# Patient Record
Sex: FEMALE | HISPANIC OR LATINO | Employment: OTHER
[De-identification: names, ages, dates, MRNs, and addresses within clinical notes are randomized per-mention and may not be internally consistent; named-entity substitution may affect disease eponyms.]

---

## 2017-01-16 ENCOUNTER — RECORDS - HEALTHEAST (OUTPATIENT)
Dept: ADMINISTRATIVE | Facility: OTHER | Age: 77
End: 2017-01-16

## 2017-01-16 ENCOUNTER — COMMUNICATION - HEALTHEAST (OUTPATIENT)
Dept: FAMILY MEDICINE | Facility: CLINIC | Age: 77
End: 2017-01-16

## 2017-01-16 DIAGNOSIS — F41.1 ANXIETY STATE: ICD-10-CM

## 2017-01-17 ENCOUNTER — COMMUNICATION - HEALTHEAST (OUTPATIENT)
Dept: NEUROLOGY | Facility: CLINIC | Age: 77
End: 2017-01-17

## 2017-01-19 ENCOUNTER — RECORDS - HEALTHEAST (OUTPATIENT)
Dept: ADMINISTRATIVE | Facility: OTHER | Age: 77
End: 2017-01-19

## 2017-01-27 ENCOUNTER — COMMUNICATION - HEALTHEAST (OUTPATIENT)
Dept: FAMILY MEDICINE | Facility: CLINIC | Age: 77
End: 2017-01-27

## 2017-01-27 ENCOUNTER — RECORDS - HEALTHEAST (OUTPATIENT)
Dept: ADMINISTRATIVE | Facility: OTHER | Age: 77
End: 2017-01-27

## 2017-02-10 ENCOUNTER — COMMUNICATION - HEALTHEAST (OUTPATIENT)
Dept: FAMILY MEDICINE | Facility: CLINIC | Age: 77
End: 2017-02-10

## 2017-02-10 ENCOUNTER — RECORDS - HEALTHEAST (OUTPATIENT)
Dept: ADMINISTRATIVE | Facility: OTHER | Age: 77
End: 2017-02-10

## 2017-02-15 ENCOUNTER — RECORDS - HEALTHEAST (OUTPATIENT)
Dept: ADMINISTRATIVE | Facility: OTHER | Age: 77
End: 2017-02-15

## 2017-03-22 ENCOUNTER — RECORDS - HEALTHEAST (OUTPATIENT)
Dept: ADMINISTRATIVE | Facility: OTHER | Age: 77
End: 2017-03-22

## 2017-03-23 ENCOUNTER — OFFICE VISIT - HEALTHEAST (OUTPATIENT)
Dept: FAMILY MEDICINE | Facility: CLINIC | Age: 77
End: 2017-03-23

## 2017-03-23 ENCOUNTER — COMMUNICATION - HEALTHEAST (OUTPATIENT)
Dept: TELEHEALTH | Facility: CLINIC | Age: 77
End: 2017-03-23

## 2017-03-23 ENCOUNTER — RECORDS - HEALTHEAST (OUTPATIENT)
Dept: ADMINISTRATIVE | Facility: OTHER | Age: 77
End: 2017-03-23

## 2017-03-23 DIAGNOSIS — I10 ESSENTIAL HYPERTENSION: ICD-10-CM

## 2017-03-23 DIAGNOSIS — E11.9 DIABETES (H): ICD-10-CM

## 2017-03-23 LAB
HBA1C MFR BLD: 7.8 % (ref 3.5–6.1)
LDLC SERPL CALC-MCNC: 120 MG/DL

## 2017-03-24 ENCOUNTER — COMMUNICATION - HEALTHEAST (OUTPATIENT)
Dept: FAMILY MEDICINE | Facility: CLINIC | Age: 77
End: 2017-03-24

## 2017-03-24 ENCOUNTER — AMBULATORY - HEALTHEAST (OUTPATIENT)
Dept: FAMILY MEDICINE | Facility: CLINIC | Age: 77
End: 2017-03-24

## 2017-04-17 ENCOUNTER — RECORDS - HEALTHEAST (OUTPATIENT)
Dept: ADMINISTRATIVE | Facility: OTHER | Age: 77
End: 2017-04-17

## 2017-04-20 ENCOUNTER — RECORDS - HEALTHEAST (OUTPATIENT)
Dept: ADMINISTRATIVE | Facility: OTHER | Age: 77
End: 2017-04-20

## 2017-04-22 ENCOUNTER — COMMUNICATION - HEALTHEAST (OUTPATIENT)
Dept: FAMILY MEDICINE | Facility: CLINIC | Age: 77
End: 2017-04-22

## 2017-04-22 DIAGNOSIS — F43.21 ADJUSTMENT DISORDER WITH DEPRESSED MOOD: ICD-10-CM

## 2017-04-22 DIAGNOSIS — I10 HTN (HYPERTENSION): ICD-10-CM

## 2017-04-22 DIAGNOSIS — K21.9 GERD WITHOUT ESOPHAGITIS: ICD-10-CM

## 2017-05-05 ENCOUNTER — HOSPITAL ENCOUNTER (OUTPATIENT)
Dept: NEUROLOGY | Facility: CLINIC | Age: 77
Setting detail: THERAPIES SERIES
Discharge: STILL ADMITTED OR ADMINISTRATIVE | End: 2017-05-05
Attending: NURSE PRACTITIONER

## 2017-05-05 DIAGNOSIS — J30.2 SEASONAL ALLERGIC RHINITIS: ICD-10-CM

## 2017-05-05 DIAGNOSIS — F41.1 ANXIETY STATE: ICD-10-CM

## 2017-06-12 ENCOUNTER — AMBULATORY - HEALTHEAST (OUTPATIENT)
Dept: ADMINISTRATIVE | Facility: CLINIC | Age: 77
End: 2017-06-12

## 2017-06-12 ENCOUNTER — OFFICE VISIT - HEALTHEAST (OUTPATIENT)
Dept: GERIATRICS | Facility: CLINIC | Age: 77
End: 2017-06-12

## 2017-06-12 DIAGNOSIS — E66.9 OBESITY: ICD-10-CM

## 2017-06-12 DIAGNOSIS — I10 ESSENTIAL HYPERTENSION: ICD-10-CM

## 2017-06-12 DIAGNOSIS — R41.3 MEMORY LOSS: ICD-10-CM

## 2017-06-12 DIAGNOSIS — I07.9 DISEASE OF TRICUSPID VALVE: ICD-10-CM

## 2017-06-12 RX ORDER — CETIRIZINE HYDROCHLORIDE 10 MG/1
10 TABLET ORAL DAILY PRN
Status: SHIPPED | COMMUNITY
Start: 2017-06-12 | End: 2021-10-25

## 2017-06-23 ENCOUNTER — RECORDS - HEALTHEAST (OUTPATIENT)
Dept: ADMINISTRATIVE | Facility: OTHER | Age: 77
End: 2017-06-23

## 2017-07-17 ENCOUNTER — OFFICE VISIT - HEALTHEAST (OUTPATIENT)
Dept: GERIATRICS | Facility: CLINIC | Age: 77
End: 2017-07-17

## 2017-07-17 DIAGNOSIS — G47.33 OBSTRUCTIVE SLEEP APNEA (ADULT) (PEDIATRIC): ICD-10-CM

## 2017-07-17 DIAGNOSIS — R60.0 BILATERAL LOWER EXTREMITY EDEMA: ICD-10-CM

## 2017-07-17 DIAGNOSIS — I10 ESSENTIAL HYPERTENSION WITH GOAL BLOOD PRESSURE LESS THAN 130/80: ICD-10-CM

## 2017-07-17 DIAGNOSIS — E11.65 TYPE 2 DIABETES MELLITUS WITH HYPERGLYCEMIA, WITHOUT LONG-TERM CURRENT USE OF INSULIN (H): ICD-10-CM

## 2017-07-17 ASSESSMENT — MIFFLIN-ST. JEOR: SCORE: 1508.27

## 2017-08-09 ENCOUNTER — OFFICE VISIT - HEALTHEAST (OUTPATIENT)
Dept: GERIATRICS | Facility: CLINIC | Age: 77
End: 2017-08-09

## 2017-08-09 DIAGNOSIS — I10 ESSENTIAL HYPERTENSION WITH GOAL BLOOD PRESSURE LESS THAN 130/80: ICD-10-CM

## 2017-08-09 DIAGNOSIS — G47.33 OBSTRUCTIVE SLEEP APNEA (ADULT) (PEDIATRIC): ICD-10-CM

## 2017-08-09 DIAGNOSIS — E11.65 TYPE 2 DIABETES MELLITUS WITH HYPERGLYCEMIA, WITHOUT LONG-TERM CURRENT USE OF INSULIN (H): ICD-10-CM

## 2017-08-09 DIAGNOSIS — R60.0 BILATERAL LOWER EXTREMITY EDEMA: ICD-10-CM

## 2017-08-10 ENCOUNTER — RECORDS - HEALTHEAST (OUTPATIENT)
Dept: LAB | Facility: CLINIC | Age: 77
End: 2017-08-10

## 2017-08-10 LAB — HBA1C MFR BLD: 8.5 % (ref 4.2–6.1)

## 2017-09-14 ENCOUNTER — OFFICE VISIT - HEALTHEAST (OUTPATIENT)
Dept: GERIATRICS | Facility: CLINIC | Age: 77
End: 2017-09-14

## 2017-09-14 DIAGNOSIS — I10 ESSENTIAL HYPERTENSION WITH GOAL BLOOD PRESSURE LESS THAN 130/80: ICD-10-CM

## 2017-09-14 DIAGNOSIS — E87.6 POTASSIUM (K) DEFICIENCY: ICD-10-CM

## 2017-09-14 DIAGNOSIS — F41.1 ANXIETY STATE: ICD-10-CM

## 2017-09-14 DIAGNOSIS — N18.30 RENAL FAILURE, CHRONIC, STAGE 3 (MODERATE) (H): ICD-10-CM

## 2017-09-14 DIAGNOSIS — E11.65 TYPE 2 DIABETES MELLITUS WITH HYPERGLYCEMIA, WITHOUT LONG-TERM CURRENT USE OF INSULIN (H): ICD-10-CM

## 2017-09-14 RX ORDER — METOPROLOL SUCCINATE 25 MG/1
25 TABLET, EXTENDED RELEASE ORAL DAILY
Status: SHIPPED | COMMUNITY
Start: 2017-09-14 | End: 2021-10-25

## 2017-09-14 RX ORDER — MINERAL OIL/HYDROPHIL PETROLAT
1 OINTMENT (GRAM) TOPICAL 3 TIMES DAILY PRN
Status: SHIPPED | COMMUNITY
Start: 2017-09-14 | End: 2021-10-25

## 2017-10-11 ENCOUNTER — OFFICE VISIT - HEALTHEAST (OUTPATIENT)
Dept: GERIATRICS | Facility: CLINIC | Age: 77
End: 2017-10-11

## 2017-10-11 DIAGNOSIS — F41.1 ANXIETY STATE: ICD-10-CM

## 2017-10-11 DIAGNOSIS — E87.6 POTASSIUM (K) DEFICIENCY: ICD-10-CM

## 2017-10-11 DIAGNOSIS — N18.30 RENAL FAILURE, CHRONIC, STAGE 3 (MODERATE) (H): ICD-10-CM

## 2017-10-11 DIAGNOSIS — E11.65 TYPE 2 DIABETES MELLITUS WITH HYPERGLYCEMIA, WITHOUT LONG-TERM CURRENT USE OF INSULIN (H): ICD-10-CM

## 2017-10-11 DIAGNOSIS — I10 ESSENTIAL HYPERTENSION: ICD-10-CM

## 2017-11-24 ENCOUNTER — RECORDS - HEALTHEAST (OUTPATIENT)
Dept: LAB | Facility: CLINIC | Age: 77
End: 2017-11-24

## 2017-11-27 LAB
CHOLEST SERPL-MCNC: 183 MG/DL
FASTING STATUS PATIENT QL REPORTED: NORMAL
HDLC SERPL-MCNC: 60 MG/DL
LDLC SERPL CALC-MCNC: 103 MG/DL
TRIGL SERPL-MCNC: 102 MG/DL

## 2018-01-08 ENCOUNTER — HOSPITAL ENCOUNTER (OUTPATIENT)
Dept: NEUROLOGY | Facility: CLINIC | Age: 78
Setting detail: THERAPIES SERIES
Discharge: STILL ADMITTED OR ADMINISTRATIVE | End: 2018-01-08
Attending: NURSE PRACTITIONER

## 2018-01-08 DIAGNOSIS — F02.80 ALZHEIMER'S DEMENTIA WITHOUT BEHAVIORAL DISTURBANCE, UNSPECIFIED TIMING OF DEMENTIA ONSET: ICD-10-CM

## 2018-01-08 DIAGNOSIS — G30.9 ALZHEIMER'S DEMENTIA WITHOUT BEHAVIORAL DISTURBANCE, UNSPECIFIED TIMING OF DEMENTIA ONSET: ICD-10-CM

## 2018-01-08 DIAGNOSIS — R41.3 MEMORY LOSS: ICD-10-CM

## 2018-01-08 DIAGNOSIS — R79.89 LOW VITAMIN B12 LEVEL: ICD-10-CM

## 2018-01-08 DIAGNOSIS — F41.1 ANXIETY STATE: ICD-10-CM

## 2018-01-08 LAB — VIT B12 SERPL-MCNC: 487 PG/ML (ref 213–816)

## 2018-01-24 ENCOUNTER — RECORDS - HEALTHEAST (OUTPATIENT)
Dept: LAB | Facility: CLINIC | Age: 78
End: 2018-01-24

## 2018-01-26 LAB — 25(OH)D3 SERPL-MCNC: 42.3 NG/ML (ref 30–80)

## 2018-03-07 ENCOUNTER — OFFICE VISIT - HEALTHEAST (OUTPATIENT)
Dept: GERIATRICS | Facility: CLINIC | Age: 78
End: 2018-03-07

## 2018-03-07 DIAGNOSIS — E11.65 TYPE 2 DIABETES MELLITUS WITH HYPERGLYCEMIA, WITHOUT LONG-TERM CURRENT USE OF INSULIN (H): ICD-10-CM

## 2018-03-07 DIAGNOSIS — I10 ESSENTIAL HYPERTENSION WITH GOAL BLOOD PRESSURE LESS THAN 130/80: ICD-10-CM

## 2018-03-07 DIAGNOSIS — R41.3 MEMORY LOSS: ICD-10-CM

## 2018-03-07 DIAGNOSIS — N18.30 RENAL FAILURE, CHRONIC, STAGE 3 (MODERATE) (H): ICD-10-CM

## 2018-03-16 ENCOUNTER — HOSPITAL ENCOUNTER (OUTPATIENT)
Dept: NEUROLOGY | Facility: CLINIC | Age: 78
Setting detail: THERAPIES SERIES
Discharge: STILL ADMITTED OR ADMINISTRATIVE | End: 2018-03-16
Attending: NURSE PRACTITIONER

## 2018-03-16 DIAGNOSIS — R41.3 MEMORY LOSS: ICD-10-CM

## 2018-03-16 DIAGNOSIS — G31.84 MILD NEUROCOGNITIVE DISORDER: ICD-10-CM

## 2018-03-17 ENCOUNTER — RECORDS - HEALTHEAST (OUTPATIENT)
Dept: LAB | Facility: CLINIC | Age: 78
End: 2018-03-17

## 2018-03-17 LAB
CREAT SERPL-MCNC: 1.18 MG/DL (ref 0.6–1.1)
GFR SERPL CREATININE-BSD FRML MDRD: 45 ML/MIN/1.73M2

## 2018-03-28 ENCOUNTER — HOSPITAL ENCOUNTER (OUTPATIENT)
Dept: NEUROLOGY | Facility: CLINIC | Age: 78
Setting detail: THERAPIES SERIES
Discharge: STILL ADMITTED OR ADMINISTRATIVE | End: 2018-03-28
Attending: NURSE PRACTITIONER

## 2018-03-28 DIAGNOSIS — G31.84 MILD NEUROCOGNITIVE DISORDER: ICD-10-CM

## 2018-04-12 ENCOUNTER — OFFICE VISIT - HEALTHEAST (OUTPATIENT)
Dept: GERIATRICS | Facility: CLINIC | Age: 78
End: 2018-04-12

## 2018-04-12 DIAGNOSIS — F41.1 ANXIETY STATE: ICD-10-CM

## 2018-04-12 DIAGNOSIS — E11.65 TYPE 2 DIABETES MELLITUS WITH HYPERGLYCEMIA, WITHOUT LONG-TERM CURRENT USE OF INSULIN (H): ICD-10-CM

## 2018-04-12 DIAGNOSIS — R60.0 BILATERAL LOWER EXTREMITY EDEMA: ICD-10-CM

## 2018-04-12 DIAGNOSIS — I10 ESSENTIAL HYPERTENSION: ICD-10-CM

## 2018-04-12 DIAGNOSIS — N18.30 RENAL FAILURE, CHRONIC, STAGE 3 (MODERATE) (H): ICD-10-CM

## 2018-04-12 DIAGNOSIS — R73.09 OTHER ABNORMAL GLUCOSE: ICD-10-CM

## 2018-04-12 DIAGNOSIS — K21.00 GASTROESOPHAGEAL REFLUX DISEASE WITH ESOPHAGITIS: ICD-10-CM

## 2018-04-12 DIAGNOSIS — E87.6 POTASSIUM (K) DEFICIENCY: ICD-10-CM

## 2018-04-13 ENCOUNTER — RECORDS - HEALTHEAST (OUTPATIENT)
Dept: LAB | Facility: CLINIC | Age: 78
End: 2018-04-13

## 2018-04-16 ENCOUNTER — COMMUNICATION - HEALTHEAST (OUTPATIENT)
Dept: NEUROLOGY | Facility: CLINIC | Age: 78
End: 2018-04-16

## 2018-04-17 LAB — HBA1C MFR BLD: 6.5 % (ref 4.2–6.1)

## 2018-05-18 ENCOUNTER — HOSPITAL ENCOUNTER (OUTPATIENT)
Dept: NEUROLOGY | Facility: CLINIC | Age: 78
Setting detail: THERAPIES SERIES
Discharge: STILL ADMITTED OR ADMINISTRATIVE | End: 2018-05-18
Attending: NURSE PRACTITIONER

## 2018-05-18 DIAGNOSIS — F02.80 MAJOR NEUROCOGNITIVE DISORDER DUE TO MULTIPLE ETIOLOGIES WITHOUT BEHAVIORAL DISTURBANCE (H): ICD-10-CM

## 2018-06-18 ENCOUNTER — OFFICE VISIT - HEALTHEAST (OUTPATIENT)
Dept: GERIATRICS | Facility: CLINIC | Age: 78
End: 2018-06-18

## 2018-06-18 DIAGNOSIS — I10 ESSENTIAL HYPERTENSION: ICD-10-CM

## 2018-06-18 DIAGNOSIS — E11.65 TYPE 2 DIABETES MELLITUS WITH HYPERGLYCEMIA, WITHOUT LONG-TERM CURRENT USE OF INSULIN (H): ICD-10-CM

## 2018-06-18 DIAGNOSIS — F02.80 ALZHEIMER'S DEMENTIA WITHOUT BEHAVIORAL DISTURBANCE, UNSPECIFIED TIMING OF DEMENTIA ONSET: ICD-10-CM

## 2018-06-18 DIAGNOSIS — K21.00 GASTROESOPHAGEAL REFLUX DISEASE WITH ESOPHAGITIS: ICD-10-CM

## 2018-06-18 DIAGNOSIS — N18.30 RENAL FAILURE, CHRONIC, STAGE 3 (MODERATE) (H): ICD-10-CM

## 2018-06-18 DIAGNOSIS — G30.9 ALZHEIMER'S DEMENTIA WITHOUT BEHAVIORAL DISTURBANCE, UNSPECIFIED TIMING OF DEMENTIA ONSET: ICD-10-CM

## 2018-06-18 DIAGNOSIS — E87.6 POTASSIUM (K) DEFICIENCY: ICD-10-CM

## 2018-06-18 DIAGNOSIS — F41.1 ANXIETY STATE: ICD-10-CM

## 2018-06-22 ENCOUNTER — RECORDS - HEALTHEAST (OUTPATIENT)
Dept: LAB | Facility: CLINIC | Age: 78
End: 2018-06-22

## 2018-06-25 LAB
ANION GAP SERPL CALCULATED.3IONS-SCNC: 10 MMOL/L (ref 5–18)
BUN SERPL-MCNC: 31 MG/DL (ref 8–28)
CALCIUM SERPL-MCNC: 9.6 MG/DL (ref 8.5–10.5)
CHLORIDE BLD-SCNC: 103 MMOL/L (ref 98–107)
CO2 SERPL-SCNC: 25 MMOL/L (ref 22–31)
CREAT SERPL-MCNC: 1.27 MG/DL (ref 0.6–1.1)
GFR SERPL CREATININE-BSD FRML MDRD: 41 ML/MIN/1.73M2
GLUCOSE BLD-MCNC: 185 MG/DL (ref 70–125)
HBA1C MFR BLD: 7 % (ref 4.2–6.1)
MAGNESIUM SERPL-MCNC: 2.3 MG/DL (ref 1.8–2.6)
POTASSIUM BLD-SCNC: 4.4 MMOL/L (ref 3.5–5)
SODIUM SERPL-SCNC: 138 MMOL/L (ref 136–145)
TSH SERPL DL<=0.005 MIU/L-ACNC: 2.71 UIU/ML (ref 0.3–5)

## 2018-07-03 ENCOUNTER — OFFICE VISIT - HEALTHEAST (OUTPATIENT)
Dept: GERIATRICS | Facility: CLINIC | Age: 78
End: 2018-07-03

## 2018-07-03 DIAGNOSIS — F02.80 ALZHEIMER'S DEMENTIA WITHOUT BEHAVIORAL DISTURBANCE, UNSPECIFIED TIMING OF DEMENTIA ONSET: ICD-10-CM

## 2018-07-03 DIAGNOSIS — E87.6 POTASSIUM (K) DEFICIENCY: ICD-10-CM

## 2018-07-03 DIAGNOSIS — R60.0 BILATERAL LOWER EXTREMITY EDEMA: ICD-10-CM

## 2018-07-03 DIAGNOSIS — E11.65 TYPE 2 DIABETES MELLITUS WITH HYPERGLYCEMIA, WITHOUT LONG-TERM CURRENT USE OF INSULIN (H): ICD-10-CM

## 2018-07-03 DIAGNOSIS — F41.1 ANXIETY STATE: ICD-10-CM

## 2018-07-03 DIAGNOSIS — I10 ESSENTIAL HYPERTENSION: ICD-10-CM

## 2018-07-03 DIAGNOSIS — K21.00 GASTROESOPHAGEAL REFLUX DISEASE WITH ESOPHAGITIS: ICD-10-CM

## 2018-07-03 DIAGNOSIS — N18.30 RENAL FAILURE, CHRONIC, STAGE 3 (MODERATE) (H): ICD-10-CM

## 2018-07-03 DIAGNOSIS — G30.9 ALZHEIMER'S DEMENTIA WITHOUT BEHAVIORAL DISTURBANCE, UNSPECIFIED TIMING OF DEMENTIA ONSET: ICD-10-CM

## 2018-07-09 ENCOUNTER — OFFICE VISIT - HEALTHEAST (OUTPATIENT)
Dept: GERIATRICS | Facility: CLINIC | Age: 78
End: 2018-07-09

## 2018-07-09 DIAGNOSIS — G30.9 ALZHEIMER'S DEMENTIA WITHOUT BEHAVIORAL DISTURBANCE, UNSPECIFIED TIMING OF DEMENTIA ONSET: ICD-10-CM

## 2018-07-09 DIAGNOSIS — R60.0 BILATERAL LOWER EXTREMITY EDEMA: ICD-10-CM

## 2018-07-09 DIAGNOSIS — E11.65 TYPE 2 DIABETES MELLITUS WITH HYPERGLYCEMIA, WITHOUT LONG-TERM CURRENT USE OF INSULIN (H): ICD-10-CM

## 2018-07-09 DIAGNOSIS — F02.80 ALZHEIMER'S DEMENTIA WITHOUT BEHAVIORAL DISTURBANCE, UNSPECIFIED TIMING OF DEMENTIA ONSET: ICD-10-CM

## 2018-07-09 DIAGNOSIS — N18.30 RENAL FAILURE, CHRONIC, STAGE 3 (MODERATE) (H): ICD-10-CM

## 2018-07-10 ENCOUNTER — HOSPITAL ENCOUNTER (OUTPATIENT)
Dept: NEUROLOGY | Facility: CLINIC | Age: 78
Setting detail: THERAPIES SERIES
Discharge: STILL ADMITTED OR ADMINISTRATIVE | End: 2018-07-10
Attending: NURSE PRACTITIONER

## 2018-07-10 DIAGNOSIS — F02.80 MAJOR NEUROCOGNITIVE DISORDER DUE TO MULTIPLE ETIOLOGIES WITHOUT BEHAVIORAL DISTURBANCE (H): ICD-10-CM

## 2018-07-11 ENCOUNTER — OFFICE VISIT - HEALTHEAST (OUTPATIENT)
Dept: GERIATRICS | Facility: CLINIC | Age: 78
End: 2018-07-11

## 2018-07-11 DIAGNOSIS — I10 ESSENTIAL HYPERTENSION: ICD-10-CM

## 2018-07-11 DIAGNOSIS — E11.65 TYPE 2 DIABETES MELLITUS WITH HYPERGLYCEMIA, WITHOUT LONG-TERM CURRENT USE OF INSULIN (H): ICD-10-CM

## 2018-07-11 DIAGNOSIS — N18.30 RENAL FAILURE, CHRONIC, STAGE 3 (MODERATE) (H): ICD-10-CM

## 2018-07-11 DIAGNOSIS — K21.00 GASTROESOPHAGEAL REFLUX DISEASE WITH ESOPHAGITIS: ICD-10-CM

## 2018-07-11 DIAGNOSIS — G30.9 ALZHEIMER'S DEMENTIA WITHOUT BEHAVIORAL DISTURBANCE, UNSPECIFIED TIMING OF DEMENTIA ONSET: ICD-10-CM

## 2018-07-11 DIAGNOSIS — E87.6 POTASSIUM (K) DEFICIENCY: ICD-10-CM

## 2018-07-11 DIAGNOSIS — F02.80 ALZHEIMER'S DEMENTIA WITHOUT BEHAVIORAL DISTURBANCE, UNSPECIFIED TIMING OF DEMENTIA ONSET: ICD-10-CM

## 2018-07-12 ENCOUNTER — RECORDS - HEALTHEAST (OUTPATIENT)
Dept: LAB | Facility: CLINIC | Age: 78
End: 2018-07-12

## 2018-07-12 LAB
ANION GAP SERPL CALCULATED.3IONS-SCNC: 8 MMOL/L (ref 5–18)
BUN SERPL-MCNC: 28 MG/DL (ref 8–28)
CALCIUM SERPL-MCNC: 9.4 MG/DL (ref 8.5–10.5)
CHLORIDE BLD-SCNC: 100 MMOL/L (ref 98–107)
CO2 SERPL-SCNC: 30 MMOL/L (ref 22–31)
CREAT SERPL-MCNC: 1.32 MG/DL (ref 0.6–1.1)
GFR SERPL CREATININE-BSD FRML MDRD: 39 ML/MIN/1.73M2
GLUCOSE BLD-MCNC: 229 MG/DL (ref 70–125)
POTASSIUM BLD-SCNC: 4 MMOL/L (ref 3.5–5)
SODIUM SERPL-SCNC: 138 MMOL/L (ref 136–145)

## 2018-08-15 ENCOUNTER — OFFICE VISIT - HEALTHEAST (OUTPATIENT)
Dept: GERIATRICS | Facility: CLINIC | Age: 78
End: 2018-08-15

## 2018-08-15 DIAGNOSIS — F02.80 ALZHEIMER'S DEMENTIA WITHOUT BEHAVIORAL DISTURBANCE, UNSPECIFIED TIMING OF DEMENTIA ONSET: ICD-10-CM

## 2018-08-15 DIAGNOSIS — E87.6 POTASSIUM (K) DEFICIENCY: ICD-10-CM

## 2018-08-15 DIAGNOSIS — I10 ESSENTIAL HYPERTENSION: ICD-10-CM

## 2018-08-15 DIAGNOSIS — E11.65 TYPE 2 DIABETES MELLITUS WITH HYPERGLYCEMIA, WITHOUT LONG-TERM CURRENT USE OF INSULIN (H): ICD-10-CM

## 2018-08-15 DIAGNOSIS — R60.0 BILATERAL LOWER EXTREMITY EDEMA: ICD-10-CM

## 2018-08-15 DIAGNOSIS — G30.9 ALZHEIMER'S DEMENTIA WITHOUT BEHAVIORAL DISTURBANCE, UNSPECIFIED TIMING OF DEMENTIA ONSET: ICD-10-CM

## 2018-08-15 DIAGNOSIS — F41.1 ANXIETY STATE: ICD-10-CM

## 2018-08-15 DIAGNOSIS — N18.30 RENAL FAILURE, CHRONIC, STAGE 3 (MODERATE) (H): ICD-10-CM

## 2018-08-16 RX ORDER — HYDRALAZINE HYDROCHLORIDE 25 MG/1
50 TABLET, FILM COATED ORAL DAILY
Status: SHIPPED | COMMUNITY
Start: 2018-08-16 | End: 2021-10-25

## 2018-08-23 ENCOUNTER — RECORDS - HEALTHEAST (OUTPATIENT)
Dept: LAB | Facility: CLINIC | Age: 78
End: 2018-08-23

## 2018-08-23 LAB
ANION GAP SERPL CALCULATED.3IONS-SCNC: 9 MMOL/L (ref 5–18)
BUN SERPL-MCNC: 25 MG/DL (ref 8–28)
CALCIUM SERPL-MCNC: 9.5 MG/DL (ref 8.5–10.5)
CHLORIDE BLD-SCNC: 101 MMOL/L (ref 98–107)
CO2 SERPL-SCNC: 30 MMOL/L (ref 22–31)
CREAT SERPL-MCNC: 1.11 MG/DL (ref 0.6–1.1)
GFR SERPL CREATININE-BSD FRML MDRD: 48 ML/MIN/1.73M2
GLUCOSE BLD-MCNC: 121 MG/DL (ref 70–125)
POTASSIUM BLD-SCNC: 4.2 MMOL/L (ref 3.5–5)
SODIUM SERPL-SCNC: 140 MMOL/L (ref 136–145)

## 2018-08-24 ENCOUNTER — OFFICE VISIT - HEALTHEAST (OUTPATIENT)
Dept: GERIATRICS | Facility: CLINIC | Age: 78
End: 2018-08-24

## 2018-08-24 DIAGNOSIS — E87.6 POTASSIUM (K) DEFICIENCY: ICD-10-CM

## 2018-08-24 DIAGNOSIS — G30.9 ALZHEIMER'S DEMENTIA WITHOUT BEHAVIORAL DISTURBANCE, UNSPECIFIED TIMING OF DEMENTIA ONSET: ICD-10-CM

## 2018-08-24 DIAGNOSIS — I10 ESSENTIAL HYPERTENSION: ICD-10-CM

## 2018-08-24 DIAGNOSIS — F02.80 ALZHEIMER'S DEMENTIA WITHOUT BEHAVIORAL DISTURBANCE, UNSPECIFIED TIMING OF DEMENTIA ONSET: ICD-10-CM

## 2018-08-24 DIAGNOSIS — N18.30 RENAL FAILURE, CHRONIC, STAGE 3 (MODERATE) (H): ICD-10-CM

## 2018-08-24 DIAGNOSIS — F41.1 ANXIETY STATE: ICD-10-CM

## 2018-08-24 DIAGNOSIS — R60.0 BILATERAL LOWER EXTREMITY EDEMA: ICD-10-CM

## 2018-08-24 DIAGNOSIS — E11.65 TYPE 2 DIABETES MELLITUS WITH HYPERGLYCEMIA, WITHOUT LONG-TERM CURRENT USE OF INSULIN (H): ICD-10-CM

## 2018-09-06 ENCOUNTER — OFFICE VISIT - HEALTHEAST (OUTPATIENT)
Dept: GERIATRICS | Facility: CLINIC | Age: 78
End: 2018-09-06

## 2018-09-06 DIAGNOSIS — K21.00 GASTROESOPHAGEAL REFLUX DISEASE WITH ESOPHAGITIS: ICD-10-CM

## 2018-09-06 DIAGNOSIS — R60.0 BILATERAL LOWER EXTREMITY EDEMA: ICD-10-CM

## 2018-09-06 DIAGNOSIS — I10 ESSENTIAL HYPERTENSION: ICD-10-CM

## 2018-09-06 DIAGNOSIS — F41.1 ANXIETY STATE: ICD-10-CM

## 2018-09-06 DIAGNOSIS — E11.65 TYPE 2 DIABETES MELLITUS WITH HYPERGLYCEMIA, WITHOUT LONG-TERM CURRENT USE OF INSULIN (H): ICD-10-CM

## 2018-09-06 DIAGNOSIS — F02.80 ALZHEIMER'S DEMENTIA WITHOUT BEHAVIORAL DISTURBANCE, UNSPECIFIED TIMING OF DEMENTIA ONSET: ICD-10-CM

## 2018-09-06 DIAGNOSIS — E87.6 POTASSIUM (K) DEFICIENCY: ICD-10-CM

## 2018-09-06 DIAGNOSIS — G30.9 ALZHEIMER'S DEMENTIA WITHOUT BEHAVIORAL DISTURBANCE, UNSPECIFIED TIMING OF DEMENTIA ONSET: ICD-10-CM

## 2018-09-06 DIAGNOSIS — N18.30 RENAL FAILURE, CHRONIC, STAGE 3 (MODERATE) (H): ICD-10-CM

## 2018-09-14 ENCOUNTER — OFFICE VISIT - HEALTHEAST (OUTPATIENT)
Dept: GERIATRICS | Facility: CLINIC | Age: 78
End: 2018-09-14

## 2018-09-14 DIAGNOSIS — G30.9 ALZHEIMER'S DEMENTIA WITHOUT BEHAVIORAL DISTURBANCE, UNSPECIFIED TIMING OF DEMENTIA ONSET: ICD-10-CM

## 2018-09-14 DIAGNOSIS — R60.0 BILATERAL LOWER EXTREMITY EDEMA: ICD-10-CM

## 2018-09-14 DIAGNOSIS — F02.80 ALZHEIMER'S DEMENTIA WITHOUT BEHAVIORAL DISTURBANCE, UNSPECIFIED TIMING OF DEMENTIA ONSET: ICD-10-CM

## 2018-09-14 DIAGNOSIS — E11.65 TYPE 2 DIABETES MELLITUS WITH HYPERGLYCEMIA, WITHOUT LONG-TERM CURRENT USE OF INSULIN (H): ICD-10-CM

## 2018-09-14 DIAGNOSIS — E55.9 VITAMIN D DEFICIENCY: ICD-10-CM

## 2018-09-14 DIAGNOSIS — E87.6 POTASSIUM (K) DEFICIENCY: ICD-10-CM

## 2018-09-14 DIAGNOSIS — N18.30 RENAL FAILURE, CHRONIC, STAGE 3 (MODERATE) (H): ICD-10-CM

## 2018-09-14 DIAGNOSIS — F41.1 ANXIETY STATE: ICD-10-CM

## 2018-09-14 DIAGNOSIS — I10 ESSENTIAL HYPERTENSION: ICD-10-CM

## 2018-10-02 ENCOUNTER — RECORDS - HEALTHEAST (OUTPATIENT)
Dept: LAB | Facility: CLINIC | Age: 78
End: 2018-10-02

## 2018-10-03 LAB — HBA1C MFR BLD: 7.6 % (ref 4.2–6.1)

## 2018-10-04 ENCOUNTER — OFFICE VISIT - HEALTHEAST (OUTPATIENT)
Dept: GERIATRICS | Facility: CLINIC | Age: 78
End: 2018-10-04

## 2018-10-04 DIAGNOSIS — E87.6 POTASSIUM (K) DEFICIENCY: ICD-10-CM

## 2018-10-04 DIAGNOSIS — F41.1 ANXIETY STATE: ICD-10-CM

## 2018-10-04 DIAGNOSIS — G30.9 ALZHEIMER'S DEMENTIA WITHOUT BEHAVIORAL DISTURBANCE, UNSPECIFIED TIMING OF DEMENTIA ONSET: ICD-10-CM

## 2018-10-04 DIAGNOSIS — F02.80 ALZHEIMER'S DEMENTIA WITHOUT BEHAVIORAL DISTURBANCE, UNSPECIFIED TIMING OF DEMENTIA ONSET: ICD-10-CM

## 2018-10-04 DIAGNOSIS — N18.30 RENAL FAILURE, CHRONIC, STAGE 3 (MODERATE) (H): ICD-10-CM

## 2018-10-04 DIAGNOSIS — E55.9 VITAMIN D DEFICIENCY: ICD-10-CM

## 2018-10-04 DIAGNOSIS — I10 ESSENTIAL HYPERTENSION: ICD-10-CM

## 2018-10-04 DIAGNOSIS — R60.0 BILATERAL LOWER EXTREMITY EDEMA: ICD-10-CM

## 2018-10-04 DIAGNOSIS — E11.65 TYPE 2 DIABETES MELLITUS WITH HYPERGLYCEMIA, WITHOUT LONG-TERM CURRENT USE OF INSULIN (H): ICD-10-CM

## 2018-10-12 ENCOUNTER — OFFICE VISIT - HEALTHEAST (OUTPATIENT)
Dept: GERIATRICS | Facility: CLINIC | Age: 78
End: 2018-10-12

## 2018-10-12 DIAGNOSIS — E11.65 TYPE 2 DIABETES MELLITUS WITH HYPERGLYCEMIA, WITHOUT LONG-TERM CURRENT USE OF INSULIN (H): ICD-10-CM

## 2018-10-12 DIAGNOSIS — E55.9 VITAMIN D DEFICIENCY: ICD-10-CM

## 2018-10-12 DIAGNOSIS — N18.30 RENAL FAILURE, CHRONIC, STAGE 3 (MODERATE) (H): ICD-10-CM

## 2018-10-12 DIAGNOSIS — E87.6 POTASSIUM (K) DEFICIENCY: ICD-10-CM

## 2018-10-12 DIAGNOSIS — G30.9 ALZHEIMER'S DEMENTIA WITHOUT BEHAVIORAL DISTURBANCE, UNSPECIFIED TIMING OF DEMENTIA ONSET: ICD-10-CM

## 2018-10-12 DIAGNOSIS — F02.80 ALZHEIMER'S DEMENTIA WITHOUT BEHAVIORAL DISTURBANCE, UNSPECIFIED TIMING OF DEMENTIA ONSET: ICD-10-CM

## 2018-10-12 DIAGNOSIS — R60.0 BILATERAL LOWER EXTREMITY EDEMA: ICD-10-CM

## 2018-10-12 DIAGNOSIS — F41.1 ANXIETY STATE: ICD-10-CM

## 2018-10-12 DIAGNOSIS — I10 ESSENTIAL HYPERTENSION: ICD-10-CM

## 2018-10-25 ENCOUNTER — RECORDS - HEALTHEAST (OUTPATIENT)
Dept: LAB | Facility: CLINIC | Age: 78
End: 2018-10-25

## 2018-10-25 ENCOUNTER — OFFICE VISIT - HEALTHEAST (OUTPATIENT)
Dept: GERIATRICS | Facility: CLINIC | Age: 78
End: 2018-10-25

## 2018-10-25 DIAGNOSIS — N18.30 RENAL FAILURE, CHRONIC, STAGE 3 (MODERATE) (H): ICD-10-CM

## 2018-10-25 DIAGNOSIS — F02.80 ALZHEIMER'S DEMENTIA WITHOUT BEHAVIORAL DISTURBANCE, UNSPECIFIED TIMING OF DEMENTIA ONSET: ICD-10-CM

## 2018-10-25 DIAGNOSIS — E55.9 VITAMIN D DEFICIENCY: ICD-10-CM

## 2018-10-25 DIAGNOSIS — F41.1 ANXIETY STATE: ICD-10-CM

## 2018-10-25 DIAGNOSIS — E87.6 POTASSIUM (K) DEFICIENCY: ICD-10-CM

## 2018-10-25 DIAGNOSIS — G30.9 ALZHEIMER'S DEMENTIA WITHOUT BEHAVIORAL DISTURBANCE, UNSPECIFIED TIMING OF DEMENTIA ONSET: ICD-10-CM

## 2018-10-25 DIAGNOSIS — I10 ESSENTIAL HYPERTENSION: ICD-10-CM

## 2018-10-25 DIAGNOSIS — E11.65 TYPE 2 DIABETES MELLITUS WITH HYPERGLYCEMIA, WITHOUT LONG-TERM CURRENT USE OF INSULIN (H): ICD-10-CM

## 2018-10-26 ENCOUNTER — COMMUNICATION - HEALTHEAST (OUTPATIENT)
Dept: GERIATRICS | Facility: CLINIC | Age: 78
End: 2018-10-26

## 2018-10-26 LAB
ANION GAP SERPL CALCULATED.3IONS-SCNC: 9 MMOL/L (ref 5–18)
BUN SERPL-MCNC: 30 MG/DL (ref 8–28)
CALCIUM SERPL-MCNC: 9.8 MG/DL (ref 8.5–10.5)
CHLORIDE BLD-SCNC: 97 MMOL/L (ref 98–107)
CO2 SERPL-SCNC: 33 MMOL/L (ref 22–31)
CREAT SERPL-MCNC: 1.21 MG/DL (ref 0.6–1.1)
GFR SERPL CREATININE-BSD FRML MDRD: 43 ML/MIN/1.73M2
GLUCOSE BLD-MCNC: 118 MG/DL (ref 70–125)
POTASSIUM BLD-SCNC: 3.7 MMOL/L (ref 3.5–5)
SODIUM SERPL-SCNC: 139 MMOL/L (ref 136–145)

## 2018-11-08 ENCOUNTER — OFFICE VISIT - HEALTHEAST (OUTPATIENT)
Dept: GERIATRICS | Facility: CLINIC | Age: 78
End: 2018-11-08

## 2018-11-08 DIAGNOSIS — F41.1 ANXIETY STATE: ICD-10-CM

## 2018-11-08 DIAGNOSIS — E11.65 TYPE 2 DIABETES MELLITUS WITH HYPERGLYCEMIA, WITHOUT LONG-TERM CURRENT USE OF INSULIN (H): ICD-10-CM

## 2018-11-08 DIAGNOSIS — I10 ESSENTIAL HYPERTENSION: ICD-10-CM

## 2018-11-08 DIAGNOSIS — R60.0 BILATERAL LOWER EXTREMITY EDEMA: ICD-10-CM

## 2018-11-08 DIAGNOSIS — G30.9 ALZHEIMER'S DEMENTIA WITHOUT BEHAVIORAL DISTURBANCE, UNSPECIFIED TIMING OF DEMENTIA ONSET: ICD-10-CM

## 2018-11-08 DIAGNOSIS — E55.9 VITAMIN D DEFICIENCY: ICD-10-CM

## 2018-11-08 DIAGNOSIS — N18.30 RENAL FAILURE, CHRONIC, STAGE 3 (MODERATE) (H): ICD-10-CM

## 2018-11-08 DIAGNOSIS — F02.80 ALZHEIMER'S DEMENTIA WITHOUT BEHAVIORAL DISTURBANCE, UNSPECIFIED TIMING OF DEMENTIA ONSET: ICD-10-CM

## 2018-11-08 DIAGNOSIS — E87.6 POTASSIUM (K) DEFICIENCY: ICD-10-CM

## 2018-11-23 ENCOUNTER — OFFICE VISIT - HEALTHEAST (OUTPATIENT)
Dept: GERIATRICS | Facility: CLINIC | Age: 78
End: 2018-11-23

## 2018-11-23 ENCOUNTER — RECORDS - HEALTHEAST (OUTPATIENT)
Dept: LAB | Facility: CLINIC | Age: 78
End: 2018-11-23

## 2018-11-23 DIAGNOSIS — G30.9 ALZHEIMER'S DEMENTIA WITHOUT BEHAVIORAL DISTURBANCE, UNSPECIFIED TIMING OF DEMENTIA ONSET: ICD-10-CM

## 2018-11-23 DIAGNOSIS — G47.33 OBSTRUCTIVE SLEEP APNEA (ADULT) (PEDIATRIC): ICD-10-CM

## 2018-11-23 DIAGNOSIS — R60.0 BILATERAL LOWER EXTREMITY EDEMA: ICD-10-CM

## 2018-11-23 DIAGNOSIS — E11.65 TYPE 2 DIABETES MELLITUS WITH HYPERGLYCEMIA, WITHOUT LONG-TERM CURRENT USE OF INSULIN (H): ICD-10-CM

## 2018-11-23 DIAGNOSIS — I10 ESSENTIAL HYPERTENSION: ICD-10-CM

## 2018-11-23 DIAGNOSIS — E55.9 VITAMIN D DEFICIENCY: ICD-10-CM

## 2018-11-23 DIAGNOSIS — N18.30 RENAL FAILURE, CHRONIC, STAGE 3 (MODERATE) (H): ICD-10-CM

## 2018-11-23 DIAGNOSIS — E87.6 POTASSIUM (K) DEFICIENCY: ICD-10-CM

## 2018-11-23 DIAGNOSIS — F02.80 ALZHEIMER'S DEMENTIA WITHOUT BEHAVIORAL DISTURBANCE, UNSPECIFIED TIMING OF DEMENTIA ONSET: ICD-10-CM

## 2018-11-23 LAB
CHOLEST SERPL-MCNC: 196 MG/DL
FASTING STATUS PATIENT QL REPORTED: NORMAL
HDLC SERPL-MCNC: 75 MG/DL
LDLC SERPL CALC-MCNC: 103 MG/DL
TRIGL SERPL-MCNC: 91 MG/DL

## 2018-11-28 ENCOUNTER — OFFICE VISIT - HEALTHEAST (OUTPATIENT)
Dept: GERIATRICS | Facility: CLINIC | Age: 78
End: 2018-11-28

## 2018-11-28 DIAGNOSIS — E11.65 TYPE 2 DIABETES MELLITUS WITH HYPERGLYCEMIA, WITHOUT LONG-TERM CURRENT USE OF INSULIN (H): ICD-10-CM

## 2018-11-28 DIAGNOSIS — F41.1 ANXIETY STATE: ICD-10-CM

## 2018-11-28 DIAGNOSIS — G47.33 OBSTRUCTIVE SLEEP APNEA (ADULT) (PEDIATRIC): ICD-10-CM

## 2018-11-28 DIAGNOSIS — G30.9 ALZHEIMER'S DEMENTIA WITHOUT BEHAVIORAL DISTURBANCE, UNSPECIFIED TIMING OF DEMENTIA ONSET: ICD-10-CM

## 2018-11-28 DIAGNOSIS — F02.80 ALZHEIMER'S DEMENTIA WITHOUT BEHAVIORAL DISTURBANCE, UNSPECIFIED TIMING OF DEMENTIA ONSET: ICD-10-CM

## 2018-12-20 ENCOUNTER — RECORDS - HEALTHEAST (OUTPATIENT)
Dept: LAB | Facility: CLINIC | Age: 78
End: 2018-12-20

## 2018-12-21 LAB — HBA1C MFR BLD: 7.3 % (ref 4.2–6.1)

## 2018-12-26 ENCOUNTER — AMBULATORY - HEALTHEAST (OUTPATIENT)
Dept: ADMINISTRATIVE | Facility: CLINIC | Age: 78
End: 2018-12-26

## 2019-01-02 ENCOUNTER — COMMUNICATION - HEALTHEAST (OUTPATIENT)
Dept: CARE COORDINATION | Facility: HOSPITAL | Age: 79
End: 2019-01-02

## 2019-01-09 ENCOUNTER — RECORDS - HEALTHEAST (OUTPATIENT)
Dept: LAB | Facility: CLINIC | Age: 79
End: 2019-01-09

## 2019-01-09 LAB
ANION GAP SERPL CALCULATED.3IONS-SCNC: 9 MMOL/L (ref 5–18)
BUN SERPL-MCNC: 24 MG/DL (ref 8–28)
CALCIUM SERPL-MCNC: 9 MG/DL (ref 8.5–10.5)
CHLORIDE BLD-SCNC: 98 MMOL/L (ref 98–107)
CO2 SERPL-SCNC: 32 MMOL/L (ref 22–31)
CREAT SERPL-MCNC: 1.22 MG/DL (ref 0.6–1.1)
GFR SERPL CREATININE-BSD FRML MDRD: 43 ML/MIN/1.73M2
GLUCOSE BLD-MCNC: 126 MG/DL (ref 70–125)
MAGNESIUM SERPL-MCNC: 1.7 MG/DL (ref 1.8–2.6)
POTASSIUM BLD-SCNC: 3.4 MMOL/L (ref 3.5–5)
SODIUM SERPL-SCNC: 139 MMOL/L (ref 136–145)

## 2019-01-10 ENCOUNTER — OFFICE VISIT - HEALTHEAST (OUTPATIENT)
Dept: GERIATRICS | Facility: CLINIC | Age: 79
End: 2019-01-10

## 2019-01-10 DIAGNOSIS — F02.80 ALZHEIMER'S DEMENTIA WITHOUT BEHAVIORAL DISTURBANCE, UNSPECIFIED TIMING OF DEMENTIA ONSET: ICD-10-CM

## 2019-01-10 DIAGNOSIS — E55.9 VITAMIN D DEFICIENCY: ICD-10-CM

## 2019-01-10 DIAGNOSIS — G30.9 ALZHEIMER'S DEMENTIA WITHOUT BEHAVIORAL DISTURBANCE, UNSPECIFIED TIMING OF DEMENTIA ONSET: ICD-10-CM

## 2019-01-10 DIAGNOSIS — E87.6 POTASSIUM (K) DEFICIENCY: ICD-10-CM

## 2019-01-10 DIAGNOSIS — R41.3 MEMORY LOSS: ICD-10-CM

## 2019-01-10 DIAGNOSIS — E11.65 TYPE 2 DIABETES MELLITUS WITH HYPERGLYCEMIA, WITHOUT LONG-TERM CURRENT USE OF INSULIN (H): ICD-10-CM

## 2019-01-10 DIAGNOSIS — R60.0 BILATERAL LOWER EXTREMITY EDEMA: ICD-10-CM

## 2019-01-10 DIAGNOSIS — I10 ESSENTIAL HYPERTENSION: ICD-10-CM

## 2019-01-10 DIAGNOSIS — F41.1 ANXIETY STATE: ICD-10-CM

## 2019-01-10 DIAGNOSIS — N18.30 RENAL FAILURE, CHRONIC, STAGE 3 (MODERATE) (H): ICD-10-CM

## 2019-01-11 ENCOUNTER — RECORDS - HEALTHEAST (OUTPATIENT)
Dept: LAB | Facility: CLINIC | Age: 79
End: 2019-01-11

## 2019-01-14 ENCOUNTER — RECORDS - HEALTHEAST (OUTPATIENT)
Dept: LAB | Facility: CLINIC | Age: 79
End: 2019-01-14

## 2019-01-14 ENCOUNTER — COMMUNICATION - HEALTHEAST (OUTPATIENT)
Dept: GERIATRICS | Facility: CLINIC | Age: 79
End: 2019-01-14

## 2019-01-14 LAB
ANION GAP SERPL CALCULATED.3IONS-SCNC: 13 MMOL/L (ref 5–18)
BUN SERPL-MCNC: 23 MG/DL (ref 8–28)
CALCIUM SERPL-MCNC: 10 MG/DL (ref 8.5–10.5)
CHLORIDE BLD-SCNC: 97 MMOL/L (ref 98–107)
CO2 SERPL-SCNC: 28 MMOL/L (ref 22–31)
CREAT SERPL-MCNC: 1.38 MG/DL (ref 0.6–1.1)
GFR SERPL CREATININE-BSD FRML MDRD: 37 ML/MIN/1.73M2
GLUCOSE BLD-MCNC: 229 MG/DL (ref 70–125)
HGB BLD-MCNC: 11.6 G/DL (ref 12–16)
POTASSIUM BLD-SCNC: 3.9 MMOL/L (ref 3.5–5)
SODIUM SERPL-SCNC: 138 MMOL/L (ref 136–145)

## 2019-01-15 LAB — HBA1C MFR BLD: 7.2 % (ref 4.2–6.1)

## 2019-01-18 ENCOUNTER — RECORDS - HEALTHEAST (OUTPATIENT)
Dept: LAB | Facility: CLINIC | Age: 79
End: 2019-01-18

## 2019-01-21 LAB
ANION GAP SERPL CALCULATED.3IONS-SCNC: 11 MMOL/L (ref 5–18)
BUN SERPL-MCNC: 23 MG/DL (ref 8–28)
CALCIUM SERPL-MCNC: 9.4 MG/DL (ref 8.5–10.5)
CHLORIDE BLD-SCNC: 101 MMOL/L (ref 98–107)
CO2 SERPL-SCNC: 28 MMOL/L (ref 22–31)
CREAT SERPL-MCNC: 1.03 MG/DL (ref 0.6–1.1)
GFR SERPL CREATININE-BSD FRML MDRD: 52 ML/MIN/1.73M2
GLUCOSE BLD-MCNC: 108 MG/DL (ref 70–125)
POTASSIUM BLD-SCNC: 4.4 MMOL/L (ref 3.5–5)
SODIUM SERPL-SCNC: 140 MMOL/L (ref 136–145)

## 2019-01-25 ENCOUNTER — COMMUNICATION - HEALTHEAST (OUTPATIENT)
Dept: GERIATRICS | Facility: CLINIC | Age: 79
End: 2019-01-25

## 2019-03-14 ENCOUNTER — OFFICE VISIT - HEALTHEAST (OUTPATIENT)
Dept: GERIATRICS | Facility: CLINIC | Age: 79
End: 2019-03-14

## 2019-03-14 DIAGNOSIS — N18.30 RENAL FAILURE, CHRONIC, STAGE 3 (MODERATE) (H): ICD-10-CM

## 2019-03-14 DIAGNOSIS — R60.0 BILATERAL LOWER EXTREMITY EDEMA: ICD-10-CM

## 2019-03-14 DIAGNOSIS — G30.9 ALZHEIMER'S DEMENTIA WITHOUT BEHAVIORAL DISTURBANCE, UNSPECIFIED TIMING OF DEMENTIA ONSET: ICD-10-CM

## 2019-03-14 DIAGNOSIS — E11.65 TYPE 2 DIABETES MELLITUS WITH HYPERGLYCEMIA, WITHOUT LONG-TERM CURRENT USE OF INSULIN (H): ICD-10-CM

## 2019-03-14 DIAGNOSIS — I10 ESSENTIAL HYPERTENSION: ICD-10-CM

## 2019-03-14 DIAGNOSIS — E87.6 POTASSIUM (K) DEFICIENCY: ICD-10-CM

## 2019-03-14 DIAGNOSIS — E55.9 VITAMIN D DEFICIENCY: ICD-10-CM

## 2019-03-14 DIAGNOSIS — F02.80 ALZHEIMER'S DEMENTIA WITHOUT BEHAVIORAL DISTURBANCE, UNSPECIFIED TIMING OF DEMENTIA ONSET: ICD-10-CM

## 2019-03-14 DIAGNOSIS — F41.1 ANXIETY STATE: ICD-10-CM

## 2019-03-14 RX ORDER — GABAPENTIN 100 MG/1
100 CAPSULE ORAL 3 TIMES DAILY
Status: SHIPPED | COMMUNITY
Start: 2019-03-14 | End: 2021-10-25

## 2019-03-15 ENCOUNTER — RECORDS - HEALTHEAST (OUTPATIENT)
Dept: LAB | Facility: CLINIC | Age: 79
End: 2019-03-15

## 2019-03-15 LAB
HBA1C MFR BLD: 6.9 % (ref 4.2–6.1)
MAGNESIUM SERPL-MCNC: 1.8 MG/DL (ref 1.8–2.6)

## 2019-05-20 ENCOUNTER — OFFICE VISIT - HEALTHEAST (OUTPATIENT)
Dept: GERIATRICS | Facility: CLINIC | Age: 79
End: 2019-05-20

## 2019-05-20 DIAGNOSIS — E11.65 TYPE 2 DIABETES MELLITUS WITH HYPERGLYCEMIA, WITHOUT LONG-TERM CURRENT USE OF INSULIN (H): ICD-10-CM

## 2019-05-20 DIAGNOSIS — N18.30 RENAL FAILURE, CHRONIC, STAGE 3 (MODERATE) (H): ICD-10-CM

## 2019-05-20 DIAGNOSIS — G47.33 OBSTRUCTIVE SLEEP APNEA (ADULT) (PEDIATRIC): ICD-10-CM

## 2019-05-20 DIAGNOSIS — F02.80 ALZHEIMER'S DEMENTIA WITHOUT BEHAVIORAL DISTURBANCE, UNSPECIFIED TIMING OF DEMENTIA ONSET: ICD-10-CM

## 2019-05-20 DIAGNOSIS — G30.9 ALZHEIMER'S DEMENTIA WITHOUT BEHAVIORAL DISTURBANCE, UNSPECIFIED TIMING OF DEMENTIA ONSET: ICD-10-CM

## 2019-06-12 ENCOUNTER — OFFICE VISIT - HEALTHEAST (OUTPATIENT)
Dept: GERIATRICS | Facility: CLINIC | Age: 79
End: 2019-06-12

## 2019-06-12 DIAGNOSIS — K21.00 GASTROESOPHAGEAL REFLUX DISEASE WITH ESOPHAGITIS: ICD-10-CM

## 2019-06-12 DIAGNOSIS — N18.30 RENAL FAILURE, CHRONIC, STAGE 3 (MODERATE) (H): ICD-10-CM

## 2019-06-12 DIAGNOSIS — F02.80 ALZHEIMER'S DEMENTIA WITHOUT BEHAVIORAL DISTURBANCE, UNSPECIFIED TIMING OF DEMENTIA ONSET: ICD-10-CM

## 2019-06-12 DIAGNOSIS — E11.65 TYPE 2 DIABETES MELLITUS WITH HYPERGLYCEMIA, WITHOUT LONG-TERM CURRENT USE OF INSULIN (H): ICD-10-CM

## 2019-06-12 DIAGNOSIS — R60.0 BILATERAL LOWER EXTREMITY EDEMA: ICD-10-CM

## 2019-06-12 DIAGNOSIS — I10 ESSENTIAL HYPERTENSION: ICD-10-CM

## 2019-06-12 DIAGNOSIS — G30.9 ALZHEIMER'S DEMENTIA WITHOUT BEHAVIORAL DISTURBANCE, UNSPECIFIED TIMING OF DEMENTIA ONSET: ICD-10-CM

## 2019-06-12 DIAGNOSIS — E87.6 POTASSIUM (K) DEFICIENCY: ICD-10-CM

## 2019-06-12 DIAGNOSIS — E55.9 VITAMIN D DEFICIENCY: ICD-10-CM

## 2019-06-18 ENCOUNTER — RECORDS - HEALTHEAST (OUTPATIENT)
Dept: LAB | Facility: CLINIC | Age: 79
End: 2019-06-18

## 2019-06-19 LAB
25(OH)D3 SERPL-MCNC: 33.5 NG/ML (ref 30–80)
ANION GAP SERPL CALCULATED.3IONS-SCNC: 9 MMOL/L (ref 5–18)
BUN SERPL-MCNC: 23 MG/DL (ref 8–28)
CALCIUM SERPL-MCNC: 9.7 MG/DL (ref 8.5–10.5)
CHLORIDE BLD-SCNC: 103 MMOL/L (ref 98–107)
CO2 SERPL-SCNC: 29 MMOL/L (ref 22–31)
CREAT SERPL-MCNC: 1.02 MG/DL (ref 0.6–1.1)
GFR SERPL CREATININE-BSD FRML MDRD: 53 ML/MIN/1.73M2
GLUCOSE BLD-MCNC: 109 MG/DL (ref 70–125)
HBA1C MFR BLD: 6.8 % (ref 4.2–6.1)
MAGNESIUM SERPL-MCNC: 1.9 MG/DL (ref 1.8–2.6)
POTASSIUM BLD-SCNC: 4.3 MMOL/L (ref 3.5–5)
SODIUM SERPL-SCNC: 141 MMOL/L (ref 136–145)
VIT B12 SERPL-MCNC: 309 PG/ML (ref 213–816)

## 2019-08-02 ENCOUNTER — OFFICE VISIT - HEALTHEAST (OUTPATIENT)
Dept: GERIATRICS | Facility: CLINIC | Age: 79
End: 2019-08-02

## 2019-08-02 DIAGNOSIS — R60.0 BILATERAL LOWER EXTREMITY EDEMA: ICD-10-CM

## 2019-08-02 DIAGNOSIS — F02.80 ALZHEIMER'S DEMENTIA WITHOUT BEHAVIORAL DISTURBANCE, UNSPECIFIED TIMING OF DEMENTIA ONSET: ICD-10-CM

## 2019-08-02 DIAGNOSIS — N18.30 RENAL FAILURE, CHRONIC, STAGE 3 (MODERATE) (H): ICD-10-CM

## 2019-08-02 DIAGNOSIS — E11.65 TYPE 2 DIABETES MELLITUS WITH HYPERGLYCEMIA, WITHOUT LONG-TERM CURRENT USE OF INSULIN (H): ICD-10-CM

## 2019-08-02 DIAGNOSIS — E87.6 POTASSIUM (K) DEFICIENCY: ICD-10-CM

## 2019-08-02 DIAGNOSIS — F41.1 ANXIETY STATE: ICD-10-CM

## 2019-08-02 DIAGNOSIS — G30.9 ALZHEIMER'S DEMENTIA WITHOUT BEHAVIORAL DISTURBANCE, UNSPECIFIED TIMING OF DEMENTIA ONSET: ICD-10-CM

## 2019-08-02 DIAGNOSIS — I10 ESSENTIAL HYPERTENSION: ICD-10-CM

## 2019-08-02 DIAGNOSIS — E55.9 VITAMIN D DEFICIENCY: ICD-10-CM

## 2019-09-07 ENCOUNTER — RECORDS - HEALTHEAST (OUTPATIENT)
Dept: LAB | Facility: CLINIC | Age: 79
End: 2019-09-07

## 2019-09-09 LAB — TSH SERPL DL<=0.005 MIU/L-ACNC: 2.39 UIU/ML (ref 0.3–5)

## 2019-09-10 LAB — HBA1C MFR BLD: 6.6 % (ref 4.2–6.1)

## 2019-09-16 ENCOUNTER — OFFICE VISIT - HEALTHEAST (OUTPATIENT)
Dept: GERIATRICS | Facility: CLINIC | Age: 79
End: 2019-09-16

## 2019-09-16 DIAGNOSIS — R60.0 BILATERAL LOWER EXTREMITY EDEMA: ICD-10-CM

## 2019-09-16 DIAGNOSIS — I10 ESSENTIAL HYPERTENSION: ICD-10-CM

## 2019-09-16 DIAGNOSIS — G30.9 ALZHEIMER'S DEMENTIA WITHOUT BEHAVIORAL DISTURBANCE, UNSPECIFIED TIMING OF DEMENTIA ONSET: ICD-10-CM

## 2019-09-16 DIAGNOSIS — F02.80 ALZHEIMER'S DEMENTIA WITHOUT BEHAVIORAL DISTURBANCE, UNSPECIFIED TIMING OF DEMENTIA ONSET: ICD-10-CM

## 2019-09-16 DIAGNOSIS — N18.30 RENAL FAILURE, CHRONIC, STAGE 3 (MODERATE) (H): ICD-10-CM

## 2019-09-16 DIAGNOSIS — E11.65 TYPE 2 DIABETES MELLITUS WITH HYPERGLYCEMIA, WITHOUT LONG-TERM CURRENT USE OF INSULIN (H): ICD-10-CM

## 2019-10-11 ENCOUNTER — OFFICE VISIT - HEALTHEAST (OUTPATIENT)
Dept: GERIATRICS | Facility: CLINIC | Age: 79
End: 2019-10-11

## 2019-10-11 ENCOUNTER — RECORDS - HEALTHEAST (OUTPATIENT)
Dept: LAB | Facility: CLINIC | Age: 79
End: 2019-10-11

## 2019-10-11 DIAGNOSIS — E55.9 VITAMIN D DEFICIENCY: ICD-10-CM

## 2019-10-11 DIAGNOSIS — F41.1 ANXIETY STATE: ICD-10-CM

## 2019-10-11 DIAGNOSIS — I10 ESSENTIAL HYPERTENSION: ICD-10-CM

## 2019-10-11 DIAGNOSIS — R60.0 BILATERAL LOWER EXTREMITY EDEMA: ICD-10-CM

## 2019-10-11 DIAGNOSIS — F02.80 ALZHEIMER'S DEMENTIA WITHOUT BEHAVIORAL DISTURBANCE, UNSPECIFIED TIMING OF DEMENTIA ONSET: ICD-10-CM

## 2019-10-11 DIAGNOSIS — N18.30 RENAL FAILURE, CHRONIC, STAGE 3 (MODERATE) (H): ICD-10-CM

## 2019-10-11 DIAGNOSIS — G30.9 ALZHEIMER'S DEMENTIA WITHOUT BEHAVIORAL DISTURBANCE, UNSPECIFIED TIMING OF DEMENTIA ONSET: ICD-10-CM

## 2019-10-11 DIAGNOSIS — E11.65 TYPE 2 DIABETES MELLITUS WITH HYPERGLYCEMIA, WITHOUT LONG-TERM CURRENT USE OF INSULIN (H): ICD-10-CM

## 2019-10-11 LAB
ALBUMIN UR-MCNC: NEGATIVE MG/DL
AMORPH CRY #/AREA URNS HPF: ABNORMAL /[HPF]
APPEARANCE UR: CLEAR
BACTERIA #/AREA URNS HPF: ABNORMAL HPF
BILIRUB UR QL STRIP: NEGATIVE
COLOR UR AUTO: YELLOW
GLUCOSE UR STRIP-MCNC: NEGATIVE MG/DL
HGB UR QL STRIP: NEGATIVE
KETONES UR STRIP-MCNC: NEGATIVE MG/DL
LEUKOCYTE ESTERASE UR QL STRIP: ABNORMAL
NITRATE UR QL: NEGATIVE
PH UR STRIP: 7 [PH] (ref 4.5–8)
RBC #/AREA URNS AUTO: ABNORMAL HPF
SP GR UR STRIP: 1.02 (ref 1–1.03)
SQUAMOUS #/AREA URNS AUTO: ABNORMAL LPF
UROBILINOGEN UR STRIP-ACNC: ABNORMAL
WBC #/AREA URNS AUTO: ABNORMAL HPF

## 2019-10-12 LAB — BACTERIA SPEC CULT: NO GROWTH

## 2019-12-30 ENCOUNTER — AMBULATORY - HEALTHEAST (OUTPATIENT)
Dept: ADMINISTRATIVE | Facility: CLINIC | Age: 79
End: 2019-12-30

## 2019-12-30 RX ORDER — VENLAFAXINE HYDROCHLORIDE 37.5 MG/1
37.5 CAPSULE, EXTENDED RELEASE ORAL DAILY
Status: SHIPPED | COMMUNITY
Start: 2019-12-30 | End: 2021-10-25

## 2019-12-30 RX ORDER — ACETAMINOPHEN 325 MG/1
650 TABLET ORAL 4 TIMES DAILY
Status: SHIPPED | COMMUNITY
Start: 2019-12-30 | End: 2021-10-25

## 2020-01-14 ENCOUNTER — COMMUNICATION - HEALTHEAST (OUTPATIENT)
Dept: CARE COORDINATION | Facility: HOSPITAL | Age: 80
End: 2020-01-14

## 2020-01-14 ENCOUNTER — RECORDS - HEALTHEAST (OUTPATIENT)
Dept: ADMINISTRATIVE | Facility: OTHER | Age: 80
End: 2020-01-14

## 2020-02-19 ENCOUNTER — OFFICE VISIT - HEALTHEAST (OUTPATIENT)
Dept: GERIATRICS | Facility: CLINIC | Age: 80
End: 2020-02-19

## 2020-02-19 DIAGNOSIS — E55.9 VITAMIN D DEFICIENCY: ICD-10-CM

## 2020-02-19 DIAGNOSIS — F02.80 ALZHEIMER'S DEMENTIA WITHOUT BEHAVIORAL DISTURBANCE, UNSPECIFIED TIMING OF DEMENTIA ONSET: ICD-10-CM

## 2020-02-19 DIAGNOSIS — G30.9 ALZHEIMER'S DEMENTIA WITHOUT BEHAVIORAL DISTURBANCE, UNSPECIFIED TIMING OF DEMENTIA ONSET: ICD-10-CM

## 2020-02-19 DIAGNOSIS — E11.65 TYPE 2 DIABETES MELLITUS WITH HYPERGLYCEMIA, WITHOUT LONG-TERM CURRENT USE OF INSULIN (H): ICD-10-CM

## 2020-02-19 DIAGNOSIS — N18.30 RENAL FAILURE, CHRONIC, STAGE 3 (MODERATE) (H): ICD-10-CM

## 2020-02-25 ENCOUNTER — RECORDS - HEALTHEAST (OUTPATIENT)
Dept: LAB | Facility: CLINIC | Age: 80
End: 2020-02-25

## 2020-02-25 LAB
ANION GAP SERPL CALCULATED.3IONS-SCNC: 13 MMOL/L (ref 5–18)
BUN SERPL-MCNC: 25 MG/DL (ref 8–28)
CALCIUM SERPL-MCNC: 9.7 MG/DL (ref 8.5–10.5)
CHLORIDE BLD-SCNC: 102 MMOL/L (ref 98–107)
CO2 SERPL-SCNC: 23 MMOL/L (ref 22–31)
CREAT SERPL-MCNC: 1.1 MG/DL (ref 0.6–1.1)
GFR SERPL CREATININE-BSD FRML MDRD: 48 ML/MIN/1.73M2
GLUCOSE BLD-MCNC: 232 MG/DL (ref 70–125)
HBA1C MFR BLD: 6.7 %
MAGNESIUM SERPL-MCNC: 1.7 MG/DL (ref 1.8–2.6)
POTASSIUM BLD-SCNC: 4.2 MMOL/L (ref 3.5–5)
SODIUM SERPL-SCNC: 138 MMOL/L (ref 136–145)
TSH SERPL DL<=0.005 MIU/L-ACNC: 1.51 UIU/ML (ref 0.3–5)

## 2020-03-06 ENCOUNTER — OFFICE VISIT - HEALTHEAST (OUTPATIENT)
Dept: GERIATRICS | Facility: CLINIC | Age: 80
End: 2020-03-06

## 2020-03-06 DIAGNOSIS — G30.9 ALZHEIMER'S DEMENTIA WITHOUT BEHAVIORAL DISTURBANCE, UNSPECIFIED TIMING OF DEMENTIA ONSET: ICD-10-CM

## 2020-03-06 DIAGNOSIS — E11.65 TYPE 2 DIABETES MELLITUS WITH HYPERGLYCEMIA, WITHOUT LONG-TERM CURRENT USE OF INSULIN (H): ICD-10-CM

## 2020-03-06 DIAGNOSIS — E55.9 VITAMIN D DEFICIENCY: ICD-10-CM

## 2020-03-06 DIAGNOSIS — F02.80 ALZHEIMER'S DEMENTIA WITHOUT BEHAVIORAL DISTURBANCE, UNSPECIFIED TIMING OF DEMENTIA ONSET: ICD-10-CM

## 2020-03-06 DIAGNOSIS — N18.30 RENAL FAILURE, CHRONIC, STAGE 3 (MODERATE) (H): ICD-10-CM

## 2020-03-06 DIAGNOSIS — I10 ESSENTIAL HYPERTENSION: ICD-10-CM

## 2020-03-07 RX ORDER — LISINOPRIL 2.5 MG/1
5 TABLET ORAL DAILY
Status: SHIPPED | COMMUNITY
Start: 2020-03-07 | End: 2021-10-25

## 2020-03-07 ASSESSMENT — MIFFLIN-ST. JEOR: SCORE: 1449.31

## 2020-03-20 ENCOUNTER — RECORDS - HEALTHEAST (OUTPATIENT)
Dept: LAB | Facility: CLINIC | Age: 80
End: 2020-03-20

## 2020-03-23 LAB
ANION GAP SERPL CALCULATED.3IONS-SCNC: 10 MMOL/L (ref 5–18)
BASOPHILS # BLD AUTO: 0 THOU/UL (ref 0–0.2)
BASOPHILS NFR BLD AUTO: 1 % (ref 0–2)
BUN SERPL-MCNC: 19 MG/DL (ref 8–28)
CALCIUM SERPL-MCNC: 9.4 MG/DL (ref 8.5–10.5)
CHLORIDE BLD-SCNC: 105 MMOL/L (ref 98–107)
CO2 SERPL-SCNC: 25 MMOL/L (ref 22–31)
CREAT SERPL-MCNC: 0.99 MG/DL (ref 0.6–1.1)
EOSINOPHIL # BLD AUTO: 0.3 THOU/UL (ref 0–0.4)
EOSINOPHIL NFR BLD AUTO: 3 % (ref 0–6)
ERYTHROCYTE [DISTWIDTH] IN BLOOD BY AUTOMATED COUNT: 15.7 % (ref 11–14.5)
GFR SERPL CREATININE-BSD FRML MDRD: 54 ML/MIN/1.73M2
GLUCOSE BLD-MCNC: 85 MG/DL (ref 70–125)
HCT VFR BLD AUTO: 35 % (ref 35–47)
HGB BLD-MCNC: 11 G/DL (ref 12–16)
LYMPHOCYTES # BLD AUTO: 2.5 THOU/UL (ref 0.8–4.4)
LYMPHOCYTES NFR BLD AUTO: 29 % (ref 20–40)
MAGNESIUM SERPL-MCNC: 1.7 MG/DL (ref 1.8–2.6)
MCH RBC QN AUTO: 30.9 PG (ref 27–34)
MCHC RBC AUTO-ENTMCNC: 31.4 G/DL (ref 32–36)
MCV RBC AUTO: 98 FL (ref 80–100)
MONOCYTES # BLD AUTO: 1.1 THOU/UL (ref 0–0.9)
MONOCYTES NFR BLD AUTO: 13 % (ref 2–10)
NEUTROPHILS # BLD AUTO: 4.6 THOU/UL (ref 2–7.7)
NEUTROPHILS NFR BLD AUTO: 54 % (ref 50–70)
PLATELET # BLD AUTO: 397 THOU/UL (ref 140–440)
PMV BLD AUTO: 9.8 FL (ref 8.5–12.5)
POTASSIUM BLD-SCNC: 4 MMOL/L (ref 3.5–5)
RBC # BLD AUTO: 3.56 MILL/UL (ref 3.8–5.4)
SODIUM SERPL-SCNC: 140 MMOL/L (ref 136–145)
TSH SERPL DL<=0.005 MIU/L-ACNC: 1.22 UIU/ML (ref 0.3–5)
VIT B12 SERPL-MCNC: 368 PG/ML (ref 213–816)
WBC: 8.6 THOU/UL (ref 4–11)

## 2020-03-24 LAB — HBA1C MFR BLD: 6.7 %

## 2020-03-25 LAB — 25(OH)D3 SERPL-MCNC: 34.4 NG/ML (ref 30–80)

## 2020-04-17 ENCOUNTER — OFFICE VISIT - HEALTHEAST (OUTPATIENT)
Dept: GERIATRICS | Facility: CLINIC | Age: 80
End: 2020-04-17

## 2020-04-17 DIAGNOSIS — N18.30 RENAL FAILURE, CHRONIC, STAGE 3 (MODERATE) (H): ICD-10-CM

## 2020-04-17 DIAGNOSIS — R60.0 BILATERAL LOWER EXTREMITY EDEMA: ICD-10-CM

## 2020-04-17 DIAGNOSIS — K59.01 SLOW TRANSIT CONSTIPATION: ICD-10-CM

## 2020-04-17 DIAGNOSIS — E11.65 TYPE 2 DIABETES MELLITUS WITH HYPERGLYCEMIA, WITHOUT LONG-TERM CURRENT USE OF INSULIN (H): ICD-10-CM

## 2020-04-17 DIAGNOSIS — G30.9 ALZHEIMER'S DEMENTIA WITHOUT BEHAVIORAL DISTURBANCE, UNSPECIFIED TIMING OF DEMENTIA ONSET: ICD-10-CM

## 2020-04-17 DIAGNOSIS — E55.9 VITAMIN D DEFICIENCY: ICD-10-CM

## 2020-04-17 DIAGNOSIS — F02.80 ALZHEIMER'S DEMENTIA WITHOUT BEHAVIORAL DISTURBANCE, UNSPECIFIED TIMING OF DEMENTIA ONSET: ICD-10-CM

## 2020-04-17 DIAGNOSIS — I10 ESSENTIAL HYPERTENSION: ICD-10-CM

## 2020-06-24 ENCOUNTER — OFFICE VISIT - HEALTHEAST (OUTPATIENT)
Dept: GERIATRICS | Facility: CLINIC | Age: 80
End: 2020-06-24

## 2020-06-24 DIAGNOSIS — E11.65 TYPE 2 DIABETES MELLITUS WITH HYPERGLYCEMIA, WITHOUT LONG-TERM CURRENT USE OF INSULIN (H): ICD-10-CM

## 2020-06-24 DIAGNOSIS — F41.1 ANXIETY STATE: ICD-10-CM

## 2020-06-24 DIAGNOSIS — G30.9 ALZHEIMER'S DEMENTIA WITH BEHAVIORAL DISTURBANCE, UNSPECIFIED TIMING OF DEMENTIA ONSET: ICD-10-CM

## 2020-06-24 DIAGNOSIS — F02.81 ALZHEIMER'S DEMENTIA WITH BEHAVIORAL DISTURBANCE, UNSPECIFIED TIMING OF DEMENTIA ONSET: ICD-10-CM

## 2020-06-24 DIAGNOSIS — I10 ESSENTIAL HYPERTENSION: ICD-10-CM

## 2020-07-16 ENCOUNTER — OFFICE VISIT - HEALTHEAST (OUTPATIENT)
Dept: GERIATRICS | Facility: CLINIC | Age: 80
End: 2020-07-16

## 2020-07-16 DIAGNOSIS — K59.01 SLOW TRANSIT CONSTIPATION: ICD-10-CM

## 2020-07-16 DIAGNOSIS — E55.9 VITAMIN D DEFICIENCY: ICD-10-CM

## 2020-07-16 DIAGNOSIS — F02.81 ALZHEIMER'S DEMENTIA WITH BEHAVIORAL DISTURBANCE, UNSPECIFIED TIMING OF DEMENTIA ONSET: ICD-10-CM

## 2020-07-16 DIAGNOSIS — G30.9 ALZHEIMER'S DEMENTIA WITH BEHAVIORAL DISTURBANCE, UNSPECIFIED TIMING OF DEMENTIA ONSET: ICD-10-CM

## 2020-07-16 DIAGNOSIS — E11.65 TYPE 2 DIABETES MELLITUS WITH HYPERGLYCEMIA, WITHOUT LONG-TERM CURRENT USE OF INSULIN (H): ICD-10-CM

## 2020-07-16 DIAGNOSIS — R60.0 BILATERAL LOWER EXTREMITY EDEMA: ICD-10-CM

## 2020-07-17 ASSESSMENT — MIFFLIN-ST. JEOR: SCORE: 1367.66

## 2020-07-23 ENCOUNTER — RECORDS - HEALTHEAST (OUTPATIENT)
Dept: LAB | Facility: CLINIC | Age: 80
End: 2020-07-23

## 2020-07-23 LAB
SARS-COV-2 PCR COMMENT: NORMAL
SARS-COV-2 RNA SPEC QL NAA+PROBE: NEGATIVE
SARS-COV-2 VIRUS SPECIMEN SOURCE: NORMAL

## 2020-07-29 ENCOUNTER — RECORDS - HEALTHEAST (OUTPATIENT)
Dept: LAB | Facility: CLINIC | Age: 80
End: 2020-07-29

## 2020-07-30 LAB — HBA1C MFR BLD: 6.6 %

## 2020-09-16 ENCOUNTER — RECORDS - HEALTHEAST (OUTPATIENT)
Dept: LAB | Facility: CLINIC | Age: 80
End: 2020-09-16

## 2020-09-17 ENCOUNTER — OFFICE VISIT - HEALTHEAST (OUTPATIENT)
Dept: GERIATRICS | Facility: CLINIC | Age: 80
End: 2020-09-17

## 2020-09-17 DIAGNOSIS — F02.81 ALZHEIMER'S DEMENTIA WITH BEHAVIORAL DISTURBANCE, UNSPECIFIED TIMING OF DEMENTIA ONSET: ICD-10-CM

## 2020-09-17 DIAGNOSIS — I10 ESSENTIAL HYPERTENSION: ICD-10-CM

## 2020-09-17 DIAGNOSIS — E11.65 TYPE 2 DIABETES MELLITUS WITH HYPERGLYCEMIA, WITHOUT LONG-TERM CURRENT USE OF INSULIN (H): ICD-10-CM

## 2020-09-17 DIAGNOSIS — K59.01 SLOW TRANSIT CONSTIPATION: ICD-10-CM

## 2020-09-17 DIAGNOSIS — E55.9 VITAMIN D DEFICIENCY: ICD-10-CM

## 2020-09-17 DIAGNOSIS — N18.30 RENAL FAILURE, CHRONIC, STAGE 3 (MODERATE) (H): ICD-10-CM

## 2020-09-17 DIAGNOSIS — G30.9 ALZHEIMER'S DEMENTIA WITH BEHAVIORAL DISTURBANCE, UNSPECIFIED TIMING OF DEMENTIA ONSET: ICD-10-CM

## 2020-09-17 LAB
ANION GAP SERPL CALCULATED.3IONS-SCNC: 9 MMOL/L (ref 5–18)
BUN SERPL-MCNC: 28 MG/DL (ref 8–28)
CALCIUM SERPL-MCNC: 10 MG/DL (ref 8.5–10.5)
CHLORIDE BLD-SCNC: 102 MMOL/L (ref 98–107)
CO2 SERPL-SCNC: 29 MMOL/L (ref 22–31)
CREAT SERPL-MCNC: 1.05 MG/DL (ref 0.6–1.1)
GFR SERPL CREATININE-BSD FRML MDRD: 51 ML/MIN/1.73M2
GLUCOSE BLD-MCNC: 121 MG/DL (ref 70–125)
POTASSIUM BLD-SCNC: 4.5 MMOL/L (ref 3.5–5)
SODIUM SERPL-SCNC: 140 MMOL/L (ref 136–145)

## 2020-11-09 ENCOUNTER — OFFICE VISIT - HEALTHEAST (OUTPATIENT)
Dept: GERIATRICS | Facility: CLINIC | Age: 80
End: 2020-11-09

## 2020-11-09 DIAGNOSIS — G30.9 ALZHEIMER'S DEMENTIA WITH BEHAVIORAL DISTURBANCE, UNSPECIFIED TIMING OF DEMENTIA ONSET: ICD-10-CM

## 2020-11-09 DIAGNOSIS — F02.81 ALZHEIMER'S DEMENTIA WITH BEHAVIORAL DISTURBANCE, UNSPECIFIED TIMING OF DEMENTIA ONSET: ICD-10-CM

## 2020-11-09 DIAGNOSIS — E66.01 MORBID OBESITY (H): ICD-10-CM

## 2020-11-09 DIAGNOSIS — E55.9 VITAMIN D DEFICIENCY: ICD-10-CM

## 2020-11-09 DIAGNOSIS — I10 ESSENTIAL HYPERTENSION: ICD-10-CM

## 2020-11-09 DIAGNOSIS — E11.65 TYPE 2 DIABETES MELLITUS WITH HYPERGLYCEMIA, WITHOUT LONG-TERM CURRENT USE OF INSULIN (H): ICD-10-CM

## 2020-11-09 DIAGNOSIS — K59.01 SLOW TRANSIT CONSTIPATION: ICD-10-CM

## 2020-11-09 ASSESSMENT — MIFFLIN-ST. JEOR: SCORE: 1413.02

## 2020-12-22 ENCOUNTER — RECORDS - HEALTHEAST (OUTPATIENT)
Dept: LAB | Facility: CLINIC | Age: 80
End: 2020-12-22

## 2020-12-29 ENCOUNTER — RECORDS - HEALTHEAST (OUTPATIENT)
Dept: LAB | Facility: CLINIC | Age: 80
End: 2020-12-29

## 2021-01-20 ENCOUNTER — RECORDS - HEALTHEAST (OUTPATIENT)
Dept: LAB | Facility: CLINIC | Age: 81
End: 2021-01-20

## 2021-01-20 ENCOUNTER — OFFICE VISIT - HEALTHEAST (OUTPATIENT)
Dept: GERIATRICS | Facility: CLINIC | Age: 81
End: 2021-01-20

## 2021-01-20 DIAGNOSIS — E11.65 TYPE 2 DIABETES MELLITUS WITH HYPERGLYCEMIA, WITHOUT LONG-TERM CURRENT USE OF INSULIN (H): ICD-10-CM

## 2021-01-20 DIAGNOSIS — E55.9 VITAMIN D DEFICIENCY: ICD-10-CM

## 2021-01-20 DIAGNOSIS — N18.30 RENAL FAILURE, CHRONIC, STAGE 3 (MODERATE) (H): ICD-10-CM

## 2021-01-20 DIAGNOSIS — F02.80 ALZHEIMER'S DEMENTIA WITHOUT BEHAVIORAL DISTURBANCE, UNSPECIFIED TIMING OF DEMENTIA ONSET: ICD-10-CM

## 2021-01-20 DIAGNOSIS — I10 ESSENTIAL HYPERTENSION: ICD-10-CM

## 2021-01-20 DIAGNOSIS — K59.01 SLOW TRANSIT CONSTIPATION: ICD-10-CM

## 2021-01-20 DIAGNOSIS — G30.9 ALZHEIMER'S DEMENTIA WITHOUT BEHAVIORAL DISTURBANCE, UNSPECIFIED TIMING OF DEMENTIA ONSET: ICD-10-CM

## 2021-01-20 DIAGNOSIS — E66.01 MORBID OBESITY (H): ICD-10-CM

## 2021-01-20 ASSESSMENT — MIFFLIN-ST. JEOR: SCORE: 1413.02

## 2021-01-21 LAB — HBA1C MFR BLD: 6.7 %

## 2021-01-29 ENCOUNTER — AMBULATORY - HEALTHEAST (OUTPATIENT)
Dept: GERIATRICS | Facility: CLINIC | Age: 81
End: 2021-01-29

## 2021-01-29 ENCOUNTER — COMMUNICATION - HEALTHEAST (OUTPATIENT)
Dept: CARE COORDINATION | Facility: HOSPITAL | Age: 81
End: 2021-01-29

## 2021-02-01 ENCOUNTER — RECORDS - HEALTHEAST (OUTPATIENT)
Dept: LAB | Facility: CLINIC | Age: 81
End: 2021-02-01

## 2021-02-15 ENCOUNTER — RECORDS - HEALTHEAST (OUTPATIENT)
Dept: LAB | Facility: CLINIC | Age: 81
End: 2021-02-15

## 2021-02-24 ENCOUNTER — OFFICE VISIT - HEALTHEAST (OUTPATIENT)
Dept: GERIATRICS | Facility: CLINIC | Age: 81
End: 2021-02-24

## 2021-02-24 DIAGNOSIS — G30.9 ALZHEIMER'S DEMENTIA WITHOUT BEHAVIORAL DISTURBANCE, UNSPECIFIED TIMING OF DEMENTIA ONSET: ICD-10-CM

## 2021-02-24 DIAGNOSIS — E11.65 TYPE 2 DIABETES MELLITUS WITH HYPERGLYCEMIA, WITHOUT LONG-TERM CURRENT USE OF INSULIN (H): ICD-10-CM

## 2021-02-24 DIAGNOSIS — K59.01 SLOW TRANSIT CONSTIPATION: ICD-10-CM

## 2021-02-24 DIAGNOSIS — E66.01 MORBID OBESITY (H): ICD-10-CM

## 2021-02-24 DIAGNOSIS — E55.9 VITAMIN D DEFICIENCY: ICD-10-CM

## 2021-02-24 DIAGNOSIS — F02.80 ALZHEIMER'S DEMENTIA WITHOUT BEHAVIORAL DISTURBANCE, UNSPECIFIED TIMING OF DEMENTIA ONSET: ICD-10-CM

## 2021-02-24 DIAGNOSIS — K21.00 GASTROESOPHAGEAL REFLUX DISEASE WITH ESOPHAGITIS, UNSPECIFIED WHETHER HEMORRHAGE: ICD-10-CM

## 2021-03-01 ENCOUNTER — RECORDS - HEALTHEAST (OUTPATIENT)
Dept: LAB | Facility: CLINIC | Age: 81
End: 2021-03-01

## 2021-03-09 ENCOUNTER — RECORDS - HEALTHEAST (OUTPATIENT)
Dept: LAB | Facility: CLINIC | Age: 81
End: 2021-03-09

## 2021-04-08 ENCOUNTER — RECORDS - HEALTHEAST (OUTPATIENT)
Dept: LAB | Facility: CLINIC | Age: 81
End: 2021-04-08

## 2021-04-12 ENCOUNTER — OFFICE VISIT - HEALTHEAST (OUTPATIENT)
Dept: GERIATRICS | Facility: CLINIC | Age: 81
End: 2021-04-12

## 2021-04-12 DIAGNOSIS — E11.65 TYPE 2 DIABETES MELLITUS WITH HYPERGLYCEMIA, WITHOUT LONG-TERM CURRENT USE OF INSULIN (H): ICD-10-CM

## 2021-04-12 DIAGNOSIS — N18.30 RENAL FAILURE, CHRONIC, STAGE 3 (MODERATE) (H): ICD-10-CM

## 2021-04-12 DIAGNOSIS — G30.9 ALZHEIMER'S DEMENTIA WITHOUT BEHAVIORAL DISTURBANCE, UNSPECIFIED TIMING OF DEMENTIA ONSET: ICD-10-CM

## 2021-04-12 DIAGNOSIS — E66.01 MORBID OBESITY (H): ICD-10-CM

## 2021-04-12 DIAGNOSIS — F02.80 ALZHEIMER'S DEMENTIA WITHOUT BEHAVIORAL DISTURBANCE, UNSPECIFIED TIMING OF DEMENTIA ONSET: ICD-10-CM

## 2021-04-12 DIAGNOSIS — I10 ESSENTIAL HYPERTENSION: ICD-10-CM

## 2021-04-14 ENCOUNTER — RECORDS - HEALTHEAST (OUTPATIENT)
Dept: LAB | Facility: CLINIC | Age: 81
End: 2021-04-14

## 2021-04-21 ENCOUNTER — RECORDS - HEALTHEAST (OUTPATIENT)
Dept: LAB | Facility: CLINIC | Age: 81
End: 2021-04-21

## 2021-05-17 ENCOUNTER — RECORDS - HEALTHEAST (OUTPATIENT)
Dept: LAB | Facility: CLINIC | Age: 81
End: 2021-05-17

## 2021-05-24 ENCOUNTER — RECORDS - HEALTHEAST (OUTPATIENT)
Dept: LAB | Facility: CLINIC | Age: 81
End: 2021-05-24

## 2021-05-26 VITALS
OXYGEN SATURATION: 97 % | DIASTOLIC BLOOD PRESSURE: 76 MMHG | SYSTOLIC BLOOD PRESSURE: 143 MMHG | TEMPERATURE: 97 F | RESPIRATION RATE: 18 BRPM | HEART RATE: 83 BPM

## 2021-05-27 VITALS
SYSTOLIC BLOOD PRESSURE: 148 MMHG | OXYGEN SATURATION: 94 % | DIASTOLIC BLOOD PRESSURE: 70 MMHG | HEART RATE: 70 BPM | RESPIRATION RATE: 16 BRPM | TEMPERATURE: 97 F

## 2021-05-28 ENCOUNTER — RECORDS - HEALTHEAST (OUTPATIENT)
Dept: ADMINISTRATIVE | Facility: CLINIC | Age: 81
End: 2021-05-28

## 2021-05-28 NOTE — PROGRESS NOTES
[facility]      Code Status:  FULL CODE  Visit Type: Review Of Multiple Medical Conditions     Facility:  [facility] [[id number]]           History of Present Illness: Kandi Ontiveros is a 78 y.o. female who is a resident of [place].  She has periods of anxiety associated with memory loss and  her short-term memory is getting progressively worse.   BIMS 5/15  Recently taken off Aricept and Namenda is now lower dose too.  She has a history of diabetes her last A1c was 7.2.  Her metformin has been increased to 1 g twice a day.  Recheck A1c on 3/15/2019 was 6.9,  She has underlying history of anxiety and depression.  She was on Effexor and Wellbutrin.  Her daughter's request Wellbutrin have been discontinued, she remains on Celexa 20 mg daily ,  mood and behaviors have been stable.  She also has a history of hypertension with elevated blood pressures.  She was on a low-dose of hydralazine she remained metoprolol as well as verapamil and clonidine.  Patient also has morbid obesity.  She is on a portion control diet so far weights have been heavy.  Recheck weights to show an improvement at 208 pounds    Past Medical History:   Diagnosis Date     Anxiety     Created by Conversion      Benign Adenomatous Polyp Of The Large Intestine     Created by Conversion      Chronic Diarrhea Of Unknown Origin     Created by Conversion      Dementia of the Alzheimer's type 7/13/2014     Diabetes Mellitus     Created by Conversion      Esophageal reflux     Created by Conversion      Gastritis      Herpes Zoster (Shingles)     Created by Conversion      Hiatal hernia      Hypercholesterolemia     Created by Conversion      Hypertension     Created by Conversion      Melanosis Coli     Created by Conversion [facility] Annotation: May  2 2012  1:19PM - Sheila Benavides: colonoscopy  4/30/12      Memory Lapses Or Loss     Created by Conversion      Obstructive Sleep Apnea     Created by  Conversion      Osteopenia     Created by Conversion      Raynaud's Disease     Created by Conversion      Tricuspid Regurgitation     Created by Conversion      No past surgical history on file.  Family History   Problem Relation Age of Onset     Hypertension Other      Diabetes Other      Stroke Other      Social History     Socioeconomic History     Marital status:      Spouse name: Not on file     Number of children: Not on file     Years of education: Not on file     Highest education level: Not on file   Occupational History     Not on file   Social Needs     Financial resource strain: Not on file     Food insecurity:     Worry: Not on file     Inability: Not on file     Transportation needs:     Medical: Not on file     Non-medical: Not on file   Tobacco Use     Smoking status: Never Smoker     Smokeless tobacco: Never Used   Substance and Sexual Activity     Alcohol use: Not on file     Drug use: Not on file     Sexual activity: Not on file   Lifestyle     Physical activity:     Days per week: Not on file     Minutes per session: Not on file     Stress: Not on file   Relationships     Social connections:     Talks on phone: Not on file     Gets together: Not on file     Attends Oriental orthodox service: Not on file     Active member of club or organization: Not on file     Attends meetings of clubs or organizations: Not on file     Relationship status: Not on file     Intimate partner violence:     Fear of current or ex partner: Not on file     Emotionally abused: Not on file     Physically abused: Not on file     Forced sexual activity: Not on file   Other Topics Concern     Not on file   Social History Narrative     Not on file   lived in [facility]  Current Outpatient Medications   Medication Sig Dispense Refill     acetaminophen 500 mg coapsule Take 1 capsule by mouth. Every 4-6 hours PRN       bisacodyl (DULCOLAX, BISACODYL,) 10 mg suppository Insert 1 suppository (10 mg total) into the rectum daily as needed  (for constipation). 12 suppository 0     cetirizine (ZYRTEC) 10 MG tablet Take 10 mg by mouth daily.       cholecalciferol, vitamin D3, 1,000 unit tablet Take 1,000 Units by mouth daily.       citalopram (CELEXA) 20 MG tablet TAKE ONE TABLET BY MOUTH EVERY DAY 90 tablet 0     cloNIDine (CATAPRES-TTS) 0.2 mg/24 hr Place 1 patch on the skin once a week.       donepezil (ARICEPT) 5 MG tablet Take 5 mg by mouth at bedtime.              fluticasone (FLONASE) 50 mcg/actuation nasal spray 1 spray into each nostril daily. 16 g 12     furosemide (LASIX) 20 MG tablet Take 20 mg by mouth daily.              gabapentin (NEURONTIN) 100 MG capsule Take 100 mg by mouth 3 (three) times a day.       hydrALAZINE (APRESOLINE) 25 MG tablet Take 50 mg by mouth Daily at 8:00 am.. Hold for SBP <130              magnesium oxide 250 mg Tab Take 250 mg by mouth daily.       memantine (NAMENDA) 5 MG tablet Take 10 mg by mouth daily.              metFORMIN (GLUCOPHAGE) 500 MG tablet Take 1,000 mg by mouth 2 (two) times a day with meals .             metoprolol succinate (TOPROL-XL) 25 MG Take 12.5 mg by mouth daily.              omeprazole (PRILOSEC) 20 MG capsule Take 20 mg by mouth daily before breakfast.       polyethylene glycol (MIRALAX) 17 gram/dose powder Take 17 g by mouth daily. 255 g 0     polyvinyl alcohol (LIQUIFILM TEARS) 1.4 % ophthalmic solution Administer 1 drop to both eyes 3 (three) times a day as needed for dry eyes.       potassium chloride SA (K-DUR,KLOR-CON) 10 MEQ tablet Take 10 mEq by mouth daily.        verapamil (VERELAN PM) 100 mg 24 hr capsule TAKE ONE CAPSULE BY MOUTH EVERY DAY 90 capsule 1     white petrolatum (AQUAPHOR ORIGINAL) 41 % Oint Apply 1 application topically 3 (three) times a day as needed.              No current facility-administered medications for this visit.      Allergies   Allergen Reactions     Codeine Sulfate          Review of Systems:    Constitutional: Negative.  Negative for fever,  chills,has  activity change, appetite change and fatigue.   HENT: Negative for congestion and facial swelling.    Eyes: Negative for photophobia, redness and visual disturbance.   Respiratory: Negative for cough and chest tightness.    Cardiovascular: Negative for chest pain, palpitations and leg swelling.   Gastrointestinal: Negative for nausea, diarrhea, constipation, blood in stool and abdominal distention.   Genitourinary: Negative.    Musculoskeletal: Negative.   she has bilateral knee osteoarthritis with knee pain  Skin: Negative.    Neurological: Negative for dizziness, tremors, syncope, weakness, light-headedness and headaches.   Hematological: Does not bruise/bleed easily.   Psychiatric/Behavioral: Negative.  She had a flat effect with a poor recall       Physical Exam:    Weight 208 pounds blood pressure 138/68 temp 98 pulse 83  Obese  GENERAL: no acute distress. Cooperative in conversation.   HEENT: pupils are equal, round and reactive. Oral mucosa is moist and intact.  RESP:Chest symmetric. Regular respiratory rate. No stridor.  CVS: S1S2  ABD: Nondistended, soft.  EXTREMITIES: No lower extremity edema.   NEURO: non focal. Alert and oriented xSELF   PSYCH: within normal limits. No depression or anxiety.  SKIN: warm dry intact     Labs:    Lab Results   Component Value Date    HGBA1C 6.9 (H) 03/15/2019     Results for orders placed or performed in visit on 01/21/19   Basic Metabolic Panel   Result Value Ref Range    Sodium 140 136 - 145 mmol/L    Potassium 4.4 3.5 - 5.0 mmol/L    Chloride 101 98 - 107 mmol/L    CO2 28 22 - 31 mmol/L    Anion Gap, Calculation 11 5 - 18 mmol/L    Glucose 108 70 - 125 mg/dL    Calcium 9.4 8.5 - 10.5 mg/dL    BUN 23 8 - 28 mg/dL    Creatinine 1.03 0.60 - 1.10 mg/dL    GFR MDRD Af Amer >60 >60 mL/min/1.73m2    GFR MDRD Non Af Amer 52 (L) >60 mL/min/1.73m2     Lab Results   Component Value Date    [id number] 487 01/08/2018     Vitamin D, Total (25-Hydroxy)   Date Value Ref  Range Status   01/25/2018 42.3 30.0 - 80.0 ng/mL Final         Assessment/Plan:    Dementia with worsening recheck B IMS is 5/15  Taken off Aricept currently on a low-dose of Namenda  Diabetes type 2 currently on a higher dose of metformin  Improvement in A1c to 6.9 on recheck.  Blood sugar checks have been discontinued  Obesity on weight management program with improvement noted in weight  Anxiety and depression currently on Celexa at a low dose with Effexor Wellbutrin discontinued mood has been stable.  Vitamin D deficiency she is adequately replete  Hypertension stable on her multiple medication regimen.  Debilitation she is requiring more cares and can barely ambulate now without assistance.  Cognitively she is declining to.  Daughter remains involved in her care continue with her current care plan  Electronically signed by: BERLIN Butts  This progress note was completed using Dragon software and there may be grammatical errors.

## 2021-05-29 NOTE — PROGRESS NOTES
[facility]      Facility:    [facility] [[id number]]  205-1 Code Status: FULL CODE      Chief Complaint/Reason for Visit:   Chief Complaint   Patient presents with     Review Of Multiple Medical Conditions       HPI:   Kandi is a 78 y.o. female who who is residing at assisted living facility has moved to long-term care with underlying history of dementia, that has been progressive in nature.  She has periods of anxiety associated with memory loss and per daughter her short-term memory is getting progressively worse.  She has a past medical history of right knee osteoarthritis and apparently received steroid injections in the past.  She also is a diabetic that has been diet controlled and has obstructive sleep apnea.  As per the family she has been moved to long-term care so that she is closer to her  who is in the nursing home with her.     LTC: Today and previously she seems oriented though daughter reports having more confusion, previously started on namenda, no change per her.   She denies any pain. Today BP seems more controlled, maintain metoprolol, diltiazem and clonidine patch. Her edema has decreased with minimal pedal edema.  Ambulates per self and usually pushing  around in his wc.  Per dietitian, diet changed and portioned decreased. BIMS 5/15 and PHQ9 2//20 on 5/3/19.    Patient denies pain, headache, chest pain, numbness or tingling, shortest of breath, eating or swallowing concerns, nausea or vomiting, diarrhea or bowel abnormalities, or no new integumentary concerns today. Previously consulted recreation for increased socialization.        Past Medical History:  Past Medical History:   Diagnosis Date     Anxiety     Created by Conversion      Benign Adenomatous Polyp Of The Large Intestine     Created by Conversion      Chronic Diarrhea Of Unknown Origin     Created by Conversion      Dementia of the Alzheimer's type 7/13/2014     Diabetes  Mellitus     Created by Conversion      Esophageal reflux     Created by Conversion      Gastritis      Herpes Zoster (Shingles)     Created by Conversion      Hiatal hernia      Hypercholesterolemia     Created by Conversion      Hypertension     Created by Conversion      Melanosis Coli     Created by Conversion [facility] Annotation: May  2 2012  1:19PM - Sheila Benavides: colonoscopy  4/30/12      Memory Lapses Or Loss     Created by Conversion      Obstructive Sleep Apnea     Created by Conversion      Osteopenia     Created by Conversion      Raynaud's Disease     Created by Conversion      Tricuspid Regurgitation     Created by Conversion            Surgical History:  No past surgical history on file.    Family History:   Family History   Problem Relation Age of Onset     Hypertension Other      Diabetes Other      Stroke Other        Social History:    Social History     Socioeconomic History     Marital status:      Spouse name: Not on file     Number of children: Not on file     Years of education: Not on file     Highest education level: Not on file   Occupational History     Not on file   Social Needs     Financial resource strain: Not on file     Food insecurity:     Worry: Not on file     Inability: Not on file     Transportation needs:     Medical: Not on file     Non-medical: Not on file   Tobacco Use     Smoking status: Never Smoker     Smokeless tobacco: Never Used   Substance and Sexual Activity     Alcohol use: Not on file     Drug use: Not on file     Sexual activity: Not on file   Lifestyle     Physical activity:     Days per week: Not on file     Minutes per session: Not on file     Stress: Not on file   Relationships     Social connections:     Talks on phone: Not on file     Gets together: Not on file     Attends Muslim service: Not on file     Active member of club or organization: Not on file     Attends meetings of clubs or organizations: Not on file     Relationship status:  Not on file     Intimate partner violence:     Fear of current or ex partner: Not on file     Emotionally abused: Not on file     Physically abused: Not on file     Forced sexual activity: Not on file   Other Topics Concern     Not on file   Social History Narrative     Not on file     Review of Systems   Constitutional: Negative for activity change, appetite change, diaphoresis and fatigue.        No issues   HENT: Negative for congestion and facial swelling.    Eyes: Negative for photophobia, redness and visual disturbance.   Respiratory: Negative for choking, shortness of breath and wheezing.    Cardiovascular: Positive for leg swelling. Negative for chest pain.        Pedal   Gastrointestinal: Negative for abdominal distention, abdominal pain, blood in stool, constipation and diarrhea.   Endocrine: Negative.    Genitourinary: Negative for difficulty urinating and dysuria.   Musculoskeletal: Negative for back pain and joint swelling.        Bilateral knee pain per OA, taking tylenol   Skin: Negative for color change.        Dry, moisturizer used   Allergic/Immunologic: Negative.    Neurological: Negative for seizures, weakness and headaches.   Hematological: Negative.    Psychiatric/Behavioral: Positive for confusion. Negative for behavioral problems, hallucinations and sleep disturbance. The patient is nervous/anxious.        Vitals:    06/13/19 0725   BP: 146/77   Pulse: 73   Resp: 18   Temp: 97  F (36.1  C)   SpO2: 95%   Weight: 207 lb (93.9 kg)       Physical Exam   Constitutional: She appears well-developed and well-nourished. No distress.   No issues   HENT:   Head: Normocephalic and atraumatic.   Mouth/Throat: Oropharynx is clear and moist. No oropharyngeal exudate.   Eyes: Pupils are equal, round, and reactive to light. Conjunctivae and EOM are normal. Right eye exhibits no discharge. Left eye exhibits no discharge. No scleral icterus.   Neck: Normal range of motion. Neck supple. No JVD present. No  tracheal deviation present.   Intact   Cardiovascular: Normal rate, regular rhythm and normal heart sounds. Exam reveals no gallop and no friction rub.   No murmur heard.  S1S2, no murmur   Pulmonary/Chest: Effort normal and breath sounds normal. No stridor. No respiratory distress. She has no wheezes. She has no rales.   CTA, RA   Abdominal: Soft. Bowel sounds are normal. She exhibits no distension. There is no tenderness. There is no rebound.   No diarrhea or constipation   Genitourinary:   Genitourinary Comments: Deferred   Musculoskeletal: Normal range of motion. She exhibits edema.   1+ LE, mostly pedal, will try discontinuing lasix   Neurological: She is alert. No cranial nerve deficit.   A/O x1-2   Skin: Skin is warm and dry. She is not diaphoretic. No erythema.   Intact, very dry, encouraged to use lotion   Psychiatric: She has a normal mood and affect.   Less agitation, continue Effexor GDR, dc aricept and continue namenda   Nursing note and vitals reviewed.      Medication List:  Current Outpatient Medications   Medication Sig     acetaminophen 500 mg coapsule Take 1 capsule by mouth. Every 4-6 hours PRN     bisacodyl (DULCOLAX, BISACODYL,) 10 mg suppository Insert 1 suppository (10 mg total) into the rectum daily as needed (for constipation).     cetirizine (ZYRTEC) 10 MG tablet Take 10 mg by mouth daily.     cholecalciferol, vitamin D3, 1,000 unit tablet Take 1,000 Units by mouth daily.     citalopram (CELEXA) 20 MG tablet TAKE ONE TABLET BY MOUTH EVERY DAY     cloNIDine (CATAPRES-TTS) 0.2 mg/24 hr Place 1 patch on the skin once a week.     fluticasone (FLONASE) 50 mcg/actuation nasal spray 1 spray into each nostril daily.     gabapentin (NEURONTIN) 100 MG capsule Take 100 mg by mouth 3 (three) times a day.     hydrALAZINE (APRESOLINE) 25 MG tablet Take 50 mg by mouth Daily at 8:00 am.. Hold for SBP <150           memantine (NAMENDA) 5 MG tablet Take 10 mg by mouth daily.            metFORMIN  (GLUCOPHAGE) 500 MG tablet Take 1,000 mg by mouth 2 (two) times a day with meals .           metoprolol succinate (TOPROL-XL) 25 MG Take 12.5 mg by mouth daily.            omeprazole (PRILOSEC) 20 MG capsule Take 20 mg by mouth daily before breakfast.     polyethylene glycol (MIRALAX) 17 gram/dose powder Take 17 g by mouth daily.     polyvinyl alcohol (LIQUIFILM TEARS) 1.4 % ophthalmic solution Administer 1 drop to both eyes 3 (three) times a day as needed for dry eyes.     verapamil (VERELAN PM) 100 mg 24 hr capsule TAKE ONE CAPSULE BY MOUTH EVERY DAY     white petrolatum (AQUAPHOR ORIGINAL) 41 % Oint Apply 1 application topically 3 (three) times a day as needed.              Labs:  Results for orders placed or performed in visit on 01/21/19   Basic Metabolic Panel   Result Value Ref Range    Sodium 140 136 - 145 mmol/L    Potassium 4.4 3.5 - 5.0 mmol/L    Chloride 101 98 - 107 mmol/L    CO2 28 22 - 31 mmol/L    Anion Gap, Calculation 11 5 - 18 mmol/L    Glucose 108 70 - 125 mg/dL    Calcium 9.4 8.5 - 10.5 mg/dL    BUN 23 8 - 28 mg/dL    Creatinine 1.03 0.60 - 1.10 mg/dL    GFR MDRD Af Amer >60 >60 mL/min/1.73m2    GFR MDRD Non Af Amer 52 (L) >60 mL/min/1.73m2     Lab Results   Component Value Date    WBC 12.2 (H) 05/28/2016    HGB 11.6 (L) 01/14/2019    HCT 32.9 (L) 05/28/2016    MCV 91 05/28/2016     05/28/2016     Vitamin D, Total (25-Hydroxy)   Date Value Ref Range Status   01/25/2018 42.3 30.0 - 80.0 ng/mL Final     Lab Results   Component Value Date    HGBA1C 6.9 (H) 03/15/2019     Lab Results   Component Value Date    [id number] 487 01/08/2018     Lab Results   Component Value Date    TSH 2.71 06/25/2018       Assessment/Plan:  1.  Hypokalemia per lasix: will dc supplement per dc lasix  2.  TARI: Last Cr 1.03 with GFR 53 pn 1/21/9. Recheck   3.  DM: current Hgb A1c 6.9 on 3/15/19, continue metformin 1000mg two times a day, recheck A1c  4.  Anxiety/depression: continue citalopram 20mg daily, previously  discontinued Effexor/wellbutrin, was related to daughter's health concerns, previously decreased effexor in 3/2018.  Minimal PHQ9 score currently  5.  HTN: decrease hydralazine to 50mg at AM, continue verapamil 100mg daily, and will maintain metoprolol 25mg daily. With clonidine patch 0.2mg/wk. -150s, controlled  6. Vit D def: continue D3 1000U, last 42.3 on 1/25/18.  Recheck  7. GERD: PPI, no change.  8. Seasonal allergies: continue fluticasone and cetirizine.  9. Edema: currently minimal pedal, will dc lasix and monitor  10. Constipation: continue miralax daily, no change.  11. Dementia: recently had cognitive assessment, discontinued donepezil, previously increased namenda to 10mg daily. Per daughter no change in memory  12. Socialization: consulted recreation. Reports attending more events.  13. Hypomagnesia: last 1.8 on 3/15/19, dc supplementation per discontinuing lasix       Electronically signed by: Ashwin Tang NP

## 2021-05-30 ENCOUNTER — RECORDS - HEALTHEAST (OUTPATIENT)
Dept: ADMINISTRATIVE | Facility: CLINIC | Age: 81
End: 2021-05-30

## 2021-05-30 VITALS — WEIGHT: 223.7 LBS | BODY MASS INDEX: 36.11 KG/M2

## 2021-05-30 VITALS — WEIGHT: 226 LBS | BODY MASS INDEX: 36.48 KG/M2

## 2021-05-31 ENCOUNTER — RECORDS - HEALTHEAST (OUTPATIENT)
Dept: ADMINISTRATIVE | Facility: CLINIC | Age: 81
End: 2021-05-31

## 2021-05-31 VITALS — HEIGHT: 64 IN | BODY MASS INDEX: 39.27 KG/M2 | WEIGHT: 230 LBS

## 2021-05-31 VITALS — BODY MASS INDEX: 35.19 KG/M2 | WEIGHT: 205 LBS

## 2021-05-31 VITALS — WEIGHT: 213.2 LBS | BODY MASS INDEX: 36.6 KG/M2

## 2021-05-31 VITALS — WEIGHT: 234.4 LBS | BODY MASS INDEX: 40.23 KG/M2

## 2021-05-31 VITALS — WEIGHT: 211.8 LBS | BODY MASS INDEX: 36.36 KG/M2

## 2021-05-31 NOTE — PROGRESS NOTES
[facility]      Facility:    [facility] [[id number]] -1 Code Status: FULL CODE      Chief Complaint/Reason for Visit:   Chief Complaint   Patient presents with     Review Of Multiple Medical Conditions       HPI:   Kandi is a 79 y.o. female who who is residing at assisted living facility has moved to long-term care with underlying history of dementia, that has been progressive in nature.  She has periods of anxiety associated with memory loss and per daughter her short-term memory is getting progressively worse.  She has a past medical history of right knee osteoarthritis and apparently received steroid injections in the past.  She also is a diabetic that has been diet controlled and has obstructive sleep apnea.  As per the family she has been moved to long-term care so that she is closer to her  who is in the nursing home with her.     LTC: Today and previously she seems oriented though daughter reports having more confusion, previously started on namenda, no change per her.   She denies any pain. Today BP seems more controlled, maintain metoprolol, diltiazem and clonidine patch. Her edema has decreased with minimal pedal edema, lasix discontinued prior.  Ambulates per self and usually pushing  around in his wc.  Per dietitian, diet changed and portioned decreased. BIMS 5/15 and PHQ9 2//20 on 5/13/19.  Patient denies pain, headache, chest pain, numbness or tingling, shortest of breath, eating or swallowing concerns, nausea or vomiting, diarrhea or bowel abnormalities, or no new integumentary concerns today. Previously consulted recreation for increased socialization.        Past Medical History:  Past Medical History:   Diagnosis Date     Anxiety     Created by Conversion      Benign Adenomatous Polyp Of The Large Intestine     Created by Conversion      Chronic Diarrhea Of Unknown Origin     Created by Conversion      Dementia of the Alzheimer's  type 7/13/2014     Diabetes Mellitus     Created by Conversion      Esophageal reflux     Created by Conversion      Gastritis      Herpes Zoster (Shingles)     Created by Conversion      Hiatal hernia      Hypercholesterolemia     Created by Conversion      Hypertension     Created by Conversion      Melanosis Coli     Created by Conversion [facility] Annotation: May  2 2012  1:19PM - Sheila Benavides: colonoscopy  4/30/12      Memory Lapses Or Loss     Created by Conversion      Obstructive Sleep Apnea     Created by Conversion      Osteopenia     Created by Conversion      Raynaud's Disease     Created by Conversion      Tricuspid Regurgitation     Created by Conversion            Surgical History:  No past surgical history on file.    Family History:   Family History   Problem Relation Age of Onset     Hypertension Other      Diabetes Other      Stroke Other        Social History:    Social History     Socioeconomic History     Marital status:      Spouse name: Not on file     Number of children: Not on file     Years of education: Not on file     Highest education level: Not on file   Occupational History     Not on file   Social Needs     Financial resource strain: Not on file     Food insecurity:     Worry: Not on file     Inability: Not on file     Transportation needs:     Medical: Not on file     Non-medical: Not on file   Tobacco Use     Smoking status: Never Smoker     Smokeless tobacco: Never Used   Substance and Sexual Activity     Alcohol use: Not on file     Drug use: Not on file     Sexual activity: Not on file   Lifestyle     Physical activity:     Days per week: Not on file     Minutes per session: Not on file     Stress: Not on file   Relationships     Social connections:     Talks on phone: Not on file     Gets together: Not on file     Attends Sabianist service: Not on file     Active member of club or organization: Not on file     Attends meetings of clubs or organizations: Not on file      Relationship status: Not on file     Intimate partner violence:     Fear of current or ex partner: Not on file     Emotionally abused: Not on file     Physically abused: Not on file     Forced sexual activity: Not on file   Other Topics Concern     Not on file   Social History Narrative     Not on file     Review of Systems   Constitutional: Negative for activity change, appetite change, diaphoresis and fatigue.        No issues   HENT: Negative for congestion and facial swelling.    Eyes: Negative for photophobia, redness and visual disturbance.   Respiratory: Negative for choking, shortness of breath and wheezing.    Cardiovascular: Positive for leg swelling. Negative for chest pain.        Pedal   Gastrointestinal: Negative for abdominal distention, abdominal pain, blood in stool, constipation and diarrhea.   Endocrine: Negative.    Genitourinary: Negative for difficulty urinating and dysuria.   Musculoskeletal: Negative for back pain and joint swelling.        Bilateral knee pain per OA, taking tylenol   Skin: Negative for color change.        Dry, moisturizer used   Allergic/Immunologic: Negative.    Neurological: Negative for seizures, weakness and headaches.   Hematological: Negative.    Psychiatric/Behavioral: Positive for confusion. Negative for behavioral problems, hallucinations and sleep disturbance. The patient is nervous/anxious.         Declining mentally       Vitals:    08/03/19 1018   BP: 172/71   Pulse: 71   Resp: 18   Temp: 98  F (36.7  C)   SpO2: 95%   Weight: 209 lb (94.8 kg)       Physical Exam   Constitutional: She appears well-developed and well-nourished. No distress.   No issues   HENT:   Head: Normocephalic and atraumatic.   Mouth/Throat: Oropharynx is clear and moist. No oropharyngeal exudate.   Eyes: Pupils are equal, round, and reactive to light. Conjunctivae and EOM are normal. Right eye exhibits no discharge. Left eye exhibits no discharge. No scleral icterus.   Neck: Normal  range of motion. Neck supple. No JVD present. No tracheal deviation present.   Intact   Cardiovascular: Normal rate, regular rhythm and normal heart sounds. Exam reveals no gallop and no friction rub.   No murmur heard.  S1S2, no murmur   Pulmonary/Chest: Effort normal and breath sounds normal. No stridor. No respiratory distress. She has no wheezes. She has no rales.   CTA, RA   Abdominal: Soft. Bowel sounds are normal. She exhibits no distension. There is no tenderness. There is no rebound.   No diarrhea or constipation   Genitourinary:   Genitourinary Comments: Deferred   Musculoskeletal: Normal range of motion. She exhibits edema.   1+ LE, mostly pedal, lasix discontinued   Neurological: She is alert. No cranial nerve deficit.   A/O x1-2   Skin: Skin is warm and dry. She is not diaphoretic. No erythema.   Intact, very dry, encouraged to use lotion   Psychiatric: She has a normal mood and affect.   Less agitation, Effexor weaned, dc aricept and continue namenda   Nursing note and vitals reviewed.      Medication List:  Current Outpatient Medications   Medication Sig     acetaminophen 500 mg coapsule Take 1 capsule by mouth. Every 4-6 hours PRN     bisacodyl (DULCOLAX, BISACODYL,) 10 mg suppository Insert 1 suppository (10 mg total) into the rectum daily as needed (for constipation).     cetirizine (ZYRTEC) 10 MG tablet Take 10 mg by mouth daily.     cholecalciferol, vitamin D3, 1,000 unit tablet Take 1,000 Units by mouth daily.     citalopram (CELEXA) 20 MG tablet TAKE ONE TABLET BY MOUTH EVERY DAY     cloNIDine (CATAPRES-TTS) 0.2 mg/24 hr Place 1 patch on the skin once a week.     fluticasone (FLONASE) 50 mcg/actuation nasal spray 1 spray into each nostril daily.     gabapentin (NEURONTIN) 100 MG capsule Take 100 mg by mouth 3 (three) times a day.     hydrALAZINE (APRESOLINE) 25 MG tablet Take 50 mg by mouth Daily at 8:00 am.. Hold for SBP <150           memantine (NAMENDA) 5 MG tablet Take 10 mg by mouth daily.             metFORMIN (GLUCOPHAGE) 500 MG tablet Take 1,000 mg by mouth 2 (two) times a day with meals .           metoprolol succinate (TOPROL-XL) 25 MG Take 12.5 mg by mouth daily.            omeprazole (PRILOSEC) 20 MG capsule Take 20 mg by mouth daily before breakfast.     polyethylene glycol (MIRALAX) 17 gram/dose powder Take 17 g by mouth daily.     polyvinyl alcohol (LIQUIFILM TEARS) 1.4 % ophthalmic solution Administer 1 drop to both eyes 3 (three) times a day as needed for dry eyes.     verapamil (VERELAN PM) 100 mg 24 hr capsule TAKE ONE CAPSULE BY MOUTH EVERY DAY     white petrolatum (AQUAPHOR ORIGINAL) 41 % Oint Apply 1 application topically 3 (three) times a day as needed.              Labs:  Results for orders placed or performed in visit on 06/19/19   Basic Metabolic Panel   Result Value Ref Range    Sodium 141 136 - 145 mmol/L    Potassium 4.3 3.5 - 5.0 mmol/L    Chloride 103 98 - 107 mmol/L    CO2 29 22 - 31 mmol/L    Anion Gap, Calculation 9 5 - 18 mmol/L    Glucose 109 70 - 125 mg/dL    Calcium 9.7 8.5 - 10.5 mg/dL    BUN 23 8 - 28 mg/dL    Creatinine 1.02 0.60 - 1.10 mg/dL    GFR MDRD Af Amer >60 >60 mL/min/1.73m2    GFR MDRD Non Af Amer 53 (L) >60 mL/min/1.73m2     Lab Results   Component Value Date    WBC 12.2 (H) 05/28/2016    HGB 11.6 (L) 01/14/2019    HCT 32.9 (L) 05/28/2016    MCV 91 05/28/2016     05/28/2016     Vitamin D, Total (25-Hydroxy)   Date Value Ref Range Status   06/19/2019 33.5 30.0 - 80.0 ng/mL Final     Lab Results   Component Value Date    HGBA1C 6.8 (H) 06/19/2019     Lab Results   Component Value Date    [id number] 309 06/19/2019     Lab Results   Component Value Date    TSH 2.71 06/25/2018       Assessment/Plan:  1.  Hypokalemia per lasix: will dc supplement per dc lasix  2.  TARI: Last Cr 1.02 with GFR 53 on 6/19/19, improved  3.  DM: current Hgb A1c 6.8 on 6/19/19, continue metformin 1000mg two times a day  4.  Anxiety/depression: continue citalopram 20mg and  Effrexor 37.5 daily,  previously discontinued wellbutrin, was related to daughter's health concerns, previously decreased effexor in 3/2018.  Minimal PHQ9 2 on 5/13/19  5.  HTN: decrease hydralazine to 50mg at AM, continue verapamil 100mg daily, and will maintain metoprolol 12.5mg daily. With clonidine patch 0.2mg/wk. -150s, controlled  6. Vit D def: continue D3 1000U, last 33.5.   7. GERD: PPI, no change.  8. Seasonal allergies: continue fluticasone and cetirizine.  9. Edema: currently minimal pedal, dc'd lasix and monitor  10. Constipation: continue miralax daily, no change.  11. Dementia: recently had cognitive assessment, discontinued donepezil, previously increased namenda to 10mg daily. Per daughter no change in memory  12. Socialization: consulted recreation. Reports attending more events.  13. Hypomagnesia: last 1.9 on 6/19/19, dc supplementation per discontinuing lasix       Electronically signed by: Ashwin Tang NP

## 2021-06-01 ENCOUNTER — RECORDS - HEALTHEAST (OUTPATIENT)
Dept: ADMINISTRATIVE | Facility: CLINIC | Age: 81
End: 2021-06-01

## 2021-06-01 VITALS — BODY MASS INDEX: 37.76 KG/M2 | WEIGHT: 220 LBS

## 2021-06-01 VITALS — BODY MASS INDEX: 37.59 KG/M2 | WEIGHT: 219 LBS

## 2021-06-01 VITALS — BODY MASS INDEX: 35.36 KG/M2 | WEIGHT: 206 LBS

## 2021-06-01 VITALS — WEIGHT: 206 LBS | BODY MASS INDEX: 35.36 KG/M2

## 2021-06-01 VITALS — BODY MASS INDEX: 37.08 KG/M2 | WEIGHT: 216 LBS

## 2021-06-01 VITALS — WEIGHT: 218 LBS | BODY MASS INDEX: 37.42 KG/M2

## 2021-06-01 VITALS — WEIGHT: 214 LBS | BODY MASS INDEX: 36.73 KG/M2

## 2021-06-01 VITALS — BODY MASS INDEX: 37.42 KG/M2 | WEIGHT: 218 LBS

## 2021-06-01 VITALS — WEIGHT: 219 LBS | BODY MASS INDEX: 37.59 KG/M2

## 2021-06-01 NOTE — PROGRESS NOTES
[facility]      Code Status:  FULL CODE  Visit Type: Review Of Multiple Medical Conditions     Facility:  [facility] [[id number]]           History of Present Illness: Kandi Ontiveros is a 79 y.o. female who is a resident of [facility].  She has periods of anxiety associated with memory loss and  her short-term memory is getting progressively worse.   BIMS 5/15  Recently taken off Aricept .  Remains a poor historian due to that  She recently had a fall and suffered contusion of her face forehead and left knee.  She was sent to the emergency room on 9/ 2 /19 conservative treatment given.  She refused any intervention refused not to walk and refused imaging.  She was discharged back to the nursing home.  Noted again to be ambulating without any assist device gait remains highly unsteady  She continues to have significant pedal edema and Lasix has been discontinued.  Patient also has diabetes currently her A1c is 6.6 on last check .  She is only on oral agents with metformin daily  Blood pressures are stable she is on a low-dose of medications currently    Past Medical History:   Diagnosis Date     Anxiety     Created by Conversion      Benign Adenomatous Polyp Of The Large Intestine     Created by Conversion      Chronic Diarrhea Of Unknown Origin     Created by Conversion      Dementia of the Alzheimer's type 7/13/2014     Diabetes Mellitus     Created by Conversion      Esophageal reflux     Created by Conversion      Gastritis      Herpes Zoster (Shingles)     Created by Conversion      Hiatal hernia      Hypercholesterolemia     Created by Conversion      Hypertension     Created by Conversion      Melanosis Coli     Created by Conversion [facility] Annotation: May  2 2012  1:19PM - Sheila Benavides: colonoscopy  4/30/12      Memory Lapses Or Loss     Created by Conversion      Obstructive Sleep Apnea     Created by Conversion      Osteopenia     Created by Conversion       Raynaud's Disease     Created by Conversion      Tricuspid Regurgitation     Created by Conversion      No past surgical history on file.  Family History   Problem Relation Age of Onset     Hypertension Other      Diabetes Other      Stroke Other      Social History     Socioeconomic History     Marital status:      Spouse name: Not on file     Number of children: Not on file     Years of education: Not on file     Highest education level: Not on file   Occupational History     Not on file   Social Needs     Financial resource strain: Not on file     Food insecurity:     Worry: Not on file     Inability: Not on file     Transportation needs:     Medical: Not on file     Non-medical: Not on file   Tobacco Use     Smoking status: Never Smoker     Smokeless tobacco: Never Used   Substance and Sexual Activity     Alcohol use: Not on file     Drug use: Not on file     Sexual activity: Not on file   Lifestyle     Physical activity:     Days per week: Not on file     Minutes per session: Not on file     Stress: Not on file   Relationships     Social connections:     Talks on phone: Not on file     Gets together: Not on file     Attends Orthodox service: Not on file     Active member of club or organization: Not on file     Attends meetings of clubs or organizations: Not on file     Relationship status: Not on file     Intimate partner violence:     Fear of current or ex partner: Not on file     Emotionally abused: Not on file     Physically abused: Not on file     Forced sexual activity: Not on file   Other Topics Concern     Not on file   Social History Narrative     Not on file   lived in [facility]  Current Outpatient Medications   Medication Sig Dispense Refill     acetaminophen 500 mg coapsule Take 1 capsule by mouth. Every 4-6 hours PRN       bisacodyl (DULCOLAX, BISACODYL,) 10 mg suppository Insert 1 suppository (10 mg total) into the rectum daily as needed (for constipation). 12 suppository 0     cetirizine  (ZYRTEC) 10 MG tablet Take 10 mg by mouth daily.       cholecalciferol, vitamin D3, 1,000 unit tablet Take 1,000 Units by mouth daily.       citalopram (CELEXA) 20 MG tablet TAKE ONE TABLET BY MOUTH EVERY DAY 90 tablet 0     cloNIDine (CATAPRES-TTS) 0.2 mg/24 hr Place 1 patch on the skin once a week.       fluticasone (FLONASE) 50 mcg/actuation nasal spray 1 spray into each nostril daily. 16 g 12     gabapentin (NEURONTIN) 100 MG capsule Take 100 mg by mouth 3 (three) times a day.       hydrALAZINE (APRESOLINE) 25 MG tablet Take 50 mg by mouth Daily at 8:00 am.. Hold for SBP <150             memantine (NAMENDA) 5 MG tablet Take 10 mg by mouth daily.              metFORMIN (GLUCOPHAGE) 500 MG tablet Take 1,000 mg by mouth 2 (two) times a day with meals .             metoprolol succinate (TOPROL-XL) 25 MG Take 12.5 mg by mouth daily.              omeprazole (PRILOSEC) 20 MG capsule Take 20 mg by mouth daily before breakfast.       polyethylene glycol (MIRALAX) 17 gram/dose powder Take 17 g by mouth daily. 255 g 0     polyvinyl alcohol (LIQUIFILM TEARS) 1.4 % ophthalmic solution Administer 1 drop to both eyes 3 (three) times a day as needed for dry eyes.       verapamil (VERELAN PM) 100 mg 24 hr capsule TAKE ONE CAPSULE BY MOUTH EVERY DAY 90 capsule 1     white petrolatum (AQUAPHOR ORIGINAL) 41 % Oint Apply 1 application topically 3 (three) times a day as needed.              No current facility-administered medications for this visit.      Allergies   Allergen Reactions     Codeine Sulfate          Review of Systems:    Constitutional: Negative.  Negative for fever, chills,has  activity change, appetite change and fatigue.   HENT: Negative for congestion and facial swelling.    Eyes: Negative for photophobia, redness and visual disturbance.   Respiratory: Negative for cough and chest tightness.    Cardiovascular: Negative for chest pain, palpitations and leg swelling.   Gastrointestinal: Negative for nausea, diarrhea,  constipation, blood in stool and abdominal distention.   Genitourinary: Negative.    Musculoskeletal: Negative.   she has bilateral knee osteoarthritis with knee pain  Skin: Negative.    Neurological: Negative for dizziness, tremors, syncope, weakness, light-headedness and headaches.   Hematological: Does not bruise/bleed easily.   Psychiatric/Behavioral: Negative.  She had a flat effect with a poor recall   Has no recall of recent events in her fall and just smiles      Physical Exam:    Weight 206 pounds blood pressure 138/68 temp 98 pulse 83  Obese  GENERAL: no acute distress. Cooperative in conversation.  Has limited recall and mostly smiles to questions  HEENT: pupils are equal, round and reactive. Oral mucosa is moist and intact.  RESP:Chest symmetric. Regular respiratory rate. No stridor.  CVS: S1S2  ABD: Nondistended, soft.  EXTREMITIES: She has pedal and lower extremity edema.   NEURO: non focal. Alert and oriented xSELF   PSYCH: within normal limits. No depression or anxiety.  SKIN: warm dry intact     Labs:    Lab Results   Component Value Date    HGBA1C 6.6 (H) 09/09/2019     Results for orders placed or performed in visit on 06/19/19   Basic Metabolic Panel   Result Value Ref Range    Sodium 141 136 - 145 mmol/L    Potassium 4.3 3.5 - 5.0 mmol/L    Chloride 103 98 - 107 mmol/L    CO2 29 22 - 31 mmol/L    Anion Gap, Calculation 9 5 - 18 mmol/L    Glucose 109 70 - 125 mg/dL    Calcium 9.7 8.5 - 10.5 mg/dL    BUN 23 8 - 28 mg/dL    Creatinine 1.02 0.60 - 1.10 mg/dL    GFR MDRD Af Amer >60 >60 mL/min/1.73m2    GFR MDRD Non Af Amer 53 (L) >60 mL/min/1.73m2     Lab Results   Component Value Date    [id number] 309 06/19/2019     Vitamin D, Total (25-Hydroxy)   Date Value Ref Range Status   06/19/2019 33.5 30.0 - 80.0 ng/mL Final         Assessment/Plan:    Recent history of a fall on 9/2/2019 patient sent to the emergency room.  Noted to have contusion and laceration but refused further imaging and  intervention and decided to return home she became very agitated in the emergency room  Dementia with worsening recheck B IMS is 5/15  Taken off Aricept currently on a low-dose of Namenda  Diabetes type 2 currently on a higher dose of metformin  Improvement in A1c to 6.6 on recheck.  On 9/9/2019  Blood sugar checks have been discontinued  Obesity on weight management program with improvement noted in weight  Anxiety and depression currently on Celexa at a low dose with Effexor Wellbutrin discontinued mood has been stable.  Vitamin D deficiency she is adequately replete  Hypertension stable on her multiple medication regimen.  Debilitation she is requiring more cares and can barely ambulate now without assistance.  Cognitively she is declining to.  Daughter remains involved in her care continue with her current care plan  Unfortunately she is experiencing both physical decline with gait instability.  Noticed to be ambulating several times and remains a fall risk.  Unfortunately she refuses any interventions either.  She refused any imaging and x-rays in the hospital and became very agitated continue to monitor mood and behaviors  Electronically signed by: BERLIN Butts  This progress note was completed using Dragon software and there may be grammatical errors.

## 2021-06-02 ENCOUNTER — RECORDS - HEALTHEAST (OUTPATIENT)
Dept: ADMINISTRATIVE | Facility: CLINIC | Age: 81
End: 2021-06-02

## 2021-06-02 VITALS — WEIGHT: 209 LBS | BODY MASS INDEX: 35.87 KG/M2

## 2021-06-02 VITALS — WEIGHT: 221 LBS | BODY MASS INDEX: 37.93 KG/M2

## 2021-06-02 VITALS — WEIGHT: 217 LBS | BODY MASS INDEX: 37.25 KG/M2

## 2021-06-02 VITALS — WEIGHT: 226 LBS | BODY MASS INDEX: 38.79 KG/M2

## 2021-06-02 VITALS — BODY MASS INDEX: 38.11 KG/M2 | WEIGHT: 222 LBS

## 2021-06-02 VITALS — WEIGHT: 225 LBS | BODY MASS INDEX: 38.62 KG/M2

## 2021-06-02 VITALS — BODY MASS INDEX: 36.56 KG/M2 | WEIGHT: 213 LBS

## 2021-06-02 VITALS — WEIGHT: 218 LBS | BODY MASS INDEX: 37.42 KG/M2

## 2021-06-02 NOTE — PROGRESS NOTES
[facility]      Facility:    [facility] [[id number]]  205-1 Code Status: FULL CODE      Chief Complaint/Reason for Visit:   Chief Complaint   Patient presents with     Review Of Multiple Medical Conditions       HPI:   Kandi is a 79 y.o. female who who is residing at assisted living facility has moved to long-term care as of 6/2017 with underlying history of dementia, that has been progressive in nature.  She has periods of anxiety associated with memory loss and per daughter her short-term memory is getting progressively worse.  She has a past medical history of right knee osteoarthritis and apparently received steroid injections in the past.  She also is a diabetic that has been diet controlled and has obstructive sleep apnea.  As per the family she has been moved to long-term care so that she is closer to her  who is in the nursing home with her.     LTC: Today and previously she seems oriented though daughter reports having more confusion, previously started on namenda, no change per her and is still continuing to decline.  She denies any pain. Today BP seems more controlled, maintain metoprolol, diltiazem and clonidine patch. Her edema has decreased with minimal pedal edema, lasix discontinued prior.  Ambulates per self and usually pushing  around in his wc, though had 2 recent falls.  Per dietitian, diet changed and portioned decreased. BIMS 6/15 and PHQ9 2/20 on 8/6/19.  Previously consulted recreation for increased socialization, though minimal improvement per language barrier.    Recent ER visits:  She was sent to [facility] on 9/2/2019 status post fall.  She suffered a laceration on the left side of her face, additional imaging was negative so was returned to long-term care the same day.    Again on 10/6/2019 she was sent to [facility] status post having witnessed fall on the sidewalk.  She did again suffer a laceration over her  left eyebrow, imaging negative so return to care center the same day.    Past Medical History:  Past Medical History:   Diagnosis Date     Anxiety     Created by Conversion      Benign Adenomatous Polyp Of The Large Intestine     Created by Conversion      Chronic Diarrhea Of Unknown Origin     Created by Conversion      Dementia of the Alzheimer's type 7/13/2014     Diabetes Mellitus     Created by Conversion      Esophageal reflux     Created by Conversion      Gastritis      Herpes Zoster (Shingles)     Created by Conversion      Hiatal hernia      Hypercholesterolemia     Created by Conversion      Hypertension     Created by Conversion      Melanosis Coli     Created by Conversion [facility] Annotation: May  2 2012  1:19PM - Sheila Benavides: colonoscopy  4/30/12      Memory Lapses Or Loss     Created by Conversion      Obstructive Sleep Apnea     Created by Conversion      Osteopenia     Created by Conversion      Raynaud's Disease     Created by Conversion      Tricuspid Regurgitation     Created by Conversion            Surgical History:  No past surgical history on file.    Family History:   Family History   Problem Relation Age of Onset     Hypertension Other      Diabetes Other      Stroke Other        Social History:    Social History     Socioeconomic History     Marital status:      Spouse name: Not on file     Number of children: Not on file     Years of education: Not on file     Highest education level: Not on file   Occupational History     Not on file   Social Needs     Financial resource strain: Not on file     Food insecurity:     Worry: Not on file     Inability: Not on file     Transportation needs:     Medical: Not on file     Non-medical: Not on file   Tobacco Use     Smoking status: Never Smoker     Smokeless tobacco: Never Used   Substance and Sexual Activity     Alcohol use: Not on file     Drug use: Not on file     Sexual activity: Not on file   Lifestyle     Physical activity:      Days per week: Not on file     Minutes per session: Not on file     Stress: Not on file   Relationships     Social connections:     Talks on phone: Not on file     Gets together: Not on file     Attends [employer] service: Not on file     Active member of club or organization: Not on file     Attends meetings of clubs or organizations: Not on file     Relationship status: Not on file     Intimate partner violence:     Fear of current or ex partner: Not on file     Emotionally abused: Not on file     Physically abused: Not on file     Forced sexual activity: Not on file   Other Topics Concern     Not on file   Social History Narrative     Not on file     Review of Systems   Constitutional: Negative for activity change, appetite change, diaphoresis and fatigue.        No issues   HENT: Negative for congestion and facial swelling.    Eyes: Negative for photophobia, redness and visual disturbance.   Respiratory: Negative for choking, shortness of breath and wheezing.    Cardiovascular: Positive for leg swelling. Negative for chest pain.        Pedal   Gastrointestinal: Negative for abdominal distention, abdominal pain, blood in stool, constipation and diarrhea.   Endocrine: Negative.    Genitourinary: Negative for difficulty urinating and dysuria.   Musculoskeletal: Negative for back pain and joint swelling.        Bilateral knee pain per OA, taking tylenol   Skin: Negative for color change.        intact   Allergic/Immunologic: Negative.    Neurological: Negative for seizures, weakness and headaches.   Hematological: Negative.    Psychiatric/Behavioral: Positive for confusion. Negative for behavioral problems, hallucinations and sleep disturbance. The patient is nervous/anxious.         Declining mentally       Vitals:    10/13/19 1641   BP: 138/73   Pulse: 71   Resp: 18   Temp: 98  F (36.7  C)   SpO2: 95%   Weight: 211 lb (95.7 kg)       Physical Exam   Constitutional: She appears well-developed and well-nourished.  No distress.   No issues   HENT:   Head: Normocephalic and atraumatic.   Mouth/Throat: Oropharynx is clear and moist. No oropharyngeal exudate.   Eyes: Pupils are equal, round, and reactive to light. Conjunctivae and EOM are normal. Right eye exhibits no discharge. Left eye exhibits no discharge. No scleral icterus.   Neck: Normal range of motion. Neck supple. No JVD present. No tracheal deviation present.   Intact   Cardiovascular: Normal rate, regular rhythm and normal heart sounds. Exam reveals no gallop and no friction rub.   No murmur heard.  Pulmonary/Chest: Effort normal and breath sounds normal. No stridor. No respiratory distress. She has no wheezes. She has no rales.   CTA, RA   Abdominal: Soft. Bowel sounds are normal. She exhibits no distension. There is no tenderness. There is no rebound.   No diarrhea or constipation   Genitourinary:    Genitourinary Comments: Deferred   Musculoskeletal: Normal range of motion.         General: Edema present.      Comments: 1+ LE, mostly pedal, lasix discontinued     Neurological: She is alert. No cranial nerve deficit.   A/O x1-2   Skin: Skin is warm and dry. She is not diaphoretic. No erythema.   Intact, very dry   Psychiatric: She has a normal mood and affect.   Less agitation, Effexor weaned, dc aricept and continue namenda   Nursing note and vitals reviewed.      Medication List:  Current Outpatient Medications   Medication Sig     acetaminophen 500 mg coapsule Take 1 capsule by mouth. Every 4-6 hours PRN     bisacodyl (DULCOLAX, BISACODYL,) 10 mg suppository Insert 1 suppository (10 mg total) into the rectum daily as needed (for constipation).     cetirizine (ZYRTEC) 10 MG tablet Take 10 mg by mouth daily.     cholecalciferol, vitamin D3, 1,000 unit tablet Take 1,000 Units by mouth daily.     citalopram (CELEXA) 20 MG tablet TAKE ONE TABLET BY MOUTH EVERY DAY     cloNIDine (CATAPRES-TTS) 0.2 mg/24 hr Place 1 patch on the skin once a week.     fluticasone  (FLONASE) 50 mcg/actuation nasal spray 1 spray into each nostril daily.     gabapentin (NEURONTIN) 100 MG capsule Take 100 mg by mouth 3 (three) times a day.     hydrALAZINE (APRESOLINE) 25 MG tablet Take 50 mg by mouth Daily at 8:00 am.. Hold for SBP <150           memantine (NAMENDA) 5 MG tablet Take 10 mg by mouth daily.            metFORMIN (GLUCOPHAGE) 500 MG tablet Take 1,000 mg by mouth 2 (two) times a day with meals .           metoprolol succinate (TOPROL-XL) 25 MG Take 12.5 mg by mouth daily.            omeprazole (PRILOSEC) 20 MG capsule Take 20 mg by mouth daily before breakfast.     polyethylene glycol (MIRALAX) 17 gram/dose powder Take 17 g by mouth daily.     polyvinyl alcohol (LIQUIFILM TEARS) 1.4 % ophthalmic solution Administer 1 drop to both eyes 3 (three) times a day as needed for dry eyes.     verapamil (VERELAN PM) 100 mg 24 hr capsule TAKE ONE CAPSULE BY MOUTH EVERY DAY     white petrolatum (AQUAPHOR ORIGINAL) 41 % Oint Apply 1 application topically 3 (three) times a day as needed.              Labs:  Results for orders placed or performed in visit on 06/19/19   Basic Metabolic Panel   Result Value Ref Range    Sodium 141 136 - 145 mmol/L    Potassium 4.3 3.5 - 5.0 mmol/L    Chloride 103 98 - 107 mmol/L    CO2 29 22 - 31 mmol/L    Anion Gap, Calculation 9 5 - 18 mmol/L    Glucose 109 70 - 125 mg/dL    Calcium 9.7 8.5 - 10.5 mg/dL    BUN 23 8 - 28 mg/dL    Creatinine 1.02 0.60 - 1.10 mg/dL    GFR MDRD Af Amer >60 >60 mL/min/1.73m2    GFR MDRD Non Af Amer 53 (L) >60 mL/min/1.73m2     Lab Results   Component Value Date    WBC 12.2 (H) 05/28/2016    HGB 11.6 (L) 01/14/2019    HCT 32.9 (L) 05/28/2016    MCV 91 05/28/2016     05/28/2016     Vitamin D, Total (25-Hydroxy)   Date Value Ref Range Status   06/19/2019 33.5 30.0 - 80.0 ng/mL Final     Lab Results   Component Value Date    HGBA1C 6.6 (H) 09/09/2019     Lab Results   Component Value Date    [id number] 309 06/19/2019     Lab Results    Component Value Date    TSH 2.39 09/09/2019       Assessment/Plan:  1.  Hypokalemia per lasix: will dc supplement per dc lasix  2.  TARI: Last Cr 1.02 with GFR 53 on 6/19/19, improved  3.  DM: current Hgb A1c 6.6 on 9/9/19 so decreased metformin 750mg two times a day  4.  Anxiety/depression: continue citalopram 20mg and Effexor 37.5 daily,  previously discontinued wellbutrin, previously decreased effexor in 3/2018.  Minimal PHQ9 2 on 5/13/19  5.  HTN: continue hydralazine 50mg at AM, continue verapamil 100mg daily, and will maintain metoprolol 12.5mg daily. With clonidine patch 0.2mg/wk. -150s, controlled  6. Vit D def: continue D3 1000U, last 33.5.   7. GERD: PPI, no change.  8. Seasonal allergies: continue fluticasone and cetirizine.  9. Edema: currently minimal pedal, dc'd lasix and monitor. Wt 211lbs  10. Constipation: continue miralax daily, no change.  11. Dementia: recently had cognitive assessment, discontinued donepezil, previously increased namenda to 10mg daily. Per daughter no change in memory  12. Socialization: consulted recreation. No real change per daughter  13. Hypomagnesia: last 1.9 on 6/19/19, dc supplementation per discontinuing lasix       Electronically signed by: Ashwin Tang NP

## 2021-06-03 VITALS
TEMPERATURE: 98 F | WEIGHT: 211 LBS | HEART RATE: 71 BPM | BODY MASS INDEX: 36.22 KG/M2 | SYSTOLIC BLOOD PRESSURE: 138 MMHG | OXYGEN SATURATION: 95 % | DIASTOLIC BLOOD PRESSURE: 73 MMHG | RESPIRATION RATE: 18 BRPM

## 2021-06-03 VITALS — BODY MASS INDEX: 35.87 KG/M2 | WEIGHT: 209 LBS

## 2021-06-03 VITALS — WEIGHT: 207 LBS | BODY MASS INDEX: 35.53 KG/M2

## 2021-06-04 VITALS
TEMPERATURE: 98 F | OXYGEN SATURATION: 95 % | WEIGHT: 217 LBS | DIASTOLIC BLOOD PRESSURE: 70 MMHG | SYSTOLIC BLOOD PRESSURE: 118 MMHG | RESPIRATION RATE: 16 BRPM | HEART RATE: 65 BPM | BODY MASS INDEX: 37.25 KG/M2

## 2021-06-04 VITALS
DIASTOLIC BLOOD PRESSURE: 77 MMHG | HEIGHT: 64 IN | WEIGHT: 199 LBS | OXYGEN SATURATION: 94 % | SYSTOLIC BLOOD PRESSURE: 149 MMHG | RESPIRATION RATE: 18 BRPM | TEMPERATURE: 97 F | HEART RATE: 67 BPM | BODY MASS INDEX: 33.97 KG/M2

## 2021-06-04 VITALS
HEIGHT: 64 IN | TEMPERATURE: 97 F | HEART RATE: 75 BPM | DIASTOLIC BLOOD PRESSURE: 75 MMHG | SYSTOLIC BLOOD PRESSURE: 138 MMHG | RESPIRATION RATE: 17 BRPM | WEIGHT: 217 LBS | BODY MASS INDEX: 37.05 KG/M2 | OXYGEN SATURATION: 95 %

## 2021-06-05 VITALS
OXYGEN SATURATION: 95 % | RESPIRATION RATE: 16 BRPM | BODY MASS INDEX: 35.68 KG/M2 | HEART RATE: 68 BPM | TEMPERATURE: 98 F | DIASTOLIC BLOOD PRESSURE: 78 MMHG | HEIGHT: 64 IN | SYSTOLIC BLOOD PRESSURE: 134 MMHG | WEIGHT: 209 LBS

## 2021-06-05 VITALS
TEMPERATURE: 98 F | SYSTOLIC BLOOD PRESSURE: 160 MMHG | RESPIRATION RATE: 20 BRPM | HEIGHT: 64 IN | BODY MASS INDEX: 35.68 KG/M2 | DIASTOLIC BLOOD PRESSURE: 76 MMHG | OXYGEN SATURATION: 95 % | HEART RATE: 80 BPM | WEIGHT: 209 LBS

## 2021-06-05 NOTE — TELEPHONE ENCOUNTER
This patient's medication list and chart were reviewed as part of the service provided by [facility].    Assessment/Recommendations:  1. (Diabetes):  Due to estGFR >45-60, recommend monitoring renal function every 3-6mo with Metformin use.  Last BMP in June.  Please recheck.  Please also recheck a1c; appears Metformin dose was reduced in October.  A1c goal <8-8.5% reasonable in this elderly patient with multiple comorbidities, dementia, limited life expectancy.  Will likely be able to tolerate further reduction in Metformin dose.  Reduction in meds where able likely helpful for this elderly patient with dementia & a significant fall risk.  2. (Allergies):  On both flonase and cetirizine scheduled.  Consider changing cetirizine to prn.  Does have some anticholinergic effects, and symptoms may be able to be controlled with nasal spray alone.  Again, reduction in meds where able likely very beneficial, may reduce fall risk.  3. (Depression/anxiety):  On both Citalopram 20mg daily and Venlafaxine ER 37.5mg daily, which is duplicative and increases risk of serotonin syndrome.  Please consider reduction in Venlafaxine ER to 37.5mg every other day for 2 weeks, then stop.  If prefer to continue with Venlafaxine ER, then please begin taper and d'c of Citalopram.  4. (HTN):  BP goal <150/90mmHg reasonable for this elderly patient (may even consider loosening this to <160/90mmHg given pt with h/o frequent falls, dementia).  Clonidine with several CNS adverse effects associated with it, and may benefit from reduction to 0.1mg/24hr patch and monitor.  If BPs allow at this this lower dose, may consider d'c altogether.  Noted pt previously taken off of lisinopril due to impaired renal function, however, ACEi indicated with diabetes and CKD with HTN, and may improve renal function; requires monitoring of course, and if added, should follow-up BMP in one week.  Pt is on CCB in addition to low dose  Metoprolol ER; has Raynaud's which may be reason for CCB as well.  May consider d'c Metoprolol ER and begin low dose Lisinopril (however, not at same time as potential reduction/change in Clonidine).  Could consider switch from Metoprolol ER to Lisinopril prior to beginning potential reduction of Clonidine, and follow-up BMP one week from potential addition of Lisinopril.  5. (Pain):  Please d'c prn Ibuprofen.  NSAIDS should be avoided in elderly; Beer's List due to increased risk of gi bleed, MI/CVA/CV risk, and increased risk of ARF.  NSAID may also contribute to HTN, edema, and may inc risk of ARF if used in combo with ACEi or ARB.  Please also note that pt's most recent est CrCl =52ml/min.  Gabapentin dose/frequency for CrCl >30-59ml/min is 200-700mg two times a day to avoid accumulation of med and increased risk of adverse effects, such as peripheral edema, confusion, sedation, falls, etc.  Please change current dose from 100mg three times a day to 300mg at bedtime.  6. (Dementia):  Please note that pt is not taking target dose of Namenda.  Currently on 10mg daily; target dose 10mg twice daily.  Possible that it may provide some benefit for anxiety, and could consider increase to 10mg qam and 5mg qpm for 1 week, then 10mg twice daily.  Please educate daughter that med will not improve patient's memory, but may help to slow down progression of disease. Appropriate to reassess continued use of Namenda if noticeable progression of dementia and/or pt experiencing side effects such as dizziness, headache, etc.  7. (GERD):  If prn Ibuprofen dc'd, and pt without active GERD symptoms, may consider taper and d'c of Omeprazole.  Could consider reduction to 20mg every other day for 2 weeks, then d'c if no increase in symptoms.  PPIs increase risk of Cdiff, pneumonia, low B12, low Mg, inc fracture risk.        Est CrCl ([place]) = 52ml/min (based on Scr 1.02 and adjusted BW 71.1kg)    Sara Cunha  Pharm.D.,[facility] [place]  Board Certified Geriatric Pharmacist  Medication Therapy Management Pharmacist  [phone]

## 2021-06-06 NOTE — PROGRESS NOTES
[facility]      Code Status:  FULL CODE  Visit Type: Review Of Multiple Medical Conditions     Facility:  [facility] [[id number]]           History of Present Illness: Kandi Ontiveros is a 79 y.o. female who is a resident of [facility].  She has underlying history of dementia with progressive memory impairment.  In light of the fact that she has been noted several several times to be wandering.  It is felt safe for that she should move to long-term care memory care unit and family is comfortable with that decision.  Her  will be moving with her to  She has a history of hypertension and remains on multiple medications blood pressures have been stable.  Blood pressures are checked often and she has been given medications appropriately.  Weights are reviewed and at 217 pounds are stable she has not had any recent weight loss.  Family is noticing she is eating well.  Patient is a diabetic A1c's have been stable she remains on metformin.  Her recheck A1c was 6.6    Past Medical History:   Diagnosis Date     Anxiety     Created by Conversion      Benign Adenomatous Polyp Of The Large Intestine     Created by Conversion      Chronic Diarrhea Of Unknown Origin     Created by Conversion      Dementia of the Alzheimer's type 7/13/2014     Diabetes Mellitus     Created by Conversion      Esophageal reflux     Created by Conversion      Gastritis      Herpes Zoster (Shingles)     Created by Conversion      Hiatal hernia      Hypercholesterolemia     Created by Conversion      Hypertension     Created by Conversion      Melanosis Coli     Created by Conversion [facility] Annotation: May  2 2012  1:19PM - Sheila Benavides: colonoscopy  4/30/12      Memory Lapses Or Loss     Created by Conversion      Obstructive Sleep Apnea     Created by Conversion      Osteopenia     Created by Conversion      Raynaud's Disease     Created by Conversion      Tricuspid Regurgitation     Created  by Conversion      No past surgical history on file.  Family History   Problem Relation Age of Onset     Hypertension Other      Diabetes Other      Stroke Other      Social History     Socioeconomic History     Marital status:      Spouse name: Not on file     Number of children: Not on file     Years of education: Not on file     Highest education level: Not on file   Occupational History     Not on file   Social Needs     Financial resource strain: Not on file     Food insecurity:     Worry: Not on file     Inability: Not on file     Transportation needs:     Medical: Not on file     Non-medical: Not on file   Tobacco Use     Smoking status: Never Smoker     Smokeless tobacco: Never Used   Substance and Sexual Activity     Alcohol use: Not on file     Drug use: Not on file     Sexual activity: Not on file   Lifestyle     Physical activity:     Days per week: Not on file     Minutes per session: Not on file     Stress: Not on file   Relationships     Social connections:     Talks on phone: Not on file     Gets together: Not on file     Attends Hinduism service: Not on file     Active member of club or organization: Not on file     Attends meetings of clubs or organizations: Not on file     Relationship status: Not on file     Intimate partner violence:     Fear of current or ex partner: Not on file     Emotionally abused: Not on file     Physically abused: Not on file     Forced sexual activity: Not on file   Other Topics Concern     Not on file   Social History Narrative     Not on file   lived in [facility]  Current Outpatient Medications   Medication Sig Dispense Refill     acetaminophen (TYLENOL) 325 MG tablet Take 650 mg by mouth 4 (four) times a day.       acetaminophen 500 mg coapsule Take 1 capsule by mouth. Every 4-6 hours PRN       bisacodyl (DULCOLAX, BISACODYL,) 10 mg suppository Insert 1 suppository (10 mg total) into the rectum daily as needed (for constipation). 12 suppository 0     cetirizine  (ZYRTEC) 10 MG tablet Take 10 mg by mouth daily.       cholecalciferol, vitamin D3, 1,000 unit tablet Take 1,000 Units by mouth daily.       citalopram (CELEXA) 20 MG tablet TAKE ONE TABLET BY MOUTH EVERY DAY 90 tablet 0     cloNIDine (CATAPRES-TTS) 0.2 mg/24 hr Place 1 patch on the skin once a week.       fluticasone (FLONASE) 50 mcg/actuation nasal spray 1 spray into each nostril daily. 16 g 12     gabapentin (NEURONTIN) 100 MG capsule Take 100 mg by mouth 3 (three) times a day.       hydrALAZINE (APRESOLINE) 25 MG tablet Take 50 mg by mouth Daily at 8:00 am.. Hold for SBP <150             ibuprofen (ADVIL,MOTRIN) 600 MG tablet Take 600 mg by mouth every 6 (six) hours as needed for pain.       memantine (NAMENDA) 5 MG tablet Take 10 mg by mouth at bedtime.        metFORMIN (GLUCOPHAGE) 500 MG tablet Take 750 mg by mouth 2 (two) times a day with meals.        metoprolol succinate (TOPROL-XL) 25 MG Take 12.5 mg by mouth daily.              omeprazole (PRILOSEC) 20 MG capsule Take 20 mg by mouth daily before breakfast.       polyethylene glycol (MIRALAX) 17 gram/dose powder Take 17 g by mouth daily. 255 g 0     polyvinyl alcohol (LIQUIFILM TEARS) 1.4 % ophthalmic solution Administer 1 drop to both eyes 3 (three) times a day as needed for dry eyes.       venlafaxine (EFFEXOR-XR) 37.5 MG 24 hr capsule Take 37.5 mg by mouth daily.       verapamil (VERELAN PM) 100 mg 24 hr capsule TAKE ONE CAPSULE BY MOUTH EVERY DAY 90 capsule 1     white petrolatum (AQUAPHOR ORIGINAL) 41 % Oint Apply 1 application topically 3 (three) times a day as needed.              No current facility-administered medications for this visit.      Allergies   Allergen Reactions     Codeine Sulfate          Review of Systems:    Constitutional: Negative.  Negative for fever, chills,has  activity change, appetite change and fatigue.   HENT: Negative for congestion and facial swelling.    Eyes: Negative for photophobia, redness and visual disturbance.    Respiratory: Negative for cough and chest tightness.    Cardiovascular: Negative for chest pain, palpitations and leg swelling.   Gastrointestinal: Negative for nausea, diarrhea, constipation, blood in stool and abdominal distention.   Genitourinary: Negative.    Musculoskeletal: Negative.   she has bilateral knee osteoarthritis with knee pain  Skin: Negative.    Neurological: Negative for dizziness, tremors, syncope, weakness, light-headedness and headaches.   Hematological: Does not bruise/bleed easily.   Psychiatric/Behavioral: Negative.  She had a flat effect with a poor recall   Has no recall of recent events       Physical Exam:    Weight 217 pounds blood pressure 138/68 temp 98 pulse 83  Obese  GENERAL: no acute distress. Cooperative in conversation.  Has limited recall and mostly smiles to questions  Patient is obese  HEENT: pupils are equal, round and reactive. Oral mucosa is moist and intact.  RESP:Chest symmetric. Regular respiratory rate. No stridor.  CVS: S1S2  ABD: Nondistended, soft.  EXTREMITIES: She has pedal and lower extremity edema.   NEURO: non focal. Alert and oriented xSELF   PSYCH: within normal limits. No depression or anxiety.  SKIN: warm dry intact   Musculoskeletal no focal abnormalities noted to be ambulating without any assist device    Labs:    Lab Results   Component Value Date    HGBA1C 6.6 (H) 09/09/2019     Results for orders placed or performed in visit on 06/19/19   Basic Metabolic Panel   Result Value Ref Range    Sodium 141 136 - 145 mmol/L    Potassium 4.3 3.5 - 5.0 mmol/L    Chloride 103 98 - 107 mmol/L    CO2 29 22 - 31 mmol/L    Anion Gap, Calculation 9 5 - 18 mmol/L    Glucose 109 70 - 125 mg/dL    Calcium 9.7 8.5 - 10.5 mg/dL    BUN 23 8 - 28 mg/dL    Creatinine 1.02 0.60 - 1.10 mg/dL    GFR MDRD Af Amer >60 >60 mL/min/1.73m2    GFR MDRD Non Af Amer 53 (L) >60 mL/min/1.73m2     Lab Results   Component Value Date    [id number] 309 06/19/2019     Vitamin D, Total  (25-Hydroxy)   Date Value Ref Range Status   06/19/2019 33.5 30.0 - 80.0 ng/mL Final         Assessment/Plan:    -Dementia with significant cognitive decline  - History of diabetes adequately controlled with a last A1c of 6.6  - Morbid obesity  -History of depression with anxiety  - Hypertension  - Generalized weakness with decline noted    Patient has been declining cognitively unfortunately due to language barrier no recent  testing available for review.  Daughter present at bedside however agrees with staff decision to move to another floor which is a long-term care memory care unit.  Plan is to recheck another A1c on her her target goal is less than 8 for her A1c.  Monitor trends before adjusting metformin further.  Monitor mood and behaviors she remains on Effexor as well as Celexa.  Unfortunately patient continues to have significant resistance to care  She will refuse cares and all interventions unless her daughter is present even her other daughter cannot help her much so we will continue with her psych meds.  SHe frequently even will refuse showers and cleaning efforts  Recheck routine labs including TSH BMP and CBC in the morning.  Discontinue her Namenda.  She now has advanced dementia with no proven benefit from Namenda  Change her Claritin and Flonase to as needed  Adjust medications further based on labs    Electronically signed by: BERLIN Butts  This progress note was completed using Dragon software and there may be grammatical errors.

## 2021-06-06 NOTE — PROGRESS NOTES
[facility]      Facility:    [facility] [[id number]] -1 Code Status: FULL CODE      Chief Complaint/Reason for Visit:    Chief Complaint   Patient presents with     Review Of Multiple Medical Conditions     Dementia, DM       HPI:   Kandi is a 79 y.o. female who who is residing at assisted living facility has moved to long-term care as of 6/2017 with underlying history of dementia, that has been progressive in nature.  She has periods of anxiety associated with memory loss and per daughter her short-term memory is getting progressively worse.  She has a past medical history of right knee osteoarthritis and apparently received steroid injections in the past.  She also is a diabetic that has been diet controlled and has obstructive sleep apnea.  As per the family she has been moved to long-term care so that she is closer to her  who is in the nursing home with her.     LTC: Today and previously she seems oriented though daughter reports having more confusion, previously started on namenda, no change per her and is still continuing to decline.  She denies any pain. Today BP seems controlled, maintain metoprolol and diltiazem.  Per pharmacy recommendations will decrease clonidine patch with the intent to dc soon. Additionally restarted lisinopril. Her edema BLE is stable so lasix discontinued prior.  Her DM is well controlled requiring a titration of her metformin.  She ambulates per self and usually pushes  around in his wc, though had some falls in the past months (Aug/sept 2019). Also per dietitian, diet was changed and portioned decreased per weight gain. BIMS 6/15 and PHQ9 2/20 on 8/6/19.  Per cognitive decline, was moved to the  in 1/2020.    Recent ER visits:  She was sent to [facility] on 9/2/2019 status post fall.  She suffered a laceration on the left side of her face, additional imaging was negative so was returned to long-term  care the same day.    Again on 10/6/2019 she was sent to [facility] status post having witnessed fall on the sidewalk.  She did again suffer a laceration over her left eyebrow, imaging negative so return to care center the same day.    Past Medical History:  Past Medical History:   Diagnosis Date     Anxiety     Created by Conversion      Benign Adenomatous Polyp Of The Large Intestine     Created by Conversion      Chronic Diarrhea Of Unknown Origin     Created by Conversion      Dementia of the Alzheimer's type 7/13/2014     Diabetes Mellitus     Created by Conversion      Esophageal reflux     Created by Conversion      Gastritis      Herpes Zoster (Shingles)     Created by Conversion      Hiatal hernia      Hypercholesterolemia     Created by Conversion      Hypertension     Created by Conversion      Melanosis Coli     Created by Conversion [facility] Annotation: May  2 2012  1:19PM - Sheila Benavides: colonoscopy  4/30/12      Memory Lapses Or Loss     Created by Conversion      Obstructive Sleep Apnea     Created by Conversion      Osteopenia     Created by Conversion      Raynaud's Disease     Created by Conversion      Tricuspid Regurgitation     Created by Conversion            Surgical History:  No past surgical history on file.    Family History:   Family History   Problem Relation Age of Onset     Hypertension Other      Diabetes Other      Stroke Other        Social History:    Social History     Socioeconomic History     Marital status:      Spouse name: Not on file     Number of children: Not on file     Years of education: Not on file     Highest education level: Not on file   Occupational History     Not on file   Social Needs     Financial resource strain: Not on file     Food insecurity:     Worry: Not on file     Inability: Not on file     Transportation needs:     Medical: Not on file     Non-medical: Not on file   Tobacco Use     Smoking status: Never Smoker     Smokeless  tobacco: Never Used   Substance and Sexual Activity     Alcohol use: Not on file     Drug use: Not on file     Sexual activity: Not on file   Lifestyle     Physical activity:     Days per week: Not on file     Minutes per session: Not on file     Stress: Not on file   Relationships     Social connections:     Talks on phone: Not on file     Gets together: Not on file     Attends Sikhism service: Not on file     Active member of club or organization: Not on file     Attends meetings of clubs or organizations: Not on file     Relationship status: Not on file     Intimate partner violence:     Fear of current or ex partner: Not on file     Emotionally abused: Not on file     Physically abused: Not on file     Forced sexual activity: Not on file   Other Topics Concern     Not on file   Social History Narrative     Not on file     Review of Systems   Constitutional: Negative for activity change, appetite change, diaphoresis and fatigue.        No issues   HENT: Negative for congestion and facial swelling.    Eyes: Negative for photophobia, redness and visual disturbance.   Respiratory: Negative for choking, shortness of breath and wheezing.    Cardiovascular: Positive for leg swelling. Negative for chest pain.        Pedal   Gastrointestinal: Negative for abdominal distention, abdominal pain, blood in stool, constipation and diarrhea.   Endocrine: Negative.    Genitourinary: Negative for difficulty urinating and dysuria.   Musculoskeletal: Negative for back pain and joint swelling.        Bilateral knee pain per OA, taking tylenol   Skin: Negative for color change.        intact   Allergic/Immunologic: Negative.    Neurological: Negative for seizures, weakness and headaches.   Hematological: Negative.    Psychiatric/Behavioral: Positive for confusion. Negative for behavioral problems, hallucinations and sleep disturbance. The patient is nervous/anxious.         Declining mentally       Vitals:    03/07/20 1220   BP:  "138/75   Pulse: 75   Resp: 17   Temp: 97  F (36.1  C)   SpO2: 95%   Weight: 217 lb (98.4 kg)   Height: 5' 4\" (1.626 m)       Physical Exam   Constitutional: She appears well-developed and well-nourished. No distress.   Was moved to    HENT:   Head: Normocephalic and atraumatic.   Mouth/Throat: Oropharynx is clear and moist. No oropharyngeal exudate.   Eyes: Pupils are equal, round, and reactive to light. Conjunctivae and EOM are normal. Right eye exhibits no discharge. Left eye exhibits no discharge. No scleral icterus.   Neck: Normal range of motion. Neck supple. No JVD present. No tracheal deviation present.   Intact   Cardiovascular: Normal rate, regular rhythm and normal heart sounds. Exam reveals no gallop and no friction rub.   No murmur heard.  Pulmonary/Chest: Effort normal and breath sounds normal. No stridor. No respiratory distress. She has no wheezes. She has no rales.   CTA, RA   Abdominal: Soft. Bowel sounds are normal. She exhibits no distension. There is no abdominal tenderness. There is no rebound.   No diarrhea or constipation   Genitourinary:    Genitourinary Comments: Deferred     Musculoskeletal: Normal range of motion.         General: Edema present.      Comments: 1+ LE, mostly pedal, lasix discontinued   Neurological: She is alert. No cranial nerve deficit.   A/O x1, namenda was discontinued   Skin: Skin is warm and dry. She is not diaphoretic. No erythema.   Intact, very dry   Psychiatric: She has a normal mood and affect.   Less agitation, Effexor weaned, dc aricept and continue namenda   Nursing note and vitals reviewed.      Medication List:  Current Outpatient Medications   Medication Sig     cloNIDine (CATAPRES-TTS) 0.1 mg/24 hr Place 1 patch on the skin once a week.     lisinopriL (PRINIVIL,ZESTRIL) 2.5 MG tablet Take 2.5 mg by mouth daily.     acetaminophen (TYLENOL) 325 MG tablet Take 650 mg by mouth 4 (four) times a day.     acetaminophen 500 mg coapsule Take 1 capsule by mouth. " Every 4-6 hours PRN     bisacodyl (DULCOLAX, BISACODYL,) 10 mg suppository Insert 1 suppository (10 mg total) into the rectum daily as needed (for constipation).     cetirizine (ZYRTEC) 10 MG tablet Take 10 mg by mouth daily as needed.      cholecalciferol, vitamin D3, 1,000 unit tablet Take 1,000 Units by mouth daily.     citalopram (CELEXA) 20 MG tablet TAKE ONE TABLET BY MOUTH EVERY DAY (Patient taking differently: 10 mg. Start GDR on 3/9/20)     fluticasone (FLONASE) 50 mcg/actuation nasal spray 1 spray into each nostril daily. (Patient taking differently: 1 spray into each nostril daily as needed. )     gabapentin (NEURONTIN) 100 MG capsule Take 100 mg by mouth 3 (three) times a day.     hydrALAZINE (APRESOLINE) 25 MG tablet Take 50 mg by mouth Daily at 8:00 am.. Hold for SBP <150           metFORMIN (GLUCOPHAGE) 500 MG tablet Take 500 mg by mouth 2 (two) times a day with meals.      metoprolol succinate (TOPROL-XL) 25 MG Take 12.5 mg by mouth daily.            omeprazole (PRILOSEC) 20 MG capsule Take 20 mg by mouth daily before breakfast.     polyethylene glycol (MIRALAX) 17 gram/dose powder Take 17 g by mouth daily.     polyvinyl alcohol (LIQUIFILM TEARS) 1.4 % ophthalmic solution Administer 1 drop to both eyes 3 (three) times a day as needed for dry eyes.     venlafaxine (EFFEXOR-XR) 37.5 MG 24 hr capsule Take 37.5 mg by mouth daily.     verapamil (VERELAN PM) 100 mg 24 hr capsule TAKE ONE CAPSULE BY MOUTH EVERY DAY     white petrolatum (AQUAPHOR ORIGINAL) 41 % Oint Apply 1 application topically 3 (three) times a day as needed.              Labs:  Results for orders placed or performed in visit on 02/25/20   Basic Metabolic Panel   Result Value Ref Range    Sodium 138 136 - 145 mmol/L    Potassium 4.2 3.5 - 5.0 mmol/L    Chloride 102 98 - 107 mmol/L    CO2 23 22 - 31 mmol/L    Anion Gap, Calculation 13 5 - 18 mmol/L    Glucose 232 (H) 70 - 125 mg/dL    Calcium 9.7 8.5 - 10.5 mg/dL    BUN 25 8 - 28 mg/dL     Creatinine 1.10 0.60 - 1.10 mg/dL    GFR MDRD Af Amer 58 (L) >60 mL/min/1.73m2    GFR MDRD Non Af Amer 48 (L) >60 mL/min/1.73m2     Lab Results   Component Value Date    WBC 12.2 (H) 05/28/2016    HGB 11.6 (L) 01/14/2019    HCT 32.9 (L) 05/28/2016    MCV 91 05/28/2016     05/28/2016     Vitamin D, Total (25-Hydroxy)   Date Value Ref Range Status   06/19/2019 33.5 30.0 - 80.0 ng/mL Final     Lab Results   Component Value Date    HGBA1C 6.7 (H) 02/25/2020     Lab Results   Component Value Date    [id number] 309 06/19/2019     Lab Results   Component Value Date    TSH 1.51 02/25/2020       Assessment/Plan:      Hx of TARI: Last Cr 1.02 with GFR 53 on 6/19/19, recheck BMP    DM: current Hgb A1c 6.7 on 2/25/20 so decreased metformin 500mg two times a day    Anxiety/depression: per pharmacy recs will decrease citalopram to 10mg and continue Effexor 37.5 daily (intent to wean celexa off),  previously discontinued wellbutrin, previously decreased effexor in 3/2018.  Minimal PHQ9 2 on 5/13/19    HTN: continue hydralazine 50mg at AM, continue verapamil 100mg daily, and will maintain metoprolol 12.5mg daily. Per pharmacy recs will decrease clonidine patch to 0.1mg/wk and restart lisinopril per renal hx/DM     Vit D def: continue D3 1000U, last 33.5.  Recheck    Hypomagnesia: last 1.9 on 6/19/19, dc supplementation per discontinuing lasix     Hx of hypokalemia per lasix: will dc supplement per dc lasix    GERD: PPI, no change.    Seasonal allergies: recently changed fluticasone and cetirizine to PRN    Edema: currently minimal pedal, dc'd lasix prior. Wt 216lbs    Constipation: continue miralax daily, no change    Dementia: had cognitive assessment, discontinued donepezil, previously increased namenda to 10mg daily. Per daughter no change in memory so namenda discontinues as well    Socialization: consulted recreation. No real change per daughter    Disposition: per cognitive decline was moved to  with        Electronically signed by: Ashwin Tang NP

## 2021-06-07 NOTE — PROGRESS NOTES
"[facility]   Video Visit    Code Status: FULL CODE    Kandi Ontiveros is a 79 y.o. female who is being evaluated via a billable video visit.      The patient has been notified of following:     \"This video visit will be conducted via a call between you and your physician/provider. We have found that certain health care needs can be provided without the need for an in-person physical exam.  This service lets us provide the care you need with a video conversation.  If a prescription is necessary we can send it to the facility team.  If lab work is needed we can place an order through the facility team to have that test done at a later time.    If during the course of the call the physician/provider feels a video visit is not appropriate, you will not be charged for this service.\"     Physician/provider has received verbal consent for a Video Visit from the patient? Yes    Patient would like the video invitation sent by: Send to :     Video Start Time: 1205    Chief Complaint/Reason for Visit:  Chief Complaint   Patient presents with     P Care Coordination - Home Visit-Annual Assessment       HPI:   Kandi is a 79 y.o. female who is residing at assisted living facility has moved to long-term care as of 6/2017 with underlying history of dementia, that has been progressive in nature.  She has periods of anxiety associated with memory loss and per daughter her short-term memory is getting progressively worse.  She has a past medical history of right knee osteoarthritis and apparently received steroid injections in the past.  She also is a diabetic that has been diet controlled and has obstructive sleep apnea.  As per the family she has been moved to long-term care so that she is closer to her  who is in the nursing home with her.      LTC: Today and previously she seems oriented though daughter reports having more confusion, previously started on namenda, no change per her so " was discontinued.  She denies any pain. Today BP seems controlled, maintain metoprolol and diltiazem.  Per pharmacy recommendations clonidine discontinued. Additionally restarted lisinopril. Her edema BLE is stable so lasix discontinued prior.  Her DM is well controlled requiring a titration of her metformin.  She ambulates per self and usually pushes  around in his wc, though had some falls in the past months (Aug/sept 2019). Also per dietitian, diet was changed and portions decreased per weight gain. BIMS 6/15 and PHQ9 2/20 on 8/6/19.  Per cognitive decline, was moved to the  in 1/2020. Today offers no other issues.    I have reviewed and updated the patient's Past Medical History, Social History, Family History and Medication List.    ALLERGIES  Codeine sulfate    Review of Systems    Vitals:    04/17/20 1259   BP: 118/70   Pulse: 65   Resp: 16   Temp: 98  F (36.7  C)   SpO2: 95%   Weight: 217 lb (98.4 kg)         Labs:  Results for orders placed or performed in visit on 03/23/20   Basic Metabolic Panel   Result Value Ref Range    Sodium 140 136 - 145 mmol/L    Potassium 4.0 3.5 - 5.0 mmol/L    Chloride 105 98 - 107 mmol/L    CO2 25 22 - 31 mmol/L    Anion Gap, Calculation 10 5 - 18 mmol/L    Glucose 85 70 - 125 mg/dL    Calcium 9.4 8.5 - 10.5 mg/dL    BUN 19 8 - 28 mg/dL    Creatinine 0.99 0.60 - 1.10 mg/dL    GFR MDRD Af Amer >60 >60 mL/min/1.73m2    GFR MDRD Non Af Amer 54 (L) >60 mL/min/1.73m2     Lab Results   Component Value Date    WBC 8.6 03/23/2020    HGB 11.0 (L) 03/23/2020    HCT 35.0 03/23/2020    MCV 98 03/23/2020     03/23/2020     Lab Results   Component Value Date    HGBA1C 6.7 (H) 03/23/2020       Additional provider notes:     Assessment/Plan:    Hx of TARI: Last Cr 0.99 with GFR 54 on 3/23/20     DM: current Hgb A1c 6.7 on 2/25/20 so decreased metformin 500mg two times a day     Anxiety/depression: per pharmacy recs will decreased citalopram to 10mg and continue Effexor 37.5  daily (intent to wean celexa off), previously discontinued wellbutrin, previously decreased effexor in 3/2018.  Minimal PHQ9 2 on 5/13/19     HTN: continue hydralazine 50mg at AM, continue verapamil 100mg daily, and will maintain metoprolol 12.5mg daily. Per pharmacy recs dc clonidine patch to 0.1mg/wk and increase lisinopril to 5mg daily. Monitor BP in evening x1wk     Vit D def: continue D3 1000U, last 34.4     Hypomagnesia: last 1.7 on 3/23/20     Hx of hypokalemia per lasix: will dc supplement per dc lasix     GERD: PPI, no change.     Seasonal allergies: recently changed fluticasone and cetirizine to PRN     Edema: currently minimal pedal, dc'd lasix prior. Wt 216lbs     Constipation: continue miralax daily, no change     Dementia: had cognitive assessment, discontinued donepezil, previously increased namenda to 10mg daily. Per daughter no change in memory so namenda discontinued as well     Socialization: consulted recreation. No real change per daughter     Disposition: per cognitive decline was moved to  with       Case Management:  I have reviewed the facility/SNF care plan/MDS which was done 4/8/20, including the falls risk, nutrition and pain screening. I also reviewed the current immunizations, and preventive care.. Future cancer screening is not clinically indicated secondary to age/goals of care.   Patient's desire to return to the community is not assessible due to cognitive impairment.    Advance Directive Discussion:    I reviewed the current advanced directives as reflected in EPIC and the facility chart. I did not due to cognitive impairment review the advance directives with the resident.     Team Discussion:  I communicated with the appropriate disciplines involved with the Plan of Care:   Nursing      Patient Goal:  Patient's goal is pain control and comfort.    Information reviewed:  Medications, vital signs, orders, and nursing notes.      Video-Visit Details    Type of service:   Video Visit    Video End Time (time video stopped): 1220    Originating Location (pt. Location):[facility] [[id number]]    Distant Location (provider location):  [facility]     Mode of Communication:  FaceTime Video Conference      Ashwin Tang NP

## 2021-06-09 NOTE — PROGRESS NOTES
[facility]      Code Status:  FULL CODE  Visit Type: Review Of Multiple Medical Conditions     Facility:  [facility] [[id number]]           History of Present Illness: Kandi Ontiveros is a 79 y.o. female who is a resident of [facility].  She has underlying history of dementia with progressive memory impairment.   She remains a very poor historian secondary to that in light of her cognitive decline associated with the fact that she has been noted to be wandering several times she has been moved to the locked memory care unit.  She was noted to be ambulating without her assist device she is not aware and alert to her surroundings any longer    She has a history of hypertension and remains on multiple medications blood pressures have been stable.  Her last few blood pressures actually have been stable to on the low side and she is being monitored closely.    As her dementia has progressed she has been losing weight currently down to 211 pounds.  Staff reports that she occasionally will forget to eat    She also is a diabetic and remains on oral metformin no blood sugar checks are being done last A1c was 6.7    Past Medical History:   Diagnosis Date     Anxiety     Created by Conversion      Benign Adenomatous Polyp Of The Large Intestine     Created by Conversion      Chronic Diarrhea Of Unknown Origin     Created by Conversion      Dementia of the Alzheimer's type 7/13/2014     Diabetes Mellitus     Created by Conversion      Esophageal reflux     Created by Conversion      Gastritis      Herpes Zoster (Shingles)     Created by Conversion      Hiatal hernia      Hypercholesterolemia     Created by Conversion      Hypertension     Created by Conversion      Melanosis Coli     Created by Conversion [facility] Annotation: May  2 2012  1:19PM - Sheila Benavides: colonoscopy  4/30/12      Memory Lapses Or Loss     Created by Conversion      Obstructive Sleep Apnea     Created by  Conversion      Osteopenia     Created by Conversion      Raynaud's Disease     Created by Conversion      Tricuspid Regurgitation     Created by Conversion      No past surgical history on file.  Family History   Problem Relation Age of Onset     Hypertension Other      Diabetes Other      Stroke Other      Social History     Socioeconomic History     Marital status:      Spouse name: Not on file     Number of children: Not on file     Years of education: Not on file     Highest education level: Not on file   Occupational History     Not on file   Social Needs     Financial resource strain: Not on file     Food insecurity     Worry: Not on file     Inability: Not on file     Transportation needs     Medical: Not on file     Non-medical: Not on file   Tobacco Use     Smoking status: Never Smoker     Smokeless tobacco: Never Used   Substance and Sexual Activity     Alcohol use: Not on file     Drug use: Not on file     Sexual activity: Not on file   Lifestyle     Physical activity     Days per week: Not on file     Minutes per session: Not on file     Stress: Not on file   Relationships     Social connections     Talks on phone: Not on file     Gets together: Not on file     Attends [employer] service: Not on file     Active member of club or organization: Not on file     Attends meetings of clubs or organizations: Not on file     Relationship status: Not on file     Intimate partner violence     Fear of current or ex partner: Not on file     Emotionally abused: Not on file     Physically abused: Not on file     Forced sexual activity: Not on file   Other Topics Concern     Not on file   Social History Narrative     Not on file   lived in [facility]  Current Outpatient Medications   Medication Sig Dispense Refill     acetaminophen (TYLENOL) 325 MG tablet Take 650 mg by mouth 4 (four) times a day.       acetaminophen 500 mg coapsule Take 1 capsule by mouth. Every 4-6 hours PRN       bisacodyl (DULCOLAX, BISACODYL,) 10  mg suppository Insert 1 suppository (10 mg total) into the rectum daily as needed (for constipation). 12 suppository 0     cetirizine (ZYRTEC) 10 MG tablet Take 10 mg by mouth daily as needed.        cholecalciferol, vitamin D3, 1,000 unit tablet Take 1,000 Units by mouth daily.       citalopram (CELEXA) 20 MG tablet TAKE ONE TABLET BY MOUTH EVERY DAY (Patient taking differently: 10 mg. Start GDR on 3/9/20) 90 tablet 0     fluticasone (FLONASE) 50 mcg/actuation nasal spray 1 spray into each nostril daily. (Patient taking differently: 1 spray into each nostril daily as needed. ) 16 g 12     gabapentin (NEURONTIN) 100 MG capsule Take 100 mg by mouth 3 (three) times a day.       hydrALAZINE (APRESOLINE) 25 MG tablet Take 50 mg by mouth Daily at 8:00 am.. Hold for SBP <150             lisinopriL (PRINIVIL,ZESTRIL) 2.5 MG tablet Take 5 mg by mouth daily.        metFORMIN (GLUCOPHAGE) 500 MG tablet Take 500 mg by mouth 2 (two) times a day with meals.        metoprolol succinate (TOPROL-XL) 25 MG Take 12.5 mg by mouth daily.              omeprazole (PRILOSEC) 20 MG capsule Take 20 mg by mouth daily before breakfast.       polyethylene glycol (MIRALAX) 17 gram/dose powder Take 17 g by mouth daily. 255 g 0     polyvinyl alcohol (LIQUIFILM TEARS) 1.4 % ophthalmic solution Administer 1 drop to both eyes 3 (three) times a day as needed for dry eyes.       venlafaxine (EFFEXOR-XR) 37.5 MG 24 hr capsule Take 37.5 mg by mouth daily.       verapamil (VERELAN PM) 100 mg 24 hr capsule TAKE ONE CAPSULE BY MOUTH EVERY DAY 90 capsule 1     white petrolatum (AQUAPHOR ORIGINAL) 41 % Oint Apply 1 application topically 3 (three) times a day as needed.              No current facility-administered medications for this visit.      Allergies   Allergen Reactions     Codeine Sulfate          Review of Systems:    Constitutional: Negative.  Negative for fever, chills,has  activity change, appetite change and fatigue.   Has been gradually losing  weight  HENT: Negative for congestion and facial swelling.    Eyes: Negative for photophobia, redness and visual disturbance.   Respiratory: Negative for cough and chest tightness.    Cardiovascular: Negative for chest pain, palpitations and leg swelling.   Gastrointestinal: Negative for nausea, diarrhea, constipation, blood in stool and abdominal distention.   Genitourinary: Negative.    Musculoskeletal: Negative.   she has bilateral knee osteoarthritis with knee pain  Skin: Negative.    Neurological: Negative for dizziness, tremors, syncope, weakness, light-headedness and headaches.   Hematological: Does not bruise/bleed easily.   Psychiatric/Behavioral: Negative.  She had a flat effect with a poor recall   Has no recall of recent events       Physical Exam:    Weight 211 pounds blood pressure 123 / 71 temp 98 pulse 83  Obese  GENERAL: no acute distress. Cooperative in conversation.  Has limited recall and mostly smiles to questions  Patient is obese  HEENT: pupils are equal, round and reactive. Oral mucosa is moist and intact.  RESP:Chest symmetric. Regular respiratory rate. No stridor.  CVS: S1S2  ABD: Nondistended, soft.  EXTREMITIES: She has pedal and lower extremity edema.   NEURO: non focal. Alert and oriented xSELF   PSYCH: within normal limits. No depression or anxiety.  SKIN: warm dry intact   Musculoskeletal no focal abnormalities noted to be ambulating without any assist device    Labs:    Lab Results   Component Value Date    HGBA1C 6.7 (H) 03/23/2020     Results for orders placed or performed in visit on 03/23/20   Basic Metabolic Panel   Result Value Ref Range    Sodium 140 136 - 145 mmol/L    Potassium 4.0 3.5 - 5.0 mmol/L    Chloride 105 98 - 107 mmol/L    CO2 25 22 - 31 mmol/L    Anion Gap, Calculation 10 5 - 18 mmol/L    Glucose 85 70 - 125 mg/dL    Calcium 9.4 8.5 - 10.5 mg/dL    BUN 19 8 - 28 mg/dL    Creatinine 0.99 0.60 - 1.10 mg/dL    GFR MDRD Af Amer >60 >60 mL/min/1.73m2    GFR MDRD Non Af  Amer 54 (L) >60 mL/min/1.73m2     Lab Results   Component Value Date    [id number] 368 03/23/2020     Vitamin D, Total (25-Hydroxy)   Date Value Ref Range Status   03/23/2020 34.4 30.0 - 80.0 ng/mL Final         Assessment/Plan:    -Dementia with significant cognitive decline  - History of diabetes adequately controlled with a last A1c of 6.6  - Morbid obesity  -History of depression with anxiety  - Hypertension  - Generalized weakness with decline noted    Patient is currently in the long-term care memory care unit because of progressive decline in cognition with dementia.  Mood and behaviors are stable though she remains pleasantly confused.  Blood pressures are stable.  She is on a frequent checks with medication and as her weights are going down her blood pressures are stabilizing.  Diabetic control is adequate on oral metformin last A1c was 6.7.  Suspect that this will drop further as she continues to eat less.  Weights are down to 211 pounds suspected to be secondary to declining oral intake.  She is pleasantly confused and frequently noted to be ambulating without any assist device she is a wander and remains a risk of elopement so she has been moved to a locked memory care unit where she is doing much better  Continue with her current care plan  Staff does report some behaviors including refusal of cares which are generally redirectable or her daughter has been coming over to help but in light of the COVID pandemic that has been an issue but overall mood has been stable    Electronically signed by: BERLIN Butts  This progress note was completed using Dragon software and there may be grammatical errors.

## 2021-06-09 NOTE — PROGRESS NOTES
[facility]      Facility:    [facility] [[id number]] -1 Code Status: FULL CODE      Chief Complaint/Reason for Visit:    Chief Complaint   Patient presents with     Review Of Multiple Medical Conditions       HPI:   Kandi is a 80 y.o. female who who is residing at assisted living facility has moved to long-term care as of 6/2017 with underlying history of dementia, that has been progressive in nature.  She has periods of anxiety associated with memory loss and per daughter her short-term memory is getting progressively worse.  She has a past medical history of right knee osteoarthritis and apparently received steroid injections in the past.  She also is a diabetic that has been diet controlled and has obstructive sleep apnea.  As per the family she has been moved to long-term care so that she is closer to her  who is in the nursing home with her.     LTC: Today and previously she seems oriented though daughter reports having more confusion, previously started on namenda, no change per her and is still continuing to decline, so namenda was discontinued.  She denies any pain. Today BP seems controlled, maintained on metoprolol, lisinopril, hydralazine and diltiazem.   Her edema BLE is stable so lasix was discontinued prior.  Her DM is well controlled with metformin.  She ambulates per self and usually pushes  around in his wc, though had some falls in the past months (Aug/sept 2019). Also per dietitian, diet was changed and portioned decreased per weight gain.   Per cognitive decline, was moved to the  in 1/2020 with . Today no changes needed.    Recent ER visits:  She was sent to [facility] on 9/2/2019 status post fall.  She suffered a laceration on the left side of her face, additional imaging was negative so was returned to long-term care the same day.    Again on 10/6/2019 she was sent to [facility] status post having  witnessed fall on the sidewalk.  She did again suffer a laceration over her left eyebrow, imaging negative so return to care center the same day.    Past Medical History:  Past Medical History:   Diagnosis Date     Anxiety     Created by Conversion      Benign Adenomatous Polyp Of The Large Intestine     Created by Conversion      Chronic Diarrhea Of Unknown Origin     Created by Conversion      Dementia of the Alzheimer's type 7/13/2014     Diabetes Mellitus     Created by Conversion      Esophageal reflux     Created by Conversion      Gastritis      Herpes Zoster (Shingles)     Created by Conversion      Hiatal hernia      Hypercholesterolemia     Created by Conversion      Hypertension     Created by Conversion      Melanosis Coli     Created by Conversion [facility] Annotation: May  2 2012  1:19PM - Sheila Benavides: colonoscopy  4/30/12      Memory Lapses Or Loss     Created by Conversion      Obstructive Sleep Apnea     Created by Conversion      Osteopenia     Created by Conversion      Raynaud's Disease     Created by Conversion      Tricuspid Regurgitation     Created by Conversion            Surgical History:  No past surgical history on file.    Family History:   Family History   Problem Relation Age of Onset     Hypertension Other      Diabetes Other      Stroke Other        Social History:    Social History     Socioeconomic History     Marital status:      Spouse name: Not on file     Number of children: Not on file     Years of education: Not on file     Highest education level: Not on file   Occupational History     Not on file   Social Needs     Financial resource strain: Not on file     Food insecurity     Worry: Not on file     Inability: Not on file     Transportation needs     Medical: Not on file     Non-medical: Not on file   Tobacco Use     Smoking status: Never Smoker     Smokeless tobacco: Never Used   Substance and Sexual Activity     Alcohol use: Not on file     Drug use: Not on  file     Sexual activity: Not on file   Lifestyle     Physical activity     Days per week: Not on file     Minutes per session: Not on file     Stress: Not on file   Relationships     Social connections     Talks on phone: Not on file     Gets together: Not on file     Attends Hinduism service: Not on file     Active member of club or organization: Not on file     Attends meetings of clubs or organizations: Not on file     Relationship status: Not on file     Intimate partner violence     Fear of current or ex partner: Not on file     Emotionally abused: Not on file     Physically abused: Not on file     Forced sexual activity: Not on file   Other Topics Concern     Not on file   Social History Narrative     Not on file     Review of Systems   Constitutional: Negative for activity change, appetite change, diaphoresis and fatigue.        No issues   HENT: Negative for congestion and facial swelling.    Eyes: Negative for photophobia, redness and visual disturbance.   Respiratory: Negative for choking, shortness of breath and wheezing.    Cardiovascular: Positive for leg swelling. Negative for chest pain.        Pedal   Gastrointestinal: Negative for abdominal distention, abdominal pain, blood in stool, constipation and diarrhea.   Endocrine: Negative.    Genitourinary: Negative for difficulty urinating and dysuria.   Musculoskeletal: Negative for back pain and joint swelling.        Bilateral knee pain per OA, taking tylenol   Skin: Negative for color change.        intact   Allergic/Immunologic: Negative.    Neurological: Negative for seizures, weakness and headaches.   Hematological: Negative.    Psychiatric/Behavioral: Positive for confusion. Negative for behavioral problems, hallucinations and sleep disturbance. The patient is nervous/anxious.         Declining mentally       Vitals:    07/17/20 1814   BP: 149/77   Pulse: 67   Resp: 18   Temp: 97  F (36.1  C)   SpO2: 94%   Weight: 199 lb (90.3 kg)   Height: 5'  "4\" (1.626 m)       Physical Exam   Constitutional: She appears well-developed and well-nourished. No distress.   No acute issues   HENT:   Head: Normocephalic and atraumatic.   Mouth/Throat: Oropharynx is clear and moist. No oropharyngeal exudate.   Eyes: Pupils are equal, round, and reactive to light. Conjunctivae and EOM are normal. Right eye exhibits no discharge. Left eye exhibits no discharge. No scleral icterus.   Neck: Normal range of motion. Neck supple. No JVD present. No tracheal deviation present.   Intact   Cardiovascular: Normal rate.   Pulmonary/Chest: Effort normal. No stridor. No respiratory distress.   RA, no AS   Abdominal: Soft. She exhibits no distension.   No diarrhea or constipation reported   Genitourinary:    Genitourinary Comments: incontinent     Musculoskeletal: Normal range of motion.         General: Edema present.      Comments: 1+ LE, mostly pedal, lasix discontinued prior   Neurological: She is alert. No cranial nerve deficit.   A/O x1, namenda was discontinued   Skin: Skin is warm and dry. She is not diaphoretic. No erythema.   Intact, very dry   Psychiatric: She has a normal mood and affect.   Less agitation, Effexor weaned, dc aricept and continue namenda   Nursing note and vitals reviewed.      Medication List:  Current Outpatient Medications   Medication Sig     acetaminophen (TYLENOL) 325 MG tablet Take 650 mg by mouth 4 (four) times a day.     bisacodyl (DULCOLAX, BISACODYL,) 10 mg suppository Insert 1 suppository (10 mg total) into the rectum daily as needed (for constipation).     cetirizine (ZYRTEC) 10 MG tablet Take 10 mg by mouth daily as needed.      cholecalciferol, vitamin D3, 1,000 unit tablet Take 1,000 Units by mouth daily.     fluticasone (FLONASE) 50 mcg/actuation nasal spray 1 spray into each nostril daily. (Patient taking differently: 1 spray into each nostril daily as needed. )     gabapentin (NEURONTIN) 100 MG capsule Take 100 mg by mouth 3 (three) times a day. "     hydrALAZINE (APRESOLINE) 25 MG tablet Take 50 mg by mouth Daily at 8:00 am.. Hold for SBP <150           lisinopriL (PRINIVIL,ZESTRIL) 2.5 MG tablet Take 5 mg by mouth daily.      metFORMIN (GLUCOPHAGE) 500 MG tablet Take 500 mg by mouth 2 (two) times a day with meals.      metoprolol succinate (TOPROL-XL) 25 MG Take 12.5 mg by mouth daily.            polyethylene glycol (MIRALAX) 17 gram/dose powder Take 17 g by mouth daily.     polyvinyl alcohol (LIQUIFILM TEARS) 1.4 % ophthalmic solution Administer 1 drop to both eyes 3 (three) times a day as needed for dry eyes.     venlafaxine (EFFEXOR-XR) 37.5 MG 24 hr capsule Take 37.5 mg by mouth daily.     verapamil (VERELAN PM) 100 mg 24 hr capsule TAKE ONE CAPSULE BY MOUTH EVERY DAY     white petrolatum (AQUAPHOR ORIGINAL) 41 % Oint Apply 1 application topically 3 (three) times a day as needed.              Labs:  Results for orders placed or performed in visit on 03/23/20   Basic Metabolic Panel   Result Value Ref Range    Sodium 140 136 - 145 mmol/L    Potassium 4.0 3.5 - 5.0 mmol/L    Chloride 105 98 - 107 mmol/L    CO2 25 22 - 31 mmol/L    Anion Gap, Calculation 10 5 - 18 mmol/L    Glucose 85 70 - 125 mg/dL    Calcium 9.4 8.5 - 10.5 mg/dL    BUN 19 8 - 28 mg/dL    Creatinine 0.99 0.60 - 1.10 mg/dL    GFR MDRD Af Amer >60 >60 mL/min/1.73m2    GFR MDRD Non Af Amer 54 (L) >60 mL/min/1.73m2     Lab Results   Component Value Date    WBC 8.6 03/23/2020    HGB 11.0 (L) 03/23/2020    HCT 35.0 03/23/2020    MCV 98 03/23/2020     03/23/2020     Vitamin D, Total (25-Hydroxy)   Date Value Ref Range Status   03/23/2020 34.4 30.0 - 80.0 ng/mL Final     Lab Results   Component Value Date    HGBA1C 6.7 (H) 03/23/2020     Lab Results   Component Value Date    [id number] 368 03/23/2020     Lab Results   Component Value Date    TSH 1.22 03/23/2020       Assessment/Plan:      Hx of TARI: Last Cr 0.99 with GFR 54 on 3/23/20    DM: current Hgb A1c 6.7 on 2/25/20, metformin 500mg  two times a day, recheck    Anxiety/depression: have discontinued citalopram and continue Effexor 37.5 daily,  previously discontinued wellbutrin, previously decreased effexor in 3/2018.  Minimal PHQ9 2 on 5/13/19    HTN: continue hydralazine 50mg at AM, continue verapamil 100mg daily, and will maintain metoprolol 12.5mg daily. Per pharmacy recs have discontinued clonidine patch and started lisinopril 5mg daily per renal hx/DM. SBP <140     Vit D def: continue D3 1000U, last 34.4 on 3/23/20    Hypomagnesia: last 1.7 on 3/23/20    Hx of hypokalemia per lasix: dc'd supplement per dc lasix    GERD: PPI, no change.    Seasonal allergies: recently changed fluticasone and cetirizine to PRN    Edema: currently minimal pedal, wt down to 199lb    Constipation: continue miralax daily, no change    Dementia: had cognitive assessment, discontinued donepezil and namenda per no change with medication    Socialization: consulted recreation. No real change per daughter    Disposition: per cognitive decline was moved to  with . Ambulating per self      Electronically signed by: Ashwin Tang NP

## 2021-06-09 NOTE — PROGRESS NOTES
ASSESSMENT:  1. Diabetes  - Basic Metabolic Panel  - Glycosylated Hemoglobin A1c  - Microalbumin, Random Urine  - LDL Cholesterol, Direct    2. Essential hypertension    She is not fasting.  We will do labs.  Encouraged to improve food choices, less of carbs.  Increase physical activity.  If levels are controlled, recheck in 6 months otherwise earlier if worse or if we start the medication.  Yearly eye exam.  Avoid walking barefoot.  They were agreeable with the plans.    PLAN:  Patient Instructions   Check your blood sugar before eating in the morning or wait at least 2 hours after eating.  Limit your consumption of sweets.  Try to get regular exercise.     You are due for a diabetic eye exam.  You need this done once a year because you have diabetes.  Make sure you tell them that you are diabetic.     Wear shoes or slippers all the time because diabetes affects the nerves in the hands and feet.  You may develop numbness and not notice if you step on something.  You should check your feet every day to make sure there are not cuts or scrapes.      Exam is normal.  Labs today.  See me in 3 months.       Orders Placed This Encounter   Procedures     Basic Metabolic Panel     Glycosylated Hemoglobin A1c     Microalbumin, Random Urine     LDL Cholesterol, Direct     CHIEF COMPLAINT:  Chief Complaint   Patient presents with     Follow-up     Diabetic check- pt is NOT FASTING today.        HISTORY OF PRESENT ILLNESS:  Kandi is a 76 y.o. female presenting to the clinic today with her two daughters for a diabetic check. Her last A1c was 6.7 in April. She is in assisted living and has her blood sugar checked twice a day. Her morning blood sugars are in the 200's but her daughter suspects she is eating before her glucose is checked. She eats all of her meals. No dizzy or fainting spells. She has not had an eye exam in about 2 years. No numbness or tingling in the hands or feet.     Osteoarthritis: Her knee has been  bothering her. Pain interferes with walking and has caused her to be less active. She used to walk a lot. She had a cortisone injection yesterday through her orthopedist. It was noted that she is not a candidate for knee replacement yet.     REVIEW OF SYSTEMS:   No chest pain, tachycardia, SOB at rest, or sudden vision loss. She continues to be seen at the [facility] clinic. All other systems are negative.    PFSH:  Her  is in a nursing home.     TOBACCO USE:  History   Smoking Status     Never Smoker   Smokeless Tobacco     Never Used       VITALS:  Vitals:    03/23/17 1127   BP: 128/62   Patient Site: Right Arm   Patient Position: Sitting   Cuff Size: Adult Large   Pulse: 72   SpO2: 97%   Weight: (!) 223 lb 11.2 oz (101.5 kg)     Wt Readings from Last 3 Encounters:   03/23/17 (!) 223 lb 11.2 oz (101.5 kg)   12/14/16 216 lb (98 kg)   05/28/16 205 lb (93 kg)     Body mass index is 36.11 kg/(m^2).    PHYSICAL EXAM:  Vital signs noted above. AAO ×3.    HEENT no nasal discharge, moist oral mucosa.    Neck: Supple neck, nonpalpable cervical lymph nodes.    Lungs: Clear to auscultation bilateral.    Heart: S1-S2 regular rate and rhythm, no murmurs were noted.    Abdomen: Flabby, soft with bowel sounds and nontender.    Extremities: No edema, pulses were full and equal.  Diabetic Foot Exam: No foot ulcers. Sensation to monofilament test normal.     QUALITY MEASURES:  The following high BMI interventions were performed this visit: encouragement to exercise    ADDITIONAL HISTORY SUMMARIZED (2): Blood sugars from assisted living reviewed in chart. Additional history from daughter.   DECISION TO OBTAIN EXTRA INFORMATION (1): None.   RADIOLOGY TESTS (1): None.  LABS (1): Labs ordered.   MEDICINE TESTS (1): None.  INDEPENDENT REVIEW (2 each): None.     The visit lasted a total of 17 minutes face to face with the patient. Over 50% of the time was spent counseling and educating the patient about diabetes.    Brittani GUNDERSON,  am scribing for and in the presence of, Dr. Calderon.    I, Dr. Calderon, personally performed the services described in this documentation, as scribed by Brittani Da Silva in my presence, and it is both accurate and complete.    MEDICATIONS:  Current Outpatient Prescriptions   Medication Sig Dispense Refill     bisacodyl (DULCOLAX, BISACODYL,) 10 mg suppository Insert 1 suppository (10 mg total) into the rectum daily as needed (for constipation). 12 suppository 0     blood glucose test (CONTOUR NEXT STRIPS) strips TEST THREE TIMES DAILY 100 strip 0     buPROPion (WELLBUTRIN XL) 150 MG 24 hr tablet Take 1 tablet (150 mg total) by mouth daily. 31 tablet 5     CALCIUM CARBONATE/VITAMIN D3 (CALCIUM 600 WITH VITAMIN D3 ORAL) Take 2 tablets by mouth daily.       cetirizine (ZYRTEC) 10 MG tablet Take 1 tablet (10 mg total) by mouth daily. 90 tablet 2     cholecalciferol, vitamin D3, 1,000 unit tablet Take 1,000 Units by mouth daily.       citalopram (CELEXA) 20 MG tablet TAKE ONE TABLET BY MOUTH EVERY DAY 90 tablet 0     galantamine (RAZADYNE ER) 16 MG 24 hr capsule TAKE ONE CAPSULE BY MOUTH EVERY DAY WITH BREAKFAST 90 capsule 3     generic lancets Use 1 each As Directed as needed. Dispense brand per patient's insurance at pharmacy discretion. 100 each 0     lansoprazole (PREVACID) 30 MG capsule TAKE 1 CAPSULE BY MOUTH EVERY DAY 90 capsule 0     lisinopril-hydrochlorothiazide (PRINZIDE,ZESTORETIC) 20-25 mg per tablet TAKE ONE TABLET BY MOUTH EVERY DAY 90 tablet 2     OMEGA-3S/DHA/EPA/FISH OIL (OMEGA 3 ORAL) Take 2 capsules by mouth daily.       polyethylene glycol (MIRALAX) 17 gram/dose powder Take 17 g by mouth daily. 255 g 0     verapamil (VERELAN PM) 100 mg 24 hr capsule TAKE ONE CAPSULE BY MOUTH EVERY DAY 90 capsule 1     vitamin E 1000 UNIT capsule Take 1,000 Units by mouth 2 (two) times a day.       No current facility-administered medications for this visit.        Total data points: 3

## 2021-06-10 NOTE — PROGRESS NOTES
Persons accompanying you (the patient) today: Daughter    How have you been doing since we saw you last? Please note any concerns.  F/u apt. No new concerns. Pt had a urinary tract infection around 2 weeks ago and it affected her memory.     Please list any surgeries or procedures you have had since we saw you last:  none    Have you had any falls since your last visit? No    Do you have any pain today? No    With whom do you currently reside? (alone, spouse, family, assisted living, nursing home)  Assisted Living   If assisted living or nursing home, please note name and location of facility: [place]

## 2021-06-10 NOTE — PROGRESS NOTES
Assessment:     1. Dementia of the Alzheimer's type  2. Tricuspid Regurgitation  3. Osteopenia  4. Anxiety  5. Diabetes Melitis  6. Obstructive sleep apnea    Plan:     1. continue Wellbutrin 150 mg, start Venlafaxine  2. Referral to Neurology for sleep evaluation  3. Return to clinic in 3 months    The patient presents to the memory loss clinic for a follow-up visit, she was last seen by me on 12/14/16, where I sent the patient for a sleep consult and started her on Wellbutrin. The daughter reports no change in the patient. The patient is now living in an assistant living apartment with her disabled daughter, and she is taking care of her daughter. Her other daughter is concerned because the patient is unable to care for her sister. The patient refused to go to the sleep consult. The patient is still not motivated to do anything, she sits at home. The patient's  was not able to move into the same facility as his wife, I did suggest the daughter talk with our social work to see if they can help her find a facility that her father, mother and sister all can live and make it easier on herself and family. The patient reports that her memory is fine because she can remember the names of her children. The daughter reports that the patient's memory is getting worse. I did suggest that she decide what is best for the patient and do it. She should ask for help from the males in her family. With the patient's nationality, they tend to respect males a little more.    Subjective:        Kandi Ontiveros is a 76 year old female who initially presented to the memory loss clinic on 4/18/13 for a cognitive evaluation. Daughter reported that the patient had been having some difficulty with cognitive performance over the past year. They noted that she was having difficulty with short-term recall manifested as misplacing items within the home, repeating herself during the course of conversation, and repeating questions.  She was also making lists of daily chores. Patient believed that she had been experiencing problems for at least 2 years. She also reported having trouble concentrating and describes instances where working memory may be a bit impaired. According to patient and family there were no changes functionally. The patient was noted to be a bit more irritable and more easily agitated and a bit impulsive. Daughter reported that the patient was having difficulty with self control in consuming sweets. Speech and behavior were appropriate. Neuropsychological testing on 6/27/13 was most consistent with a diagnostic impression of mild cognitive impairment, multi-domain, with deficits noted in immediate and delayed verbal memory and delayed visual memory, but also in areas of cognitive processing speed, augmentation on naming, verbal fluency, cognitive flexibility.  There was a possibility given her risk factors there was a multifactorial etiology contributing to her presentation, including vascular ischemic changes and the possibility of Alzheimer's disease.  The patient was endorsing some minimal symptoms of depression, and falls within the range considered normal for most individuals. On 8/29/13, Dr. Lackey's clinical evaluation was Dementia of the Alzheimer's type, he discontinued the Aricept due to persistent problems with dreams. He started Razadyne ER 8 mg. 11/6/13 the Razadne was increased to 16 mg. On 11/12/14 the patient was experiencing vivid dreams again, the patient was started on melatonin 3 mg. No other problems noted, patient has been seen every 6 months and reported that she has been stable cognitively and behaviorally.   12/14/16,   the patient is currently on citalopram 20 mg, galantamine ER 16 mg, and vitamin E 1000 BID. Since last appointment she was doing good, until her  was hospitalized for pancreatitis, the patient has been living with her disabled daughter. The daughter reports that the patient is  sleeping a lot more and has no energy. Her  was diagnosed with severe pancreatitis and was hospitalized for quite some time in acute care, and then did spend sometime up stairs in our long-term acute hospital, he then was moved to transitional care, with the hopes of him returning home to be with his wife.  Apparently the  has not recovered as planned, he has recently moved in to long-term care. Wife and daughter are scheduled to meet with the [facility] employee to assess if the patient is able to move into assisted living complex, it is planned that the wife will move in and the  will join her in her apartment, and the disabled daughter will be moved into another apartment next door.  Since 's hospitalization the daughter reports that the patient forgets a lot more and has very low energy The patient does have a history of obstructive sleep apnea and refuses to wear her CPAP machine. The daughter also reports the patient has been gaining weight. Patient has no interest in moving into long-term care with her  even though she is at a dementia level of impairment. I referred her to neurology for sleep consultation.  I also started the patient on Wellbutrin probably help her a little bit with energy and concentration,      Patient Active Problem List    Diagnosis Date Noted     Dementia of the Alzheimer's type 07/13/2014     Tricuspid Regurgitation      Pain During Urination (Dysuria)      Osteopenia      Hypertension      Breast Pain      Joint Pain, Localized In The Shoulder      Chronic Diarrhea Of Unknown Origin      Anxiety      Fatigue      Abnormal Glucose      Contusion With Intact Skin Surface      Herpes Zoster (Shingles)      Diabetes Mellitus      Hypercholesterolemia      Esophageal Reflux      Melanosis Coli      Memory Lapses Or Loss      Benign Adenomatous Polyp Of The Large Intestine      Raynaud's Disease      Obstructive Sleep Apnea      Past Medical History:    Diagnosis Date     Anxiety     Created by Conversion      Benign Adenomatous Polyp Of The Large Intestine     Created by Conversion      Chronic Diarrhea Of Unknown Origin     Created by Conversion      Dementia of the Alzheimer's type 7/13/2014     Diabetes Mellitus     Created by Conversion      Esophageal reflux     Created by Conversion      Gastritis      Herpes Zoster (Shingles)     Created by Conversion      Hiatal hernia      Hypercholesterolemia     Created by Conversion      Hypertension     Created by Conversion      Melanosis Coli     Created by Conversion [facility] Annotation: May  2 2012  1:19PM - Sheila Benavides: colonoscopy  4/30/12      Memory Lapses Or Loss     Created by Conversion      Obstructive Sleep Apnea     Created by Conversion      Osteopenia     Created by Conversion      Raynaud's Disease     Created by Conversion      Tricuspid Regurgitation     Created by Conversion      No past surgical history on file.  Family History   Problem Relation Age of Onset     Hypertension Other      Diabetes Other      Stroke Other      Current Outpatient Prescriptions   Medication Sig Dispense Refill     bisacodyl (DULCOLAX, BISACODYL,) 10 mg suppository Insert 1 suppository (10 mg total) into the rectum daily as needed (for constipation). 12 suppository 0     blood glucose test (CONTOUR NEXT STRIPS) strips TEST THREE TIMES DAILY 100 strip 0     buPROPion (WELLBUTRIN XL) 150 MG 24 hr tablet Take 1 tablet (150 mg total) by mouth daily. 90 tablet 1     CALCIUM CARBONATE/VITAMIN D3 (CALCIUM 600 WITH VITAMIN D3 ORAL) Take 2 tablets by mouth daily.       cetirizine (ZYRTEC) 10 MG tablet Take 1 tablet (10 mg total) by mouth daily. 90 tablet 2     cholecalciferol, vitamin D3, 1,000 unit tablet Take 1,000 Units by mouth daily.       citalopram (CELEXA) 20 MG tablet TAKE ONE TABLET BY MOUTH EVERY DAY 90 tablet 0     galantamine (RAZADYNE ER) 16 MG 24 hr capsule TAKE ONE CAPSULE BY MOUTH EVERY DAY WITH  BREAKFAST 90 capsule 3     generic lancets Use 1 each As Directed as needed. Dispense brand per patient's insurance at pharmacy discretion. 100 each 0     lansoprazole (PREVACID) 30 MG capsule TAKE 1 CAPSULE BY MOUTH EVERY DAY 90 capsule 1     lisinopril-hydrochlorothiazide (PRINZIDE,ZESTORETIC) 20-25 mg per tablet TAKE ONE TABLET BY MOUTH EVERY DAY 90 tablet 2     OMEGA-3S/DHA/EPA/FISH OIL (OMEGA 3 ORAL) Take 2 capsules by mouth daily.       polyethylene glycol (MIRALAX) 17 gram/dose powder Take 17 g by mouth daily. 255 g 0     verapamil (VERELAN PM) 100 mg 24 hr capsule TAKE ONE CAPSULE BY MOUTH EVERY DAY 90 capsule 1     vitamin E 1000 UNIT capsule Take 1,000 Units by mouth 2 (two) times a day.       atorvastatin (LIPITOR) 10 MG tablet Take 1 tablet (10 mg total) by mouth bedtime. 30 tablet 2     fluticasone (FLONASE) 50 mcg/actuation nasal spray 1 spray into each nostril daily. 16 g 12     metFORMIN (GLUCOPHAGE) 500 MG tablet Take 1 tablet (500 mg total) by mouth 2 (two) times a day with meals. 60 tablet 2     venlafaxine (EFFEXOR XR) 37.5 MG 24 hr capsule Take 2 capsules (75 mg total) by mouth daily. 60 capsule 1     No current facility-administered medications for this encounter.        Allergies   Allergen Reactions     Codeine Sulfate      Social History     Social History     Marital status:      Spouse name: N/A     Number of children: N/A     Years of education: N/A     Occupational History     Not on file.     Social History Main Topics     Smoking status: Never Smoker     Smokeless tobacco: Never Used     Alcohol use Not on file     Drug use: Not on file     Sexual activity: Not on file     Other Topics Concern     Not on file     Social History Narrative     The following portions of the patient's history were reviewed and updated as appropriate: allergies, current medications, past family history, past medical history, past social history, past surgical history and problem list.    Review of  Systems  A comprehensive review of systems was negative except for: what is noted above    Objective:     Vitals:    05/05/17 0912   BP: 158/66   Pulse: 80   Weight: (!) 226 lb (102.5 kg)     Functional Assessment 4/18/16 04/18/2016 11/12/2014 07/31/2013   ACL: NT due to time 4.4 4.4   CPT: 4.9 4.9 4.6   MOCA: 20/28 22/30 23/30   The above assessment scores indicate a Mild level of impairment. The CPT consisted of the following sub-tests: Medbox, Shop, Wash, [place] and Travel.  Recommendations: Cognitive functioning recommendations: 4.6-4.8: The assessment scores indicate a recommendation for an assisted living environment with daily check-in supervision (i.e. at home with assistance or assisted living facility). Assistance with managing medications and finances is recommended as @ name@ may have increased difficulty with complex problem solving. Learning new skills and information may be very challenging for Kandi but she may be able to do so with a lot of repetition. Problem solving is often challenging especially when it is not anticipated or expected. Kandi may lack insight into her deficits indicating an increased need for assistance with complex tasks requiring accuracy for safety. Supervision is recommended for Kandi when using hot tools and major appliances during meal preparation. Driving is not recommended for Kandi due to difficulty with divided attention and complex problem solving. Further, Kandi may benefit from increased structure throughout the day creating healthy habits and routines and eliminating her need to problem solve from one task to another.    I asked her to call with any questions or concerns and will see her again in clinic in about 3 months. Total time spent with the patient today was 40 minutes with greater than 50% of the time spent in counseling and care coordination. We discussed the risks and benefits of the medication including risk of worsening depression with  medication adjustments and even the possibility of emergence of suicidal ideations.      Physical Examination  General: Elderly,  female, no acute physical distress     Mental Status Examination  She is casually dressed, appropriately groomed and seated for evaluation. There is no evidence of acute psychological distress. Her behavior is socially appropriate and she initially is interactive and part of the conversation, but as the conversation became more complex, she became quiet and did not partake. Affect with mild apathy, but euthymic mostly and congruent mood. She is alert and without obvious signs of distractibility/delirium. She is oriented to person, place and situation. There is some reduction in spontaneity of speech, but volume and rate is normal. She speaks fluent Banglades[place] without any halting or difficulty saying words. Thought processes are with impaired expression and comprehension. She generate ideas that are quite simple and are lacking in depth and she has trouble comprehending more complex conversation. When she does speak, her thoughts are organized and delivered in a sequential fashion. No evidence of auditory or visual hallucinations. No delusional ideation. Gen. fund of knowledge, insight and memory all impaired.

## 2021-06-11 NOTE — PROGRESS NOTES
[facility]      Facility:    [facility] [[id number]]  Code Status: FULL CODE      Chief Complaint/Reason for Visit: RM  [address]  Chief Complaint   Patient presents with     Review Of Multiple Medical Conditions       HPI:   Kandi is a 77 y.o. female who who is residing at assisted living facility has moved to long-term care with underlying history of dementia, that has been progressive in nature.  She has periods of anxiety associated with memory loss and per daughter her short-term memory is getting progressively worse.  She has a past medical history of right knee osteoarthritis and apparently received steroid injections in the past.  She also is a diabetic that has been diet controlled and has obstructive sleep apnea.  As per the family she has been moved to long-term care so that she is closer to her  who is in the nursing home with her.     TCU: Today she seems oriented though not sure per language barrier.  She denies any pain.  We explained to her that we need to give her medication for her diabetes and increase 1 of her medications that she is currently taking for her blood pressure. We also suggested that we order GANGA hose per her edema in her legs.  She seemed to agree.     Patient denies pain, headache, chest pain, numbness or tingling, shortest of breath, eating or swallowing concerns, nausea or vomiting, diarrhea or bowel abnormalities, or no new integumentary concerns today.      Past Medical History:  Past Medical History:   Diagnosis Date     Anxiety     Created by Conversion      Benign Adenomatous Polyp Of The Large Intestine     Created by Conversion      Chronic Diarrhea Of Unknown Origin     Created by Conversion      Dementia of the Alzheimer's type 7/13/2014     Diabetes Mellitus     Created by Conversion      Esophageal reflux     Created by Conversion      Gastritis      Herpes Zoster (Shingles)     Created by Conversion      Hiatal  hernia      Hypercholesterolemia     Created by Conversion      Hypertension     Created by Conversion      Melanosis Coli     Created by Conversion [facility] Annotation: May  2 2012  1:19PM - Sheila Benavides: colonoscopy  4/30/12      Memory Lapses Or Loss     Created by Conversion      Obstructive Sleep Apnea     Created by Conversion      Osteopenia     Created by Conversion      Raynaud's Disease     Created by Conversion      Tricuspid Regurgitation     Created by Conversion            Surgical History:  No past surgical history on file.    Family History:   Family History   Problem Relation Age of Onset     Hypertension Other      Diabetes Other      Stroke Other        Social History:    Social History     Social History     Marital status:      Spouse name: N/A     Number of children: N/A     Years of education: N/A     Social History Main Topics     Smoking status: Never Smoker     Smokeless tobacco: Never Used     Alcohol use Not on file     Drug use: Not on file     Sexual activity: Not on file     Other Topics Concern     Not on file     Social History Narrative     Review of Systems   Constitutional: Positive for appetite change. Negative for activity change, diaphoresis and fatigue.   HENT: Negative.  Negative for congestion and facial swelling.    Eyes: Negative.  Negative for visual disturbance.   Respiratory: Negative.  Negative for shortness of breath and wheezing.    Cardiovascular: Negative.  Negative for chest pain.   Gastrointestinal: Negative.  Negative for abdominal distention and abdominal pain.   Endocrine: Negative.    Genitourinary: Negative.    Musculoskeletal: Positive for joint swelling. Negative for back pain.        Bilateral knee pain per OA   Skin: Negative.  Negative for color change.   Allergic/Immunologic: Negative.    Neurological: Negative.  Negative for weakness and headaches.   Hematological: Negative.    Psychiatric/Behavioral: Negative.  Negative for confusion  "and sleep disturbance.       Vitals:    07/17/17 1324   BP: 138/74   Pulse: 79   Resp: 20   Temp: 97.2  F (36.2  C)   Weight: (!) 230 lb (104.3 kg)   Height: 5' 4\" (1.626 m)       Physical Exam   Constitutional: She is oriented to person, place, and time. She appears well-developed.   HENT:   Head: Normocephalic and atraumatic.   Eyes: Pupils are equal, round, and reactive to light.   Neck: Normal range of motion. Neck supple.   Cardiovascular: Normal rate and regular rhythm.    S1S2, no murmur   Pulmonary/Chest: Breath sounds normal. She has no wheezes.   Abdominal: Soft. Bowel sounds are normal. She exhibits no distension.   Musculoskeletal: Normal range of motion. She exhibits edema.   2+ LE edema bilaterally   Neurological: She is alert and oriented to person, place, and time.   BIMS 9/15   Skin: Skin is warm and dry. No erythema.   Psychiatric: She has a normal mood and affect.       Medication List:  Current Outpatient Prescriptions   Medication Sig     furosemide (LASIX) 20 MG tablet Take 20 mg by mouth daily.     metFORMIN (GLUCOPHAGE) 500 MG tablet Take 250 mg by mouth 2 (two) times a day with meals.     acetaminophen 500 mg coapsule Take 1 capsule by mouth. Every 4-6 hours PRN     bisacodyl (DULCOLAX, BISACODYL,) 10 mg suppository Insert 1 suppository (10 mg total) into the rectum daily as needed (for constipation).     buPROPion (WELLBUTRIN XL) 150 MG 24 hr tablet Take 1 tablet (150 mg total) by mouth daily.     CALCIUM CARBONATE/VITAMIN D3 (CALCIUM 600 WITH VITAMIN D3 ORAL) Take 2 tablets by mouth daily.     cetirizine (ZYRTEC) 10 MG tablet Take 10 mg by mouth daily.     cholecalciferol, vitamin D3, 1,000 unit tablet Take 1,000 Units by mouth daily.     citalopram (CELEXA) 20 MG tablet TAKE ONE TABLET BY MOUTH EVERY DAY     dextromethorphan (DELSYM) 30 mg/5 mL liquid Take 60 mg by mouth every 12 (twelve) hours as needed for cough.     fluticasone (FLONASE) 50 mcg/actuation nasal spray 1 spray into each " nostril daily.     galantamine (RAZADYNE ER) 16 MG 24 hr capsule TAKE ONE CAPSULE BY MOUTH EVERY DAY WITH BREAKFAST     lansoprazole (PREVACID) 30 MG capsule TAKE 1 CAPSULE BY MOUTH EVERY DAY     lisinopril-hydrochlorothiazide (PRINZIDE,ZESTORETIC) 20-25 mg per tablet TAKE ONE TABLET BY MOUTH EVERY DAY     OMEGA-3S/DHA/EPA/FISH OIL (OMEGA 3 ORAL) Take 2 capsules by mouth daily.     polyethylene glycol (MIRALAX) 17 gram/dose powder Take 17 g by mouth daily.     venlafaxine (EFFEXOR XR) 37.5 MG 24 hr capsule Take 2 capsules (75 mg total) by mouth daily.     verapamil (VERELAN PM) 100 mg 24 hr capsule TAKE ONE CAPSULE BY MOUTH EVERY DAY     vitamin E 1000 UNIT capsule Take 1,000 Units by mouth 2 (two) times a day.       Labs:  No results found for this or any previous visit (from the past 168 hour(s)).      Assessment:    ICD-10-CM    1. Type 2 diabetes mellitus with hyperglycemia, without long-term current use of insulin E11.65    2. Essential hypertension with goal blood pressure less than 130/80 I10    3. Bilateral lower extremity edema R60.0    4. Obstructive Sleep Apnea G47.33        Plan:  1. Start metformin 250mg BID  2. Continue zestoretic (max dose)  3. Start lasix 20mg daily  4. Encouraged CPAP    The care plan has been reviewed and all orders signed. Changes to care plan, if any, as noted. Otherwise, continue care plan of care.      Ashwin GUNDERSON, am scribing for and in the presence of ZANDRA Mclean.    Electronically signed by: Jayesh Mendiola CNP

## 2021-06-11 NOTE — PROGRESS NOTES
[facility]      Code Status:  FULL CODE  Visit Type: H & P     Facility:  [facility] [[id number]]           History of Present Illness: Kandi Ontiveros is a 76 y.o. female who was residing in assisted living facility and has moved to long-term care with underlying history of dementia which has been progressive.  She has periods of anxiety associated with memory loss and as per her daughter her short-term memory is getting progressively worse.  She also has underlying history of knee osteoarthritis and was getting steroid injections but is able to ambulate without any assist device as yet patient is a diabetic and also has obstructive sleep apnea  Along with anxiety which is primarily associated with her memory loss.  As per family she has been moved to long-term care so that she can be closer to her  who is in the nursing home also due to her progressive memory issues she needed more supervision with medications and other issues then she has had before    Past Medical History:   Diagnosis Date     Anxiety     Created by Conversion      Benign Adenomatous Polyp Of The Large Intestine     Created by Conversion      Chronic Diarrhea Of Unknown Origin     Created by Conversion      Dementia of the Alzheimer's type 7/13/2014     Diabetes Mellitus     Created by Conversion      Esophageal reflux     Created by Conversion      Gastritis      Herpes Zoster (Shingles)     Created by Conversion      Hiatal hernia      Hypercholesterolemia     Created by Conversion      Hypertension     Created by Conversion      Melanosis Coli     Created by Conversion [facility] Annotation: May  2 2012  1:19PM - Sheila Benavides: colonoscopy  4/30/12      Memory Lapses Or Loss     Created by Conversion      Obstructive Sleep Apnea     Created by Conversion      Osteopenia     Created by Conversion      Raynaud's Disease     Created by Conversion      Tricuspid Regurgitation      Created by Conversion      No past surgical history on file.  Family History   Problem Relation Age of Onset     Hypertension Other      Diabetes Other      Stroke Other      Social History     Social History     Marital status:      Spouse name: N/A     Number of children: N/A     Years of education: N/A     Occupational History     Not on file.     Social History Main Topics     Smoking status: Never Smoker     Smokeless tobacco: Never Used     Alcohol use Not on file     Drug use: Not on file     Sexual activity: Not on file     Other Topics Concern     Not on file     Social History Narrative   lived in [facility]  Current Outpatient Prescriptions   Medication Sig Dispense Refill     atorvastatin (LIPITOR) 10 MG tablet Take 1 tablet (10 mg total) by mouth bedtime. 30 tablet 2     bisacodyl (DULCOLAX, BISACODYL,) 10 mg suppository Insert 1 suppository (10 mg total) into the rectum daily as needed (for constipation). 12 suppository 0     blood glucose test (CONTOUR NEXT STRIPS) strips TEST THREE TIMES DAILY 100 strip 0     buPROPion (WELLBUTRIN XL) 150 MG 24 hr tablet Take 1 tablet (150 mg total) by mouth daily. 90 tablet 1     CALCIUM CARBONATE/VITAMIN D3 (CALCIUM 600 WITH VITAMIN D3 ORAL) Take 2 tablets by mouth daily.       cholecalciferol, vitamin D3, 1,000 unit tablet Take 1,000 Units by mouth daily.       citalopram (CELEXA) 20 MG tablet TAKE ONE TABLET BY MOUTH EVERY DAY 90 tablet 0     fluticasone (FLONASE) 50 mcg/actuation nasal spray 1 spray into each nostril daily. 16 g 12     galantamine (RAZADYNE ER) 16 MG 24 hr capsule TAKE ONE CAPSULE BY MOUTH EVERY DAY WITH BREAKFAST 90 capsule 3     generic lancets Use 1 each As Directed as needed. Dispense brand per patient's insurance at pharmacy discretion. 100 each 0     lansoprazole (PREVACID) 30 MG capsule TAKE 1 CAPSULE BY MOUTH EVERY DAY 90 capsule 1     lisinopril-hydrochlorothiazide (PRINZIDE,ZESTORETIC) 20-25 mg per tablet TAKE ONE TABLET BY MOUTH EVERY  DAY 90 tablet 2     metFORMIN (GLUCOPHAGE) 500 MG tablet Take 1 tablet (500 mg total) by mouth 2 (two) times a day with meals. 60 tablet 2     OMEGA-3S/DHA/EPA/FISH OIL (OMEGA 3 ORAL) Take 2 capsules by mouth daily.       polyethylene glycol (MIRALAX) 17 gram/dose powder Take 17 g by mouth daily. 255 g 0     venlafaxine (EFFEXOR XR) 37.5 MG 24 hr capsule Take 2 capsules (75 mg total) by mouth daily. 60 capsule 1     verapamil (VERELAN PM) 100 mg 24 hr capsule TAKE ONE CAPSULE BY MOUTH EVERY DAY 90 capsule 1     vitamin E 1000 UNIT capsule Take 1,000 Units by mouth 2 (two) times a day.       No current facility-administered medications for this visit.      Allergies   Allergen Reactions     Codeine Sulfate          Review of Systems:    Constitutional: Negative.  Negative for fever, chills,has  activity change, appetite change and fatigue.   HENT: Negative for congestion and facial swelling.    Eyes: Negative for photophobia, redness and visual disturbance.   Respiratory: Negative for cough and chest tightness.    Cardiovascular: Negative for chest pain, palpitations and leg swelling.   Gastrointestinal: Negative for nausea, diarrhea, constipation, blood in stool and abdominal distention.   Genitourinary: Negative.    Musculoskeletal: Negative.   she has bilateral knee osteoarthritis with knee pain  Skin: Negative.    Neurological: Negative for dizziness, tremors, syncope, weakness, light-headedness and headaches.   Hematological: Does not bruise/bleed easily.   Psychiatric/Behavioral: Negative.  She had a flat effect with a poor recall     There were no vitals filed for this visit.    Physical Exam:    GENERAL: no acute distress. Cooperative in conversation.   HEENT: pupils are equal, round and reactive. Oral mucosa is moist and intact.  RESP:Chest symmetric. Regular respiratory rate. No stridor.  CVS: S1S2  ABD: Nondistended, soft.  EXTREMITIES: No lower extremity edema.   NEURO: non focal. Alert and oriented x3.    PSYCH: within normal limits. No depression or anxiety.  SKIN: warm dry intact     Labs:    Lab Results   Component Value Date    HGBA1C 7.8 (H) 03/23/2017     Results for orders placed or performed in visit on 03/23/17   Basic Metabolic Panel   Result Value Ref Range    Sodium 140 136 - 145 mmol/L    Potassium 4.8 3.5 - 5.0 mmol/L    Chloride 102 98 - 107 mmol/L    CO2 25 22 - 31 mmol/L    Anion Gap, Calculation 13 5 - 18 mmol/L    Glucose 150 (H) 70 - 125 mg/dL    Calcium 9.2 8.5 - 10.5 mg/dL    BUN 36 (H) 8 - 28 mg/dL    Creatinine 1.17 (H) 0.60 - 1.10 mg/dL    GFR MDRD Af Amer 55 (L) >60 mL/min/1.73m2    GFR MDRD Non Af Amer 45 (L) >60 mL/min/1.73m2     Lab Results   Component Value Date    [id number] 486 04/18/2013         Assessment/Plan:    Impaired cognition/dementia-she remains on galantamine.  Her last CPT was 4.9 NaCl 4.4 and [place] 22/30 her cognitive impairment but daughter feels she has progressed since then  Diabetes-she is diet controlled  CKD  Hyperlipidemia-she is on fish oil  Anxiety disorder with depression for which patient is on multiple medications including Celexa along with Wellbutrin along with Effexor  Osteoarthritis of the knee  Hypertension with patient currently on lisinopril HCTZ medication  Obstructive sleep apnea  GERD-on Prevacid  She has chronic diarrhea  Obstructive sleep apnea-Refused sleep study  TR  Debilitation  Family has elected to move her to long-term care from an assisted living facility due to progressive cognitive decline  She seems to be adjusting well to long-term care though she remains somewhat resistant to certain cares daughter remains close at hand and is extremely helpful in their care management continue with her care plan monitor mood and behaviors  Total time spent was 45 minutes, more than half of it was in face-to-face counseling regarding disease state, treatment, side effects, documentation, review of clinical data and coordination of care  Electronically  signed by: BERLIN Butts  This progress note was completed using Dragon software and there may be grammatical errors.

## 2021-06-11 NOTE — PROGRESS NOTES
[facility]      Facility:    [facility] [[id number]] -1 Code Status: FULL CODE      Chief Complaint/Reason for Visit:    Chief Complaint   Patient presents with     Review Of Multiple Medical Conditions     dementia       HPI:   Kandi is a 80 y.o. female who who is residing at assisted living facility has moved to long-term care as of 6/2017 with underlying history of dementia, that has been progressive in nature.  She has periods of anxiety associated with memory loss and per daughter her short-term memory is getting progressively worse.  She has a past medical history of right knee osteoarthritis and apparently received steroid injections in the past.  She also is a diabetic that has been diet controlled and has obstructive sleep apnea.  As per the family she has been moved to long-term care so that she is closer to her  who is in the nursing home with her.     LTC: Today and previously she seems oriented though daughter reports having more confusion, previously started on namenda, no change per her and is still continuing to decline, so namenda was discontinued.  She denies any pain. Historically BP controlled, maintained on metoprolol, lisinopril, hydralazine and diltiazem.   Her edema BLE was stable so lasix discontinued prior.  Her DM is well controlled with metformin.   Per cognitive decline, was moved to the  in 1/2020 with . She ambulates per self and usually pushes  around in his wc, though had some falls in the past months (Aug/sept 2019). Previous diet was changed and portions decreased per weight gain, though now per cognition declining not eating as much per report. No recent weight available.      Recent ER visits:  She was sent to [facility] on 9/2/2019 status post fall.  She suffered a laceration on the left side of her face, additional imaging was negative so was returned to long-term care the same  day.    Again on 10/6/2019 she was sent to [facility] status post having witnessed fall on the sidewalk.  She did again suffer a laceration over her left eyebrow, imaging negative so return to care center the same day.    Past Medical History:  Past Medical History:   Diagnosis Date     Anxiety     Created by Conversion      Benign Adenomatous Polyp Of The Large Intestine     Created by Conversion      Chronic Diarrhea Of Unknown Origin     Created by Conversion      Dementia of the Alzheimer's type 7/13/2014     Diabetes Mellitus     Created by Conversion      Esophageal reflux     Created by Conversion      Gastritis      Herpes Zoster (Shingles)     Created by Conversion      Hiatal hernia      Hypercholesterolemia     Created by Conversion      Hypertension     Created by Conversion      Melanosis Coli     Created by Conversion [facility] Annotation: May  2 2012  1:19PM - Sheila Benavides: colonoscopy  4/30/12      Memory Lapses Or Loss     Created by Conversion      Obstructive Sleep Apnea     Created by Conversion      Osteopenia     Created by Conversion      Raynaud's Disease     Created by Conversion      Tricuspid Regurgitation     Created by Conversion            Surgical History:  No past surgical history on file.    Family History:   Family History   Problem Relation Age of Onset     Hypertension Other      Diabetes Other      Stroke Other        Social History:    Social History     Socioeconomic History     Marital status:      Spouse name: Not on file     Number of children: Not on file     Years of education: Not on file     Highest education level: Not on file   Occupational History     Not on file   Social Needs     Financial resource strain: Not on file     Food insecurity     Worry: Not on file     Inability: Not on file     Transportation needs     Medical: Not on file     Non-medical: Not on file   Tobacco Use     Smoking status: Never Smoker     Smokeless tobacco: Never Used    Substance and Sexual Activity     Alcohol use: Not on file     Drug use: Not on file     Sexual activity: Not on file   Lifestyle     Physical activity     Days per week: Not on file     Minutes per session: Not on file     Stress: Not on file   Relationships     Social connections     Talks on phone: Not on file     Gets together: Not on file     Attends [employer] service: Not on file     Active member of club or organization: Not on file     Attends meetings of clubs or organizations: Not on file     Relationship status: Not on file     Intimate partner violence     Fear of current or ex partner: Not on file     Emotionally abused: Not on file     Physically abused: Not on file     Forced sexual activity: Not on file   Other Topics Concern     Not on file   Social History Narrative     Not on file     Review of Systems   Constitutional: Negative for activity change, appetite change, diaphoresis and fatigue.        No issues   HENT: Negative for congestion and facial swelling.    Eyes: Negative for photophobia, redness and visual disturbance.   Respiratory: Negative for choking, shortness of breath and wheezing.    Cardiovascular: Positive for leg swelling. Negative for chest pain.        Pedal   Gastrointestinal: Negative for abdominal distention, abdominal pain, blood in stool, constipation and diarrhea.   Endocrine: Negative.    Genitourinary: Negative for difficulty urinating and dysuria.   Musculoskeletal: Negative for back pain and joint swelling.        Bilateral knee pain per OA, taking tylenol   Skin: Negative for color change.        intact   Allergic/Immunologic: Negative.    Neurological: Negative for seizures, weakness and headaches.   Hematological: Negative.    Psychiatric/Behavioral: Positive for confusion. Negative for behavioral problems, hallucinations and sleep disturbance. The patient is nervous/anxious.        Vitals:    09/19/20 1043   BP: 143/76   Pulse: 83   Resp: 18   Temp: 97  F (36.1   C)   SpO2: 97%       Physical Exam   Constitutional: She appears well-developed and well-nourished. No distress.   No acute issues   HENT:   Head: Normocephalic and atraumatic.   Mouth/Throat: Oropharynx is clear and moist. No oropharyngeal exudate.   Eyes: Pupils are equal, round, and reactive to light. Conjunctivae and EOM are normal. Right eye exhibits no discharge. Left eye exhibits no discharge. No scleral icterus.   Neck: Normal range of motion. Neck supple. No JVD present. No tracheal deviation present.   Intact   Cardiovascular: Normal rate.   Pulmonary/Chest: Effort normal. No stridor. No respiratory distress.   RA, no AS per covid 19 recommendation   Abdominal: Soft. She exhibits no distension.   No diarrhea or constipation reported   Genitourinary:    Genitourinary Comments: incontinent     Musculoskeletal: Normal range of motion.         General: Edema present.      Comments: 1+ LE, mostly pedal, denies pain   Neurological: She is alert. No cranial nerve deficit.   A/O x1   Skin: Skin is warm and dry. She is not diaphoretic. No erythema.   Intact, very dry   Psychiatric: She has a normal mood and affect.   No behaviors reported   Nursing note and vitals reviewed.      Medication List:  Current Outpatient Medications   Medication Sig     acetaminophen (TYLENOL) 325 MG tablet Take 650 mg by mouth 4 (four) times a day.     bisacodyl (DULCOLAX, BISACODYL,) 10 mg suppository Insert 1 suppository (10 mg total) into the rectum daily as needed (for constipation).     cetirizine (ZYRTEC) 10 MG tablet Take 10 mg by mouth daily as needed.      cholecalciferol, vitamin D3, 1,000 unit tablet Take 1,000 Units by mouth daily.     fluticasone (FLONASE) 50 mcg/actuation nasal spray 1 spray into each nostril daily. (Patient taking differently: 1 spray into each nostril daily as needed. )     gabapentin (NEURONTIN) 100 MG capsule Take 100 mg by mouth 3 (three) times a day.     hydrALAZINE (APRESOLINE) 25 MG tablet Take 50  mg by mouth Daily at 8:00 am.. Hold for SBP <150           lisinopriL (PRINIVIL,ZESTRIL) 2.5 MG tablet Take 5 mg by mouth daily.      metFORMIN (GLUCOPHAGE) 500 MG tablet Take 500 mg by mouth 2 (two) times a day with meals.      metoprolol succinate (TOPROL-XL) 25 MG Take 12.5 mg by mouth daily.            polyethylene glycol (MIRALAX) 17 gram/dose powder Take 17 g by mouth daily.     polyvinyl alcohol (LIQUIFILM TEARS) 1.4 % ophthalmic solution Administer 1 drop to both eyes 3 (three) times a day as needed for dry eyes.     venlafaxine (EFFEXOR-XR) 37.5 MG 24 hr capsule Take 37.5 mg by mouth daily.     verapamil (VERELAN PM) 100 mg 24 hr capsule TAKE ONE CAPSULE BY MOUTH EVERY DAY     white petrolatum (AQUAPHOR ORIGINAL) 41 % Oint Apply 1 application topically 3 (three) times a day as needed.              Labs:  Results for orders placed or performed in visit on 09/17/20   Basic Metabolic Panel   Result Value Ref Range    Sodium 140 136 - 145 mmol/L    Potassium 4.5 3.5 - 5.0 mmol/L    Chloride 102 98 - 107 mmol/L    CO2 29 22 - 31 mmol/L    Anion Gap, Calculation 9 5 - 18 mmol/L    Glucose 121 70 - 125 mg/dL    Calcium 10.0 8.5 - 10.5 mg/dL    BUN 28 8 - 28 mg/dL    Creatinine 1.05 0.60 - 1.10 mg/dL    GFR MDRD Af Amer >60 >60 mL/min/1.73m2    GFR MDRD Non Af Amer 51 (L) >60 mL/min/1.73m2     Lab Results   Component Value Date    WBC 8.6 03/23/2020    HGB 11.0 (L) 03/23/2020    HCT 35.0 03/23/2020    MCV 98 03/23/2020     03/23/2020     Vitamin D, Total (25-Hydroxy)   Date Value Ref Range Status   03/23/2020 34.4 30.0 - 80.0 ng/mL Final     Lab Results   Component Value Date    HGBA1C 6.6 (H) 07/30/2020     Lab Results   Component Value Date    [id number] 368 03/23/2020     Lab Results   Component Value Date    TSH 1.22 03/23/2020       Assessment/Plan:      Hx of TARI: Last Cr 0.99 with GFR 54 on 3/23/20    DM: current Hgb A1c 6.6 on 7/30/20, continue metformin 500mg two times a day    Anxiety/depression:  have discontinued citalopram and continue Effexor 37.5 daily,  previously discontinued wellbutrin, previously decreased effexor in 3/2018.  Minimal PHQ9 0 on 7/8/20    HTN: continue hydralazine 50mg at AM, continue verapamil 100mg daily, and will maintain metoprolol 12.5mg daily. Prior per pharmacy recs have discontinued clonidine patch and continue lisinopril 5mg daily per renal hx/DM. SBP <140     Vit D def: continue D3 1000U, last 34.4 on 3/23/20    Hypomagnesia: last 1.7 on 3/23/20    Hx of hypokalemia per lasix: dc'd supplement per dc lasix    GERD: PPI, no change.    Seasonal allergies: recently changed fluticasone and cetirizine to PRN    Edema: currently minimal pedal, no current weight available    Constipation: continue miralax daily, no change    Dementia: had cognitive assessment, discontinued donepezil and namenda per no change with medication    Socialization: consulted recreation. No real change per daughter    Disposition: Resides in [facility]. Ambulating per self      Electronically signed by: Ashwin Tang NP

## 2021-06-12 NOTE — PROGRESS NOTES
[facility]      Facility:    [facility] [[id number]]  Code Status: DNR      Chief Complaint/Reason for Visit:    Chief Complaint   Patient presents with     Review Of Multiple Medical Conditions     DM       HPI:   Kandi is a 80 y.o. female who who is residing at assisted living facility has moved to long-term care as of 6/2017 with underlying history of dementia, that has been progressive in nature.  She has periods of anxiety associated with memory loss and per daughter her short-term memory is getting progressively worse.  She has a past medical history of right knee osteoarthritis and apparently received steroid injections in the past.  She also is a diabetic that has been diet controlled and has obstructive sleep apnea.  As per the family she has been moved to long-term care so that she is closer to her  who is in the nursing home with her.     LTC: Today and previously she seems oriented though daughter reports having more confusion, previously started on namenda, no change per her and is still continuing to decline, so namenda was discontinued.  She denies any pain. Historically BP controlled, maintained on metoprolol, lisinopril, hydralazine and diltiazem.   Her edema BLE was stable so lasix discontinued prior.  Her DM is well controlled with metformin.   Per cognitive decline, was moved to the  in 1/2020 with . She ambulates per self and usually pushes  around in his wc, though had some falls in the past months (Aug/sept 2019). Previous diet was changed and portions decreased per weight gain, though now per cognition declining not eating as much per report with subsequent weight loss, dietitian following. She has weekly visits with , but currently on hold per covid recommendations.      Recent ER visits:  She was sent to [facility] on 9/2/2019 status post fall.  She suffered a laceration on the left side of her  face, additional imaging was negative so was returned to long-term care the same day.    Again on 10/6/2019 she was sent to [facility] status post having witnessed fall on the sidewalk.  She did again suffer a laceration over her left eyebrow, imaging negative so return to care center the same day.    Past Medical History:  Past Medical History:   Diagnosis Date     Anxiety     Created by Conversion      Benign Adenomatous Polyp Of The Large Intestine     Created by Conversion      Chronic Diarrhea Of Unknown Origin     Created by Conversion      Dementia of the Alzheimer's type 7/13/2014     Diabetes Mellitus     Created by Conversion      Esophageal reflux     Created by Conversion      Gastritis      Herpes Zoster (Shingles)     Created by Conversion      Hiatal hernia      Hypercholesterolemia     Created by Conversion      Hypertension     Created by Conversion      Melanosis Coli     Created by Conversion [facility] Annotation: May  2 2012  1:19PM - Sheila Benavides: colonoscopy  4/30/12      Memory Lapses Or Loss     Created by Conversion      Obstructive Sleep Apnea     Created by Conversion      Osteopenia     Created by Conversion      Raynaud's Disease     Created by Conversion      Tricuspid Regurgitation     Created by Conversion            Surgical History:  No past surgical history on file.    Family History:   Family History   Problem Relation Age of Onset     Hypertension Other      Diabetes Other      Stroke Other        Social History:    Social History     Socioeconomic History     Marital status:      Spouse name: Not on file     Number of children: Not on file     Years of education: Not on file     Highest education level: Not on file   Occupational History     Not on file   Social Needs     Financial resource strain: Not on file     Food insecurity     Worry: Not on file     Inability: Not on file     Transportation needs     Medical: Not on file     Non-medical: Not on file    Tobacco Use     Smoking status: Never Smoker     Smokeless tobacco: Never Used   Substance and Sexual Activity     Alcohol use: Not on file     Drug use: Not on file     Sexual activity: Not on file   Lifestyle     Physical activity     Days per week: Not on file     Minutes per session: Not on file     Stress: Not on file   Relationships     Social connections     Talks on phone: Not on file     Gets together: Not on file     Attends [employer] service: Not on file     Active member of club or organization: Not on file     Attends meetings of clubs or organizations: Not on file     Relationship status: Not on file     Intimate partner violence     Fear of current or ex partner: Not on file     Emotionally abused: Not on file     Physically abused: Not on file     Forced sexual activity: Not on file   Other Topics Concern     Not on file   Social History Narrative     Not on file     Review of Systems   Constitutional: Negative for activity change, appetite change, diaphoresis and fatigue.        No issues   HENT: Negative for congestion and facial swelling.    Eyes: Negative for photophobia, redness and visual disturbance.   Respiratory: Negative for choking, shortness of breath and wheezing.    Cardiovascular: Positive for leg swelling. Negative for chest pain.        Pedal   Gastrointestinal: Negative for abdominal distention, abdominal pain, blood in stool, constipation and diarrhea.   Endocrine: Negative.    Genitourinary: Negative for difficulty urinating and dysuria.   Musculoskeletal: Negative for back pain and joint swelling.        Bilateral knee pain per OA, taking tylenol   Skin: Negative for color change.        intact   Allergic/Immunologic: Negative.    Neurological: Negative for seizures, weakness and headaches.   Hematological: Negative.    Psychiatric/Behavioral: Positive for confusion. Negative for behavioral problems, hallucinations and sleep disturbance. The patient is nervous/anxious.   "      Vitals:    11/09/20 1637   BP: 134/78   Pulse: 68   Resp: 16   Temp: 98  F (36.7  C)   SpO2: 95%   Weight: 209 lb (94.8 kg)   Height: 5' 4\" (1.626 m)       Physical Exam   Constitutional: She appears well-developed and well-nourished. No distress.   No acute issues   HENT:   Head: Normocephalic and atraumatic.   Mouth/Throat: Oropharynx is clear and moist. No oropharyngeal exudate.   Eyes: Pupils are equal, round, and reactive to light. Conjunctivae and EOM are normal. Right eye exhibits no discharge. Left eye exhibits no discharge. No scleral icterus.   Neck: Normal range of motion. Neck supple. No JVD present. No tracheal deviation present.   Intact   Cardiovascular: Normal rate.   Pulmonary/Chest: Effort normal. No stridor. No respiratory distress.   RA, no AS per covid 19 recommendation   Abdominal: Soft. She exhibits no distension.   No diarrhea or constipation reported   Genitourinary:    Genitourinary Comments: incontinent     Musculoskeletal: Normal range of motion.         General: Edema present.      Comments: 1+ LE, mostly pedal, denies pain   Neurological: She is alert. No cranial nerve deficit.   A/O x1   Skin: Skin is warm and dry. She is not diaphoretic. No erythema.   Intact, very dry   Psychiatric: She has a normal mood and affect.   No behaviors reported   Nursing note and vitals reviewed.      Medication List:  Current Outpatient Medications   Medication Sig     acetaminophen (TYLENOL) 325 MG tablet Take 650 mg by mouth 4 (four) times a day.     bisacodyl (DULCOLAX, BISACODYL,) 10 mg suppository Insert 1 suppository (10 mg total) into the rectum daily as needed (for constipation).     cetirizine (ZYRTEC) 10 MG tablet Take 10 mg by mouth daily as needed.      cholecalciferol, vitamin D3, 1,000 unit tablet Take 1,000 Units by mouth daily.     fluticasone (FLONASE) 50 mcg/actuation nasal spray 1 spray into each nostril daily. (Patient taking differently: 1 spray into each nostril daily as " needed. )     gabapentin (NEURONTIN) 100 MG capsule Take 100 mg by mouth 3 (three) times a day.     hydrALAZINE (APRESOLINE) 25 MG tablet Take 50 mg by mouth Daily at 8:00 am.. Hold for SBP <150           lisinopriL (PRINIVIL,ZESTRIL) 2.5 MG tablet Take 5 mg by mouth daily.      metFORMIN (GLUCOPHAGE) 500 MG tablet Take 500 mg by mouth 2 (two) times a day with meals.      metoprolol succinate (TOPROL-XL) 25 MG Take 12.5 mg by mouth daily.            polyethylene glycol (MIRALAX) 17 gram/dose powder Take 17 g by mouth daily.     polyvinyl alcohol (LIQUIFILM TEARS) 1.4 % ophthalmic solution Administer 1 drop to both eyes 3 (three) times a day as needed for dry eyes.     venlafaxine (EFFEXOR-XR) 37.5 MG 24 hr capsule Take 37.5 mg by mouth daily.     verapamil (VERELAN PM) 100 mg 24 hr capsule TAKE ONE CAPSULE BY MOUTH EVERY DAY     white petrolatum (AQUAPHOR ORIGINAL) 41 % Oint Apply 1 application topically 3 (three) times a day as needed.              Labs:  Results for orders placed or performed in visit on 09/17/20   Basic Metabolic Panel   Result Value Ref Range    Sodium 140 136 - 145 mmol/L    Potassium 4.5 3.5 - 5.0 mmol/L    Chloride 102 98 - 107 mmol/L    CO2 29 22 - 31 mmol/L    Anion Gap, Calculation 9 5 - 18 mmol/L    Glucose 121 70 - 125 mg/dL    Calcium 10.0 8.5 - 10.5 mg/dL    BUN 28 8 - 28 mg/dL    Creatinine 1.05 0.60 - 1.10 mg/dL    GFR MDRD Af Amer >60 >60 mL/min/1.73m2    GFR MDRD Non Af Amer 51 (L) >60 mL/min/1.73m2     Lab Results   Component Value Date    WBC 8.6 03/23/2020    HGB 11.0 (L) 03/23/2020    HCT 35.0 03/23/2020    MCV 98 03/23/2020     03/23/2020     Vitamin D, Total (25-Hydroxy)   Date Value Ref Range Status   03/23/2020 34.4 30.0 - 80.0 ng/mL Final     Lab Results   Component Value Date    HGBA1C 6.6 (H) 07/30/2020     Lab Results   Component Value Date    [id number] 368 03/23/2020     Lab Results   Component Value Date    TSH 1.22 03/23/2020       Assessment/Plan:      Denny of  TARI: Last Cr 1.05 with GFR 51 on 9/17/20    DM: current Hgb A1c 6.6 on 7/30/20, continue metformin 250mg two times a day, if Alc still low will titrate metformin. Recheck    Anxiety/depression: have discontinued citalopram and continue Effexor 37.5 daily,  previously discontinued wellbutrin, previously decreased effexor in 3/2018.  Minimal PHQ9 0 on 7/8/20    HTN: continue hydralazine 50mg at AM, continue verapamil 100mg daily, and will maintain metoprolol 12.5mg daily. Prior per pharmacy recs have discontinued clonidine patch and continue lisinopril 5mg daily per renal hx/DM. SBP <140     Vit D def: continue D3 1000U, last 34.4 on 3/23/20    Hypomagnesia: last 1.7 on 3/23/20    Hx of hypokalemia per lasix: dc'd supplement per dc lasix    GERD: PPI, no change.    Seasonal allergies: recently changed fluticasone and cetirizine to PRN    Edema: currently minimal pedal, 209lb    Morbid obesity: last BMI 35.8    Constipation: continue miralax daily, no change    Dementia: had cognitive assessment, discontinued donepezil and namenda per no change with medication    Socialization: consulted recreation. No real change per daughter    Disposition: Resides in [facility]. Ambulating per self      Electronically signed by: Ashwin Tang NP

## 2021-06-12 NOTE — PROGRESS NOTES
[facility]      Code Status:  FULL CODE  Visit Type: Review Of Multiple Medical Conditions     Facility:  [facility] [[id number]]           History of Present Illness: Kandi Ontiveros is a 77 y.o. female who was residing in assisted living facility and has moved to long-term care with underlying history of dementia which has been progressive.  She has periods of anxiety associated with memory loss and  her short-term memory is getting progressively worse.    She also has underlying history of knee osteoarthritis and was getting steroid injections but is able to ambulate without any assist device as yet patient is a diabetic and also has obstructive sleep apnea  Along with anxiety which is primarily associated with her memory loss.  As per family she has been moved to long-term care so that she can be closer to her  who is in the nursing home also due to her progressive memory issues she needed more supervision with medications and other issues then she has had before  Weights have been stable at 234 pounds.  She is independent with most of her ADLs and in fact helps her daughter and  with cares also  Mood and behaviors have been stable appetite has been good.  Taken off lisinopril due to impaired renal function and started on Lasix.  Blood pressures have been stable    Past Medical History:   Diagnosis Date     Anxiety     Created by Conversion      Benign Adenomatous Polyp Of The Large Intestine     Created by Conversion      Chronic Diarrhea Of Unknown Origin     Created by Conversion      Dementia of the Alzheimer's type 7/13/2014     Diabetes Mellitus     Created by Conversion      Esophageal reflux     Created by Conversion      Gastritis      Herpes Zoster (Shingles)     Created by Conversion      Hiatal hernia      Hypercholesterolemia     Created by Conversion      Hypertension     Created by Conversion      Melanosis Coli     Created by  [facility] Annotation: May  2 2012  1:19PM - Sheila Benavides: colonoscopy  4/30/12      Memory Lapses Or Loss     Created by Conversion      Obstructive Sleep Apnea     Created by Conversion      Osteopenia     Created by Conversion      Raynaud's Disease     Created by Conversion      Tricuspid Regurgitation     Created by Conversion      No past surgical history on file.  Family History   Problem Relation Age of Onset     Hypertension Other      Diabetes Other      Stroke Other      Social History     Social History     Marital status:      Spouse name: N/A     Number of children: N/A     Years of education: N/A     Occupational History     Not on file.     Social History Main Topics     Smoking status: Never Smoker     Smokeless tobacco: Never Used     Alcohol use Not on file     Drug use: Not on file     Sexual activity: Not on file     Other Topics Concern     Not on file     Social History Narrative   lived in [facility]  Current Outpatient Prescriptions   Medication Sig Dispense Refill     acetaminophen 500 mg coapsule Take 1 capsule by mouth. Every 4-6 hours PRN       bisacodyl (DULCOLAX, BISACODYL,) 10 mg suppository Insert 1 suppository (10 mg total) into the rectum daily as needed (for constipation). 12 suppository 0     buPROPion (WELLBUTRIN XL) 150 MG 24 hr tablet Take 1 tablet (150 mg total) by mouth daily. 90 tablet 1     CALCIUM CARBONATE/VITAMIN D3 (CALCIUM 600 WITH VITAMIN D3 ORAL) Take 2 tablets by mouth daily.       cetirizine (ZYRTEC) 10 MG tablet Take 10 mg by mouth daily.       cholecalciferol, vitamin D3, 1,000 unit tablet Take 1,000 Units by mouth daily.       citalopram (CELEXA) 20 MG tablet TAKE ONE TABLET BY MOUTH EVERY DAY 90 tablet 0     dextromethorphan (DELSYM) 30 mg/5 mL liquid Take 60 mg by mouth every 12 (twelve) hours as needed for cough.       fluticasone (FLONASE) 50 mcg/actuation nasal spray 1 spray into each nostril daily. 16 g 12     furosemide (LASIX) 20 MG  tablet Take 20 mg by mouth daily.       galantamine (RAZADYNE ER) 16 MG 24 hr capsule TAKE ONE CAPSULE BY MOUTH EVERY DAY WITH BREAKFAST 90 capsule 3     lansoprazole (PREVACID) 30 MG capsule TAKE 1 CAPSULE BY MOUTH EVERY DAY 90 capsule 1     lisinopril-hydrochlorothiazide (PRINZIDE,ZESTORETIC) 20-25 mg per tablet TAKE ONE TABLET BY MOUTH EVERY DAY 90 tablet 2     metFORMIN (GLUCOPHAGE) 500 MG tablet Take 250 mg by mouth 2 (two) times a day with meals.       OMEGA-3S/DHA/EPA/FISH OIL (OMEGA 3 ORAL) Take 2 capsules by mouth daily.       polyethylene glycol (MIRALAX) 17 gram/dose powder Take 17 g by mouth daily. 255 g 0     venlafaxine (EFFEXOR XR) 37.5 MG 24 hr capsule Take 2 capsules (75 mg total) by mouth daily. 60 capsule 1     verapamil (VERELAN PM) 100 mg 24 hr capsule TAKE ONE CAPSULE BY MOUTH EVERY DAY 90 capsule 1     vitamin E 1000 UNIT capsule Take 1,000 Units by mouth 2 (two) times a day.       No current facility-administered medications for this visit.      Allergies   Allergen Reactions     Codeine Sulfate          Review of Systems:    Constitutional: Negative.  Negative for fever, chills,has  activity change, appetite change and fatigue.   HENT: Negative for congestion and facial swelling.    Eyes: Negative for photophobia, redness and visual disturbance.   Respiratory: Negative for cough and chest tightness.    Cardiovascular: Negative for chest pain, palpitations and leg swelling.   Gastrointestinal: Negative for nausea, diarrhea, constipation, blood in stool and abdominal distention.   Genitourinary: Negative.    Musculoskeletal: Negative.   she has bilateral knee osteoarthritis with knee pain  Skin: Negative.    Neurological: Negative for dizziness, tremors, syncope, weakness, light-headedness and headaches.   Hematological: Does not bruise/bleed easily.   Psychiatric/Behavioral: Negative.  She had a flat effect with a poor recall     Vitals:    08/09/17 1457   BP: 136/72   Pulse: 68   Resp: 19    Temp: 97.6  F (36.4  C)       Physical Exam:    GENERAL: no acute distress. Cooperative in conversation.   HEENT: pupils are equal, round and reactive. Oral mucosa is moist and intact.  RESP:Chest symmetric. Regular respiratory rate. No stridor.  CVS: S1S2  ABD: Nondistended, soft.  EXTREMITIES: No lower extremity edema.   NEURO: non focal. Alert and oriented x3.   PSYCH: within normal limits. No depression or anxiety.  SKIN: warm dry intact     Labs:    Lab Results   Component Value Date    HGBA1C 7.8 (H) 03/23/2017     Results for orders placed or performed in visit on 08/03/17   Basic Metabolic Panel   Result Value Ref Range    Sodium 135 (L) 136 - 145 mmol/L    Potassium 3.5 3.5 - 5.0 mmol/L    Chloride 95 (L) 98 - 107 mmol/L    CO2 28 22 - 31 mmol/L    Anion Gap, Calculation 12 5 - 18 mmol/L    Glucose 198 (H) 70 - 125 mg/dL    Calcium 9.5 8.5 - 10.5 mg/dL    BUN 25 8 - 28 mg/dL    Creatinine 1.48 (H) 0.60 - 1.10 mg/dL    GFR MDRD Af Amer 42 (L) >60 mL/min/1.73m2    GFR MDRD Non Af Amer 34 (L) >60 mL/min/1.73m2     Lab Results   Component Value Date    [id number] 486 04/18/2013         Assessment/Plan:    Impaired cognition/dementia-she remains on galantamine.  Her last CPT was 4.9 ACl 4.4 and [place] 22/30 .BIMS 9/15  Stable mood and behaviors with no change reported  Diabetes-she is diet controlled; due to elevated hemoglobin A1c has been started on a low-dose of metformin  CKD  Hyperlipidemia-she is on fish oil  Anxiety disorder with depression for which patient is on multiple medications including Celexa along with Wellbutrin along with Effexor  Osteoarthritis of the knee  Hypertension with patient currently off lisinopril HCTZ medication due to impaired renal function recheck BMP is pending  Obstructive sleep apnea  GERD-on Prevacid  She has chronic diarrhea  Obstructive sleep apnea-Refused sleep study  Debilitation  Family has elected to move her to long-term care from an assisted living facility due to  progressive cognitive decline  She seems to be adjusting well to long-term care though she remains somewhat resistant to certain cares daughter remains close at hand and is extremely helpful in their care management continue with her care plan monitor mood and behaviors  Follow-up on the results of her BMP.  No change in weight reported  Electronically signed by: BERLIN Butts  This progress note was completed using Dragon software and there may be grammatical errors.

## 2021-06-13 NOTE — PROGRESS NOTES
[facility]      Facility:    [facility] [[id number]] -1 Code Status: FULL CODE      Chief Complaint/Reason for Visit:   Chief Complaint   Patient presents with     Review Of Multiple Medical Conditions       HPI:   Kandi is a 77 y.o. female who who is residing at assisted living facility has moved to long-term care with underlying history of dementia, that has been progressive in nature.  She has periods of anxiety associated with memory loss and per daughter her short-term memory is getting progressively worse.  She has a past medical history of right knee osteoarthritis and apparently received steroid injections in the past.  She also is a diabetic that has been diet controlled and has obstructive sleep apnea.  As per the family she has been moved to long-term care so that she is closer to her  who is in the nursing home with her.     TCU: Today and previously she seems oriented though not sure per language barrier.  She denies any pain.  Previously we explained to her that we need to give her medication for her diabetes and increase 1 of her medications that she is currently taking for her blood pressure.  This has resulted in decreased renal function.  Today I noticed I ordered to DC her metformin was never carried out.  Fortunately her kidney function has rebounded taking off HCTZ and ACEi as her creatinine is 1.13 with a GFR of 47.  Previously I will started metoprolol 12.5 mg daily and monitor blood pressure.  Today he hav noticed that her edema is improved and her hypertension is borderline.  So order monitoring of blood pressures up until 10/16/17 and evaluate.  We also suggested ordering GANGA hose per her edema in her legs, though apparently she is never use of this and has since been DC'd.  Previously she also told me she has really dry skin so I ordered Aquaphor for her 3 times daily as needed, as this has been therapeutic.  Also she needed  potassium supplementation with 10 mEq daily, with a recheck potassium at 3.7.  We will continue to administer per Lasix dosage. BIMS 9/15 on 9/14/17.    Patient denies pain, headache, chest pain, numbness or tingling, shortest of breath, eating or swallowing concerns, nausea or vomiting, diarrhea or bowel abnormalities, or no new integumentary concerns today. Monitor BP.      Past Medical History:  Past Medical History:   Diagnosis Date     Anxiety     Created by Conversion      Benign Adenomatous Polyp Of The Large Intestine     Created by Conversion      Chronic Diarrhea Of Unknown Origin     Created by Conversion      Dementia of the Alzheimer's type 7/13/2014     Diabetes Mellitus     Created by Conversion      Esophageal reflux     Created by Conversion      Gastritis      Herpes Zoster (Shingles)     Created by Conversion      Hiatal hernia      Hypercholesterolemia     Created by Conversion      Hypertension     Created by Conversion      Melanosis Coli     Created by Conversion [facility] Annotation: May  2 2012  1:19PM - Sheila Benavides: colonoscopy  4/30/12      Memory Lapses Or Loss     Created by Conversion      Obstructive Sleep Apnea     Created by Conversion      Osteopenia     Created by Conversion      Raynaud's Disease     Created by Conversion      Tricuspid Regurgitation     Created by Conversion            Surgical History:  No past surgical history on file.    Family History:   Family History   Problem Relation Age of Onset     Hypertension Other      Diabetes Other      Stroke Other        Social History:    Social History     Social History     Marital status:      Spouse name: N/A     Number of children: N/A     Years of education: N/A     Social History Main Topics     Smoking status: Never Smoker     Smokeless tobacco: Never Used     Alcohol use Not on file     Drug use: Not on file     Sexual activity: Not on file     Other Topics Concern     Not on file     Social History  Narrative     Review of Systems   Constitutional: Positive for appetite change. Negative for activity change, diaphoresis and fatigue.   HENT: Negative.  Negative for congestion and facial swelling.    Eyes: Negative.  Negative for visual disturbance.   Respiratory: Negative.  Negative for shortness of breath and wheezing.    Cardiovascular: Negative.  Negative for chest pain.   Gastrointestinal: Negative.  Negative for abdominal distention, abdominal pain, blood in stool, constipation and diarrhea.   Endocrine: Negative.    Genitourinary: Negative.  Negative for dysuria.   Musculoskeletal: Negative for back pain and joint swelling.        Bilateral knee pain per OA, taking tylenol   Skin: Negative.  Negative for color change.        moisturizer used   Allergic/Immunologic: Negative.    Neurological: Negative.  Negative for weakness and headaches.   Hematological: Negative.    Psychiatric/Behavioral: Negative for confusion and sleep disturbance. The patient is nervous/anxious.        Vitals:    10/11/17 1515   BP: 146/84   Pulse: 80   Resp: 18   Temp: 98.2  F (36.8  C)   SpO2: 94%   Weight: 211 lb 12.8 oz (96.1 kg)       Physical Exam   Constitutional: She is oriented to person, place, and time. She appears well-developed.   Concerns about daughter   HENT:   Head: Normocephalic and atraumatic.   Mouth/Throat: Oropharynx is clear and moist.   Eyes: Pupils are equal, round, and reactive to light. Right eye exhibits no discharge. Left eye exhibits no discharge. No scleral icterus.   Neck: Normal range of motion. Neck supple.   Intact   Cardiovascular: Normal rate and regular rhythm.  Exam reveals no gallop and no friction rub.    No murmur heard.  S1S2, no murmur   Pulmonary/Chest: Breath sounds normal. She has no wheezes. She has no rales.   CTA   Abdominal: Soft. Bowel sounds are normal. She exhibits no distension.   Genitourinary:   Genitourinary Comments: Deferred   Musculoskeletal: Normal range of motion. She  exhibits edema.   1-2+ LE edema bilaterally   Neurological: She is alert and oriented to person, place, and time.   BIMS 9/15   Skin: Skin is warm and dry. No erythema.   Psychiatric: She has a normal mood and affect.   Has anxiety       Medication List:  Current Outpatient Prescriptions   Medication Sig     acetaminophen 500 mg coapsule Take 1 capsule by mouth. Every 4-6 hours PRN     bisacodyl (DULCOLAX, BISACODYL,) 10 mg suppository Insert 1 suppository (10 mg total) into the rectum daily as needed (for constipation).     buPROPion (WELLBUTRIN XL) 150 MG 24 hr tablet Take 1 tablet (150 mg total) by mouth daily.     CALCIUM CARBONATE/VITAMIN D3 (CALCIUM 600 WITH VITAMIN D3 ORAL) Take 2 tablets by mouth daily.     cetirizine (ZYRTEC) 10 MG tablet Take 10 mg by mouth daily.     cholecalciferol, vitamin D3, 1,000 unit tablet Take 1,000 Units by mouth daily.     citalopram (CELEXA) 20 MG tablet TAKE ONE TABLET BY MOUTH EVERY DAY     dextromethorphan (DELSYM) 30 mg/5 mL liquid Take 60 mg by mouth every 12 (twelve) hours as needed for cough.     fluticasone (FLONASE) 50 mcg/actuation nasal spray 1 spray into each nostril daily.     furosemide (LASIX) 20 MG tablet Take 20 mg by mouth daily.     galantamine (RAZADYNE ER) 16 MG 24 hr capsule TAKE ONE CAPSULE BY MOUTH EVERY DAY WITH BREAKFAST     lansoprazole (PREVACID) 30 MG capsule TAKE 1 CAPSULE BY MOUTH EVERY DAY     metoprolol succinate (TOPROL-XL) 25 MG Take 12.5 mg by mouth daily.     OMEGA-3S/DHA/EPA/FISH OIL (OMEGA 3 ORAL) Take 2 capsules by mouth daily.     polyethylene glycol (MIRALAX) 17 gram/dose powder Take 17 g by mouth daily.     potassium chloride SA (K-DUR,KLOR-CON) 10 MEQ tablet Take 10 mEq by mouth 2 (two) times a day.     venlafaxine (EFFEXOR XR) 37.5 MG 24 hr capsule Take 2 capsules (75 mg total) by mouth daily.     verapamil (VERELAN PM) 100 mg 24 hr capsule TAKE ONE CAPSULE BY MOUTH EVERY DAY     vitamin E 1000 UNIT capsule Take 1,000 Units by mouth  2 (two) times a day.     white petrolatum (AQUAPHOR ORIGINAL) 41 % Oint Apply topically 3 (three) times a day as needed.       Labs:  No results found for this or any previous visit (from the past 240 hour(s)).        Assessment:    ICD-10-CM    1. Potassium (K) deficiency E87.6    2. Renal failure, chronic, stage 3 (moderate) N18.3    3. Type 2 diabetes mellitus with hyperglycemia, without long-term current use of insulin E11.65    4. Anxiety F41.1    5. Hypertension I10        Plan:  1.  Last K3.7 on 10 mEq daily  2.  Last creatinine 1.13 with GFR of 47 on 10/3/17  3.  -180s, monitor  4.  Improved, related to daughter stasis  5.  Monitor BP every shift until 10/16, monitor    The care plan has been reviewed and all orders signed. Changes to care plan, if any, as noted. Otherwise, continue care plan of care.      Electronically signed by: Ashwin Tang NP

## 2021-06-13 NOTE — PROGRESS NOTES
[facility]      Facility:    [facility] [[id number]] -1 Code Status: FULL CODE      Chief Complaint/Reason for Visit:   Chief Complaint   Patient presents with     Review Of Multiple Medical Conditions       HPI:   Kandi is a 77 y.o. female who who is residing at assisted living facility has moved to long-term care with underlying history of dementia, that has been progressive in nature.  She has periods of anxiety associated with memory loss and per daughter her short-term memory is getting progressively worse.  She has a past medical history of right knee osteoarthritis and apparently received steroid injections in the past.  She also is a diabetic that has been diet controlled and has obstructive sleep apnea.  As per the family she has been moved to long-term care so that she is closer to her  who is in the nursing home with her.     TCU: Today she seems oriented though not sure per language barrier.  She denies any pain.  Previously we explained to her that we need to give her medication for her diabetes and increase 1 of her medications that she is currently taking for her blood pressure.  This has resulted in decreased renal function.  Today DC her metformin with previous DC of high chlorothiazide and lisinopril.  Today I will start metoprolol 12.5 mg daily and monitor blood pressure.  We also suggested that we order GANGA hose per her edema in her legs, though apparently she is never use of this has been DC'd previously.  She also tells me she has really dry skin so I will order Aquaphor for her 3 times daily as needed.  Also she needs potassium supplementation with 10 mEq daily with a recheck BMP in 1 week.    Patient denies pain, headache, chest pain, numbness or tingling, shortest of breath, eating or swallowing concerns, nausea or vomiting, diarrhea or bowel abnormalities, or no new integumentary concerns today.      Past Medical History:  Past  Medical History:   Diagnosis Date     Anxiety     Created by Conversion      Benign Adenomatous Polyp Of The Large Intestine     Created by Conversion      Chronic Diarrhea Of Unknown Origin     Created by Conversion      Dementia of the Alzheimer's type 7/13/2014     Diabetes Mellitus     Created by Conversion      Esophageal reflux     Created by Conversion      Gastritis      Herpes Zoster (Shingles)     Created by Conversion      Hiatal hernia      Hypercholesterolemia     Created by Conversion      Hypertension     Created by Conversion      Melanosis Coli     Created by Conversion [facility] Annotation: May  2 2012  1:19PM - Sheila Benavides: colonoscopy  4/30/12      Memory Lapses Or Loss     Created by Conversion      Obstructive Sleep Apnea     Created by Conversion      Osteopenia     Created by Conversion      Raynaud's Disease     Created by Conversion      Tricuspid Regurgitation     Created by Conversion            Surgical History:  No past surgical history on file.    Family History:   Family History   Problem Relation Age of Onset     Hypertension Other      Diabetes Other      Stroke Other        Social History:    Social History     Social History     Marital status:      Spouse name: N/A     Number of children: N/A     Years of education: N/A     Social History Main Topics     Smoking status: Never Smoker     Smokeless tobacco: Never Used     Alcohol use Not on file     Drug use: Not on file     Sexual activity: Not on file     Other Topics Concern     Not on file     Social History Narrative     Review of Systems   Constitutional: Positive for appetite change. Negative for activity change, diaphoresis and fatigue.   HENT: Negative.  Negative for congestion and facial swelling.    Eyes: Negative.  Negative for visual disturbance.   Respiratory: Negative.  Negative for shortness of breath and wheezing.    Cardiovascular: Negative.  Negative for chest pain.   Gastrointestinal: Negative.   Negative for abdominal distention and abdominal pain.   Endocrine: Negative.    Genitourinary: Negative.    Musculoskeletal: Positive for joint swelling. Negative for back pain.        Bilateral knee pain per OA   Skin: Negative.  Negative for color change.   Allergic/Immunologic: Negative.    Neurological: Negative.  Negative for weakness and headaches.   Hematological: Negative.    Psychiatric/Behavioral: Negative.  Negative for confusion and sleep disturbance.       Vitals:    09/14/17 1200   BP: 162/75   Pulse: 84   Resp: 16   Temp: 98.4  F (36.9  C)   SpO2: 94%   Weight: 213 lb 3.2 oz (96.7 kg)       Physical Exam   Constitutional: She is oriented to person, place, and time. She appears well-developed.   Concerns about daughter   HENT:   Head: Normocephalic and atraumatic.   Mouth/Throat: Oropharynx is clear and moist.   Eyes: Pupils are equal, round, and reactive to light. Right eye exhibits no discharge. Left eye exhibits no discharge. No scleral icterus.   Neck: Normal range of motion. Neck supple.   Intact   Cardiovascular: Normal rate and regular rhythm.  Exam reveals no gallop and no friction rub.    No murmur heard.  S1S2, no murmur   Pulmonary/Chest: Breath sounds normal. She has no wheezes. She has no rales.   CTA   Abdominal: Soft. Bowel sounds are normal. She exhibits no distension.   Genitourinary:   Genitourinary Comments: Deferred   Musculoskeletal: Normal range of motion. She exhibits edema.   1-2+ LE edema bilaterally   Neurological: She is alert and oriented to person, place, and time.   BIMS 9/15   Skin: Skin is warm and dry. No erythema.   Psychiatric: She has a normal mood and affect.   Has anxiety       Medication List:  Current Outpatient Prescriptions   Medication Sig     metoprolol succinate (TOPROL-XL) 25 MG Take 12.5 mg by mouth daily.     potassium chloride SA (K-DUR,KLOR-CON) 10 MEQ tablet Take 10 mEq by mouth 2 (two) times a day.     white petrolatum (AQUAPHOR ORIGINAL) 41 % Oint  Apply topically 3 (three) times a day as needed.     acetaminophen 500 mg coapsule Take 1 capsule by mouth. Every 4-6 hours PRN     bisacodyl (DULCOLAX, BISACODYL,) 10 mg suppository Insert 1 suppository (10 mg total) into the rectum daily as needed (for constipation).     buPROPion (WELLBUTRIN XL) 150 MG 24 hr tablet Take 1 tablet (150 mg total) by mouth daily.     CALCIUM CARBONATE/VITAMIN D3 (CALCIUM 600 WITH VITAMIN D3 ORAL) Take 2 tablets by mouth daily.     cetirizine (ZYRTEC) 10 MG tablet Take 10 mg by mouth daily.     cholecalciferol, vitamin D3, 1,000 unit tablet Take 1,000 Units by mouth daily.     citalopram (CELEXA) 20 MG tablet TAKE ONE TABLET BY MOUTH EVERY DAY     dextromethorphan (DELSYM) 30 mg/5 mL liquid Take 60 mg by mouth every 12 (twelve) hours as needed for cough.     fluticasone (FLONASE) 50 mcg/actuation nasal spray 1 spray into each nostril daily.     furosemide (LASIX) 20 MG tablet Take 20 mg by mouth daily.     galantamine (RAZADYNE ER) 16 MG 24 hr capsule TAKE ONE CAPSULE BY MOUTH EVERY DAY WITH BREAKFAST     lansoprazole (PREVACID) 30 MG capsule TAKE 1 CAPSULE BY MOUTH EVERY DAY     OMEGA-3S/DHA/EPA/FISH OIL (OMEGA 3 ORAL) Take 2 capsules by mouth daily.     polyethylene glycol (MIRALAX) 17 gram/dose powder Take 17 g by mouth daily.     venlafaxine (EFFEXOR XR) 37.5 MG 24 hr capsule Take 2 capsules (75 mg total) by mouth daily.     verapamil (VERELAN PM) 100 mg 24 hr capsule TAKE ONE CAPSULE BY MOUTH EVERY DAY     vitamin E 1000 UNIT capsule Take 1,000 Units by mouth 2 (two) times a day.       Labs:  Recent Results (from the past 168 hour(s))   Basic Metabolic Panel   Result Value Ref Range    Sodium 137 136 - 145 mmol/L    Potassium 3.3 (L) 3.5 - 5.0 mmol/L    Chloride 96 (L) 98 - 107 mmol/L    CO2 28 22 - 31 mmol/L    Anion Gap, Calculation 13 5 - 18 mmol/L    Glucose 228 (H) 70 - 125 mg/dL    Calcium 9.5 8.5 - 10.5 mg/dL    BUN 18 8 - 28 mg/dL    Creatinine 1.46 (H) 0.60 - 1.10 mg/dL     GFR MDRD Af Amer 42 (L) >60 mL/min/1.73m2    GFR MDRD Non Af Amer 35 (L) >60 mL/min/1.73m2         Assessment:    ICD-10-CM    1. Essential hypertension with goal blood pressure less than 130/80 I10    2. Type 2 diabetes mellitus with hyperglycemia, without long-term current use of insulin E11.65    3. Renal failure, chronic, stage 3 (moderate) N18.3    4. Potassium (K) deficiency E87.6    5. Anxiety F41.1        Plan:  1. Start metoprolol 12.5 mg daily  2. Continue to monitor, plan to start insulin  3.  DC Glucophage, last creatinine 1.46 with GFR of 35, will check next BMP in 1 week  4.  Start 10 mg with potassium daily  5.  Ongoing, related to daughter, monitor    The care plan has been reviewed and all orders signed. Changes to care plan, if any, as noted. Otherwise, continue care plan of care.      Ashwin GUNDERSON, am scribing for and in the presence of ZANDRA Mclean.    Electronically signed by: Ashwin Tang NP

## 2021-06-14 NOTE — PROGRESS NOTES
[facility]      Facility:    [facility] [[id number]]  Code Status: DNR      Chief Complaint/Reason for Visit:    Chief Complaint   Patient presents with     Review Of Multiple Medical Conditions     dementia       HPI:   Kandi is a 80 y.o. female who who is residing at assisted living facility has moved to long-term care as of 6/2017 with underlying history of dementia, that has been progressive in nature.  She has periods of anxiety associated with memory loss and per daughter her short-term memory is getting progressively worse.  She has a past medical history of right knee osteoarthritis and apparently received steroid injections in the past.  She also is a diabetic that has been diet controlled and has obstructive sleep apnea.  As per the family she has been moved to long-term care so that she is closer to her  who is in the nursing home with her.     LTC: Today and previously she seems oriented though daughter reports having more confusion, previously started on namenda, no change per her and is still continuing to decline, so namenda was discontinued.  She denies any pain. Historically BP controlled, maintained on metoprolol, lisinopril, hydralazine and diltiazem.   Her edema BLE was stable so lasix discontinued prior.  Her DM is well controlled with metformin.   Per cognitive decline, was moved to the  in 1/2020 with . She ambulates per self and usually pushes  around in his wc, though had some falls in the past months (Aug/sept 2019). Previous diet was changed and portions decreased per weight gain, though now per cognition declining not eating as much per report with subsequent weight loss, dietitian following. She has weekly visits with  again, previous on hold per covid outbreak in facility. No issues reported this visit.      Recent ER visits:  She was sent to [facility] on 9/2/2019 status post fall.  She  suffered a laceration on the left side of her face, additional imaging was negative so was returned to long-term care the same day.    Again on 10/6/2019 she was sent to [facility] status post having witnessed fall on the sidewalk.  She did again suffer a laceration over her left eyebrow, imaging negative so return to care center the same day.    Past Medical History:  Past Medical History:   Diagnosis Date     Anxiety     Created by Conversion      Benign Adenomatous Polyp Of The Large Intestine     Created by Conversion      Chronic Diarrhea Of Unknown Origin     Created by Conversion      Dementia of the Alzheimer's type 7/13/2014     Diabetes Mellitus     Created by Conversion      Esophageal reflux     Created by Conversion      Gastritis      Herpes Zoster (Shingles)     Created by Conversion      Hiatal hernia      Hypercholesterolemia     Created by Conversion      Hypertension     Created by Conversion      Melanosis Coli     Created by Conversion [facility] Annotation: May  2 2012  1:19PM - Sheila Benavides: colonoscopy  4/30/12      Memory Lapses Or Loss     Created by Conversion      Obstructive Sleep Apnea     Created by Conversion      Osteopenia     Created by Conversion      Raynaud's Disease     Created by Conversion      Tricuspid Regurgitation     Created by Conversion            Surgical History:  No past surgical history on file.    Family History:   Family History   Problem Relation Age of Onset     Hypertension Other      Diabetes Other      Stroke Other        Social History:    Social History     Socioeconomic History     Marital status:      Spouse name: Not on file     Number of children: Not on file     Years of education: Not on file     Highest education level: Not on file   Occupational History     Not on file   Social Needs     Financial resource strain: Not on file     Food insecurity     Worry: Not on file     Inability: Not on file     Transportation needs      Medical: Not on file     Non-medical: Not on file   Tobacco Use     Smoking status: Never Smoker     Smokeless tobacco: Never Used   Substance and Sexual Activity     Alcohol use: Not on file     Drug use: Not on file     Sexual activity: Not on file   Lifestyle     Physical activity     Days per week: Not on file     Minutes per session: Not on file     Stress: Not on file   Relationships     Social connections     Talks on phone: Not on file     Gets together: Not on file     Attends Yazdanism service: Not on file     Active member of club or organization: Not on file     Attends meetings of clubs or organizations: Not on file     Relationship status: Not on file     Intimate partner violence     Fear of current or ex partner: Not on file     Emotionally abused: Not on file     Physically abused: Not on file     Forced sexual activity: Not on file   Other Topics Concern     Not on file   Social History Narrative     Not on file     Review of Systems   Constitutional: Negative for activity change, appetite change, diaphoresis and fatigue.        No issues   HENT: Negative for congestion and facial swelling.    Eyes: Negative for photophobia, redness and visual disturbance.   Respiratory: Negative for choking, shortness of breath and wheezing.    Cardiovascular: Positive for leg swelling. Negative for chest pain.        Pedal   Gastrointestinal: Negative for abdominal distention, abdominal pain, blood in stool, constipation and diarrhea.   Endocrine: Negative.    Genitourinary: Negative for difficulty urinating and dysuria.   Musculoskeletal: Negative for back pain and joint swelling.        Bilateral knee pain per OA, taking tylenol   Skin: Negative for color change.        intact   Allergic/Immunologic: Negative.    Neurological: Negative for seizures, weakness and headaches.   Hematological: Negative.    Psychiatric/Behavioral: Positive for confusion. Negative for behavioral problems, hallucinations and sleep  "disturbance. The patient is nervous/anxious.        Vitals:    01/20/21 1437   BP: 160/76   Pulse: 80   Resp: 20   Temp: 98  F (36.7  C)   SpO2: 95%   Weight: 209 lb (94.8 kg)   Height: 5' 4\" (1.626 m)       Physical Exam   Constitutional: She appears well-developed and well-nourished. No distress.   No acute issues   HENT:   Head: Normocephalic and atraumatic.   Mouth/Throat: Oropharynx is clear and moist. No oropharyngeal exudate.   Eyes: Pupils are equal, round, and reactive to light. Conjunctivae and EOM are normal. Right eye exhibits no discharge. Left eye exhibits no discharge. No scleral icterus.   Neck: Normal range of motion. Neck supple. No JVD present. No tracheal deviation present.   Intact   Cardiovascular: Normal rate.   Pulmonary/Chest: Effort normal. No stridor. No respiratory distress.   RA, CTA   Abdominal: Soft. She exhibits no distension.   No diarrhea or constipation reported   Genitourinary:    Genitourinary Comments: incontinent     Musculoskeletal: Normal range of motion.         General: Edema present.      Comments: 1+ LE, mostly pedal, denies pain   Neurological: She is alert. No cranial nerve deficit.   A/O x1   Skin: Skin is warm and dry. She is not diaphoretic. No erythema.   Intact, very dry   Psychiatric: She has a normal mood and affect.   No behaviors reported   Nursing note and vitals reviewed.      Medication List:  Current Outpatient Medications   Medication Sig     acetaminophen (TYLENOL) 325 MG tablet Take 650 mg by mouth 4 (four) times a day.     bisacodyl (DULCOLAX, BISACODYL,) 10 mg suppository Insert 1 suppository (10 mg total) into the rectum daily as needed (for constipation).     cetirizine (ZYRTEC) 10 MG tablet Take 10 mg by mouth daily as needed.      cholecalciferol, vitamin D3, 1,000 unit tablet Take 1,000 Units by mouth daily.     fluticasone (FLONASE) 50 mcg/actuation nasal spray 1 spray into each nostril daily. (Patient taking differently: 1 spray into each " nostril daily as needed. )     gabapentin (NEURONTIN) 100 MG capsule Take 100 mg by mouth 3 (three) times a day.     hydrALAZINE (APRESOLINE) 25 MG tablet Take 50 mg by mouth Daily at 8:00 am.. Hold for SBP <150           lisinopriL (PRINIVIL,ZESTRIL) 2.5 MG tablet Take 5 mg by mouth daily.      metFORMIN (GLUCOPHAGE) 500 MG tablet Take 250 mg by mouth 2 (two) times a day with meals.      metoprolol succinate (TOPROL-XL) 25 MG Take 12.5 mg by mouth daily.            polyethylene glycol (MIRALAX) 17 gram/dose powder Take 17 g by mouth daily.     polyvinyl alcohol (LIQUIFILM TEARS) 1.4 % ophthalmic solution Administer 1 drop to both eyes 3 (three) times a day as needed for dry eyes.     venlafaxine (EFFEXOR-XR) 37.5 MG 24 hr capsule Take 37.5 mg by mouth daily.     verapamil (VERELAN PM) 100 mg 24 hr capsule TAKE ONE CAPSULE BY MOUTH EVERY DAY     white petrolatum (AQUAPHOR ORIGINAL) 41 % Oint Apply 1 application topically 3 (three) times a day as needed.              Labs:  Results for orders placed or performed in visit on 09/17/20   Basic Metabolic Panel   Result Value Ref Range    Sodium 140 136 - 145 mmol/L    Potassium 4.5 3.5 - 5.0 mmol/L    Chloride 102 98 - 107 mmol/L    CO2 29 22 - 31 mmol/L    Anion Gap, Calculation 9 5 - 18 mmol/L    Glucose 121 70 - 125 mg/dL    Calcium 10.0 8.5 - 10.5 mg/dL    BUN 28 8 - 28 mg/dL    Creatinine 1.05 0.60 - 1.10 mg/dL    GFR MDRD Af Amer >60 >60 mL/min/1.73m2    GFR MDRD Non Af Amer 51 (L) >60 mL/min/1.73m2     Lab Results   Component Value Date    WBC 8.6 03/23/2020    HGB 11.0 (L) 03/23/2020    HCT 35.0 03/23/2020    MCV 98 03/23/2020     03/23/2020     Vitamin D, Total (25-Hydroxy)   Date Value Ref Range Status   03/23/2020 34.4 30.0 - 80.0 ng/mL Final     Lab Results   Component Value Date    HGBA1C 6.6 (H) 07/30/2020     Lab Results   Component Value Date    [id number] 368 03/23/2020     Lab Results   Component Value Date    TSH 1.22 03/23/2020        Assessment/Plan:      Hx of TARI: Last Cr 1.05 with GFR 51 on 9/17/20, recheck in March 2021    DM: current Hgb A1c 6.6 on 7/30/20, continue metformin 250mg two times a day, if Alc still low will titrate metformin. Recheck    Anxiety/depression: have discontinued citalopram and continue Effexor 37.5 daily,  previously discontinued wellbutrin, previously decreased effexor in 3/2018.  Minimal PHQ9 0 on 7/8/20    HTN: continue hydralazine 50mg at AM, continue verapamil 100mg daily, and will maintain metoprolol 12.5mg daily. Prior per pharmacy recs have discontinued clonidine patch and continue lisinopril 5mg daily per renal hx/DM. SBP <140     Vit D def: continue D3 1000U, last 34.4 on 3/23/20    Hypomagnesia: last 1.7 on 3/23/20    Hx of hypokalemia per lasix: dc'd supplement per dc lasix    GERD: PPI, no change.    Seasonal allergies: recently changed fluticasone and cetirizine to PRN    Edema: currently minimal pedal, 209lb. (stable)    Morbid obesity: last BMI 35.8    Constipation: continue miralax daily, no change    Dementia: had cognitive assessment, discontinued donepezil and namenda per no change with medication    Socialization: consulted recreation. No real change per daughter    Disposition: Resides in LTC  and  in regular LTC. Ambulating per self      Electronically signed by: Ashwin Tang NP

## 2021-06-14 NOTE — TELEPHONE ENCOUNTER
This patient's medication list and chart were reviewed as part of the service provided by [facility].    Assessment/Recommendations:  1. (HTN):  Pt on lisinopril 5mg daily, hydralazine 50mg every morning, metoprolol ER 12.5mg daily, verapamil ER-24hr 100mg daily.  To reduce pill load, could consider d'c metoprolol ER and if necessary, may increase verapamil ER to 120mg daily and monitor. Alternative to increasing the verapamil would be an increase in the lisinopril and follow-up BMP in one week, however last K = 4.5 and risk for hyperkalemia with dose increase.  2. (Allergies):  Both prn Fluticasone NS and prn Cetirizine available.  Fluticasone requires scheduled use for a couple of weeks before reaching full benefit, so prn use not ideal.  Consider d'c prn Fluticasone NS and consider changing Cetirizine to 5mg daily prn vs 10mg daily prn (due to pt age, 5mg is recommended dose).  3. (Pain):  If no active symptoms, may consider reducing Gabapentin to 100mg twice daily and monitor.  Gabapentin may increase risk of peripheral edema, confusion, sedation, dizziness, falls, etc.  May also consider changing tylenol from 650mg four times a day to 1000mg three times daily to reduce number of med passes.      Sara Cunha, Pharm.D.,[facility]  Board Certified Geriatric Pharmacist  Medication Therapy Management Pharmacist  [phone]

## 2021-06-15 NOTE — PROGRESS NOTES
[facility]      Facility:    [facility] [[id number]]  Code Status: DNR      Chief Complaint/Reason for Visit:    Chief Complaint   Patient presents with     [facility] Care Coordination - Home Visit-Annual Assessment       HPI:   Kandi is a 80 y.o. female who resided at assisted living facility has moved to long-term care as of 6/2017 with underlying history of dementia, that has been progressive in nature.  She has periods of anxiety associated with memory loss and per daughter her short-term memory is getting progressively worse.  She has a past medical history of right knee osteoarthritis and apparently received steroid injections in the past.  She also is a diabetic that has been diet controlled and has obstructive sleep apnea.  As per the family she has been moved to long-term care so that she is closer to her  who is in the nursing home with her.     LTC: Today and previously she seems oriented though daughter reports having more confusion, previously started on namenda, no change per her and is still continuing to decline, so namenda was discontinued.  She denies any pain. Historically BP controlled, maintained on metoprolol, lisinopril, hydralazine and diltiazem.   Her edema BLE was stable so lasix discontinued prior.  Her DM is well controlled with metformin.   Per cognitive decline, was moved to the  in 1/2020 with . She ambulates per self and usually pushes  around in his wc, though had some falls in the past months (Aug/sept 2019). Previous diet was changed and portions decreased per weight gain, though now per cognition declining not eating as much per report with subsequent weight loss, dietitian following. She has weekly visits with  again, previous on hold per covid outbreak in facility. No medication changes needed today again.      Recent ER visits:  She was sent to [facility] on 9/2/2019 status post fall.   She suffered a laceration on the left side of her face, additional imaging was negative so was returned to long-term care the same day.    Again on 10/6/2019 she was sent to [facility] status post having witnessed fall on the sidewalk.  She did again suffer a laceration over her left eyebrow, imaging negative so return to care center the same day.    Past Medical History:  Past Medical History:   Diagnosis Date     Anxiety     Created by Conversion      Benign Adenomatous Polyp Of The Large Intestine     Created by Conversion      Chronic Diarrhea Of Unknown Origin     Created by Conversion      Dementia of the Alzheimer's type 7/13/2014     Diabetes Mellitus     Created by Conversion      Esophageal reflux     Created by Conversion      Gastritis      Herpes Zoster (Shingles)     Created by Conversion      Hiatal hernia      Hypercholesterolemia     Created by Conversion      Hypertension     Created by Conversion      Melanosis Coli     Created by Conversion [facility] Annotation: May  2 2012  1:19PM - Sheila Benavides: colonoscopy  4/30/12      Memory Lapses Or Loss     Created by Conversion      Obstructive Sleep Apnea     Created by Conversion      Osteopenia     Created by Conversion      Raynaud's Disease     Created by Conversion      Tricuspid Regurgitation     Created by Conversion            Surgical History:  No past surgical history on file.    Family History:   Family History   Problem Relation Age of Onset     Hypertension Other      Diabetes Other      Stroke Other        Social History:    Social History     Socioeconomic History     Marital status:      Spouse name: Not on file     Number of children: Not on file     Years of education: Not on file     Highest education level: Not on file   Occupational History     Not on file   Social Needs     Financial resource strain: Not on file     Food insecurity     Worry: Not on file     Inability: Not on file     Transportation needs      Medical: Not on file     Non-medical: Not on file   Tobacco Use     Smoking status: Never Smoker     Smokeless tobacco: Never Used   Substance and Sexual Activity     Alcohol use: Not on file     Drug use: Not on file     Sexual activity: Not on file   Lifestyle     Physical activity     Days per week: Not on file     Minutes per session: Not on file     Stress: Not on file   Relationships     Social connections     Talks on phone: Not on file     Gets together: Not on file     Attends Yazdanism service: Not on file     Active member of club or organization: Not on file     Attends meetings of clubs or organizations: Not on file     Relationship status: Not on file     Intimate partner violence     Fear of current or ex partner: Not on file     Emotionally abused: Not on file     Physically abused: Not on file     Forced sexual activity: Not on file   Other Topics Concern     Not on file   Social History Narrative     Not on file     Review of Systems   Constitutional: Negative for activity change, appetite change, diaphoresis and fatigue.        No issues   HENT: Negative for congestion and facial swelling.    Eyes: Negative for photophobia, redness and visual disturbance.   Respiratory: Negative for choking, shortness of breath and wheezing.    Cardiovascular: Positive for leg swelling. Negative for chest pain.        Pedal   Gastrointestinal: Negative for abdominal distention, abdominal pain, blood in stool, constipation and diarrhea.   Endocrine: Negative.    Genitourinary: Negative for difficulty urinating and dysuria.   Musculoskeletal: Negative for back pain and joint swelling.        Bilateral knee pain per OA, taking tylenol   Skin: Negative for color change.        intact   Allergic/Immunologic: Negative.    Neurological: Negative for seizures, weakness and headaches.   Hematological: Negative.    Psychiatric/Behavioral: Positive for confusion. Negative for behavioral problems, hallucinations and sleep  disturbance. The patient is nervous/anxious.        Vitals:    02/24/21 1854   BP: 148/70   Pulse: 70   Resp: 16   Temp: 97  F (36.1  C)   SpO2: 94%       Physical Exam   Constitutional: She appears well-developed and well-nourished. No distress.   No acute issues   HENT:   Head: Normocephalic and atraumatic.   Mouth/Throat: Oropharynx is clear and moist. No oropharyngeal exudate.   Eyes: Pupils are equal, round, and reactive to light. Conjunctivae and EOM are normal. Right eye exhibits no discharge. Left eye exhibits no discharge. No scleral icterus.   Neck: Normal range of motion. Neck supple. No JVD present. No tracheal deviation present.   Intact   Cardiovascular: Normal rate.   Pulmonary/Chest: Effort normal. No stridor. No respiratory distress.   RA, CTA   Abdominal: Soft. She exhibits no distension.   No diarrhea or constipation reported   Genitourinary:    Genitourinary Comments: incontinent     Musculoskeletal: Normal range of motion.         General: Edema present.      Comments: 1+ LE, mostly pedal, denies pain   Neurological: She is alert. No cranial nerve deficit.   A/O x1   Skin: Skin is warm and dry. She is not diaphoretic. No erythema.   Intact, very dry   Psychiatric: She has a normal mood and affect.   No behaviors reported   Nursing note and vitals reviewed.      Medication List:  Current Outpatient Medications   Medication Sig     acetaminophen (TYLENOL) 325 MG tablet Take 650 mg by mouth 4 (four) times a day.     bisacodyl (DULCOLAX, BISACODYL,) 10 mg suppository Insert 1 suppository (10 mg total) into the rectum daily as needed (for constipation).     cetirizine (ZYRTEC) 10 MG tablet Take 10 mg by mouth daily as needed.      cholecalciferol, vitamin D3, 1,000 unit tablet Take 1,000 Units by mouth daily.     fluticasone propionate (FLONASE) 50 mcg/actuation nasal spray Apply 1 spray into each nostril daily as needed for rhinitis.     gabapentin (NEURONTIN) 100 MG capsule Take 100 mg by mouth 3  (three) times a day.     hydrALAZINE (APRESOLINE) 25 MG tablet Take 50 mg by mouth Daily at 8:00 am.. Hold for SBP <150           lisinopriL (PRINIVIL,ZESTRIL) 2.5 MG tablet Take 5 mg by mouth daily.      metoprolol succinate (TOPROL-XL) 25 MG Take 12.5 mg by mouth daily.            polyethylene glycol (MIRALAX) 17 gram/dose powder Take 17 g by mouth daily.     venlafaxine (EFFEXOR-XR) 37.5 MG 24 hr capsule Take 37.5 mg by mouth daily.     verapamil (VERELAN PM) 100 mg 24 hr capsule TAKE ONE CAPSULE BY MOUTH EVERY DAY     white petrolatum (AQUAPHOR ORIGINAL) 41 % Oint Apply 1 application topically 3 (three) times a day as needed.              Labs:  Results for orders placed or performed in visit on 09/17/20   Basic Metabolic Panel   Result Value Ref Range    Sodium 140 136 - 145 mmol/L    Potassium 4.5 3.5 - 5.0 mmol/L    Chloride 102 98 - 107 mmol/L    CO2 29 22 - 31 mmol/L    Anion Gap, Calculation 9 5 - 18 mmol/L    Glucose 121 70 - 125 mg/dL    Calcium 10.0 8.5 - 10.5 mg/dL    BUN 28 8 - 28 mg/dL    Creatinine 1.05 0.60 - 1.10 mg/dL    GFR MDRD Af Amer >60 >60 mL/min/1.73m2    GFR MDRD Non Af Amer 51 (L) >60 mL/min/1.73m2     Lab Results   Component Value Date    WBC 8.6 03/23/2020    HGB 11.0 (L) 03/23/2020    HCT 35.0 03/23/2020    MCV 98 03/23/2020     03/23/2020     Vitamin D, Total (25-Hydroxy)   Date Value Ref Range Status   03/23/2020 34.4 30.0 - 80.0 ng/mL Final     Lab Results   Component Value Date    HGBA1C 6.7 (H) 01/21/2021     Lab Results   Component Value Date    [id number] 03/23/2020     Lab Results   Component Value Date    TSH 1.22 03/23/2020       Assessment/Plan:      Hx of TARI: Last Cr 1.05 with GFR 51 on 9/17/20, recheck in March 2021    DM: current Hgb A1c 6.7 on 1/21/21, continue metformin 250mg two times a day, plan to decrease further in future    Anxiety/depression: have discontinued citalopram and continue Effexor 37.5 daily,  previously discontinued wellbutrin, previously  decreased effexor in 3/2018.  Minimal PHQ9 0 on 7/8/20    HTN: continue hydralazine 50mg at AM, continue verapamil 100mg daily, and will maintain metoprolol 12.5mg daily. Prior per pharmacy recs have discontinued clonidine patch and continue lisinopril 5mg daily per renal hx/DM. SBP <140     Vit D def: continue D3 1000U, last 34.4 on 3/23/20    Hypomagnesia: last 1.7 on 3/23/20    Hx of hypokalemia per lasix: dc'd supplement per dc lasix    GERD: PPI, no change.    Seasonal allergies: recently changed fluticasone and cetirizine to PRN    Edema: currently minimal pedal, 209lb    Morbid obesity: last BMI 35.8    Constipation: continue miralax daily, no change    Dementia: had cognitive assessment, discontinued donepezil and namenda prior    Socialization: consulted recreation. No real change per daughter    Disposition: Resides in LTC [facility] and  in regular LTC. Ambulating per self      Case Management:  I have reviewed the facility/SNF care plan/MDS which was done 2/24/21, including the falls risk, nutrition and pain screening. I also reviewed the current immunizations, and preventive care.. Future cancer screening is not clinically indicated secondary to age/goals of care.   Patient's desire to return to the community is not assessible due to cognitive impairment.    Advance Directive Discussion:    I reviewed the current advanced directives as reflected in EPIC and the facility chart. I did not due to cognitive impairment review the advance directives with the resident.     Team Discussion:  I communicated with the appropriate disciplines involved with the Plan of Care:   Nursing      Patient Goal:  Patient's goal is unobtainable secondary to cognitive impairment.    Information reviewed:  Medications, vital signs, orders, and nursing notes.      Electronically signed by: Ashwin Tang NP

## 2021-06-15 NOTE — PROGRESS NOTES
Persons accompanying you (the patient) today: robyn Da Silva    How have you been doing since we saw you last? Please note any concerns.  Lives in the nursing home now, forgetful more, can a dose be changed in medication. Can this be done through the nursing home or does she have to come here.    Please list any recent hospitalizations/surgeries/procedures you've had since we saw you last:  no    Have you had any falls since your last visit? Yes but not hurt. Fell due to assisting     Do you have any pain today? No

## 2021-06-15 NOTE — PROGRESS NOTES
Assessment:     1. Dementia of the Alzheimer's type  2. Tricuspid Regurgitation  3. Osteopenia  4. Anxiety  5. Diabetes Melitis  6. Obstructive sleep apnea    Plan:     1. Neuropsych testing  2. B12 lab and injection  3.  Increase galantamine, and make sure facility is getting whole and splitting medication  4.  Return to clinic in 1 year    The patient presents to the memory loss clinic for a follow-up visit, she was last seen by me on 5/5/17, where the patient was started on Effexor.  The patient is now living with her  and her disabled daughter is in a group home.  Daughter reports that the patient's memory continues to decline.  I will have the patient complete neuropsych testing to see if there is been a big decline in the patient's cognition.  I have also increased galantamine, daughter and patient report that the facility is scoring the tablet and giving the medication throughout the day.  The tablet is a 24 hour tablets I will write in the patient's orders not to score tablet.  The patient also had a lower B12 last time I performed a lab.  I will do a B12 lab and give the patient IM B12 to help with fatigue.  Overall the patient reports that she is doing much better now that she lives with her , she has had a decrease in anxiety.  Daughter reports that there was a lot of stress or her disabled sister, there was a lot of hospitalization stays and problems, the stress could have impacted the patient's cognition, will be interesting to see now without this added stress if the patient's cognition is becomes more stable or improved.  The patient continues to do all ADLs independently.  I have suggested that the patient spend more time away from her  and taking care of herself, per daughter's report patient does refuse to do this.    Subjective:        Kandi Ontiveros is a 76 year old female who initially presented to the memory loss clinic on 4/18/13 for a cognitive evaluation.  Daughter reported that the patient had been having some difficulty with cognitive performance over the past year. They noted that she was having difficulty with short-term recall manifested as misplacing items within the home, repeating herself during the course of conversation, and repeating questions. She was also making lists of daily chores. Patient believed that she had been experiencing problems for at least 2 years. She also reported having trouble concentrating and describes instances where working memory may be a bit impaired. According to patient and family there were no changes functionally. The patient was noted to be a bit more irritable and more easily agitated and a bit impulsive. Daughter reported that the patient was having difficulty with self control in consuming sweets. Speech and behavior were appropriate. Neuropsychological testing on 6/27/13 was most consistent with a diagnostic impression of mild cognitive impairment, multi-domain, with deficits noted in immediate and delayed verbal memory and delayed visual memory, but also in areas of cognitive processing speed, augmentation on naming, verbal fluency, cognitive flexibility.  There was a possibility given her risk factors there was a multifactorial etiology contributing to her presentation, including vascular ischemic changes and the possibility of Alzheimer's disease.  The patient was endorsing some minimal symptoms of depression, and falls within the range considered normal for most individuals. On 8/29/13, Dr. Lackey's clinical evaluation was Dementia of the Alzheimer's type, he discontinued the Aricept due to persistent problems with dreams. He started Razadyne ER 8 mg. 11/6/13 the Razadne was increased to 16 mg. On 11/12/14 the patient was experiencing vivid dreams again, the patient was started on melatonin 3 mg. No other problems noted, patient has been seen every 6 months and reported that she has been stable cognitively and  behaviorally.   12/14/16,   the patient is currently on citalopram 20 mg, galantamine ER 16 mg, and vitamin E 1000 BID. Since last appointment she was doing good, until her  was hospitalized for pancreatitis, the patient has been living with her disabled daughter. The daughter reports that the patient is sleeping a lot more and has no energy. Her  was diagnosed with severe pancreatitis and was hospitalized for quite some time in acute care, and then did spend sometime up stairs in our long-term acute hospital, he then was moved to transitional care, with the hopes of him returning home to be with his wife.  Apparently the  has not recovered as planned, he has recently moved in to long-term care. Wife and daughter are scheduled to meet with the [facility] employee to assess if the patient is able to move into assisted living complex, it is planned that the wife will move in and the  will join her in her apartment, and the disabled daughter will be moved into another apartment next door.  Since 's hospitalization the daughter reports that the patient forgets a lot more and has very low energy The patient does have a history of obstructive sleep apnea and refuses to wear her CPAP machine. The daughter also reports the patient has been gaining weight. Patient has no interest in moving into long-term care with her  even though she is at a dementia level of impairment. I referred her to neurology for sleep consultation.  I also started the patient on Wellbutrin probably help her a little bit with energy and concentration,  5/5/17  The daughter reports no change in the patient. The patient is now living in an assistant living apartment with her disabled daughter, and she is taking care of her daughter. Her other daughter is concerned because the patient is unable to care for her sister. The patient refused to go to the sleep consult. The patient is still not motivated to do anything,  she sits at home. The patient reports that her memory is fine because she can remember the names of her children. The daughter reports that the patient's memory is getting worse.  Patient was started on Effexor ER    Patient Active Problem List    Diagnosis Date Noted     Renal failure, chronic, stage 3 (moderate) 09/14/2017     Potassium (K) deficiency 09/14/2017     Bilateral lower extremity edema 07/17/2017     Dementia of the Alzheimer's type 07/13/2014     Tricuspid Regurgitation      Pain During Urination (Dysuria)      Osteopenia      Hypertension      Breast Pain      Joint Pain, Localized In The Shoulder      Chronic Diarrhea Of Unknown Origin      Anxiety      Fatigue      Abnormal Glucose      Contusion With Intact Skin Surface      Herpes Zoster (Shingles)      Type 2 diabetes mellitus with hyperglycemia, without long-term current use of insulin      Hypercholesterolemia      Esophageal Reflux      Melanosis Coli      Memory Lapses Or Loss      Benign Adenomatous Polyp Of The Large Intestine      Raynaud's Disease      Obstructive Sleep Apnea      Past Medical History:   Diagnosis Date     Anxiety     Created by Conversion      Benign Adenomatous Polyp Of The Large Intestine     Created by Conversion      Chronic Diarrhea Of Unknown Origin     Created by Conversion      Dementia of the Alzheimer's type 7/13/2014     Diabetes Mellitus     Created by Conversion      Esophageal reflux     Created by Conversion      Gastritis      Herpes Zoster (Shingles)     Created by Conversion      Hiatal hernia      Hypercholesterolemia     Created by Conversion      Hypertension     Created by Conversion      Melanosis Coli     Created by Conversion [facility] Annotation: May  2 2012  1:19PM - Sheila Benavides: colonoscopy  4/30/12      Memory Lapses Or Loss     Created by Conversion      Obstructive Sleep Apnea     Created by Conversion      Osteopenia     Created by Conversion      Raynaud's Disease     Created by  Conversion      Tricuspid Regurgitation     Created by Conversion      No past surgical history on file.  Family History   Problem Relation Age of Onset     Hypertension Other      Diabetes Other      Stroke Other      Current Outpatient Prescriptions   Medication Sig Dispense Refill     acetaminophen 500 mg coapsule Take 1 capsule by mouth. Every 4-6 hours PRN       bisacodyl (DULCOLAX, BISACODYL,) 10 mg suppository Insert 1 suppository (10 mg total) into the rectum daily as needed (for constipation). 12 suppository 0     buPROPion (WELLBUTRIN XL) 150 MG 24 hr tablet Take 1 tablet (150 mg total) by mouth daily. 90 tablet 1     CALCIUM CARBONATE/VITAMIN D3 (CALCIUM 600 WITH VITAMIN D3 ORAL) Take 2 tablets by mouth daily.       cetirizine (ZYRTEC) 10 MG tablet Take 10 mg by mouth daily.       cholecalciferol, vitamin D3, 1,000 unit tablet Take 1,000 Units by mouth daily.       citalopram (CELEXA) 20 MG tablet TAKE ONE TABLET BY MOUTH EVERY DAY 90 tablet 0     dextromethorphan (DELSYM) 30 mg/5 mL liquid Take 60 mg by mouth every 12 (twelve) hours as needed for cough.       fluticasone (FLONASE) 50 mcg/actuation nasal spray 1 spray into each nostril daily. 16 g 12     furosemide (LASIX) 20 MG tablet Take 20 mg by mouth daily.       hydrALAZINE (APRESOLINE) 10 MG tablet Take 5 mg by mouth 4 (four) times a day.       lansoprazole (PREVACID) 30 MG capsule TAKE 1 CAPSULE BY MOUTH EVERY DAY 90 capsule 1     metFORMIN (GLUCOPHAGE) 500 MG tablet Take 500 mg by mouth 2 (two) times a day with meals.       metoprolol succinate (TOPROL-XL) 25 MG Take 12.5 mg by mouth daily.       OMEGA-3S/DHA/EPA/FISH OIL (OMEGA 3 ORAL) Take 2 capsules by mouth daily.       omeprazole (PRILOSEC) 20 MG capsule Take 20 mg by mouth daily before breakfast.       polyethylene glycol (MIRALAX) 17 gram/dose powder Take 17 g by mouth daily. 255 g 0     potassium chloride SA (K-DUR,KLOR-CON) 10 MEQ tablet Take 10 mEq by mouth daily.        venlafaxine  (EFFEXOR XR) 37.5 MG 24 hr capsule Take 2 capsules (75 mg total) by mouth daily. 60 capsule 1     verapamil (VERELAN PM) 100 mg 24 hr capsule TAKE ONE CAPSULE BY MOUTH EVERY DAY 90 capsule 1     vitamin E 1000 UNIT capsule Take 1,000 Units by mouth 2 (two) times a day.       white petrolatum (AQUAPHOR ORIGINAL) 41 % Oint Apply topically 3 (three) times a day as needed.       galantamine (RAZADYNE ER) 24 MG 24 hr capsule Take 1 capsule (24 mg total) by mouth daily with breakfast. 30 capsule 6     No current facility-administered medications for this encounter.        Allergies   Allergen Reactions     Codeine Sulfate      Social History     Social History     Marital status:      Spouse name: N/A     Number of children: N/A     Years of education: N/A     Occupational History     Not on file.     Social History Main Topics     Smoking status: Never Smoker     Smokeless tobacco: Never Used     Alcohol use Not on file     Drug use: Not on file     Sexual activity: Not on file     Other Topics Concern     Not on file     Social History Narrative     The following portions of the patient's history were reviewed and updated as appropriate: allergies, current medications, past family history, past medical history, past social history, past surgical history and problem list.    Review of Systems  A comprehensive review of systems was negative except for: what is noted above    Objective:     Vitals:    01/08/18 1101 01/08/18 1103 01/08/18 1104   BP: 149/72 144/70 158/75   Pulse: 72 75 77   Weight: 205 lb (93 kg)       Functional Assessment 4/18/16 04/18/2016 11/12/2014 07/31/2013   ACL: NT due to time 4.4 4.4   CPT: 4.9 4.9 4.6   MOCA: 20/28 22/30 23/30   The above assessment scores indicate a Mild level of impairment. The CPT consisted of the following sub-tests: Medbox, Shop, Wash, [place] and Travel.  Recommendations: Cognitive functioning recommendations: 4.6-4.8: The assessment scores indicate a recommendation  for an assisted living environment with daily check-in supervision (i.e. at home with assistance or assisted living facility). Assistance with managing medications and finances is recommended as @ name@ may have increased difficulty with complex problem solving. Learning new skills and information may be very challenging for Kandi but she may be able to do so with a lot of repetition. Problem solving is often challenging especially when it is not anticipated or expected. Kandi may lack insight into her deficits indicating an increased need for assistance with complex tasks requiring accuracy for safety. Supervision is recommended for Kandi when using hot tools and major appliances during meal preparation. Driving is not recommended for Kandi due to difficulty with divided attention and complex problem solving. Further, Kandi may benefit from increased structure throughout the day creating healthy habits and routines and eliminating her need to problem solve from one task to another.    I asked her to call with any questions or concerns and will see her again in clinic in about 1 year. Total time spent with the patient today was 30 minutes with greater than 50% of the time spent in counseling and care coordination. We discussed the risks and benefits of the medication including risk of worsening depression with medication adjustments and even the possibility of emergence of suicidal ideations.      Physical Examination  General: Elderly,  female, no acute physical distress     Mental Status Examination  She is casually dressed, appropriately groomed and seated for evaluation. There is no evidence of acute psychological distress. Her behavior is socially appropriate and she initially is interactive and part of the conversation, but as the conversation became more complex, she became quiet and did not partake. Affect with mild apathy, but euthymic mostly and congruent mood. She is alert and  without obvious signs of distractibility/delirium. She is oriented to person, place and situation. There is some reduction in spontaneity of speech, but volume and rate is normal. She speaks fluent Greek without any halting or difficulty saying words. Thought processes are with impaired expression and comprehension. She generate ideas that are quite simple and are lacking in depth and she has trouble comprehending more complex conversation. When she does speak, her thoughts are organized and delivered in a sequential fashion. No evidence of auditory or visual hallucinations. No delusional ideation. Gen. fund of knowledge, insight and memory all impaired.  .

## 2021-06-16 ENCOUNTER — OFFICE VISIT - HEALTHEAST (OUTPATIENT)
Dept: GERIATRICS | Facility: CLINIC | Age: 81
End: 2021-06-16

## 2021-06-16 DIAGNOSIS — E55.9 VITAMIN D DEFICIENCY: ICD-10-CM

## 2021-06-16 DIAGNOSIS — E11.65 TYPE 2 DIABETES MELLITUS WITH HYPERGLYCEMIA, WITHOUT LONG-TERM CURRENT USE OF INSULIN (H): ICD-10-CM

## 2021-06-16 DIAGNOSIS — I10 ESSENTIAL HYPERTENSION: ICD-10-CM

## 2021-06-16 DIAGNOSIS — F32.A DEPRESSION, UNSPECIFIED DEPRESSION TYPE: ICD-10-CM

## 2021-06-16 DIAGNOSIS — D64.9 ANEMIA, UNSPECIFIED TYPE: ICD-10-CM

## 2021-06-16 DIAGNOSIS — G30.9 ALZHEIMER'S DEMENTIA WITH BEHAVIORAL DISTURBANCE, UNSPECIFIED TIMING OF DEMENTIA ONSET: ICD-10-CM

## 2021-06-16 DIAGNOSIS — F02.81 ALZHEIMER'S DEMENTIA WITH BEHAVIORAL DISTURBANCE, UNSPECIFIED TIMING OF DEMENTIA ONSET: ICD-10-CM

## 2021-06-16 PROBLEM — N18.30: Status: ACTIVE | Noted: 2017-09-14

## 2021-06-16 PROBLEM — R60.0 BILATERAL LOWER EXTREMITY EDEMA: Status: ACTIVE | Noted: 2017-07-17

## 2021-06-16 PROBLEM — E66.01 MORBID OBESITY (H): Status: ACTIVE | Noted: 2020-11-09

## 2021-06-16 PROBLEM — K59.01 SLOW TRANSIT CONSTIPATION: Status: ACTIVE | Noted: 2020-04-17

## 2021-06-16 ASSESSMENT — MIFFLIN-ST. JEOR: SCORE: 1450.66

## 2021-06-16 NOTE — PROGRESS NOTES
[facility]      Code Status:  FULL CODE  Visit Type: Review Of Multiple Medical Conditions     Facility:  [facility] [[id number]]           History of Present Illness: Kandi Ontiveros is a 77 y.o. female who was residing in assisted living facility and has moved to long-term care with underlying history of dementia which has been progressive.  She has periods of anxiety associated with memory loss and  her short-term memory is getting progressively worse.   BIMS 6/15  She also has underlying history of knee osteoarthritis and was getting steroid injections but is able to ambulate without any assist device as yet patient is a diabetic and also has obstructive sleep apnea  Along with anxiety which is primarily associated with her memory loss.  As per family she has been moved to long-term care so that she can be closer to her  who is in the nursing home also due to her progressive memory issues she needed more supervision with medications and other issues then she has had before  Weights have been stable .  She is independent with most of her ADLs and in fact helps her   with cares also  Mood and behaviors have been stable appetite has been good.  Taken off lisinopril due to impaired renal function and started on Lasix.  Blood pressures have been stable  She is on metformin for dm    Past Medical History:   Diagnosis Date     Anxiety     Created by Conversion      Benign Adenomatous Polyp Of The Large Intestine     Created by Conversion      Chronic Diarrhea Of Unknown Origin     Created by Conversion      Dementia of the Alzheimer's type 7/13/2014     Diabetes Mellitus     Created by Conversion      Esophageal reflux     Created by Conversion      Gastritis      Herpes Zoster (Shingles)     Created by Conversion      Hiatal hernia      Hypercholesterolemia     Created by Conversion      Hypertension     Created by Conversion      Melanosis Coli      Created by Conversion [facility] Annotation: May  2 2012  1:19PM - Sheila Benavides: colonoscopy  4/30/12      Memory Lapses Or Loss     Created by Conversion      Obstructive Sleep Apnea     Created by Conversion      Osteopenia     Created by Conversion      Raynaud's Disease     Created by Conversion      Tricuspid Regurgitation     Created by Conversion      No past surgical history on file.  Family History   Problem Relation Age of Onset     Hypertension Other      Diabetes Other      Stroke Other      Social History     Social History     Marital status:      Spouse name: N/A     Number of children: N/A     Years of education: N/A     Occupational History     Not on file.     Social History Main Topics     Smoking status: Never Smoker     Smokeless tobacco: Never Used     Alcohol use Not on file     Drug use: Not on file     Sexual activity: Not on file     Other Topics Concern     Not on file     Social History Narrative   lived in retirement  Current Outpatient Prescriptions   Medication Sig Dispense Refill     acetaminophen 500 mg coapsule Take 1 capsule by mouth. Every 4-6 hours PRN       bisacodyl (DULCOLAX, BISACODYL,) 10 mg suppository Insert 1 suppository (10 mg total) into the rectum daily as needed (for constipation). 12 suppository 0     buPROPion (WELLBUTRIN XL) 150 MG 24 hr tablet Take 1 tablet (150 mg total) by mouth daily. 90 tablet 1     CALCIUM CARBONATE/VITAMIN D3 (CALCIUM 600 WITH VITAMIN D3 ORAL) Take 2 tablets by mouth daily.       cetirizine (ZYRTEC) 10 MG tablet Take 10 mg by mouth daily.       cholecalciferol, vitamin D3, 1,000 unit tablet Take 1,000 Units by mouth daily.       citalopram (CELEXA) 20 MG tablet TAKE ONE TABLET BY MOUTH EVERY DAY 90 tablet 0     dextromethorphan (DELSYM) 30 mg/5 mL liquid Take 60 mg by mouth every 12 (twelve) hours as needed for cough.       fluticasone (FLONASE) 50 mcg/actuation nasal spray 1 spray into each nostril daily. 16 g 12     furosemide (LASIX)  20 MG tablet Take 20 mg by mouth daily.       galantamine (RAZADYNE ER) 24 MG 24 hr capsule Take 1 capsule (24 mg total) by mouth daily with breakfast. 30 capsule 6     hydrALAZINE (APRESOLINE) 10 MG tablet Take 5 mg by mouth 4 (four) times a day.       lansoprazole (PREVACID) 30 MG capsule TAKE 1 CAPSULE BY MOUTH EVERY DAY 90 capsule 1     metFORMIN (GLUCOPHAGE) 500 MG tablet Take 500 mg by mouth 2 (two) times a day with meals.       metoprolol succinate (TOPROL-XL) 25 MG Take 12.5 mg by mouth daily.       OMEGA-3S/DHA/EPA/FISH OIL (OMEGA 3 ORAL) Take 2 capsules by mouth daily.       omeprazole (PRILOSEC) 20 MG capsule Take 20 mg by mouth daily before breakfast.       polyethylene glycol (MIRALAX) 17 gram/dose powder Take 17 g by mouth daily. 255 g 0     potassium chloride SA (K-DUR,KLOR-CON) 10 MEQ tablet Take 10 mEq by mouth daily.        venlafaxine (EFFEXOR XR) 37.5 MG 24 hr capsule Take 2 capsules (75 mg total) by mouth daily. 60 capsule 1     verapamil (VERELAN PM) 100 mg 24 hr capsule TAKE ONE CAPSULE BY MOUTH EVERY DAY 90 capsule 1     vitamin E 1000 UNIT capsule Take 1,000 Units by mouth 2 (two) times a day.       white petrolatum (AQUAPHOR ORIGINAL) 41 % Oint Apply topically 3 (three) times a day as needed.       No current facility-administered medications for this visit.      Allergies   Allergen Reactions     Codeine Sulfate          Review of Systems:    Constitutional: Negative.  Negative for fever, chills,has  activity change, appetite change and fatigue.   HENT: Negative for congestion and facial swelling.    Eyes: Negative for photophobia, redness and visual disturbance.   Respiratory: Negative for cough and chest tightness.    Cardiovascular: Negative for chest pain, palpitations and leg swelling.   Gastrointestinal: Negative for nausea, diarrhea, constipation, blood in stool and abdominal distention.   Genitourinary: Negative.    Musculoskeletal: Negative.   she has bilateral knee osteoarthritis  with knee pain  Skin: Negative.    Neurological: Negative for dizziness, tremors, syncope, weakness, light-headedness and headaches.   Hematological: Does not bruise/bleed easily.   Psychiatric/Behavioral: Negative.  She had a flat effect with a poor recall     Vitals:    03/07/18 1615   BP: 133/76   Pulse: 80   Temp: 98  F (36.7  C)       Physical Exam:    GENERAL: no acute distress. Cooperative in conversation.   HEENT: pupils are equal, round and reactive. Oral mucosa is moist and intact.  RESP:Chest symmetric. Regular respiratory rate. No stridor.  CVS: S1S2  ABD: Nondistended, soft.  EXTREMITIES: No lower extremity edema.   NEURO: non focal. Alert and oriented xSELF   PSYCH: within normal limits. No depression or anxiety.  SKIN: warm dry intact     Labs:    Lab Results   Component Value Date    HGBA1C 8.5 (H) 08/10/2017     Results for orders placed or performed in visit on 10/03/17   Basic Metabolic Panel   Result Value Ref Range    Sodium 139 136 - 145 mmol/L    Potassium 3.7 3.5 - 5.0 mmol/L    Chloride 101 98 - 107 mmol/L    CO2 29 22 - 31 mmol/L    Anion Gap, Calculation 9 5 - 18 mmol/L    Glucose 185 (H) 70 - 125 mg/dL    Calcium 9.9 8.5 - 10.5 mg/dL    BUN 21 8 - 28 mg/dL    Creatinine 1.13 (H) 0.60 - 1.10 mg/dL    GFR MDRD Af Amer 57 (L) >60 mL/min/1.73m2    GFR MDRD Non Af Amer 47 (L) >60 mL/min/1.73m2     Lab Results   Component Value Date    [id number] 487 01/08/2018     Vitamin D, Total (25-Hydroxy)   Date Value Ref Range Status   01/25/2018 42.3 30.0 - 80.0 ng/mL Final         Assessment/Plan:    Impaired cognition/dementia-she remains on galantamine.  Her last CPT was 4.9 ACl 4.4 and [place] 22/30 .BIMS 6/15  Stable mood and behaviors with no change reported  Diabetes-she is diet controlled; due to elevated hemoglobin A1c has been started on a low-dose of metformin  R/c A1c pending  CKD  Hyperlipidemia-she is on fish oil  Anxiety disorder with depression for which patient is on multiple medications  including Celexa along with Wellbutrin along with Effexor  Osteoarthritis of the knee  Hypertension with patient currently off lisinopril HCTZ medication due to impaired renal function recheck BMP is pending  Obstructive sleep apnea  GERD-on Prevacid  She has chronic diarrhea  Obstructive sleep apnea-Refused sleep study  Debilitation  Family has elected to move her to long-term care from an assisted living facility due to progressive cognitive decline  She seems to be adjusting well to long-term care though she remains somewhat resistant to certain cares daughter remains close at hand and is extremely helpful in their care management continue with her care plan monitor mood and behaviors  Some gain in wt noted  Electronically signed by: BERLIN Butts  This progress note was completed using Dragon software and there may be grammatical errors.

## 2021-06-16 NOTE — PROGRESS NOTES
NEUROPSYCHOLOGICAL EVALUATION  [facility]: [facility]    NAME: Kandi Ontiveros    YOB: 1940   AGE: 77  EDU: 16  DATE OF EVALUATION: 3/16/2018    REASON FOR REFERRAL:  Ms. Ontiveros is a 77 y.o., right-handed (early childhood left-handed),  [place] female presenting with concerns about cognitive functioning in the context of cerebrovascular risk factors, an approximate 6-year history of anxiety, and previous testing in 2013 notable for mild cognitive impairment. She was referred for updated neurocognitive evaluation by her provider in the Memory Loss clinic, VICTORIANO Nunes, to assist with differential diagnosis and care planning.    Unfortunately, there was a misunderstanding and no  was present for today's appointment.  Ms. Ontiveros is a native [place] speaker and is not fluent in English.  Today we were able to complete the interview with the assistance of her daughter Deborah.  However, testing was deferred to a future date (March 28) so that a professional  could be available for formal testing.    Update: Ms. Ontiveros returned on March 28th but although scheduled, no  arrived for the appointment and an internal  was not available for last minute coverage. We are working with staff to resolve the issue and to make sure that an  will be available for the next appointment. The following note will be completed based on the interview conducted on March 16th, with the full evaluation to follow when testing is completed.     Thank you for allowing us to participate in Ms. Ontiveros s care.  Please contact Dr. Salazar with any questions regarding the content of this report.      HISTORY OF PRESENTING PROBLEM:  Verbal consent for neuropsychological testing was received following the provision of information about the nature of the evaluation, and the opportunity to ask questions. The evaluation was  conducted and this report was prepared jointly by the undersigned extern under the supervision of the undersigned licensed psychologist. Dr. Brenda Salazar provided general supervision and is responsible for the final version of this report.    Ms. Ontiveros previously underwent neuropsychological evaluation with Dr. Norah Lux on June 18th, 2013.  Please see that report for full background. The following provides an update over the last several years.     At the present time, Ms. Ontiveros reports concerns about further declines in her memory over the last several years. She stated that she  did not know how to explain it,  but she did provide some examples. Specifically, she stated that she will meet a person one day and not know who they are tomorrow. She also reported that she never knows what day of the week it is, which is a fairly new problem over the past several months. She endorsed forgetting appointments, but she noted that others remind her. Ms. Ontiveros additionally reported misplacing objects. Ms. Ontiveros also reported concerns about word finding difficulties. She stated that she  sometimes  has trouble coming up with the word she needs.    Deborah reported that her mother s short term memory has been getting progressively worse over the past several years. She stated that her memory is  a lot worse  and hypothesized that  you will probably see a big difference in the tests  since her previous evaluation. She noted that her mother seems to understand conversations and television programs in the moment, but she forgets  1 minute later,  for example if there is a commercial break. Deborah stated that sometimes her mother appears to have a vague recollection of something if she is reminded (e.g., she will say  oh, that s right! ) while other times she does not (e.g., she will reply,  really? ).  Ms. Ontiveros endorsed forgetting people she has known for a while but her daughter  qualified that she does better if she has had more frequent contact with the person. Her daughter stated that she has not noticed her mother calling objects by the wrong name.     With regard to activities of daily living, Deborah stated that she became concerned about her mother s ability to live independently when Ms. Ontiveros s  (and Deborah s father), Jaswant, became ill in September of 2016. Deborah stated that her parents had been living independently with their disabled daughter and that they were able to make this arrangement work because they complemented each other s strengths and weaknesses; Jaswant was  mentally sharp  but physically disabled and Ms. Ontiveros was able-bodied but struggled cognitively. Ms. Ontiveros and her disabled daughter were moved into assisted living in January of 2017. Deborah reported that she felt her mother needed a greater level of support than what she was getting and also that it was stressful for her parents to be . Therefore, she moved Ms. Ontiveros (and their daughter) into the nursing home with Jaswant in June of 2017. Ms. Ontiveros s daughter has since moved to a group home, but Ms. Ontiveros and Jaswant remain in the nursing home. Ms. Ontiveros reported that the nursing home staff is doing everything for her  except for bathing her,  i.e., they clean, administer medications, and prepare meals. Deborah reported today that she took over management of her parents  finances when Jaswant fell ill in late 2016. Ms. Ontiveros does not currently drive. She never liked driving in the [place], and her daughter Deborah reported that her license was revoked by a physician.      MEDICAL HISTORY:  Ms. Ontiveros's medical history is significant for   Past Medical History:   Diagnosis Date     Anxiety     Created by Conversion      Benign Adenomatous Polyp Of The Large Intestine     Created by Conversion      Chronic Diarrhea  Of Unknown Origin     Created by Conversion      Dementia of the Alzheimer's type 7/13/2014     Diabetes Mellitus     Created by Conversion      Esophageal reflux     Created by Conversion      Gastritis      Herpes Zoster (Shingles)     Created by Conversion      Hiatal hernia      Hypercholesterolemia     Created by Conversion      Hypertension     Created by Conversion      Melanosis Coli     Created by Conversion [facility] Annotation: May  2 2012  1:19PM - Sheila Benavides: colonoscopy  4/30/12      Memory Lapses Or Loss     Created by Conversion      Obstructive Sleep Apnea     Created by Conversion      Osteopenia     Created by Conversion      Raynaud's Disease     Created by Conversion      Tricuspid Regurgitation     Created by Conversion      Ms. Rodriguez current problem list includes   Patient Active Problem List   Diagnosis     Tricuspid Regurgitation     Pain During Urination (Dysuria)     Osteopenia     Hypertension     Breast Pain     Joint Pain, Localized In The Shoulder     Chronic Diarrhea Of Unknown Origin     Anxiety     Fatigue     Abnormal Glucose     Contusion With Intact Skin Surface     Herpes Zoster (Shingles)     Type 2 diabetes mellitus with hyperglycemia, without long-term current use of insulin     Hypercholesterolemia     Esophageal Reflux     Melanosis Coli     Memory Lapses Or Loss     Benign Adenomatous Polyp Of The Large Intestine     Raynaud's Disease     Obstructive Sleep Apnea     Dementia of the Alzheimer's type     Bilateral lower extremity edema     Renal failure, chronic, stage 3 (moderate)     Potassium (K) deficiency      Diagnostic studies:  The most recent imaging of the head was an MRI on 5/1/13, which was significant only for  minimal generalized cerebral atrophy.     No significant sensory changes or disturbance in appetite, sleep or energy level was reported. Ms. Ontiveros has Obstructive Sleep Apnea but she does not tolerate CPAP.    No past surgical  history on file.    Current medications include (per medical record):   Current Outpatient Prescriptions:      acetaminophen 500 mg coapsule, Take 1 capsule by mouth. Every 4-6 hours PRN, Disp: , Rfl:      bisacodyl (DULCOLAX, BISACODYL,) 10 mg suppository, Insert 1 suppository (10 mg total) into the rectum daily as needed (for constipation)., Disp: 12 suppository, Rfl: 0     buPROPion (WELLBUTRIN XL) 150 MG 24 hr tablet, Take 1 tablet (150 mg total) by mouth daily., Disp: 90 tablet, Rfl: 1     CALCIUM CARBONATE/VITAMIN D3 (CALCIUM 600 WITH VITAMIN D3 ORAL), Take 2 tablets by mouth daily., Disp: , Rfl:      cetirizine (ZYRTEC) 10 MG tablet, Take 10 mg by mouth daily., Disp: , Rfl:      cholecalciferol, vitamin D3, 1,000 unit tablet, Take 1,000 Units by mouth daily., Disp: , Rfl:      citalopram (CELEXA) 20 MG tablet, TAKE ONE TABLET BY MOUTH EVERY DAY, Disp: 90 tablet, Rfl: 0     dextromethorphan (DELSYM) 30 mg/5 mL liquid, Take 60 mg by mouth every 12 (twelve) hours as needed for cough., Disp: , Rfl:      fluticasone (FLONASE) 50 mcg/actuation nasal spray, 1 spray into each nostril daily., Disp: 16 g, Rfl: 12     furosemide (LASIX) 20 MG tablet, Take 20 mg by mouth daily., Disp: , Rfl:      galantamine (RAZADYNE ER) 24 MG 24 hr capsule, Take 1 capsule (24 mg total) by mouth daily with breakfast., Disp: 30 capsule, Rfl: 6     hydrALAZINE (APRESOLINE) 10 MG tablet, Take 5 mg by mouth 4 (four) times a day., Disp: , Rfl:      lansoprazole (PREVACID) 30 MG capsule, TAKE 1 CAPSULE BY MOUTH EVERY DAY, Disp: 90 capsule, Rfl: 1     metFORMIN (GLUCOPHAGE) 500 MG tablet, Take 500 mg by mouth 2 (two) times a day with meals., Disp: , Rfl:      metoprolol succinate (TOPROL-XL) 25 MG, Take 12.5 mg by mouth daily., Disp: , Rfl:      OMEGA-3S/DHA/EPA/FISH OIL (OMEGA 3 ORAL), Take 2 capsules by mouth daily., Disp: , Rfl:      omeprazole (PRILOSEC) 20 MG capsule, Take 20 mg by mouth daily before breakfast., Disp: , Rfl:       "polyethylene glycol (MIRALAX) 17 gram/dose powder, Take 17 g by mouth daily., Disp: 255 g, Rfl: 0     potassium chloride SA (K-DUR,KLOR-CON) 10 MEQ tablet, Take 10 mEq by mouth daily. , Disp: , Rfl:      venlafaxine (EFFEXOR XR) 37.5 MG 24 hr capsule, Take 2 capsules (75 mg total) by mouth daily., Disp: 60 capsule, Rfl: 1     verapamil (VERELAN PM) 100 mg 24 hr capsule, TAKE ONE CAPSULE BY MOUTH EVERY DAY, Disp: 90 capsule, Rfl: 1     vitamin E 1000 UNIT capsule, Take 1,000 Units by mouth 2 (two) times a day., Disp: , Rfl:      white petrolatum (AQUAPHOR ORIGINAL) 41 % Oint, Apply topically 3 (three) times a day as needed., Disp: , Rfl: .    FAMILY HISTORY:   Family medical history is significant for:   Family History   Problem Relation Age of Onset     Hypertension Other      Diabetes Other      Stroke Other      PSYCHIATRIC HISTORY:  Per the previous report, Ms. Ontiveros has a history of anxiety as a result of various psychosocial stressors. There is no other history of mental illness.    Today Ms. Ontiveros described her current mood as \"not 100% well,\" noting that she has been  stuck with Jaswant  all day every day. She stated that at times she has motivation to do things while at other times she does not. Ms. Ontiveros experienced a strong emotional reaction when her  Jaswant fell ill and was hospitalized in late 2016. Deborah reported that her mother was particularly tearful at that time, which was out of the ordinary for her. She also appeared to give up many of her daily routines (such as cooking, cleaning, and laundry) at that time, opting instead to sit on the couch for the majority of the day. Deborah feels that her mother s mood has not fully returned to her previous baseline at this point and wonders if this is because her mother has nothing to do. Ms. Ontiveros used to be an active volunteer and a dedicated homemaker, but these activities are not possible for her anymore. Ms. " Weston reported that she likes to go shopping and read.     Today Ms. Ontiveros and her daughter reported that she has been experiencing visual hallucinations, with the most recent episode occurring several months ago. Specifically, Ms. Ontiveros reported seeing beautiful puppies, birds, and tiny flying children in her home. She stated that when this happened she thought she  must be going crazy  but also that she was not scared by the hallucinations because they were  pretty, not ugly.       Ms. Ontiveros has never smoked tobacco or used recreational drugs. She reported no history of alcohol use.     SOCIAL HISTORY:  Per the previous report, Ms. Ontiveros was born and raised in [place], [place]. Today she reported that although she was initially left-handed, her mother taught her to write with her right hand so that she would not be punished by the , who was notorious for slapping children who wrote with the left hand. Thus, she is now right-handed. She earned mostly A s and B s in grade school and went to college. 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 After working as a teacher for approximately 15 years, she left the profession to care for her disabled daughter, who was 12 years old at the time. She has not worked outside of the home since. She has been  to her  Jaswant for 55 years. She and Jaswant have 4 children (3 living, one who passed away as an infant) and 6 grandchildren together.  They moved from [place] to [place] in 2003. Ms. Ontiveros lived with her  and their disabled daughter until late 2016 when Jaswant went into the hospital and then into a nursing home. Ms. Ontiveros was moved to assisted living in January of 2017 then into the nursing home with Jaswant in June of 2017. Ms. Ontiveros and Jaswant currently live together in [facility].     PREVIOUS TESTING:  Ms. Ontiveros previously underwent neuropsychological evaluation with Dr. Norah Bruno on June 18th 2013.  Please see that report for full details. In brief, \"Ms. Kandi Ontiveros s history as well as presentation on neuropsychological assessment is most consistent with the diagnostic impression of mild cognitive impairment, multidomain, with deficits noted in immediate and delayed verbal memory and delayed visual memory, but also in areas of cognitive processing speed, confrontational naming, verbal fluency, cognitive flexibility.  Vascular ischemic changes as well as Alzheimer s disease were thought to be possible etiological factors.     MENTAL STATUS AND BEHAVIORAL OBSERVATIONS:   Ms. Ontiveros arrived several minutes late and accompanied by her daughter Deborah to today's appointment. She was appropriately dressed and groomed. Ms. Ontiveros spoke [place] during the interview and her daughter interpreted. Ms. Ontiveros was alert but failed several orientation probes. She estimated the date to be the middle of October, 2018, and was unable to name the day of the week. She was unable to name the city or the building, only stating that she was in  an office.  Gait was slow but otherwise unremarkable. Her mood was euthymic and her affect was appropriately reactive. She joked appropriately with her daughter.  Although Ms. Ontiveros appeared to have a good sense of humor, her daughter noted that she was difficult to discern whether her mother was being humorous or serious. Rapport was easily established and eye contact was unremarkable. She was pleasant and cooperative.  Rate, prosody, and content of speech were grossly normal, although again, Ms. Ontiveros spoke in [place].  There was no evidence of a dai thought disorder; no hallucinations or delusions were apparent. Judgment and insight appeared impaired.       EVALUATION SERVICES AND " TIME:   Pertinent information was obtained by reviewing the electronic medical record as well as through an individual interview the extern and I conducted with the patient.    For diagnostic and coding purposes, Ms. Ontiveros has a history of cerebrovascular risk factors, an approximate 6-year history of anxiety, and previous testing in 2013 notable for mild cognitive impairment, and was referred for an evaluation of Mild Neurocognitive Disorder.     79161: 1 hour of the neuropsychologists time was spent in neurobehavioral status exam  [id number]: 1 hour of the neuropsychologists time was spent in medical record review and report preparation    Екатерина Aden MA  Neuropsychology Extern    Brenda Salazar, PhD, LP, ABPP  Clinical Neuropsychologist, LP#[id number]  Board Certified in Clinical [place] [address]  Phone:  [phone]

## 2021-06-16 NOTE — PROGRESS NOTES
[facility]      Code Status:  FULL CODE  Visit Type: Review Of Multiple Medical Conditions     Facility:  [facility] [[id number]]           History of Present Illness: Kandi Ontiveros is a 80 y.o. female who is a resident of [facility].  She has underlying history of dementia with progressive memory impairment.   Last BIMS 3/15  Due to certain behaviors including resisting cares and elopement episodes.  Physically she remains debilitated but noted to be ambulating independently without any assist device.  Weights are stable at 215 pounds today.  She is a diabetic and last A1c done is 6.7 which is an adequate control in this elderly female.  Speaks very little and is a native Bulgarian speaker      Past Medical History:   Diagnosis Date     Anxiety     Created by Conversion      Benign Adenomatous Polyp Of The Large Intestine     Created by Conversion      Chronic Diarrhea Of Unknown Origin     Created by Conversion      Dementia of the Alzheimer's type 7/13/2014     Diabetes Mellitus     Created by Conversion      Esophageal reflux     Created by Conversion      Gastritis      Herpes Zoster (Shingles)     Created by Conversion      Hiatal hernia      Hypercholesterolemia     Created by Conversion      Hypertension     Created by Conversion      Melanosis Coli     Created by Conversion Health system Annotation: May  2 2012  1:19PM - Sheila Benavides: colonoscopy  4/30/12      Memory Lapses Or Loss     Created by Conversion      Obstructive Sleep Apnea     Created by Conversion      Osteopenia     Created by Conversion      Raynaud's Disease     Created by Conversion      Tricuspid Regurgitation     Created by Conversion      No past surgical history on file.  Family History   Problem Relation Age of Onset     Hypertension Other      Diabetes Other      Stroke Other      Social History     Socioeconomic History     Marital status:      Spouse name: Not on file     Number  of children: Not on file     Years of education: Not on file     Highest education level: Not on file   Occupational History     Not on file   Social Needs     Financial resource strain: Not on file     Food insecurity     Worry: Not on file     Inability: Not on file     Transportation needs     Medical: Not on file     Non-medical: Not on file   Tobacco Use     Smoking status: Never Smoker     Smokeless tobacco: Never Used   Substance and Sexual Activity     Alcohol use: Not on file     Drug use: Not on file     Sexual activity: Not on file   Lifestyle     Physical activity     Days per week: Not on file     Minutes per session: Not on file     Stress: Not on file   Relationships     Social connections     Talks on phone: Not on file     Gets together: Not on file     Attends Congregation service: Not on file     Active member of club or organization: Not on file     Attends meetings of clubs or organizations: Not on file     Relationship status: Not on file     Intimate partner violence     Fear of current or ex partner: Not on file     Emotionally abused: Not on file     Physically abused: Not on file     Forced sexual activity: Not on file   Other Topics Concern     Not on file   Social History Narrative     Not on file   lived in [facility]  Current Outpatient Medications   Medication Sig Dispense Refill     acetaminophen (TYLENOL) 325 MG tablet Take 650 mg by mouth 4 (four) times a day.       bisacodyl (DULCOLAX, BISACODYL,) 10 mg suppository Insert 1 suppository (10 mg total) into the rectum daily as needed (for constipation). 12 suppository 0     cetirizine (ZYRTEC) 10 MG tablet Take 10 mg by mouth daily as needed.        cholecalciferol, vitamin D3, 1,000 unit tablet Take 1,000 Units by mouth daily.       fluticasone propionate (FLONASE) 50 mcg/actuation nasal spray Apply 1 spray into each nostril daily as needed for rhinitis.       gabapentin (NEURONTIN) 100 MG capsule Take 100 mg by mouth 3 (three) times a day.        hydrALAZINE (APRESOLINE) 25 MG tablet Take 50 mg by mouth Daily at 8:00 am.. Hold for SBP <150             lisinopriL (PRINIVIL,ZESTRIL) 2.5 MG tablet Take 5 mg by mouth daily.        metoprolol succinate (TOPROL-XL) 25 MG Take 12.5 mg by mouth daily.              polyethylene glycol (MIRALAX) 17 gram/dose powder Take 17 g by mouth daily. 255 g 0     venlafaxine (EFFEXOR-XR) 37.5 MG 24 hr capsule Take 37.5 mg by mouth daily.       verapamil (VERELAN PM) 100 mg 24 hr capsule TAKE ONE CAPSULE BY MOUTH EVERY DAY 90 capsule 1     white petrolatum (AQUAPHOR ORIGINAL) 41 % Oint Apply 1 application topically 3 (three) times a day as needed.              No current facility-administered medications for this visit.      Allergies   Allergen Reactions     Codeine Sulfate          Review of Systems:    Constitutional: Negative.  Negative for fever, chills,has  activity change, appetite change and fatigue.   Has been gradually losing weight but now weights appear to be stable  HENT: Negative for congestion and facial swelling.    Eyes: Negative for photophobia, redness and visual disturbance.   Respiratory: Negative for cough and chest tightness.    Cardiovascular: Negative for chest pain, palpitations and leg swelling.   Gastrointestinal: Negative for nausea, diarrhea, constipation, blood in stool and abdominal distention.   Genitourinary: Negative.    Musculoskeletal: Negative.   she has bilateral knee osteoarthritis with knee pain  Skin: Negative.    Neurological: Negative for dizziness, tremors, syncope, weakness, light-headedness and headaches.   Hematological: Does not bruise/bleed easily.   Psychiatric/Behavioral: Negative.  She had a flat effect with a poor recall   Has no recall of recent events   Staff does report some behaviors including continued resistance to cares but overall mood and behaviors have been stable      Physical Exam:    Weight 215 pounds blood pressure 126 / 71 temp 98 pulse 83  Obese  GENERAL:  no acute distress. Cooperative in conversation.  Has limited recall and mostly smiles to questions  Patient is obese  HEENT: pupils are equal, round and reactive. Oral mucosa is moist and intact.  RESP:Chest symmetric. Regular respiratory rate. No stridor.  CVS: S1S2  ABD: Nondistended, soft.  EXTREMITIES: She has pedal and lower extremity edema.   NEURO: non focal. Alert and oriented xSELF   PSYCH: within normal limits. No depression or anxiety.  SKIN: warm dry intact   Musculoskeletal no focal abnormalities noted to be ambulating without any assist device    Labs:    Lab Results   Component Value Date    HGBA1C 6.7 (H) 01/21/2021     Results for orders placed or performed in visit on 09/17/20   Basic Metabolic Panel   Result Value Ref Range    Sodium 140 136 - 145 mmol/L    Potassium 4.5 3.5 - 5.0 mmol/L    Chloride 102 98 - 107 mmol/L    CO2 29 22 - 31 mmol/L    Anion Gap, Calculation 9 5 - 18 mmol/L    Glucose 121 70 - 125 mg/dL    Calcium 10.0 8.5 - 10.5 mg/dL    BUN 28 8 - 28 mg/dL    Creatinine 1.05 0.60 - 1.10 mg/dL    GFR MDRD Af Amer >60 >60 mL/min/1.73m2    GFR MDRD Non Af Amer 51 (L) >60 mL/min/1.73m2     Lab Results   Component Value Date    VTGNPOGO81 368 03/23/2020     Vitamin D, Total (25-Hydroxy)   Date Value Ref Range Status   03/23/2020 34.4 30.0 - 80.0 ng/mL Final         Assessment/Plan:    -Dementia with significant cognitive decline  - History of diabetes adequately controlled with a last A1c of 6.7  - Morbid obesity  -History of depression with anxiety  - Hypertension  - Generalized weakness with decline noted    Patient is currently in the long-term care memory care unit because of progressive decline in cognition with dementia.  She continues to have decline.  Her last BIMS is 3/15.  She has certain behaviors including refusal and resistance to cares and elopement episodes but overall has been stable.  Ambulating independently without any assist device.  Weights are stable at 215  pounds.  No worsening lymphedema noted.  Diabetic control adequate with a last A1c on 1/21/2021 at 6.7 which is adequate.  Blood pressures are stable.  Continue with her care plan      Electronically signed by: BERLIN Butts  This progress note was completed using Dragon software and there may be grammatical errors.

## 2021-06-17 ENCOUNTER — RECORDS - HEALTHEAST (OUTPATIENT)
Dept: LAB | Facility: CLINIC | Age: 81
End: 2021-06-17

## 2021-06-17 NOTE — PROGRESS NOTES
[facility]      Facility:    [facility] [[id number]] -1 Code Status: FULL CODE      Chief Complaint/Reason for Visit:   Chief Complaint   Patient presents with     Review Of Multiple Medical Conditions       HPI:   Kandi is a 77 y.o. female who who is residing at assisted living facility has moved to long-term care with underlying history of dementia, that has been progressive in nature.  She has periods of anxiety associated with memory loss and per daughter her short-term memory is getting progressively worse.  She has a past medical history of right knee osteoarthritis and apparently received steroid injections in the past.  She also is a diabetic that has been diet controlled and has obstructive sleep apnea.  As per the family she has been moved to long-term care so that she is closer to her  who is in the nursing home with her.     TCU: Today and previously she seems oriented though not sure per language barrier.  She denies any pain.  Previously we explained to her that we need to give her medication for her diabetes and increase 1 of her medications that she is currently taking for her blood pressure.  This has resulted in decreased renal function.  Previously noticed I ordered to DC her metformin was never carried out.  Fortunately her kidney function has rebounded taking off HCTZ and ACEi as her last Cr 1.18 with GFR 45.  Previously I will started metoprolol 12.5 mg daily and monitor blood pressure.  Today he have noticed that her edema is improved and her hypertension is borderline.  We also suggested ordering GANGA hose per her edema in her legs, though apparently she is never use of this and has since been DC'd.  Previously she also told me she has really dry skin so I ordered Aquaphor for her 3 times daily as needed, as this has been therapeutic, though today tells me she doesn't use.  Also she needed potassium supplementation, with a recheck  potassium at 3.7.  We will continue to administer per Lasix dosage. BIMS 8/15 on 3/8/18.    Patient denies pain, headache, chest pain, numbness or tingling, shortest of breath, eating or swallowing concerns, nausea or vomiting, diarrhea or bowel abnormalities, or no new integumentary concerns today. Monitor BP increased to TID, will recheck Hgb A1c this visit.      Past Medical History:  Past Medical History:   Diagnosis Date     Anxiety     Created by Conversion      Benign Adenomatous Polyp Of The Large Intestine     Created by Conversion      Chronic Diarrhea Of Unknown Origin     Created by Conversion      Dementia of the Alzheimer's type 7/13/2014     Diabetes Mellitus     Created by Conversion      Esophageal reflux     Created by Conversion      Gastritis      Herpes Zoster (Shingles)     Created by Conversion      Hiatal hernia      Hypercholesterolemia     Created by Conversion      Hypertension     Created by Conversion      Melanosis Coli     Created by Conversion [facility] Annotation: May  2 2012  1:19PM - Sheila Benavides: colonoscopy  4/30/12      Memory Lapses Or Loss     Created by Conversion      Obstructive Sleep Apnea     Created by Conversion      Osteopenia     Created by Conversion      Raynaud's Disease     Created by Conversion      Tricuspid Regurgitation     Created by Conversion            Surgical History:  No past surgical history on file.    Family History:   Family History   Problem Relation Age of Onset     Hypertension Other      Diabetes Other      Stroke Other        Social History:    Social History     Social History     Marital status:      Spouse name: N/A     Number of children: N/A     Years of education: N/A     Social History Main Topics     Smoking status: Never Smoker     Smokeless tobacco: Never Used     Alcohol use Not on file     Drug use: Not on file     Sexual activity: Not on file     Other Topics Concern     Not on file     Social History Narrative      Review of Systems   Constitutional: Positive for appetite change. Negative for activity change, diaphoresis and fatigue.   HENT: Negative.  Negative for congestion and facial swelling.    Eyes: Negative.  Negative for visual disturbance.   Respiratory: Negative.  Negative for shortness of breath and wheezing.    Cardiovascular: Negative.  Negative for chest pain.   Gastrointestinal: Negative.  Negative for abdominal distention, abdominal pain, blood in stool, constipation and diarrhea.   Endocrine: Negative.    Genitourinary: Negative.  Negative for dysuria.   Musculoskeletal: Negative for back pain and joint swelling.        Bilateral knee pain per OA, taking tylenol   Skin: Negative.  Negative for color change.        moisturizer used   Allergic/Immunologic: Negative.    Neurological: Negative.  Negative for weakness and headaches.   Hematological: Negative.    Psychiatric/Behavioral: Negative for confusion and sleep disturbance. The patient is nervous/anxious.        Vitals:    04/13/18 0909   BP: 137/77   Pulse: 65   Resp: 18   Temp: 98.4  F (36.9  C)   SpO2: 96%   Weight: 206 lb (93.4 kg)       Physical Exam   Constitutional: She is oriented to person, place, and time. She appears well-developed and well-nourished. No distress.   No concerns today, language barrier   HENT:   Head: Normocephalic and atraumatic.   Mouth/Throat: Oropharynx is clear and moist.   Eyes: Pupils are equal, round, and reactive to light. Right eye exhibits no discharge. Left eye exhibits no discharge. No scleral icterus.   Neck: Normal range of motion. Neck supple.   Intact   Cardiovascular: Normal rate and regular rhythm.  Exam reveals no gallop and no friction rub.    No murmur heard.  S1S2, no murmur   Pulmonary/Chest: Breath sounds normal. She has no wheezes. She has no rales.   CTA   Abdominal: Soft. Bowel sounds are normal. She exhibits no distension. There is no tenderness. There is no rebound.   No diarrhea or constipation    Genitourinary:   Genitourinary Comments: Deferred   Musculoskeletal: Normal range of motion. She exhibits edema.   1-2+ LE edema bilaterally   Neurological: She is alert and oriented to person, place, and time. No cranial nerve deficit.   BIMS 9/15   Skin: Skin is warm and dry. She is not diaphoretic. No erythema.   Intact, very dry, encouraged to use lotion   Psychiatric: She has a normal mood and affect.   Has anxiety and depression, controlled   Nursing note and vitals reviewed.      Medication List:  Current Outpatient Prescriptions   Medication Sig     acetaminophen 500 mg coapsule Take 1 capsule by mouth. Every 4-6 hours PRN     bisacodyl (DULCOLAX, BISACODYL,) 10 mg suppository Insert 1 suppository (10 mg total) into the rectum daily as needed (for constipation).     buPROPion (WELLBUTRIN XL) 150 MG 24 hr tablet Take 1 tablet (150 mg total) by mouth daily.     CALCIUM CARBONATE/VITAMIN D3 (CALCIUM 600 WITH VITAMIN D3 ORAL) Take 2 tablets by mouth daily.     cetirizine (ZYRTEC) 10 MG tablet Take 10 mg by mouth daily.     cholecalciferol, vitamin D3, 1,000 unit tablet Take 1,000 Units by mouth daily.     citalopram (CELEXA) 20 MG tablet TAKE ONE TABLET BY MOUTH EVERY DAY     dextromethorphan (DELSYM) 30 mg/5 mL liquid Take 60 mg by mouth every 12 (twelve) hours as needed for cough.     fluticasone (FLONASE) 50 mcg/actuation nasal spray 1 spray into each nostril daily.     furosemide (LASIX) 20 MG tablet Take 20 mg by mouth daily.     galantamine (RAZADYNE ER) 24 MG 24 hr capsule Take 1 capsule (24 mg total) by mouth daily with breakfast.     hydrALAZINE (APRESOLINE) 10 MG tablet Take 5 mg by mouth 4 (four) times a day.     lansoprazole (PREVACID) 30 MG capsule TAKE 1 CAPSULE BY MOUTH EVERY DAY     metFORMIN (GLUCOPHAGE) 500 MG tablet Take 500 mg by mouth 2 (two) times a day with meals.     metoprolol succinate (TOPROL-XL) 25 MG Take 12.5 mg by mouth daily.     OMEGA-3S/DHA/EPA/FISH OIL (OMEGA 3 ORAL) Take 2  capsules by mouth daily.     polyethylene glycol (MIRALAX) 17 gram/dose powder Take 17 g by mouth daily.     potassium chloride SA (K-DUR,KLOR-CON) 10 MEQ tablet Take 10 mEq by mouth daily.      venlafaxine (EFFEXOR XR) 37.5 MG 24 hr capsule Take 2 capsules (75 mg total) by mouth daily. (Patient taking differently: Take 37.5 mg by mouth daily. GDR 3/2018)     verapamil (VERELAN PM) 100 mg 24 hr capsule TAKE ONE CAPSULE BY MOUTH EVERY DAY     vitamin E 1000 UNIT capsule Take 1,000 Units by mouth 2 (two) times a day.     white petrolatum (AQUAPHOR ORIGINAL) 41 % Oint Apply topically 3 (three) times a day as needed.       Labs:  No results found for this or any previous visit (from the past 240 hour(s)).    Assessment/Plan:  1.  Hypokalemia: continue potassium 10mEq BID, last K3.7 on 10 mEq daily  2.  TARI: Last creatinine 1.18 with GFR of 45 in 3/2018  3.  DM: last Hgb A1c 8.5, continue metformin 500mg BID, increase BS to TID x1 wk, recheck A1c on 4/16/18  4.  Anxiety/depression: continue citalopram and effexor, related to daughter's health concerns, decreased effexor in 3/2018  5.  HTN: Continue hydralazine 5mg QID, verapamil 100mg daily, and metoprolol 12.5mg daily, will monitor BP x5d  6. Vit D def: continue D3 1000U, last 42.3 on 1/25/18  7. GERD: PPI  8. Seasonal allergies: continue fluticasone and cetirizine  9. Edema: lasix 20mg daily, continue  10. Constipation: continue miralax daily, no change    The care plan has been reviewed and all orders signed. Changes to care plan, if any, as noted. Otherwise, continue care plan of care.      Electronically signed by: Ashwin Tang NP

## 2021-06-17 NOTE — PROGRESS NOTES
Kandi Ontiveros returned today to complete cognitive testing as an interpeter was not available for the initial evaluation date (March 16th). Unfortunately, an  again was not available despite being scheduled after the last appt (March 16th). Staff attempted to obtain an  in house, but no one was available. I apologized to the patient and family and assured them that we would make this work. They were very understanding. I told them that I would be working with my staff to come up with a solution to ensure that an  would be present at the next appointment. I shared that I (or my staff) would be in touch next week to schedule a new time to complete testing. The patient and her daughter expressed understanding.     No billing for today's appointment.

## 2021-06-18 LAB
25(OH)D3 SERPL-MCNC: 36.4 NG/ML (ref 30–80)
ANION GAP SERPL CALCULATED.3IONS-SCNC: 8 MMOL/L (ref 5–18)
BASOPHILS # BLD AUTO: 0 THOU/UL (ref 0–0.2)
BASOPHILS NFR BLD AUTO: 0 % (ref 0–2)
BUN SERPL-MCNC: 38 MG/DL (ref 8–28)
CALCIUM SERPL-MCNC: 9.1 MG/DL (ref 8.5–10.5)
CHLORIDE BLD-SCNC: 101 MMOL/L (ref 98–107)
CO2 SERPL-SCNC: 28 MMOL/L (ref 22–31)
CREAT SERPL-MCNC: 1.23 MG/DL (ref 0.6–1.1)
EOSINOPHIL # BLD AUTO: 0.2 THOU/UL (ref 0–0.4)
EOSINOPHIL NFR BLD AUTO: 2 % (ref 0–6)
ERYTHROCYTE [DISTWIDTH] IN BLOOD BY AUTOMATED COUNT: 14.4 % (ref 11–14.5)
GFR SERPL CREATININE-BSD FRML MDRD: 42 ML/MIN/1.73M2
GLUCOSE BLD-MCNC: 228 MG/DL (ref 70–125)
HCT VFR BLD AUTO: 34.2 % (ref 35–47)
HGB BLD-MCNC: 11 G/DL (ref 12–16)
IMM GRANULOCYTES # BLD: 0 THOU/UL
IMM GRANULOCYTES NFR BLD: 0 %
LYMPHOCYTES # BLD AUTO: 1.9 THOU/UL (ref 0.8–4.4)
LYMPHOCYTES NFR BLD AUTO: 20 % (ref 20–40)
MCH RBC QN AUTO: 31.7 PG (ref 27–34)
MCHC RBC AUTO-ENTMCNC: 32.2 G/DL (ref 32–36)
MCV RBC AUTO: 99 FL (ref 80–100)
MONOCYTES # BLD AUTO: 0.9 THOU/UL (ref 0–0.9)
MONOCYTES NFR BLD AUTO: 10 % (ref 2–10)
NEUTROPHILS # BLD AUTO: 6.3 THOU/UL (ref 2–7.7)
NEUTROPHILS NFR BLD AUTO: 67 % (ref 50–70)
PLATELET # BLD AUTO: 430 THOU/UL (ref 140–440)
PMV BLD AUTO: 9.7 FL (ref 8.5–12.5)
POTASSIUM BLD-SCNC: 4.1 MMOL/L (ref 3.5–5)
RBC # BLD AUTO: 3.47 MILL/UL (ref 3.8–5.4)
SODIUM SERPL-SCNC: 137 MMOL/L (ref 136–145)
WBC: 9.4 THOU/UL (ref 4–11)

## 2021-06-18 NOTE — PROGRESS NOTES
[facility]      Facility:    [facility] [[id number]] -1 Code Status: FULL CODE      Chief Complaint/Reason for Visit:   Chief Complaint   Patient presents with     Review Of Multiple Medical Conditions       HPI:   Kandi is a 77 y.o. female who who is residing at assisted living facility has moved to long-term care with underlying history of dementia, that has been progressive in nature.  She has periods of anxiety associated with memory loss and per daughter her short-term memory is getting progressively worse.  She has a past medical history of right knee osteoarthritis and apparently received steroid injections in the past.  She also is a diabetic that has been diet controlled and has obstructive sleep apnea.  As per the family she has been moved to long-term care so that she is closer to her  who is in the nursing home with her.     TCU: Today and previously she seems oriented though not sure per language barrier.  She denies any pain.  Previously we explained to her that we need to give her medication for her diabetes and increase 1 of her medications that she is currently taking for her blood pressure.  This has resulted in decreased renal function.  Previously noticed I ordered to DC her metformin was never carried out.  Fortunately her kidney function has rebounded taking off HCTZ and ACEi as her last Cr 1.18 with GFR 45.  Previously I will started metoprolol 12.5 mg daily, though today will increase hydralazine and monitor.  Today her edema has increased, so will increase lasix x1 wk and monitor response. We also suggested ordering GANGA hose per her edema in her legs, though apparently she is never use of this and has since been DC'd.  Previously she also told me she has really dry skin so I ordered Aquaphor for her 3 times daily as needed, as this has been therapeutic, though today tells me she doesn't use.  Also she needed potassium  supplementation, with a recheck potassium at 3.7. Will recheck BMP, TSH, mag in 1 wk. and A1c in 1 month. BIMS 7/15 on 6/7/18.    Patient denies pain, headache, chest pain, numbness or tingling, shortest of breath, eating or swallowing concerns, nausea or vomiting, diarrhea or bowel abnormalities, or no new integumentary concerns today. Will also have PT see her for exercise.       Past Medical History:  Past Medical History:   Diagnosis Date     Anxiety     Created by Conversion      Benign Adenomatous Polyp Of The Large Intestine     Created by Conversion      Chronic Diarrhea Of Unknown Origin     Created by Conversion      Dementia of the Alzheimer's type 7/13/2014     Diabetes Mellitus     Created by Conversion      Esophageal reflux     Created by Conversion      Gastritis      Herpes Zoster (Shingles)     Created by Conversion      Hiatal hernia      Hypercholesterolemia     Created by Conversion      Hypertension     Created by Conversion      Melanosis Coli     Created by Conversion [facility] Annotation: May  2 2012  1:19PM - Sheila Benavides: colonoscopy  4/30/12      Memory Lapses Or Loss     Created by Conversion      Obstructive Sleep Apnea     Created by Conversion      Osteopenia     Created by Conversion      Raynaud's Disease     Created by Conversion      Tricuspid Regurgitation     Created by Conversion            Surgical History:  No past surgical history on file.    Family History:   Family History   Problem Relation Age of Onset     Hypertension Other      Diabetes Other      Stroke Other        Social History:    Social History     Social History     Marital status:      Spouse name: N/A     Number of children: N/A     Years of education: N/A     Social History Main Topics     Smoking status: Never Smoker     Smokeless tobacco: Never Used     Alcohol use Not on file     Drug use: Not on file     Sexual activity: Not on file     Other Topics Concern     Not on file     Social History  Narrative     Review of Systems   Constitutional: Positive for appetite change. Negative for activity change, diaphoresis and fatigue.   HENT: Negative.  Negative for congestion and facial swelling.    Eyes: Negative.  Negative for visual disturbance.   Respiratory: Negative.  Negative for shortness of breath and wheezing.    Cardiovascular: Negative.  Negative for chest pain.   Gastrointestinal: Negative.  Negative for abdominal distention, abdominal pain, blood in stool, constipation and diarrhea.   Endocrine: Negative.    Genitourinary: Negative.  Negative for dysuria.   Musculoskeletal: Negative for back pain and joint swelling.        Bilateral knee pain per OA, taking tylenol   Skin: Negative.  Negative for color change.        moisturizer used   Allergic/Immunologic: Negative.    Neurological: Negative.  Negative for weakness and headaches.   Hematological: Negative.    Psychiatric/Behavioral: Negative for confusion and sleep disturbance. The patient is nervous/anxious.        Vitals:    06/19/18 1939   BP: 175/76   Pulse: 67   Resp: 18   Temp: 98  F (36.7  C)   SpO2: 96%   Weight: 214 lb (97.1 kg)       Physical Exam   Constitutional: She is oriented to person, place, and time. She appears well-developed and well-nourished. No distress.   Wants to participate in more outtings, had mtg with daughter interpreting    HENT:   Head: Normocephalic and atraumatic.   Mouth/Throat: Oropharynx is clear and moist. No oropharyngeal exudate.   Eyes: Pupils are equal, round, and reactive to light. Right eye exhibits no discharge. Left eye exhibits no discharge. No scleral icterus.   Neck: Normal range of motion. Neck supple. No JVD present. No tracheal deviation present.   Intact   Cardiovascular: Normal rate and regular rhythm.  Exam reveals no gallop and no friction rub.    No murmur heard.  S1S2, no murmur   Pulmonary/Chest: Breath sounds normal. No stridor. No respiratory distress. She has no wheezes. She has no  rales.   CTA   Abdominal: Soft. Bowel sounds are normal. She exhibits no distension. There is no tenderness. There is no rebound.   No diarrhea or constipation   Genitourinary:   Genitourinary Comments: Deferred   Musculoskeletal: Normal range of motion. She exhibits edema.   1-2+ LE edema bilaterally   Neurological: She is alert and oriented to person, place, and time. No cranial nerve deficit.   BIMS 9/15   Skin: Skin is warm and dry. She is not diaphoretic. No erythema.   Intact, very dry, encouraged to use lotion   Psychiatric: She has a normal mood and affect.   Has anxiety and depression, more agitation noted   Nursing note and vitals reviewed.      Medication List:  Current Outpatient Prescriptions   Medication Sig     acetaminophen 500 mg coapsule Take 1 capsule by mouth. Every 4-6 hours PRN     bisacodyl (DULCOLAX, BISACODYL,) 10 mg suppository Insert 1 suppository (10 mg total) into the rectum daily as needed (for constipation).     buPROPion (WELLBUTRIN XL) 150 MG 24 hr tablet Take 1 tablet (150 mg total) by mouth daily.     CALCIUM CARBONATE/VITAMIN D3 (CALCIUM 600 WITH VITAMIN D3 ORAL) Take 2 tablets by mouth daily.     cetirizine (ZYRTEC) 10 MG tablet Take 10 mg by mouth daily.     cholecalciferol, vitamin D3, 1,000 unit tablet Take 1,000 Units by mouth daily.     citalopram (CELEXA) 20 MG tablet TAKE ONE TABLET BY MOUTH EVERY DAY     dextromethorphan (DELSYM) 30 mg/5 mL liquid Take 60 mg by mouth every 12 (twelve) hours as needed for cough.     fluticasone (FLONASE) 50 mcg/actuation nasal spray 1 spray into each nostril daily.     furosemide (LASIX) 20 MG tablet Take 20 mg by mouth daily.     galantamine (RAZADYNE ER) 24 MG 24 hr capsule Take 1 capsule (24 mg total) by mouth daily with breakfast.     hydrALAZINE (APRESOLINE) 10 MG tablet Take 10 mg by mouth 3 (three) times a day.      lansoprazole (PREVACID) 30 MG capsule TAKE 1 CAPSULE BY MOUTH EVERY DAY     metFORMIN (GLUCOPHAGE) 500 MG tablet Take  500 mg by mouth 2 (two) times a day with meals.     metoprolol succinate (TOPROL-XL) 25 MG Take 12.5 mg by mouth daily.     OMEGA-3S/DHA/EPA/FISH OIL (OMEGA 3 ORAL) Take 2 capsules by mouth daily.     polyethylene glycol (MIRALAX) 17 gram/dose powder Take 17 g by mouth daily.     potassium chloride SA (K-DUR,KLOR-CON) 10 MEQ tablet Take 10 mEq by mouth daily.      venlafaxine (EFFEXOR XR) 37.5 MG 24 hr capsule Take 2 capsules (75 mg total) by mouth daily. (Patient taking differently: Take 37.5 mg by mouth daily. GDR 3/2018)     verapamil (VERELAN PM) 100 mg 24 hr capsule TAKE ONE CAPSULE BY MOUTH EVERY DAY     vitamin E 1000 UNIT capsule Take 1,000 Units by mouth 2 (two) times a day.     white petrolatum (AQUAPHOR ORIGINAL) 41 % Oint Apply topically 3 (three) times a day as needed.       Labs:  No results found for this or any previous visit (from the past 240 hour(s)).    Assessment/Plan:  1.  Hypokalemia: continue potassium 10mEq BID, last K3.7 on 10 mEq daily, will recheck BMP in 1 wk.  2.  TARI: Last creatinine 1.18 with GFR of 45 in 3/2018, recheck pending.  3.  DM: last Hgb A1c 8.5, continue metformin 500mg BID, last A1c on 4/16/18 was 6.5, will recheck in 1 month.   4.  Anxiety/depression: continue citalopram and effexor, related to daughter's health concerns, decreased effexor in 3/2018.  5.  HTN: increase hydralazine to 10mg three times a day, hold if SBP <130, continue verapamil 100mg daily, and metoprolol 12.5mg daily.  6. Vit D def: continue D3 1000U, last 42.3 on 1/25/18.  7. GERD: PPI, no change.  8. Seasonal allergies: continue fluticasone and cetirizine.  9. Edema: will increase lasix 20mg to two times a day per wt gain x1 wk, continue to monitor.  10. Constipation: continue miralax daily, no change.  11. Dementia: recently had cognitive assessment, instructed daughter to f/u    The care plan has been reviewed and all orders signed. Changes to care plan, if any, as noted. Otherwise, continue care plan  of care.      Electronically signed by: Ashwin Tang NP

## 2021-06-18 NOTE — PROGRESS NOTES
NEUROPSYCHOLOGICAL EVALUATION  [facility]: [facility]    NAME: Kandi Ontiveros    YOB: 1940   AGE: 77  EDU: 16  DATE OF EVALUATION: 3/16/2018 and 5/18/2018    REASON FOR REFERRAL:  Ms. Ontiveros is a 77 y.o., right-handed (early childhood left-handed), Finnish female presenting with concerns about cognitive functioning in the context of cerebrovascular risk factors, an approximate 6-year history of anxiety, and previous testing in 2013 notable for mild cognitive impairment. She was referred for updated neurocognitive evaluation by her provider in the Memory Loss clinic, VICTORIANO Nunes, to assist with differential diagnosis and care planning.    Unfortunately, there was a misunderstanding and no  was present for the initial appointment on March 16th.  Ms. Ontiveros is a native Nauruan speaker and is not fluent in English.  On the initial evaluation day we were able to complete the interview with the assistance of her daughter Deborah.  However, testing was deferred to a future date (March 28th) so that a professional  could be available for formal testing.    Ms. Ontiveros returned on March 28th but although scheduled, no  arrived for the appointment. We were ultimately able to find a time that worked both for Ms. Ontiveros and her daughter to return to complete testing on May 18th. An  was present for that appointment.     DIAGNOSTIC SUMMARY:  Results of testing are interpreted with extreme caution given Ms. Ontiveros's non-native English speaking status. (Our tasks are designed and normed on native English speaking North American samples. It is difficult to generalize the findings to non-native English speakers.) With this caveat in mind, based on her educational and occupational histories, Ms. Ontiveros is a woman of at least average premorbid intellectual functioning whose performance is notable for intact  nonverbal reasoning skills in the context of impairments across all other domains tested including attention, cognitive efficiency, language, visual-spatial skills, learning, memory, and mental flexibility.  Of note, learning and memory impairments represent the most significant areas of weakness in this profile as she exhibits minimal learning and an amnestic memory profile. Her daughter completed a measure of adaptive functioning which was notable for mild to moderate impairments across 7 of 9 measures of adaptive skills.  Finally, Ms. Ontiveros denied significant symptoms of depression on a self-report questionnaire.    As compared to previous testing in 2013:    Stable performances were evident on measures of    Nonverbal reasoning (low average)    Visual-spatial judgment (mildly impaired)    Cognitive efficiency (moderate to severely impaired)    Complex attention (severely impaired)    Confrontation naming (moderately impaired)    Semantic fluency (moderately impaired)    Verbal memory (moderately impaired)    Nonverbal memory (severely impaired)    Performance declines were evident on measures of    Basic attention (from low average to mildly impaired)     Wordlist learning (from average to moderately impaired)    Prose learning (from mild to moderately impaired)    Construction (from high average to moderately impaired)    Adaptive functioning (from mildly impaired in 2 of 9 domains to mild to moderately impaired in 7 of 9 domains)    Overall, current test results are notable for declines across a number of different measures as compared to testing 5 years ago.  Importantly, while verbal memory measures appeared to only be moderately impaired relative to severely impaired nonverbal memory, these particular tasks have a floor at the level of moderate impairment.  It is important to emphasize that Ms. Ontiveros is amnestic (0% retention) across 3 different memory measures with no benefit from  recognition cues.  This profile continues to strongly suggest the presence of a medial temporal process (i.e. Alzheimer's disease) with the possible additional impact of cerebrovascular disease.  Given the declines in adaptive functioning, Ms. 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    Ms. Ontiveros's providers and her family should be aware that she is experiencing profound memory impairments. She will continue to require assistance in the management of her personal affairs including medications and finances. It is strongly recommended that Ms. Ontiveros continue to have a family member accompany her to all appointments to ensure that accurate information is given to providers and instructions are followed. It will be helpful to present the information in brief, repeated segments and have her repeat back the information in her own words to ensure understanding. But ultimately, her family members should be in charge of following through with any recommendations.     Given her cognitive weaknesses, Ms. Ontiveros will do well in a supportive environment. The current nursing home will be able to provide the structure she needs and staying with her  is important to her. But she would greatly benefit from increased activities to keep her cognitively and socially active.  Her family may want to consider finding opportunities such as a local community center to provide an outlet for her.     DIAGNOSTIC IMPRESSIONS:  Major Neurocognitive Disorder due to Multiple Etiologies    RECOMMENDATIONS:  Physical and Emotional Health    If not medically contraindicated, Ms. Ontiveros may benefit from a trial of a cognitive enhancing medication.     It is important that Ms. Ontiveros continue to follow her medication treatment regimen so as to maintain her physical health, as this  can have a significant impact on her physical, emotional, and cognitive functioning.     It is strongly recommended that Ms. Ontiveros' engage in regular activities such as exercise (under the guidance of her physician), recreational activities and social interaction. Such activities would benefit her cognitively as well as emotionally.  Consideration could be given towards participating in activities in a local community center or senior center.  Memory and Organization    Ms. Ontiveros will do best in an environment where a lot of routines are established so that she can follow a regular pattern for her daily or weekly routines. She will likely become easily confused when deviating from this routine.     Ms. Ontiveros demonstrates intact basic attention but profound memory impairments, thus, she should not be given instructions for tasks any more than a few minutes in the future.    In her daily life, Ms. Ontiveros will continue to benefit from the use of compensation techniques. That is, she may find it helpful to post reminder notes around the house, make lists, and carry a small calendar so that she can feel more comfortable and confident in her ability to remember information. A daily planner could also be used as a memory book where important information is recorded and organized for future reference.     Ms. Ontiveros should also create a system to establish set locations for certain items (i.e., keys) such that she always knows where to put them upon entering the house and where to look when she needs them. If she would like to keep certain items out of sight (i.e., a wallet), she could set up a specific hidden place to keep items and use that same place so as to ensure she can find the required item when needed.     Ms. Ontiveros and her family should be aware that she may not be able to process information as quickly and efficiently as she once could. Thus she should allow herself  extra time to complete tasks and not try and work under extreme time constraints.   Follow-up    Given the severity of her cognitive deficits, no further follow-up is indicated.  Ms. Ontiveros now carries a diagnosis of dementia and unless there is a significant improvement in cognitive skills, no follow-up is recommended.    FEEDBACK:  Ms. Ontiveros indicated a preference to receive the results via a formal feedback session with me. Her daughter indicated she would call to schedule. Such an appointment has not yet been scheduled at the time of this report.  I am still more than happy to meet with them and they are free to call and set up an appointment at their convenience.     Thank you for allowing me to participate in Ms. Ontiveros's care.  Please contact me with any questions regarding the content of this report.      --------------------------------EXTENDED REPORT--------------------------------  Verbal consent for neuropsychological testing was received following the provision of information about the nature of the evaluation, and the opportunity to ask questions.     HISTORY OF PRESENTING PROBLEM:  Verbal consent for neuropsychological testing was received following the provision of information about the nature of the evaluation, and the opportunity to ask questions. The evaluation was conducted and this report was prepared jointly by the undersigned extern under the supervision of the undersigned licensed psychologist. Dr. Brenda Salazar provided general supervision and is responsible for the final version of this report.    Ms. Ontiveros previously underwent neuropsychological evaluation with Dr. Norah Lux on June 18th, 2013.  Please see that report for full background. The following provides an update over the last several years.     At the present time, Ms. Ontiveros reports concerns about further declines in her memory over the last several years. She stated that she  did not  know how to explain it,  but she did provide some examples. Specifically, she stated that she will meet a person one day and not know who they are the next day. She also reported that she never knows what day of the week it is, which is a fairly new problem over the past several months. She endorsed forgetting appointments, but she noted that others remind her. Ms. Ontiveros additionally reported misplacing objects. Ms. Ontiveros also reported concerns about word finding difficulties. She stated that she  sometimes  has trouble coming up with the word she needs.    Her daughter Deborah reported that her mother s short term memory has been getting progressively worse over the past several years. She stated that her memory is  a lot worse  and hypothesized that  you will probably see a big difference in the tests  since her previous evaluation. She noted that her mother seems to understand conversations and television programs in the moment, but she forgets  1 minute later,  for example if there is a commercial break. Deborah stated that sometimes her mother appears to have a vague recollection of something if she is reminded (e.g., she will say  oh, that s right! ) while other times she does not (e.g., she will reply,  really? ).  Ms. Ontiveros endorsed forgetting people she has known for a while but her daughter qualified that she does better if she has had more frequent contact with the person. Her daughter stated that she has not noticed her mother calling objects by the wrong name.     With regard to activities of daily living, Deborah stated that she became concerned about her mother s ability to live independently when Ms. Ontiveros s  (and Deborah s father), Jaswant, became ill in September of 2016. Deborah stated that her parents had been living independently with their disabled daughter and that they were able to make this arrangement work because they complemented for each other s  strengths and weaknesses; Jaswant was  mentally sharp  but physically disabled and Ms. Ontiveros was able-bodied but struggled cognitively. Ms. Ontiveros and her disabled daughter were moved into assisted living in January of 2017. Deobrah reported that she felt her mother needed a greater level of support than what she was getting and also that it was stressful for her parents to be . Therefore, she moved Ms. Ontvieros (and their daughter) into the nursing home with Jaswant in June of 2017. Ms. Ontiveros s daughter has since moved to a group home, but Ms. Ontiveros and Jaswant remain in the nursing home. Ms. Ontiveros reported that the nursing home staff is doing everything for her  except for bathing her,  i.e., they clean, administer medications, and prepare meals. Deborah reported today that she took over management of her parents  finances when Jaswant fell ill in late 2016. Ms. Ontiveros does not currently drive. She never liked driving in the [place], and her daughter Deborah reported that her license was revoked by a physician.      When Ms. Ontiveros and her daughter returned on May 18, they indicated that there had not been any major changes in terms of her cognitive or adaptive functioning in the last 2 months since we last saw them.  In addition, there were no major changes in terms of her medical or psychiatric status.    MEDICAL HISTORY:  Ms. Ontiveros's medical history is significant for   Past Medical History:   Diagnosis Date     Anxiety     Created by Conversion      Benign Adenomatous Polyp Of The Large Intestine     Created by Conversion      Chronic Diarrhea Of Unknown Origin     Created by Conversion      Dementia of the Alzheimer's type 7/13/2014     Diabetes Mellitus     Created by Conversion      Esophageal reflux     Created by Conversion      Gastritis      Herpes Zoster (Shingles)     Created by Conversion      Hiatal hernia       Hypercholesterolemia     Created by Conversion      Hypertension     Created by Conversion      Melanosis Coli     Created by Conversion [facility] Annotation: May  2 2012  1:19PM - Sheila Benavides: colonoscopy  4/30/12      Memory Lapses Or Loss     Created by Conversion      Obstructive Sleep Apnea     Created by Conversion      Osteopenia     Created by Conversion      Raynaud's Disease     Created by Conversion      Tricuspid Regurgitation     Created by Conversion      Ms. Rodriguez current problem list includes   Patient Active Problem List   Diagnosis     Tricuspid Regurgitation     Pain During Urination (Dysuria)     Osteopenia     Hypertension     Breast Pain     Joint Pain, Localized In The Shoulder     Chronic Diarrhea Of Unknown Origin     Anxiety     Fatigue     Abnormal Glucose     Contusion With Intact Skin Surface     Herpes Zoster (Shingles)     Type 2 diabetes mellitus with hyperglycemia, without long-term current use of insulin     Hypercholesterolemia     Esophageal Reflux     Melanosis Coli     Memory Lapses Or Loss     Benign Adenomatous Polyp Of The Large Intestine     Raynaud's Disease     Obstructive Sleep Apnea     Dementia of the Alzheimer's type     Bilateral lower extremity edema     Renal failure, chronic, stage 3 (moderate)     Potassium (K) deficiency     Diagnostic studies:  The most recent imaging of the head was an MRI on 5/1/13, which was significant only for  minimal generalized cerebral atrophy.      No significant sensory changes or disturbance in appetite, sleep or energy level was reported. Ms. Ontiveros has Obstructive Sleep Apnea but she does not tolerate CPAP.    No past surgical history on file.    Current medications include (per medical record):   Current Outpatient Prescriptions:      acetaminophen 500 mg coapsule, Take 1 capsule by mouth. Every 4-6 hours PRN, Disp: , Rfl:      bisacodyl (DULCOLAX, BISACODYL,) 10 mg suppository, Insert 1 suppository (10 mg  total) into the rectum daily as needed (for constipation)., Disp: 12 suppository, Rfl: 0     buPROPion (WELLBUTRIN XL) 150 MG 24 hr tablet, Take 1 tablet (150 mg total) by mouth daily., Disp: 90 tablet, Rfl: 1     CALCIUM CARBONATE/VITAMIN D3 (CALCIUM 600 WITH VITAMIN D3 ORAL), Take 2 tablets by mouth daily., Disp: , Rfl:      cetirizine (ZYRTEC) 10 MG tablet, Take 10 mg by mouth daily., Disp: , Rfl:      cholecalciferol, vitamin D3, 1,000 unit tablet, Take 1,000 Units by mouth daily., Disp: , Rfl:      citalopram (CELEXA) 20 MG tablet, TAKE ONE TABLET BY MOUTH EVERY DAY, Disp: 90 tablet, Rfl: 0     dextromethorphan (DELSYM) 30 mg/5 mL liquid, Take 60 mg by mouth every 12 (twelve) hours as needed for cough., Disp: , Rfl:      fluticasone (FLONASE) 50 mcg/actuation nasal spray, 1 spray into each nostril daily., Disp: 16 g, Rfl: 12     furosemide (LASIX) 20 MG tablet, Take 20 mg by mouth daily., Disp: , Rfl:      galantamine (RAZADYNE ER) 24 MG 24 hr capsule, Take 1 capsule (24 mg total) by mouth daily with breakfast., Disp: 30 capsule, Rfl: 6     hydrALAZINE (APRESOLINE) 10 MG tablet, Take 5 mg by mouth 4 (four) times a day., Disp: , Rfl:      lansoprazole (PREVACID) 30 MG capsule, TAKE 1 CAPSULE BY MOUTH EVERY DAY, Disp: 90 capsule, Rfl: 1     metFORMIN (GLUCOPHAGE) 500 MG tablet, Take 500 mg by mouth 2 (two) times a day with meals., Disp: , Rfl:      metoprolol succinate (TOPROL-XL) 25 MG, Take 12.5 mg by mouth daily., Disp: , Rfl:      OMEGA-3S/DHA/EPA/FISH OIL (OMEGA 3 ORAL), Take 2 capsules by mouth daily., Disp: , Rfl:      polyethylene glycol (MIRALAX) 17 gram/dose powder, Take 17 g by mouth daily., Disp: 255 g, Rfl: 0     potassium chloride SA (K-DUR,KLOR-CON) 10 MEQ tablet, Take 10 mEq by mouth daily. , Disp: , Rfl:      venlafaxine (EFFEXOR XR) 37.5 MG 24 hr capsule, Take 2 capsules (75 mg total) by mouth daily. (Patient taking differently: Take 37.5 mg by mouth daily. GDR 3/2018), Disp: 60 capsule, Rfl:  "1     verapamil (VERELAN PM) 100 mg 24 hr capsule, TAKE ONE CAPSULE BY MOUTH EVERY DAY, Disp: 90 capsule, Rfl: 1     vitamin E 1000 UNIT capsule, Take 1,000 Units by mouth 2 (two) times a day., Disp: , Rfl:      white petrolatum (AQUAPHOR ORIGINAL) 41 % Oint, Apply topically 3 (three) times a day as needed., Disp: , Rfl: .    FAMILY HISTORY:   Family medical history is significant for:   Family History   Problem Relation Age of Onset     Hypertension Other      Diabetes Other      Stroke Other      PSYCHIATRIC AND SUBSTANCE USE HISTORY:  Per the previous report, Ms. Ontiveros has a history of anxiety as a result of various psychosocial stressors. There is no other history of mental illness.    Today Ms. Ontiveros described her current mood as \"not 100% well,\" noting that she has been  stuck with Jaswant  all day every day. She stated that at times she has motivation to do things while at other times she does not. Ms. Ontiveros experienced a strong emotional reaction when her  Jaswant fell ill and was hospitalized in late 2016. Deborah reported that her mother was particularly tearful at that time, which was out of the ordinary for her. She also appeared to give up many of her daily routines (such as cooking, cleaning, and laundry) at that time, opting instead to sit on the couch for the majority of the day. Deborah feels that her mother s mood has not fully returned to her previous baseline at this point and wonders if this is because her mother has nothing to do. Ms. Ontiveros used to be an active volunteer and a dedicated homemaker, but these activities are not possible for her anymore. Ms. Ontiveros reported that she likes to go shopping and read.     Today Ms. Ontiveros and her daughter reported that she has been experiencing visual hallucinations, with the most recent episode occurring several months ago. Specifically, Ms. Ontiveros reported seeing beautiful puppies, birds, " "and tiny flying children in her home. She stated that when this happened she thought she  must be going crazy  but also that she was not scared by the hallucinations because they were  pretty, not ugly.       Ms. Ontiveros has never smoked tobacco or used recreational drugs. She reported no history of alcohol use.     SOCIAL HISTORY:  Per the previous report, Ms. Ontiveros was born and raised in [place], [place]. Today she reported that although she was initially left-handed, her mother taught her to write with her right hand so that she would not be punished by the , who was notorious for slapping children who wrote with the left hand. Thus, she is now right-handed. She earned mostly A s and B s in grade school and went to college. 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 After working as a teacher for approximately 15 years, she left the profession to care for her disabled daughter, who was 12 years old at the time. She has not worked outside of the home since. She has been  to her  Jaswant for 55 years. She and Jaswant have 4 children (3 living, one who passed away as an infant) and 6 grandchildren together. They moved from [place] to [place] in 2003. Ms. Ontiveros lived with her  and their disabled daughter until late 2016 when Jaswant went into the hospital and then into a nursing home. Ms. Ontiveros was moved to assisted living in January of 2017 then into the nursing home with Jaswant in June of 2017. Ms. Ontiveros and Jaswant currently live together in [facility] nursing home.     PREVIOUS TESTING:  Ms. Ontiveros previously underwent neuropsychological evaluation with Dr. Norah Bruno on June 18th 2013.  Please see that report for full details. In brief, \"Ms. Kandi Ontiveros s history as well as presentation on neuropsychological assessment " is most consistent with the diagnostic impression of mild cognitive impairment, multidomain, with deficits noted in immediate and delayed verbal memory and delayed visual memory, but also in areas of cognitive processing speed, confrontational naming, verbal fluency, cognitive flexibility.  Vascular ischemic changes as well as Alzheimer s disease were thought to be possible etiological factors.     MENTAL STATUS AND BEHAVIORAL OBSERVATIONS:   Ms. Ontiveros arrived several minutes late and accompanied by her daughter Deborah to the initial appointment. She was appropriately dressed and groomed. Ms. Ontiveros spoke Kazakh during the interview and her daughter interpreted. Ms. Ontiveros was alert but failed several orientation probes. On the initial evaluation day (March 16th) she estimated the date to be the middle of October, 2018, and was unable to name the day of the week. She was unable to name the city or the building, only stating that she was in  an office.  Gait was slow but otherwise unremarkable. Her mood was euthymic and her affect was appropriately reactive. She joked appropriately with her daughter.  Although Ms. Ontiveros appeared to have a good sense of humor, her daughter noted that she was difficult to discern whether her mother was being humorous or serious. Rapport was easily established and eye contact was unremarkable. She was pleasant and cooperative.  Rate, prosody, and content of speech were grossly normal, although again, Ms. Ontiveros spoke in Kazakh.  There was no evidence of a dai thought disorder; no hallucinations or delusions were apparent. Judgment and insight appeared impaired.     When she returned for testing on May 18th, Ms. Ontiveros appeared adequately motivated and engaged easily in the testing component of the evaluation. She appeared to put forth good and consistent effort. She attempted all tasks presented to her and worked at a steady pace.  She did  not appear overly frustrated by difficult or challenging tasks and responded appropriately to encouragement from the examiner.     TESTS ADMINISTERED:   Adaptive Behavior Assessment System -Second Edition (ABAS -II). Color Trails, Geriatric Depression Scale-15 (GDS), Repeatable Battery for the Assessment of Neuropsychological Status Form B (RBANS), Test of Nonverbal Intelligence 3rd Edition (STEFANY-3).    To facilitate comparison, the exact same battery as was administered in 2013 was administered during the current evaluation (including the same forms).    It should be noted that the current tasks were designed and normed on native English speaking North American samples and it is somewhat difficult to generalize the findings to non-native English speakers.  Thus the following results are interpreted with some caution as they may underestimate Ms. Ontiveros's true level of cognitive functioning.    DESCRIPTIVE PERFORMANCE KEY:  Scores at the 10th percentile and above are generally considered within normal limits:  Superior scores:  91st percentile and above  High Average scores:       75th through 90th percentile  Average scores:                 25th through 74th percentile  Low Average scores:         10th through 24th percentile    Scores that fall at the 9th percentile are considered borderline    Scores that fall at the 8th percentile and below are considered a degree of impairment:  Mildly Impaired:  3rd through 8th percentile  Moderately Impaired:  1st through 2nd percentile  Severely Impaired:  <1st percentile    OPTIMAL PREMORBID INTELLECT:  Optimal premorbid intellectual abilities were estimated as falling in at least the average range based on Ms. Ontiveros's educational and occupational histories.    SUMMARY OF TEST RESULTS:   On a pattern completion task assessing nonverbal reasoning skills, she performed in the low average range which is largely consistent with her performance 5 years  ago.    Auditory attention and working memory performances fell in the mildly impaired range of functioning.  Specifically, she was able to immediately recall up to 5 digits forwards.  Performance was somewhat stronger 5 years ago and assessed in the low average range, but this reflects only a one raw point score difference. At that time, she could also repeat 5 digits forwards. Basic attention is largely stable.     Confrontation naming was moderately impaired, consistent with previous testing. Performance on a measure of semantic verbal fluency was assessed in the range of moderate impairment consistent with previous testing.    Cognitive speed and processing accuracy fell in the moderately impaired range of functioning on a task that required her to use numbers to rapidly decode a series of abstract symbols. This represents a very slight improvement from previous severely impaired performance on this task. A simple sequencing task was performed in the severely impaired range representing a slight decline from moderately impaired performance 5 years ago.  A subsequent complex sequencing and set shifting task was also performed in the range of severe impairment consistent with previous testing.  Overall, cognitive speed and mental flexibility are largely stable and impaired.    Visual attention and spatial localization skills were mildly impaired, consistent with previous testing. Her copy of a complex figure was performed in the moderately impaired range representing decline from previous high average performance.  Her current drawing is notable for some inattention to detail. Thus visual-spatial judgment has been stable over time but basic construction skills have declined over the last 5 years.  Her drawing of a clock was notable for incorrect hand size and placement.  This task was not previously administered.    With regard to learning and memory, Ms. Ontiveros was administered a measure of rote auditory  verbal list learning that required her to learn a series of 10 words over trials and retain and recall them over a long delay.  Her initial rate of learning (4,4,5,4) fell in the moderately impaired range of functioning and was notable for a flat learning curve. She retained and recalled 0 words after the delay for moderately impaired delayed recall performance.  Recognition memory was moderately impaired (10/20) and represented chance performance.  In 2013, she did exhibit much stronger learning on this task (3, 6, 9, 8) but with similarly impaired recall (0 words) and recognition memory (11/20). Contextual auditory verbal learning abilities were moderately impaired as she learned 0 and 5 details over two trials. After a delay, she retained and recalled 0 details for severely impaired delayed recall performance.  In 2013 learning had also been stronger (3, 7) with mildly impaired performance.  However she retained no information after delay at that time either.    Delayed nonverbal memory for a complex figure was assessed in the severely impaired range (0% retention). This is consistent with previous testing.    On a measure of adaptive functioning (completed by her daughter) mild to moderate impairments were evident across virtually all domains (7 of 9) with the exception of leisure and self-care activities.  Specifically, mild impairments were evident in the domains of functional academics, health and safety, and social skills, with more moderate impairments evident in the domains of communication, community use, home living, and self-direction. This represents a significant decline from previous testing 5 years ago in which only mild impairments were identified (by her other daughter) in community use and functional academics.    No significant symptoms of depression were endorsed on a questionnaire.    EVALUATION SERVICES AND TIME:   Pertinent information was obtained by reviewing the electronic medical record  as well as through an individual interview I conducted with the patient.  I selected the tests and supervised the examiner.    Diagnosis:  Major Neurocognitive Disorder due to Multiple Etiologies    For diagnostic and coding purposes, Ms. Ontiveros has a history of cerebrovascular risk factors and an approximate 6-year history of anxiety and was referred for an evaluation of Mild Neurocognitive Disorder. Feedback of results will be provided via a formal feedback appointment with me.  Such an appointment has not yet been scheduled as of the time of this evaluation; however, she is welcome to schedule one any time.    Of note, the initial interview for this evaluation was completed on March 16th and billing for the interview was completed on that encounter. The following billing is for formal cognitive testing, interpretation and integration of findings.    30572: 2 hours of the neuropsychologists time was spent in medical record review, interpretation and integration of all tests, and report preparation  49989: 2 hours of time was spent in the administration of the remainder of the testing battery by a trained examiner and interpreted by the neuropsychologist    Екатерина Aden MA  Neuropsychology Extern    Brenda Salazar, PhD, LP, ABPP  Clinical Neuropsychologist, LP#[id number]  Board Certified in Clinical [place] [address]  Phone:  [phone]

## 2021-06-18 NOTE — LETTER
Letter by Ashwin Tang NP at      Author: Ashwin Tang NP Service: -- Author Type: --    Filed:  Encounter Date: 1/10/2019 Status: (Other)         Patient: Kandi Ontiveros   MR Number: [id number]   YOB: 1940   Date of Visit: 1/10/2019     [facility]      Facility:    [facility] [[id number]] -1 Code Status: FULL CODE      Chief Complaint/Reason for Visit:   Chief Complaint   Patient presents with   ? Review Of Multiple Medical Conditions       HPI:   Kandi is a 78 y.o. female who who is residing at assisted living facility has moved to long-term care with underlying history of dementia, that has been progressive in nature.  She has periods of anxiety associated with memory loss and per daughter her short-term memory is getting progressively worse.  She has a past medical history of right knee osteoarthritis and apparently received steroid injections in the past.  She also is a diabetic that has been diet controlled and has obstructive sleep apnea.  As per the family she has been moved to long-term care so that she is closer to her  who is in the nursing home with her.     LTC: Today and previously she seems oriented though not sure per language barrier, will attempt to use dietitian or daughter to interpret.   She denies any pain.  Today BP seems more controlled, maintain metoprolol, diltiazem and clonidine patch. Her edema has decreased, weight now decreasing as well and renal fx improved. We also suggested ordering GANGA hose per her edema in her legs, though apparently she is never use of this and has since been DC'd.    Also she needed potassium supplementation, with a recheck potassium of 4.2. Her current A1c has worsen as well, so previously increased metformin coverage, now will verify adequate coverage with monitoring again, BS <180.  Ambulates per self and usually pushing  around in his wc.   Per daughter,  tried donepezil, though per pharm will up titrate namenda instead.  Per dietitian, diet changed and portioned decreased. BIMS 9/15 and PHQ9 7/20 on 11/27/18.    Patient denies pain, headache, chest pain, numbness or tingling, shortest of breath, eating or swallowing concerns, nausea or vomiting, diarrhea or bowel abnormalities, or no new integumentary concerns today. Previously consulted recreation for increased socialization.        Past Medical History:  Past Medical History:   Diagnosis Date   ? Anxiety     Created by Conversion    ? Benign Adenomatous Polyp Of The Large Intestine     Created by Conversion    ? Chronic Diarrhea Of Unknown Origin     Created by Conversion    ? Dementia of the Alzheimer's type 7/13/2014   ? Diabetes Mellitus     Created by Conversion    ? Esophageal reflux     Created by Conversion    ? Gastritis    ? Herpes Zoster (Shingles)     Created by Conversion    ? Hiatal hernia    ? Hypercholesterolemia     Created by Conversion    ? Hypertension     Created by Conversion    ? Melanosis Coli     Created by Conversion [facility] Annotation: May  2 2012  1:19PM - Sheila Benavides: colonoscopy  4/30/12    ? Memory Lapses Or Loss     Created by Conversion    ? Obstructive Sleep Apnea     Created by Conversion    ? Osteopenia     Created by Conversion    ? Raynaud's Disease     Created by Conversion    ? Tricuspid Regurgitation     Created by Conversion            Surgical History:  No past surgical history on file.    Family History:   Family History   Problem Relation Age of Onset   ? Hypertension Other    ? Diabetes Other    ? Stroke Other        Social History:    Social History     Socioeconomic History   ? Marital status:      Spouse name: Not on file   ? Number of children: Not on file   ? Years of education: Not on file   ? Highest education level: Not on file   Social Needs   ? Financial resource strain: Not on file   ? Food insecurity - worry: Not on file   ? Food insecurity -  inability: Not on file   ? Transportation needs - medical: Not on file   ? Transportation needs - non-medical: Not on file   Occupational History   ? Not on file   Tobacco Use   ? Smoking status: Never Smoker   ? Smokeless tobacco: Never Used   Substance and Sexual Activity   ? Alcohol use: Not on file   ? Drug use: Not on file   ? Sexual activity: Not on file   Other Topics Concern   ? Not on file   Social History Narrative   ? Not on file     Review of Systems   Constitutional: Negative for activity change, appetite change, diaphoresis and fatigue.   HENT: Negative for congestion and facial swelling.    Eyes: Negative for photophobia, redness and visual disturbance.   Respiratory: Negative for choking, shortness of breath and wheezing.    Cardiovascular: Positive for leg swelling. Negative for chest pain.   Gastrointestinal: Negative for abdominal distention, abdominal pain, blood in stool, constipation and diarrhea.   Endocrine: Negative.    Genitourinary: Negative for dysuria.   Musculoskeletal: Negative for back pain and joint swelling.        Bilateral knee pain per OA, taking tylenol   Skin: Negative for color change.        Dry, moisturizer used   Allergic/Immunologic: Negative.    Neurological: Negative for seizures, weakness and headaches.   Hematological: Negative.    Psychiatric/Behavioral: Positive for confusion. Negative for behavioral problems, hallucinations and sleep disturbance. The patient is nervous/anxious.        Vitals:    01/13/19 1429   BP: 131/72   Pulse: 77   Resp: 18   Temp: 98  F (36.7  C)   SpO2: 95%   Weight: 213 lb (96.6 kg)       Physical Exam   Constitutional: She appears well-developed and well-nourished. No distress.   reported attending more outtings   HENT:   Head: Normocephalic and atraumatic.   Mouth/Throat: Oropharynx is clear and moist. No oropharyngeal exudate.   Eyes: Conjunctivae and EOM are normal. Pupils are equal, round, and reactive to light. Right eye exhibits no  discharge. Left eye exhibits no discharge. No scleral icterus.   Neck: Normal range of motion. Neck supple. No JVD present. No tracheal deviation present.   Intact   Cardiovascular: Normal rate, regular rhythm and normal heart sounds. Exam reveals no gallop and no friction rub.   No murmur heard.  S1S2, no murmur   Pulmonary/Chest: Effort normal and breath sounds normal. No stridor. No respiratory distress. She has no wheezes. She has no rales.   CTA, RA   Abdominal: Soft. Bowel sounds are normal. She exhibits no distension. There is no tenderness. There is no rebound.   No diarrhea or constipation   Genitourinary:   Genitourinary Comments: Deferred   Musculoskeletal: Normal range of motion. She exhibits edema.   1+ LE edema bilaterally, mostly pedal   Neurological: She is alert. No cranial nerve deficit.   A/O x2-3, BIMS 9/15 on 11/17/18   Skin: Skin is warm and dry. She is not diaphoretic. No erythema.   Intact, very dry, encouraged to use lotion   Psychiatric: She has a normal mood and affect.   Less agitation, continue Effexor GDR, continue aricept, trial namenda   Nursing note and vitals reviewed.      Medication List:  Current Outpatient Medications   Medication Sig   ? acetaminophen 500 mg coapsule Take 1 capsule by mouth. Every 4-6 hours PRN   ? bisacodyl (DULCOLAX, BISACODYL,) 10 mg suppository Insert 1 suppository (10 mg total) into the rectum daily as needed (for constipation).   ? cetirizine (ZYRTEC) 10 MG tablet Take 10 mg by mouth daily.   ? cholecalciferol, vitamin D3, 1,000 unit tablet Take 1,000 Units by mouth daily.   ? citalopram (CELEXA) 20 MG tablet TAKE ONE TABLET BY MOUTH EVERY DAY   ? cloNIDine (CATAPRES-TTS) 0.2 mg/24 hr Place 1 patch on the skin once a week.   ? donepezil (ARICEPT) 5 MG tablet Take 5 mg by mouth at bedtime.          ? fluticasone (FLONASE) 50 mcg/actuation nasal spray 1 spray into each nostril daily.   ? furosemide (LASIX) 20 MG tablet Take 20 mg by mouth 2 (two) times a  day at 9am and 6pm.    ? hydrALAZINE (APRESOLINE) 25 MG tablet Take 50 mg by mouth Daily at 8:00 am.. Hold for SBP <130          ? memantine (NAMENDA) 5 MG tablet Take 10 mg by mouth daily.          ? metFORMIN (GLUCOPHAGE) 500 MG tablet Take 1,000 mg by mouth 2 (two) times a day with meals .         ? metoprolol succinate (TOPROL-XL) 25 MG Take 12.5 mg by mouth daily.          ? omeprazole (PRILOSEC) 20 MG capsule Take 20 mg by mouth daily before breakfast.   ? polyethylene glycol (MIRALAX) 17 gram/dose powder Take 17 g by mouth daily.   ? polyvinyl alcohol (LIQUIFILM TEARS) 1.4 % ophthalmic solution Administer 1 drop to both eyes 3 (three) times a day as needed for dry eyes.   ? potassium chloride SA (K-DUR,KLOR-CON) 10 MEQ tablet Take 10 mEq by mouth daily.    ? verapamil (VERELAN PM) 100 mg 24 hr capsule TAKE ONE CAPSULE BY MOUTH EVERY DAY   ? white petrolatum (AQUAPHOR ORIGINAL) 41 % Oint Apply 1 application topically 3 (three) times a day as needed.              Labs:  Results for orders placed or performed in visit on 01/09/19   Basic Metabolic Panel   Result Value Ref Range    Sodium 139 136 - 145 mmol/L    Potassium 3.4 (L) 3.5 - 5.0 mmol/L    Chloride 98 98 - 107 mmol/L    CO2 32 (H) 22 - 31 mmol/L    Anion Gap, Calculation 9 5 - 18 mmol/L    Glucose 126 (H) 70 - 125 mg/dL    Calcium 9.0 8.5 - 10.5 mg/dL    BUN 24 8 - 28 mg/dL    Creatinine 1.22 (H) 0.60 - 1.10 mg/dL    GFR MDRD Af Amer 52 (L) >60 mL/min/1.73m2    GFR MDRD Non Af Amer 43 (L) >60 mL/min/1.73m2     Lab Results   Component Value Date    WBC 12.2 (H) 05/28/2016    HGB 11.1 (L) 05/28/2016    HCT 32.9 (L) 05/28/2016    MCV 91 05/28/2016     05/28/2016     Vitamin D, Total (25-Hydroxy)   Date Value Ref Range Status   01/25/2018 42.3 30.0 - 80.0 ng/mL Final     Lab Results   Component Value Date    HGBA1C 7.3 (H) 12/21/2018     Assessment/Plan:  1.  Hypokalemia: continue potassium 10mEq BID, last K 3.4 on 1/9/19, have decreased lasix  dosing, recheck BMP in 1 wk  2.  TARI: Last creatinine 1.22 with GFR of 43 on 11/9/19. Stable. Recheck BMP   3.  DM: current Hgb A1c 7.6, previous 7.0, increased metformin to 1000mg two times a day,  will maintain four times a day monitoring x2wks, BS <180  4.  Anxiety/depression: continue citalopram 20mg daily, will discontinue Effexor/wellbutrin, related to daughter's health concerns, previously decreased effexor in 3/2018.  5.  HTN: decrease hydralazine to 50mg at AM, continue verapamil 100mg daily, and will maintain metoprolol 25mg daily. With clonidine patch 0.2mg/wk. -150s  6. Vit D def: continue D3 1000U, last 42.3 on 1/25/18.  7. GERD: PPI, no change.  8. Seasonal allergies: continue fluticasone and cetirizine.  9. Edema: decrease lasix to 20mg a day, currently 213lb.  Was on larger portion, now discontinued and CSC diet started per dietitian.   10. Constipation: continue miralax daily, no change.  11. Dementia: recently had cognitive assessment, will decrease donepezil to 5mg at HS, increase namenda to 10mg daily.   12. Socialization: consulted recreation. Reports attending more events.  13. Hypomagnesia: start mag oxide 250mg daily, last 1.7 on 1/9/19      The care plan has been reviewed and all orders signed. Changes to care plan, if any, as noted. Otherwise, continue care plan of care.  The total time spent with this patient was greater than 30 minutes, with greater than 50% spent in counseling and coordination of care that included multiple issues per Htn and weight loss.      Electronically signed by: Ashwin Tang NP

## 2021-06-19 NOTE — LETTER
Letter by Ashwin Tang NP at      Author: Ashwin Tang NP Service: -- Author Type: --    Filed:  Encounter Date: 8/2/2019 Status: (Other)         Patient: Kandi Ontiveros   MR Number: [id number]   YOB: 1940   Date of Visit: 8/2/2019     [facility]      Facility:    [facility] [[id number]]  205-1 Code Status: FULL CODE      Chief Complaint/Reason for Visit:   Chief Complaint   Patient presents with   ? Review Of Multiple Medical Conditions       HPI:   Kandi is a 79 y.o. female who who is residing at assisted living facility has moved to long-term care with underlying history of dementia, that has been progressive in nature.  She has periods of anxiety associated with memory loss and per daughter her short-term memory is getting progressively worse.  She has a past medical history of right knee osteoarthritis and apparently received steroid injections in the past.  She also is a diabetic that has been diet controlled and has obstructive sleep apnea.  As per the family she has been moved to long-term care so that she is closer to her  who is in the nursing home with her.     LTC: Today and previously she seems oriented though daughter reports having more confusion, previously started on namenda, no change per her.   She denies any pain. Today BP seems more controlled, maintain metoprolol, diltiazem and clonidine patch. Her edema has decreased with minimal pedal edema, lasix discontinued prior.  Ambulates per self and usually pushing  around in his wc.  Per dietitian, diet changed and portioned decreased. BIMS 5/15 and PHQ9 2//20 on 5/13/19.  Patient denies pain, headache, chest pain, numbness or tingling, shortest of breath, eating or swallowing concerns, nausea or vomiting, diarrhea or bowel abnormalities, or no new integumentary concerns today. Previously consulted recreation for increased socialization.        Past  Medical History:  Past Medical History:   Diagnosis Date   ? Anxiety     Created by Conversion    ? Benign Adenomatous Polyp Of The Large Intestine     Created by Conversion    ? Chronic Diarrhea Of Unknown Origin     Created by Conversion    ? Dementia of the Alzheimer's type 7/13/2014   ? Diabetes Mellitus     Created by Conversion    ? Esophageal reflux     Created by Conversion    ? Gastritis    ? Herpes Zoster (Shingles)     Created by Conversion    ? Hiatal hernia    ? Hypercholesterolemia     Created by Conversion    ? Hypertension     Created by Conversion    ? Melanosis Coli     Created by Conversion [facility] Annotation: May  2 2012  1:19PM - Sheila Benavides: colonoscopy  4/30/12    ? Memory Lapses Or Loss     Created by Conversion    ? Obstructive Sleep Apnea     Created by Conversion    ? Osteopenia     Created by Conversion    ? Raynaud's Disease     Created by Conversion    ? Tricuspid Regurgitation     Created by Conversion            Surgical History:  No past surgical history on file.    Family History:   Family History   Problem Relation Age of Onset   ? Hypertension Other    ? Diabetes Other    ? Stroke Other        Social History:    Social History     Socioeconomic History   ? Marital status:      Spouse name: Not on file   ? Number of children: Not on file   ? Years of education: Not on file   ? Highest education level: Not on file   Occupational History   ? Not on file   Social Needs   ? Financial resource strain: Not on file   ? Food insecurity:     Worry: Not on file     Inability: Not on file   ? Transportation needs:     Medical: Not on file     Non-medical: Not on file   Tobacco Use   ? Smoking status: Never Smoker   ? Smokeless tobacco: Never Used   Substance and Sexual Activity   ? Alcohol use: Not on file   ? Drug use: Not on file   ? Sexual activity: Not on file   Lifestyle   ? Physical activity:     Days per week: Not on file     Minutes per session: Not on file   ? Stress:  Not on file   Relationships   ? Social connections:     Talks on phone: Not on file     Gets together: Not on file     Attends Holiness service: Not on file     Active member of club or organization: Not on file     Attends meetings of clubs or organizations: Not on file     Relationship status: Not on file   ? Intimate partner violence:     Fear of current or ex partner: Not on file     Emotionally abused: Not on file     Physically abused: Not on file     Forced sexual activity: Not on file   Other Topics Concern   ? Not on file   Social History Narrative   ? Not on file     Review of Systems   Constitutional: Negative for activity change, appetite change, diaphoresis and fatigue.        No issues   HENT: Negative for congestion and facial swelling.    Eyes: Negative for photophobia, redness and visual disturbance.   Respiratory: Negative for choking, shortness of breath and wheezing.    Cardiovascular: Positive for leg swelling. Negative for chest pain.        Pedal   Gastrointestinal: Negative for abdominal distention, abdominal pain, blood in stool, constipation and diarrhea.   Endocrine: Negative.    Genitourinary: Negative for difficulty urinating and dysuria.   Musculoskeletal: Negative for back pain and joint swelling.        Bilateral knee pain per OA, taking tylenol   Skin: Negative for color change.        Dry, moisturizer used   Allergic/Immunologic: Negative.    Neurological: Negative for seizures, weakness and headaches.   Hematological: Negative.    Psychiatric/Behavioral: Positive for confusion. Negative for behavioral problems, hallucinations and sleep disturbance. The patient is nervous/anxious.         Declining mentally       Vitals:    08/03/19 1018   BP: 172/71   Pulse: 71   Resp: 18   Temp: 98  F (36.7  C)   SpO2: 95%   Weight: 209 lb (94.8 kg)       Physical Exam   Constitutional: She appears well-developed and well-nourished. No distress.   No issues   HENT:   Head: Normocephalic and  atraumatic.   Mouth/Throat: Oropharynx is clear and moist. No oropharyngeal exudate.   Eyes: Pupils are equal, round, and reactive to light. Conjunctivae and EOM are normal. Right eye exhibits no discharge. Left eye exhibits no discharge. No scleral icterus.   Neck: Normal range of motion. Neck supple. No JVD present. No tracheal deviation present.   Intact   Cardiovascular: Normal rate, regular rhythm and normal heart sounds. Exam reveals no gallop and no friction rub.   No murmur heard.  S1S2, no murmur   Pulmonary/Chest: Effort normal and breath sounds normal. No stridor. No respiratory distress. She has no wheezes. She has no rales.   CTA, RA   Abdominal: Soft. Bowel sounds are normal. She exhibits no distension. There is no tenderness. There is no rebound.   No diarrhea or constipation   Genitourinary:   Genitourinary Comments: Deferred   Musculoskeletal: Normal range of motion. She exhibits edema.   1+ LE, mostly pedal, lasix discontinued   Neurological: She is alert. No cranial nerve deficit.   A/O x1-2   Skin: Skin is warm and dry. She is not diaphoretic. No erythema.   Intact, very dry, encouraged to use lotion   Psychiatric: She has a normal mood and affect.   Less agitation, Effexor weaned, dc aricept and continue namenda   Nursing note and vitals reviewed.      Medication List:  Current Outpatient Medications   Medication Sig   ? acetaminophen 500 mg coapsule Take 1 capsule by mouth. Every 4-6 hours PRN   ? bisacodyl (DULCOLAX, BISACODYL,) 10 mg suppository Insert 1 suppository (10 mg total) into the rectum daily as needed (for constipation).   ? cetirizine (ZYRTEC) 10 MG tablet Take 10 mg by mouth daily.   ? cholecalciferol, vitamin D3, 1,000 unit tablet Take 1,000 Units by mouth daily.   ? citalopram (CELEXA) 20 MG tablet TAKE ONE TABLET BY MOUTH EVERY DAY   ? cloNIDine (CATAPRES-TTS) 0.2 mg/24 hr Place 1 patch on the skin once a week.   ? fluticasone (FLONASE) 50 mcg/actuation nasal spray 1 spray into  each nostril daily.   ? gabapentin (NEURONTIN) 100 MG capsule Take 100 mg by mouth 3 (three) times a day.   ? hydrALAZINE (APRESOLINE) 25 MG tablet Take 50 mg by mouth Daily at 8:00 am.. Hold for SBP <150         ? memantine (NAMENDA) 5 MG tablet Take 10 mg by mouth daily.          ? metFORMIN (GLUCOPHAGE) 500 MG tablet Take 1,000 mg by mouth 2 (two) times a day with meals .         ? metoprolol succinate (TOPROL-XL) 25 MG Take 12.5 mg by mouth daily.          ? omeprazole (PRILOSEC) 20 MG capsule Take 20 mg by mouth daily before breakfast.   ? polyethylene glycol (MIRALAX) 17 gram/dose powder Take 17 g by mouth daily.   ? polyvinyl alcohol (LIQUIFILM TEARS) 1.4 % ophthalmic solution Administer 1 drop to both eyes 3 (three) times a day as needed for dry eyes.   ? verapamil (VERELAN PM) 100 mg 24 hr capsule TAKE ONE CAPSULE BY MOUTH EVERY DAY   ? white petrolatum (AQUAPHOR ORIGINAL) 41 % Oint Apply 1 application topically 3 (three) times a day as needed.              Labs:  Results for orders placed or performed in visit on 06/19/19   Basic Metabolic Panel   Result Value Ref Range    Sodium 141 136 - 145 mmol/L    Potassium 4.3 3.5 - 5.0 mmol/L    Chloride 103 98 - 107 mmol/L    CO2 29 22 - 31 mmol/L    Anion Gap, Calculation 9 5 - 18 mmol/L    Glucose 109 70 - 125 mg/dL    Calcium 9.7 8.5 - 10.5 mg/dL    BUN 23 8 - 28 mg/dL    Creatinine 1.02 0.60 - 1.10 mg/dL    GFR MDRD Af Amer >60 >60 mL/min/1.73m2    GFR MDRD Non Af Amer 53 (L) >60 mL/min/1.73m2     Lab Results   Component Value Date    WBC 12.2 (H) 05/28/2016    HGB 11.6 (L) 01/14/2019    HCT 32.9 (L) 05/28/2016    MCV 91 05/28/2016     05/28/2016     Vitamin D, Total (25-Hydroxy)   Date Value Ref Range Status   06/19/2019 33.5 30.0 - 80.0 ng/mL Final     Lab Results   Component Value Date    HGBA1C 6.8 (H) 06/19/2019     Lab Results   Component Value Date    [id number] 309 06/19/2019     Lab Results   Component Value Date    TSH 2.71 06/25/2018        Assessment/Plan:  1.  Hypokalemia per lasix: will dc supplement per dc lasix  2.  TARI: Last Cr 1.02 with GFR 53 on 6/19/19, improved  3.  DM: current Hgb A1c 6.8 on 6/19/19, continue metformin 1000mg two times a day  4.  Anxiety/depression: continue citalopram 20mg and Effrexor 37.5 daily,  previously discontinued wellbutrin, was related to daughter's health concerns, previously decreased effexor in 3/2018.  Minimal PHQ9 2 on 5/13/19  5.  HTN: decrease hydralazine to 50mg at AM, continue verapamil 100mg daily, and will maintain metoprolol 12.5mg daily. With clonidine patch 0.2mg/wk. -150s, controlled  6. Vit D def: continue D3 1000U, last 33.5.   7. GERD: PPI, no change.  8. Seasonal allergies: continue fluticasone and cetirizine.  9. Edema: currently minimal pedal, dc'd lasix and monitor  10. Constipation: continue miralax daily, no change.  11. Dementia: recently had cognitive assessment, discontinued donepezil, previously increased namenda to 10mg daily. Per daughter no change in memory  12. Socialization: consulted recreation. Reports attending more events.  13. Hypomagnesia: last 1.9 on 6/19/19, dc supplementation per discontinuing lasix       Electronically signed by: Ashwin Tang NP

## 2021-06-19 NOTE — LETTER
Letter by Ashwni Tang NP at      Author: Ashwin Tang NP Service: -- Author Type: --    Filed:  Encounter Date: 10/11/2019 Status: Signed         Patient: Kandi Ontiveros   MR Number: [id number]   YOB: 1940   Date of Visit: 10/11/2019     [facility]      Facility:    [facility] [[id number]] -1 Code Status: FULL CODE      Chief Complaint/Reason for Visit:   Chief Complaint   Patient presents with   ? Review Of Multiple Medical Conditions       HPI:   Kandi is a 79 y.o. female who who is residing at assisted living facility has moved to long-term care as of 6/2017 with underlying history of dementia, that has been progressive in nature.  She has periods of anxiety associated with memory loss and per daughter her short-term memory is getting progressively worse.  She has a past medical history of right knee osteoarthritis and apparently received steroid injections in the past.  She also is a diabetic that has been diet controlled and has obstructive sleep apnea.  As per the family she has been moved to long-term care so that she is closer to her  who is in the nursing home with her.     LTC: Today and previously she seems oriented though daughter reports having more confusion, previously started on namenda, no change per her and is still continuing to decline.  She denies any pain. Today BP seems more controlled, maintain metoprolol, diltiazem and clonidine patch. Her edema has decreased with minimal pedal edema, lasix discontinued prior.  Ambulates per self and usually pushing  around in his wc, though had 2 recent falls.  Per dietitian, diet changed and portioned decreased. BIMS 6/15 and PHQ9 2/20 on 8/6/19.  Previously consulted recreation for increased socialization, though minimal improvement per language barrier.    Recent ER visits:  She was sent to [facility] on 9/2/2019 status post fall.  She  suffered a laceration on the left side of her face, additional imaging was negative so was returned to long-term care the same day.    Again on 10/6/2019 she was sent to [facility] status post having witnessed fall on the sidewalk.  She did again suffer a laceration over her left eyebrow, imaging negative so return to care center the same day.    Past Medical History:  Past Medical History:   Diagnosis Date   ? Anxiety     Created by Conversion    ? Benign Adenomatous Polyp Of The Large Intestine     Created by Conversion    ? Chronic Diarrhea Of Unknown Origin     Created by Conversion    ? Dementia of the Alzheimer's type 7/13/2014   ? Diabetes Mellitus     Created by Conversion    ? Esophageal reflux     Created by Conversion    ? Gastritis    ? Herpes Zoster (Shingles)     Created by Conversion    ? Hiatal hernia    ? Hypercholesterolemia     Created by Conversion    ? Hypertension     Created by Conversion    ? Melanosis Coli     Created by Conversion [facility] Annotation: May  2 2012  1:19PM - Sheila Benavides: colonoscopy  4/30/12    ? Memory Lapses Or Loss     Created by Conversion    ? Obstructive Sleep Apnea     Created by Conversion    ? Osteopenia     Created by Conversion    ? Raynaud's Disease     Created by Conversion    ? Tricuspid Regurgitation     Created by Conversion            Surgical History:  No past surgical history on file.    Family History:   Family History   Problem Relation Age of Onset   ? Hypertension Other    ? Diabetes Other    ? Stroke Other        Social History:    Social History     Socioeconomic History   ? Marital status:      Spouse name: Not on file   ? Number of children: Not on file   ? Years of education: Not on file   ? Highest education level: Not on file   Occupational History   ? Not on file   Social Needs   ? Financial resource strain: Not on file   ? Food insecurity:     Worry: Not on file     Inability: Not on file   ? Transportation needs:      Medical: Not on file     Non-medical: Not on file   Tobacco Use   ? Smoking status: Never Smoker   ? Smokeless tobacco: Never Used   Substance and Sexual Activity   ? Alcohol use: Not on file   ? Drug use: Not on file   ? Sexual activity: Not on file   Lifestyle   ? Physical activity:     Days per week: Not on file     Minutes per session: Not on file   ? Stress: Not on file   Relationships   ? Social connections:     Talks on phone: Not on file     Gets together: Not on file     Attends Anglican service: Not on file     Active member of club or organization: Not on file     Attends meetings of clubs or organizations: Not on file     Relationship status: Not on file   ? Intimate partner violence:     Fear of current or ex partner: Not on file     Emotionally abused: Not on file     Physically abused: Not on file     Forced sexual activity: Not on file   Other Topics Concern   ? Not on file   Social History Narrative   ? Not on file     Review of Systems   Constitutional: Negative for activity change, appetite change, diaphoresis and fatigue.        No issues   HENT: Negative for congestion and facial swelling.    Eyes: Negative for photophobia, redness and visual disturbance.   Respiratory: Negative for choking, shortness of breath and wheezing.    Cardiovascular: Positive for leg swelling. Negative for chest pain.        Pedal   Gastrointestinal: Negative for abdominal distention, abdominal pain, blood in stool, constipation and diarrhea.   Endocrine: Negative.    Genitourinary: Negative for difficulty urinating and dysuria.   Musculoskeletal: Negative for back pain and joint swelling.        Bilateral knee pain per OA, taking tylenol   Skin: Negative for color change.        intact   Allergic/Immunologic: Negative.    Neurological: Negative for seizures, weakness and headaches.   Hematological: Negative.    Psychiatric/Behavioral: Positive for confusion. Negative for behavioral problems, hallucinations and sleep  disturbance. The patient is nervous/anxious.         Declining mentally       Vitals:    10/13/19 1641   BP: 138/73   Pulse: 71   Resp: 18   Temp: 98  F (36.7  C)   SpO2: 95%   Weight: 211 lb (95.7 kg)       Physical Exam   Constitutional: She appears well-developed and well-nourished. No distress.   No issues   HENT:   Head: Normocephalic and atraumatic.   Mouth/Throat: Oropharynx is clear and moist. No oropharyngeal exudate.   Eyes: Pupils are equal, round, and reactive to light. Conjunctivae and EOM are normal. Right eye exhibits no discharge. Left eye exhibits no discharge. No scleral icterus.   Neck: Normal range of motion. Neck supple. No JVD present. No tracheal deviation present.   Intact   Cardiovascular: Normal rate, regular rhythm and normal heart sounds. Exam reveals no gallop and no friction rub.   No murmur heard.  Pulmonary/Chest: Effort normal and breath sounds normal. No stridor. No respiratory distress. She has no wheezes. She has no rales.   CTA, RA   Abdominal: Soft. Bowel sounds are normal. She exhibits no distension. There is no tenderness. There is no rebound.   No diarrhea or constipation   Genitourinary:    Genitourinary Comments: Deferred   Musculoskeletal: Normal range of motion.         General: Edema present.      Comments: 1+ LE, mostly pedal, lasix discontinued     Neurological: She is alert. No cranial nerve deficit.   A/O x1-2   Skin: Skin is warm and dry. She is not diaphoretic. No erythema.   Intact, very dry   Psychiatric: She has a normal mood and affect.   Less agitation, Effexor weaned, dc aricept and continue namenda   Nursing note and vitals reviewed.      Medication List:  Current Outpatient Medications   Medication Sig   ? acetaminophen 500 mg coapsule Take 1 capsule by mouth. Every 4-6 hours PRN   ? bisacodyl (DULCOLAX, BISACODYL,) 10 mg suppository Insert 1 suppository (10 mg total) into the rectum daily as needed (for constipation).   ? cetirizine (ZYRTEC) 10 MG tablet  Take 10 mg by mouth daily.   ? cholecalciferol, vitamin D3, 1,000 unit tablet Take 1,000 Units by mouth daily.   ? citalopram (CELEXA) 20 MG tablet TAKE ONE TABLET BY MOUTH EVERY DAY   ? cloNIDine (CATAPRES-TTS) 0.2 mg/24 hr Place 1 patch on the skin once a week.   ? fluticasone (FLONASE) 50 mcg/actuation nasal spray 1 spray into each nostril daily.   ? gabapentin (NEURONTIN) 100 MG capsule Take 100 mg by mouth 3 (three) times a day.   ? hydrALAZINE (APRESOLINE) 25 MG tablet Take 50 mg by mouth Daily at 8:00 am.. Hold for SBP <150         ? memantine (NAMENDA) 5 MG tablet Take 10 mg by mouth daily.          ? metFORMIN (GLUCOPHAGE) 500 MG tablet Take 1,000 mg by mouth 2 (two) times a day with meals .         ? metoprolol succinate (TOPROL-XL) 25 MG Take 12.5 mg by mouth daily.          ? omeprazole (PRILOSEC) 20 MG capsule Take 20 mg by mouth daily before breakfast.   ? polyethylene glycol (MIRALAX) 17 gram/dose powder Take 17 g by mouth daily.   ? polyvinyl alcohol (LIQUIFILM TEARS) 1.4 % ophthalmic solution Administer 1 drop to both eyes 3 (three) times a day as needed for dry eyes.   ? verapamil (VERELAN PM) 100 mg 24 hr capsule TAKE ONE CAPSULE BY MOUTH EVERY DAY   ? white petrolatum (AQUAPHOR ORIGINAL) 41 % Oint Apply 1 application topically 3 (three) times a day as needed.              Labs:  Results for orders placed or performed in visit on 06/19/19   Basic Metabolic Panel   Result Value Ref Range    Sodium 141 136 - 145 mmol/L    Potassium 4.3 3.5 - 5.0 mmol/L    Chloride 103 98 - 107 mmol/L    CO2 29 22 - 31 mmol/L    Anion Gap, Calculation 9 5 - 18 mmol/L    Glucose 109 70 - 125 mg/dL    Calcium 9.7 8.5 - 10.5 mg/dL    BUN 23 8 - 28 mg/dL    Creatinine 1.02 0.60 - 1.10 mg/dL    GFR MDRD Af Amer >60 >60 mL/min/1.73m2    GFR MDRD Non Af Amer 53 (L) >60 mL/min/1.73m2     Lab Results   Component Value Date    WBC 12.2 (H) 05/28/2016    HGB 11.6 (L) 01/14/2019    HCT 32.9 (L) 05/28/2016    MCV 91 05/28/2016      05/28/2016     Vitamin D, Total (25-Hydroxy)   Date Value Ref Range Status   06/19/2019 33.5 30.0 - 80.0 ng/mL Final     Lab Results   Component Value Date    HGBA1C 6.6 (H) 09/09/2019     Lab Results   Component Value Date    [id number] 309 06/19/2019     Lab Results   Component Value Date    TSH 2.39 09/09/2019       Assessment/Plan:  1.  Hypokalemia per lasix: will dc supplement per dc lasix  2.  TARI: Last Cr 1.02 with GFR 53 on 6/19/19, improved  3.  DM: current Hgb A1c 6.6 on 9/9/19 so decreased metformin 750mg two times a day  4.  Anxiety/depression: continue citalopram 20mg and Effexor 37.5 daily,  previously discontinued wellbutrin, previously decreased effexor in 3/2018.  Minimal PHQ9 2 on 5/13/19  5.  HTN: continue hydralazine 50mg at AM, continue verapamil 100mg daily, and will maintain metoprolol 12.5mg daily. With clonidine patch 0.2mg/wk. -150s, controlled  6. Vit D def: continue D3 1000U, last 33.5.   7. GERD: PPI, no change.  8. Seasonal allergies: continue fluticasone and cetirizine.  9. Edema: currently minimal pedal, dc'd lasix and monitor. Wt 211lbs  10. Constipation: continue miralax daily, no change.  11. Dementia: recently had cognitive assessment, discontinued donepezil, previously increased namenda to 10mg daily. Per daughter no change in memory  12. Socialization: consulted recreation. No real change per daughter  13. Hypomagnesia: last 1.9 on 6/19/19, dc supplementation per discontinuing lasix       Electronically signed by: sAhwin Tang NP

## 2021-06-19 NOTE — LETTER
Letter by Ashwin Tang NP at      Author: Ashwin Tang NP Service: -- Author Type: --    Filed:  Encounter Date: 3/14/2019 Status: (Other)         Patient: Kandi Ontiveros   MR Number: [id number]   YOB: 1940   Date of Visit: 3/14/2019     [facility]      Facility:    [facility] [[id number]] -1 Code Status: FULL CODE      Chief Complaint/Reason for Visit:   Chief Complaint   Patient presents with   ? Review Of Multiple Medical Conditions       HPI:   Kandi is a 78 y.o. female who who is residing at assisted living facility has moved to long-term care with underlying history of dementia, that has been progressive in nature.  She has periods of anxiety associated with memory loss and per daughter her short-term memory is getting progressively worse.  She has a past medical history of right knee osteoarthritis and apparently received steroid injections in the past.  She also is a diabetic that has been diet controlled and has obstructive sleep apnea.  As per the family she has been moved to long-term care so that she is closer to her  who is in the nursing home with her.     LTC: Today and previously she seems oriented though not sure per language barrier, will attempt to use dietitian or daughter to interpret.   She denies any pain.  Today BP seems more controlled, maintain metoprolol, diltiazem and clonidine patch. Her edema has decreased, weight now decreasing as well and renal fx improved.  Ambulates per self and usually pushing  around in his wc.   Per daughter, tried donepezil, though per pharm will up titrate namenda instead and dc donepezil.  Today, per daughter memory has not improved. Per dietitian, diet changed and portioned decreased. BIMS 6/15 and PHQ9 7/20 on 2/19/19.  Recheck A1c and mag.    Patient denies pain, headache, chest pain, numbness or tingling, shortest of breath, eating or swallowing  concerns, nausea or vomiting, diarrhea or bowel abnormalities, or no new integumentary concerns today. Previously consulted recreation for increased socialization.        Past Medical History:  Past Medical History:   Diagnosis Date   ? Anxiety     Created by Conversion    ? Benign Adenomatous Polyp Of The Large Intestine     Created by Conversion    ? Chronic Diarrhea Of Unknown Origin     Created by Conversion    ? Dementia of the Alzheimer's type 7/13/2014   ? Diabetes Mellitus     Created by Conversion    ? Esophageal reflux     Created by Conversion    ? Gastritis    ? Herpes Zoster (Shingles)     Created by Conversion    ? Hiatal hernia    ? Hypercholesterolemia     Created by Conversion    ? Hypertension     Created by Conversion    ? Melanosis Coli     Created by Conversion [facility] Annotation: May  2 2012  1:19PM - Sheila Benavides: colonoscopy  4/30/12    ? Memory Lapses Or Loss     Created by Conversion    ? Obstructive Sleep Apnea     Created by Conversion    ? Osteopenia     Created by Conversion    ? Raynaud's Disease     Created by Conversion    ? Tricuspid Regurgitation     Created by Conversion            Surgical History:  No past surgical history on file.    Family History:   Family History   Problem Relation Age of Onset   ? Hypertension Other    ? Diabetes Other    ? Stroke Other        Social History:    Social History     Socioeconomic History   ? Marital status:      Spouse name: Not on file   ? Number of children: Not on file   ? Years of education: Not on file   ? Highest education level: Not on file   Occupational History   ? Not on file   Social Needs   ? Financial resource strain: Not on file   ? Food insecurity:     Worry: Not on file     Inability: Not on file   ? Transportation needs:     Medical: Not on file     Non-medical: Not on file   Tobacco Use   ? Smoking status: Never Smoker   ? Smokeless tobacco: Never Used   Substance and Sexual Activity   ? Alcohol use: Not on  file   ? Drug use: Not on file   ? Sexual activity: Not on file   Lifestyle   ? Physical activity:     Days per week: Not on file     Minutes per session: Not on file   ? Stress: Not on file   Relationships   ? Social connections:     Talks on phone: Not on file     Gets together: Not on file     Attends [employer] service: Not on file     Active member of club or organization: Not on file     Attends meetings of clubs or organizations: Not on file     Relationship status: Not on file   ? Intimate partner violence:     Fear of current or ex partner: Not on file     Emotionally abused: Not on file     Physically abused: Not on file     Forced sexual activity: Not on file   Other Topics Concern   ? Not on file   Social History Narrative   ? Not on file     Review of Systems   Constitutional: Negative for activity change, appetite change, diaphoresis and fatigue.   HENT: Negative for congestion and facial swelling.    Eyes: Negative for photophobia, redness and visual disturbance.   Respiratory: Negative for choking, shortness of breath and wheezing.    Cardiovascular: Positive for leg swelling. Negative for chest pain.   Gastrointestinal: Negative for abdominal distention, abdominal pain, blood in stool, constipation and diarrhea.   Endocrine: Negative.    Genitourinary: Negative for dysuria.   Musculoskeletal: Negative for back pain and joint swelling.        Bilateral knee pain per OA, taking tylenol   Skin: Negative for color change.        Dry, moisturizer used   Allergic/Immunologic: Negative.    Neurological: Negative for seizures, weakness and headaches.   Hematological: Negative.    Psychiatric/Behavioral: Positive for confusion. Negative for behavioral problems, hallucinations and sleep disturbance. The patient is nervous/anxious.        Vitals:    03/14/19 1646   BP: 138/70   Pulse: 70   Resp: 16   Temp: 97.8  F (36.6  C)   SpO2: 94%   Weight: 209 lb (94.8 kg)       Physical Exam   Constitutional: She appears  well-developed and well-nourished. No distress.   reported attending more outtings   HENT:   Head: Normocephalic and atraumatic.   Mouth/Throat: Oropharynx is clear and moist. No oropharyngeal exudate.   Eyes: Conjunctivae and EOM are normal. Pupils are equal, round, and reactive to light. Right eye exhibits no discharge. Left eye exhibits no discharge. No scleral icterus.   Neck: Normal range of motion. Neck supple. No JVD present. No tracheal deviation present.   Intact   Cardiovascular: Normal rate, regular rhythm and normal heart sounds. Exam reveals no gallop and no friction rub.   No murmur heard.  S1S2, no murmur   Pulmonary/Chest: Effort normal and breath sounds normal. No stridor. No respiratory distress. She has no wheezes. She has no rales.   CTA, RA   Abdominal: Soft. Bowel sounds are normal. She exhibits no distension. There is no tenderness. There is no rebound.   No diarrhea or constipation   Genitourinary:   Genitourinary Comments: Deferred   Musculoskeletal: Normal range of motion. She exhibits edema.   1+ LE edema bilaterally, mostly pedal   Neurological: She is alert. No cranial nerve deficit.   A/O x2-3, BIMS 6/15 on 2/19/19   Skin: Skin is warm and dry. She is not diaphoretic. No erythema.   Intact, very dry, encouraged to use lotion   Psychiatric: She has a normal mood and affect.   Less agitation, continue Effexor GDR, dc aricept and continue namenda   Nursing note and vitals reviewed.      Medication List:  Current Outpatient Medications   Medication Sig   ? gabapentin (NEURONTIN) 100 MG capsule Take 100 mg by mouth 3 (three) times a day.   ? acetaminophen 500 mg coapsule Take 1 capsule by mouth. Every 4-6 hours PRN   ? bisacodyl (DULCOLAX, BISACODYL,) 10 mg suppository Insert 1 suppository (10 mg total) into the rectum daily as needed (for constipation).   ? cetirizine (ZYRTEC) 10 MG tablet Take 10 mg by mouth daily.   ? cholecalciferol, vitamin D3, 1,000 unit tablet Take 1,000 Units by  mouth daily.   ? citalopram (CELEXA) 20 MG tablet TAKE ONE TABLET BY MOUTH EVERY DAY   ? cloNIDine (CATAPRES-TTS) 0.2 mg/24 hr Place 1 patch on the skin once a week.   ? donepezil (ARICEPT) 5 MG tablet Take 5 mg by mouth at bedtime.          ? fluticasone (FLONASE) 50 mcg/actuation nasal spray 1 spray into each nostril daily.   ? furosemide (LASIX) 20 MG tablet Take 20 mg by mouth daily.          ? hydrALAZINE (APRESOLINE) 25 MG tablet Take 50 mg by mouth Daily at 8:00 am.. Hold for SBP <130          ? magnesium oxide 250 mg Tab Take 250 mg by mouth daily.   ? memantine (NAMENDA) 5 MG tablet Take 10 mg by mouth daily.          ? metFORMIN (GLUCOPHAGE) 500 MG tablet Take 1,000 mg by mouth 2 (two) times a day with meals .         ? metoprolol succinate (TOPROL-XL) 25 MG Take 12.5 mg by mouth daily.          ? omeprazole (PRILOSEC) 20 MG capsule Take 20 mg by mouth daily before breakfast.   ? polyethylene glycol (MIRALAX) 17 gram/dose powder Take 17 g by mouth daily.   ? polyvinyl alcohol (LIQUIFILM TEARS) 1.4 % ophthalmic solution Administer 1 drop to both eyes 3 (three) times a day as needed for dry eyes.   ? potassium chloride SA (K-DUR,KLOR-CON) 10 MEQ tablet Take 10 mEq by mouth daily.    ? verapamil (VERELAN PM) 100 mg 24 hr capsule TAKE ONE CAPSULE BY MOUTH EVERY DAY   ? white petrolatum (AQUAPHOR ORIGINAL) 41 % Oint Apply 1 application topically 3 (three) times a day as needed.              Labs:  Results for orders placed or performed in visit on 01/21/19   Basic Metabolic Panel   Result Value Ref Range    Sodium 140 136 - 145 mmol/L    Potassium 4.4 3.5 - 5.0 mmol/L    Chloride 101 98 - 107 mmol/L    CO2 28 22 - 31 mmol/L    Anion Gap, Calculation 11 5 - 18 mmol/L    Glucose 108 70 - 125 mg/dL    Calcium 9.4 8.5 - 10.5 mg/dL    BUN 23 8 - 28 mg/dL    Creatinine 1.03 0.60 - 1.10 mg/dL    GFR MDRD Af Amer >60 >60 mL/min/1.73m2    GFR MDRD Non Af Amer 52 (L) >60 mL/min/1.73m2     Lab Results   Component Value  Date    WBC 12.2 (H) 05/28/2016    HGB 11.6 (L) 01/14/2019    HCT 32.9 (L) 05/28/2016    MCV 91 05/28/2016     05/28/2016     Vitamin D, Total (25-Hydroxy)   Date Value Ref Range Status   01/25/2018 42.3 30.0 - 80.0 ng/mL Final     Lab Results   Component Value Date    HGBA1C 7.2 (H) 01/14/2019     Assessment/Plan:  1.  Hypokalemia: continue potassium 10mEq BID, last K 4.4 on 1/21/19, have decreased lasix dosing  2.  TARI: Last creatinine 1.03 with GFR 53 pn 1/21/9.   3.  DM: current Hgb A1c 7.2, increased metformin to 1000mg two times a day, recheck A1c  4.  Anxiety/depression: continue citalopram 20mg daily, will discontinue Effexor/wellbutrin, related to daughter's health concerns, previously decreased effexor in 3/2018.  5.  HTN: decrease hydralazine to 50mg at AM, continue verapamil 100mg daily, and will maintain metoprolol 25mg daily. With clonidine patch 0.2mg/wk. -150s, monitor BP two times a day x1wk  6. Vit D def: continue D3 1000U, last 42.3 on 1/25/18.  7. GERD: PPI, no change.  8. Seasonal allergies: continue fluticasone and cetirizine.  9. Edema: decrease lasix to 20mg a day, currently 213lb.  Was on larger portion, now discontinued and CSC diet started per dietitian. Improved.  10. Constipation: continue miralax daily, no change.  11. Dementia: recently had cognitive assessment, will discontinue donepezil, previously increased namenda to 10mg daily. Per daughter no change in memory  12. Socialization: consulted recreation. Reports attending more events.  13. Hypomagnesia: start mag oxide 250mg daily, last 1.7 on 1/9/19. Recheck      Electronically signed by: Ashwin Tang NP

## 2021-06-19 NOTE — PROGRESS NOTES
[facility]      Facility:    [facility] [[id number]] -1 Code Status: FULL CODE      Chief Complaint/Reason for Visit:   Chief Complaint   Patient presents with     Problem Visit       HPI:   Kandi is a 78 y.o. female who who is residing at assisted living facility has moved to long-term care with underlying history of dementia, that has been progressive in nature.  She has periods of anxiety associated with memory loss and per daughter her short-term memory is getting progressively worse.  She has a past medical history of right knee osteoarthritis and apparently received steroid injections in the past.  She also is a diabetic that has been diet controlled and has obstructive sleep apnea.  As per the family she has been moved to long-term care so that she is closer to her  who is in the nursing home with her.     TCU: Today and previously she seems oriented though not sure per language barrier.  She denies any pain.  Previously we explained to her that we need to give her medication for her diabetes and increase 1 of her medications that she is currently taking for her blood pressure.  This has resulted in decreased renal function.  Previously noticed I ordered to DC her metformin was never carried out.  Fortunately her kidney function has rebounded taking off HCTZ and ACEi as her last Cr improved.  Previously increased hydralazine, today will increase again and maintain metoprolol dose.  Her edema had increased, so lasix was increased, weight stable and waiting for another BMP recheck. We also suggested ordering GANGA hose per her edema in her legs, though apparently she is never use of this and has since been DC'd.  Previously she also told me she has really dry skin so I ordered Aquaphor for her 3 times daily as needed, as this has been therapeutic, though today tells me she doesn't use.  Also she needed potassium supplementation, with a recheck potassium  of 4.4, another pending. Her current A1c has worsen as well, so will increase metformin coverage.  BIMS 7/15 on 6/7/18.    Patient denies pain, headache, chest pain, numbness or tingling, shortest of breath, eating or swallowing concerns, nausea or vomiting, diarrhea or bowel abnormalities, or no new integumentary concerns today. Will also have PT see her for exercise and consulted recreation for increased socialization.        Past Medical History:  Past Medical History:   Diagnosis Date     Anxiety     Created by Conversion      Benign Adenomatous Polyp Of The Large Intestine     Created by Conversion      Chronic Diarrhea Of Unknown Origin     Created by Conversion      Dementia of the Alzheimer's type 7/13/2014     Diabetes Mellitus     Created by Conversion      Esophageal reflux     Created by Conversion      Gastritis      Herpes Zoster (Shingles)     Created by Conversion      Hiatal hernia      Hypercholesterolemia     Created by Conversion      Hypertension     Created by Conversion      Melanosis Coli     Created by Conversion [facility] Annotation: May  2 2012  1:19PM - Sheila Benavides: colonoscopy  4/30/12      Memory Lapses Or Loss     Created by Conversion      Obstructive Sleep Apnea     Created by Conversion      Osteopenia     Created by Conversion      Raynaud's Disease     Created by Conversion      Tricuspid Regurgitation     Created by Conversion            Surgical History:  No past surgical history on file.    Family History:   Family History   Problem Relation Age of Onset     Hypertension Other      Diabetes Other      Stroke Other        Social History:    Social History     Social History     Marital status:      Spouse name: N/A     Number of children: N/A     Years of education: N/A     Social History Main Topics     Smoking status: Never Smoker     Smokeless tobacco: Never Used     Alcohol use Not on file     Drug use: Not on file     Sexual activity: Not on file     Other  Topics Concern     Not on file     Social History Narrative     Review of Systems   Constitutional: Positive for appetite change. Negative for activity change, diaphoresis and fatigue.   HENT: Negative.  Negative for congestion and facial swelling.    Eyes: Negative.  Negative for visual disturbance.   Respiratory: Negative.  Negative for shortness of breath and wheezing.    Cardiovascular: Negative.  Negative for chest pain.   Gastrointestinal: Negative.  Negative for abdominal distention, abdominal pain, blood in stool, constipation and diarrhea.   Endocrine: Negative.    Genitourinary: Negative.  Negative for dysuria.   Musculoskeletal: Negative for back pain and joint swelling.        Bilateral knee pain per OA, taking tylenol   Skin: Negative.  Negative for color change.        moisturizer used   Allergic/Immunologic: Negative.    Neurological: Negative.  Negative for weakness and headaches.   Hematological: Negative.    Psychiatric/Behavioral: Negative for confusion and sleep disturbance. The patient is nervous/anxious.        Vitals:    08/15/18 1800   BP: 157/75   Pulse: 60   Resp: 16   Temp: 97  F (36.1  C)   SpO2: 93%   Weight: 218 lb (98.9 kg)       Physical Exam   Constitutional: She appears well-developed and well-nourished. No distress.   Wants to participate in more outtings, reported attending more outtings   HENT:   Head: Normocephalic and atraumatic.   Mouth/Throat: Oropharynx is clear and moist. No oropharyngeal exudate.   Eyes: Pupils are equal, round, and reactive to light. Right eye exhibits no discharge. Left eye exhibits no discharge. No scleral icterus.   Neck: Normal range of motion. Neck supple. No JVD present. No tracheal deviation present.   Intact   Cardiovascular: Normal rate and regular rhythm.  Exam reveals no gallop and no friction rub.    No murmur heard.  S1S2, no murmur   Pulmonary/Chest: Breath sounds normal. No stridor. No respiratory distress. She has no wheezes. She has no  rales.   CTA   Abdominal: Soft. Bowel sounds are normal. She exhibits no distension. There is no tenderness. There is no rebound.   No diarrhea or constipation   Genitourinary:   Genitourinary Comments: Deferred   Musculoskeletal: Normal range of motion. She exhibits edema.   1-2+ LE edema bilaterally, mostly pedal   Neurological: She is alert. No cranial nerve deficit.   A/O x2-3, BIMS 9/15   Skin: Skin is warm and dry. She is not diaphoretic. No erythema.   Intact, very dry, encouraged to use lotion   Psychiatric: She has a normal mood and affect.   Has anxiety and depression, more agitation noted, per cognitive eval will start donepezil   Nursing note and vitals reviewed.      Medication List:  Current Outpatient Prescriptions   Medication Sig     hydrALAZINE (APRESOLINE) 25 MG tablet Take 25 mg by mouth 3 (three) times a day. Hold for SBP <130     acetaminophen 500 mg coapsule Take 1 capsule by mouth. Every 4-6 hours PRN     bisacodyl (DULCOLAX, BISACODYL,) 10 mg suppository Insert 1 suppository (10 mg total) into the rectum daily as needed (for constipation).     buPROPion (WELLBUTRIN XL) 150 MG 24 hr tablet Take 1 tablet (150 mg total) by mouth daily.     CALCIUM CARBONATE/VITAMIN D3 (CALCIUM 600 WITH VITAMIN D3 ORAL) Take 2 tablets by mouth daily.     cetirizine (ZYRTEC) 10 MG tablet Take 10 mg by mouth daily.     cholecalciferol, vitamin D3, 1,000 unit tablet Take 1,000 Units by mouth daily.     citalopram (CELEXA) 20 MG tablet TAKE ONE TABLET BY MOUTH EVERY DAY     dextromethorphan (DELSYM) 30 mg/5 mL liquid Take 60 mg by mouth every 12 (twelve) hours as needed for cough.     donepezil (ARICEPT) 5 MG tablet Take 10 mg by mouth at bedtime.      fluticasone (FLONASE) 50 mcg/actuation nasal spray 1 spray into each nostril daily.     furosemide (LASIX) 20 MG tablet Take 20 mg by mouth 2 (two) times a day at 9am and 6pm.      galantamine (RAZADYNE ER) 24 MG 24 hr capsule Take 1 capsule (24 mg total) by mouth  daily with breakfast.     lansoprazole (PREVACID) 30 MG capsule TAKE 1 CAPSULE BY MOUTH EVERY DAY     metFORMIN (GLUCOPHAGE) 500 MG tablet Take 500 mg by mouth daily with supper.      metFORMIN (GLUCOPHAGE) 500 MG tablet Take 250 mg by mouth daily with breakfast.     metoprolol succinate (TOPROL-XL) 25 MG Take 25 mg by mouth daily.      OMEGA-3S/DHA/EPA/FISH OIL (OMEGA 3 ORAL) Take 2 capsules by mouth daily.     polyethylene glycol (MIRALAX) 17 gram/dose powder Take 17 g by mouth daily.     potassium chloride SA (K-DUR,KLOR-CON) 10 MEQ tablet Take 10 mEq by mouth daily.      venlafaxine (EFFEXOR XR) 37.5 MG 24 hr capsule Take 2 capsules (75 mg total) by mouth daily. (Patient taking differently: Take 37.5 mg by mouth daily. GDR 3/2018)     verapamil (VERELAN PM) 100 mg 24 hr capsule TAKE ONE CAPSULE BY MOUTH EVERY DAY     vitamin E 1000 UNIT capsule Take 1,000 Units by mouth 2 (two) times a day.     white petrolatum (AQUAPHOR ORIGINAL) 41 % Oint Apply topically 3 (three) times a day as needed.       Labs:  Results for orders placed or performed in visit on 07/12/18   Basic Metabolic Panel   Result Value Ref Range    Sodium 138 136 - 145 mmol/L    Potassium 4.0 3.5 - 5.0 mmol/L    Chloride 100 98 - 107 mmol/L    CO2 30 22 - 31 mmol/L    Anion Gap, Calculation 8 5 - 18 mmol/L    Glucose 229 (H) 70 - 125 mg/dL    Calcium 9.4 8.5 - 10.5 mg/dL    BUN 28 8 - 28 mg/dL    Creatinine 1.32 (H) 0.60 - 1.10 mg/dL    GFR MDRD Af Amer 47 (L) >60 mL/min/1.73m2    GFR MDRD Non Af Amer 39 (L) >60 mL/min/1.73m2     Lab Results   Component Value Date    WBC 12.2 (H) 05/28/2016    HGB 11.1 (L) 05/28/2016    HCT 32.9 (L) 05/28/2016    MCV 91 05/28/2016     05/28/2016     Vitamin D, Total (25-Hydroxy)   Date Value Ref Range Status   01/25/2018 42.3 30.0 - 80.0 ng/mL Final     Lab Results   Component Value Date    HGBA1C 7.0 (H) 06/25/2018     Assessment/Plan:  1.  Hypokalemia: continue potassium 10mEq BID, last K 4.0.  2.  TARI:  Last creatinine 1.32 with GFR of 39 on 7/12/18.   3.  DM: current Hgb A1c 7.0, previous 6.5, increase metformin 500mg at PM and 250mg at AM, recheck A1c in sept.  4.  Anxiety/depression: continue citalopram and effexor, related to daughter's health concerns, decreased effexor in 3/2018.  5.  HTN: increase hydralazine to 25mg three times a day, hold if SBP <130, continue verapamil 100mg daily, and will maintain metoprolol 25mg daily.  6. Vit D def: continue D3 1000U, last 42.3 on 1/25/18.  7. GERD: PPI, no change.  8. Seasonal allergies: continue fluticasone and cetirizine.  9. Edema: maintain lasix 20mg two times a day, currently 216lb.   10. Constipation: continue miralax daily, no change.  11. Dementia: recently had cognitive assessment, increase donepezil to 10mg at HS, re-evaluate with daughter.  12. Socialization: consulted recreation. Reports attending more events.    The care plan has been reviewed and all orders signed. Changes to care plan, if any, as noted. Otherwise, continue care plan of care.      Electronically signed by: Ashwin Tang NP

## 2021-06-19 NOTE — PROGRESS NOTES
[facility]      Facility:    [facility] [[id number]] -1 Code Status: FULL CODE      Chief Complaint/Reason for Visit:   Chief Complaint   Patient presents with     Problem Visit       HPI:   Kandi is a 78 y.o. female who who is residing at assisted living facility has moved to long-term care with underlying history of dementia, that has been progressive in nature.  She has periods of anxiety associated with memory loss and per daughter her short-term memory is getting progressively worse.  She has a past medical history of right knee osteoarthritis and apparently received steroid injections in the past.  She also is a diabetic that has been diet controlled and has obstructive sleep apnea.  As per the family she has been moved to long-term care so that she is closer to her  who is in the nursing home with her.     TCU: Today and previously she seems oriented though not sure per language barrier.  She denies any pain.  Previously we explained to her that we need to give her medication for her diabetes and increase 1 of her medications that she is currently taking for her blood pressure.  This has resulted in decreased renal function.  Previously noticed I ordered to DC her metformin was never carried out.  Fortunately her kidney function has rebounded taking off HCTZ and ACEi as her last Cr improved.  Previously I will started metoprolol 12.5 mg daily, though today will increase hydralazine and monitor.  Today her edema has increased, so will increase lasix and monitor response. We also suggested ordering GANGA hose per her edema in her legs, though apparently she is never use of this and has since been DC'd.  Previously she also told me she has really dry skin so I ordered Aquaphor for her 3 times daily as needed, as this has been therapeutic, though today tells me she doesn't use.  Also she needed potassium supplementation, with a recheck potassium of 4.4.  Her current A1c has worsen as well, so will increase metformin coverage.  BIMS 7/15 on 6/7/18.    Patient denies pain, headache, chest pain, numbness or tingling, shortest of breath, eating or swallowing concerns, nausea or vomiting, diarrhea or bowel abnormalities, or no new integumentary concerns today. Will also have PT see her for exercise and consulted recreation for increased socialization.        Past Medical History:  Past Medical History:   Diagnosis Date     Anxiety     Created by Conversion      Benign Adenomatous Polyp Of The Large Intestine     Created by Conversion      Chronic Diarrhea Of Unknown Origin     Created by Conversion      Dementia of the Alzheimer's type 7/13/2014     Diabetes Mellitus     Created by Conversion      Esophageal reflux     Created by Conversion      Gastritis      Herpes Zoster (Shingles)     Created by Conversion      Hiatal hernia      Hypercholesterolemia     Created by Conversion      Hypertension     Created by Conversion      Melanosis Coli     Created by Conversion [facility] Annotation: May  2 2012  1:19PM - Sheila Benavides: colonoscopy  4/30/12      Memory Lapses Or Loss     Created by Conversion      Obstructive Sleep Apnea     Created by Conversion      Osteopenia     Created by Conversion      Raynaud's Disease     Created by Conversion      Tricuspid Regurgitation     Created by Conversion            Surgical History:  No past surgical history on file.    Family History:   Family History   Problem Relation Age of Onset     Hypertension Other      Diabetes Other      Stroke Other        Social History:    Social History     Social History     Marital status:      Spouse name: N/A     Number of children: N/A     Years of education: N/A     Social History Main Topics     Smoking status: Never Smoker     Smokeless tobacco: Never Used     Alcohol use Not on file     Drug use: Not on file     Sexual activity: Not on file     Other Topics Concern     Not on  file     Social History Narrative     Review of Systems   Constitutional: Positive for appetite change. Negative for activity change, diaphoresis and fatigue.   HENT: Negative.  Negative for congestion and facial swelling.    Eyes: Negative.  Negative for visual disturbance.   Respiratory: Negative.  Negative for shortness of breath and wheezing.    Cardiovascular: Negative.  Negative for chest pain.   Gastrointestinal: Negative.  Negative for abdominal distention, abdominal pain, blood in stool, constipation and diarrhea.   Endocrine: Negative.    Genitourinary: Negative.  Negative for dysuria.   Musculoskeletal: Negative for back pain and joint swelling.        Bilateral knee pain per OA, taking tylenol   Skin: Negative.  Negative for color change.        moisturizer used   Allergic/Immunologic: Negative.    Neurological: Negative.  Negative for weakness and headaches.   Hematological: Negative.    Psychiatric/Behavioral: Negative for confusion and sleep disturbance. The patient is nervous/anxious.        Vitals:    07/03/18 1857   BP: 145/60   Pulse: 71   Resp: 18   Temp: 98  F (36.7  C)   SpO2: 96%   Weight: 219 lb (99.3 kg)       Physical Exam   Constitutional: She is oriented to person, place, and time. She appears well-developed and well-nourished. No distress.   Wants to participate in more outtings, ordered recreation consult   HENT:   Head: Normocephalic and atraumatic.   Mouth/Throat: Oropharynx is clear and moist. No oropharyngeal exudate.   Eyes: Pupils are equal, round, and reactive to light. Right eye exhibits no discharge. Left eye exhibits no discharge. No scleral icterus.   Neck: Normal range of motion. Neck supple. No JVD present. No tracheal deviation present.   Intact   Cardiovascular: Normal rate and regular rhythm.  Exam reveals no gallop and no friction rub.    No murmur heard.  S1S2, no murmur   Pulmonary/Chest: Breath sounds normal. No stridor. No respiratory distress. She has no wheezes.  She has no rales.   CTA   Abdominal: Soft. Bowel sounds are normal. She exhibits no distension. There is no tenderness. There is no rebound.   No diarrhea or constipation   Genitourinary:   Genitourinary Comments: Deferred   Musculoskeletal: Normal range of motion. She exhibits edema.   1-2+ LE edema bilaterally   Neurological: She is alert and oriented to person, place, and time. No cranial nerve deficit.   BIMS 9/15   Skin: Skin is warm and dry. She is not diaphoretic. No erythema.   Intact, very dry, encouraged to use lotion   Psychiatric: She has a normal mood and affect.   Has anxiety and depression, more agitation noted, scheduled for follow up next week per neuro testing   Nursing note and vitals reviewed.      Medication List:  Current Outpatient Prescriptions   Medication Sig     metFORMIN (GLUCOPHAGE) 500 MG tablet Take 250 mg by mouth daily with breakfast.     acetaminophen 500 mg coapsule Take 1 capsule by mouth. Every 4-6 hours PRN     bisacodyl (DULCOLAX, BISACODYL,) 10 mg suppository Insert 1 suppository (10 mg total) into the rectum daily as needed (for constipation).     buPROPion (WELLBUTRIN XL) 150 MG 24 hr tablet Take 1 tablet (150 mg total) by mouth daily.     CALCIUM CARBONATE/VITAMIN D3 (CALCIUM 600 WITH VITAMIN D3 ORAL) Take 2 tablets by mouth daily.     cetirizine (ZYRTEC) 10 MG tablet Take 10 mg by mouth daily.     cholecalciferol, vitamin D3, 1,000 unit tablet Take 1,000 Units by mouth daily.     citalopram (CELEXA) 20 MG tablet TAKE ONE TABLET BY MOUTH EVERY DAY     dextromethorphan (DELSYM) 30 mg/5 mL liquid Take 60 mg by mouth every 12 (twelve) hours as needed for cough.     fluticasone (FLONASE) 50 mcg/actuation nasal spray 1 spray into each nostril daily.     furosemide (LASIX) 20 MG tablet Take 20 mg by mouth daily.     galantamine (RAZADYNE ER) 24 MG 24 hr capsule Take 1 capsule (24 mg total) by mouth daily with breakfast.     hydrALAZINE (APRESOLINE) 10 MG tablet Take 10 mg by  mouth 3 (three) times a day. Hold for SBP <130     lansoprazole (PREVACID) 30 MG capsule TAKE 1 CAPSULE BY MOUTH EVERY DAY     metFORMIN (GLUCOPHAGE) 500 MG tablet Take 500 mg by mouth daily with supper.      metoprolol succinate (TOPROL-XL) 25 MG Take 12.5 mg by mouth daily.     OMEGA-3S/DHA/EPA/FISH OIL (OMEGA 3 ORAL) Take 2 capsules by mouth daily.     polyethylene glycol (MIRALAX) 17 gram/dose powder Take 17 g by mouth daily.     potassium chloride SA (K-DUR,KLOR-CON) 10 MEQ tablet Take 10 mEq by mouth daily.      venlafaxine (EFFEXOR XR) 37.5 MG 24 hr capsule Take 2 capsules (75 mg total) by mouth daily. (Patient taking differently: Take 37.5 mg by mouth daily. GDR 3/2018)     verapamil (VERELAN PM) 100 mg 24 hr capsule TAKE ONE CAPSULE BY MOUTH EVERY DAY     vitamin E 1000 UNIT capsule Take 1,000 Units by mouth 2 (two) times a day.     white petrolatum (AQUAPHOR ORIGINAL) 41 % Oint Apply topically 3 (three) times a day as needed.       Labs:  Recent Results (from the past 240 hour(s))   Basic Metabolic Panel    Collection Time: 06/25/18  9:45 AM   Result Value Ref Range    Sodium 138 136 - 145 mmol/L    Potassium 4.4 3.5 - 5.0 mmol/L    Chloride 103 98 - 107 mmol/L    CO2 25 22 - 31 mmol/L    Anion Gap, Calculation 10 5 - 18 mmol/L    Glucose 185 (H) 70 - 125 mg/dL    Calcium 9.6 8.5 - 10.5 mg/dL    BUN 31 (H) 8 - 28 mg/dL    Creatinine 1.27 (H) 0.60 - 1.10 mg/dL    GFR MDRD Af Amer 50 (L) >60 mL/min/1.73m2    GFR MDRD Non Af Amer 41 (L) >60 mL/min/1.73m2   Glycosylated Hemoglobin A1C    Collection Time: 06/25/18  9:45 AM   Result Value Ref Range    Hemoglobin A1c 7.0 (H) 4.2 - 6.1 %   Magnesium    Collection Time: 06/25/18  9:45 AM   Result Value Ref Range    Magnesium 2.3 1.8 - 2.6 mg/dL   Thyroid Stimulating Hormone (TSH)    Collection Time: 06/25/18  9:45 AM   Result Value Ref Range    TSH 2.71 0.30 - 5.00 uIU/mL       Assessment/Plan:  1.  Hypokalemia: continue potassium 10mEq BID, last K 4.4 on 10 mEq  daily.  2.  TARI: Last creatinine 1.27 with GFR of 41 on 6/25/18.   3.  DM: current Hgb A1c 7.0, previous 6.5, increase metformin 500mg at PM and 250mg at AM, recheck A1c in 3 months.   4.  Anxiety/depression: continue citalopram and effexor, related to daughter's health concerns, decreased effexor in 3/2018.  5.  HTN: increase hydralazine to 10mg three times a day, hold if SBP <130, continue verapamil 100mg daily, and metoprolol 12.5mg daily.  6. Vit D def: continue D3 1000U, last 42.3 on 1/25/18.  7. GERD: PPI, no change.  8. Seasonal allergies: continue fluticasone and cetirizine.  9. Edema: will increase lasix 20mg to two times a day, continue to monitor.  10. Constipation: continue miralax daily, no change.  11. Dementia: recently had cognitive assessment, is scheduled for f/u next week  12. Socialization: consulted recreation    The care plan has been reviewed and all orders signed. Changes to care plan, if any, as noted. Otherwise, continue care plan of care.      Electronically signed by: Ashwin Tang NP

## 2021-06-19 NOTE — LETTER
Letter by Kathya Vivas MBBS at      Author: Kathya Vivas MBBS Service: -- Author Type: --    Filed:  Encounter Date: 9/16/2019 Status: (Other)         Patient: Kandi Ontiveros   MR Number: [id number]   YOB: 1940   Date of Visit: 9/16/2019     [facility]      Code Status:  FULL CODE  Visit Type: Review Of Multiple Medical Conditions     Facility:  [facility] [[id number]]           History of Present Illness: Kandi Ontiveros is a 79 y.o. female who is a resident of [facility].  She has periods of anxiety associated with memory loss and  her short-term memory is getting progressively worse.   BIMS 5/15  Recently taken off Aricept .  Remains a poor historian due to that  She recently had a fall and suffered contusion of her face forehead and left knee.  She was sent to the emergency room on 9/ 2 /19 conservative treatment given.  She refused any intervention refused not to walk and refused imaging.  She was discharged back to the nursing home.  Noted again to be ambulating without any assist device gait remains highly unsteady  She continues to have significant pedal edema and Lasix has been discontinued.  Patient also has diabetes currently her A1c is 6.6 on last check .  She is only on oral agents with metformin daily  Blood pressures are stable she is on a low-dose of medications currently    Past Medical History:   Diagnosis Date   ? Anxiety     Created by Conversion    ? Benign Adenomatous Polyp Of The Large Intestine     Created by Conversion    ? Chronic Diarrhea Of Unknown Origin     Created by Conversion    ? Dementia of the Alzheimer's type 7/13/2014   ? Diabetes Mellitus     Created by Conversion    ? Esophageal reflux     Created by Conversion    ? Gastritis    ? Herpes Zoster (Shingles)     Created by Conversion    ? Hiatal hernia    ? Hypercholesterolemia     Created by Conversion    ? Hypertension     Created by Conversion    ? Melanosis  Coli     Created by Conversion [facility] Annotation: May  2 2012  1:19PM - Sheila Benavides: colonoscopy  4/30/12    ? Memory Lapses Or Loss     Created by Conversion    ? Obstructive Sleep Apnea     Created by Conversion    ? Osteopenia     Created by Conversion    ? Raynaud's Disease     Created by Conversion    ? Tricuspid Regurgitation     Created by Conversion      No past surgical history on file.  Family History   Problem Relation Age of Onset   ? Hypertension Other    ? Diabetes Other    ? Stroke Other      Social History     Socioeconomic History   ? Marital status:      Spouse name: Not on file   ? Number of children: Not on file   ? Years of education: Not on file   ? Highest education level: Not on file   Occupational History   ? Not on file   Social Needs   ? Financial resource strain: Not on file   ? Food insecurity:     Worry: Not on file     Inability: Not on file   ? Transportation needs:     Medical: Not on file     Non-medical: Not on file   Tobacco Use   ? Smoking status: Never Smoker   ? Smokeless tobacco: Never Used   Substance and Sexual Activity   ? Alcohol use: Not on file   ? Drug use: Not on file   ? Sexual activity: Not on file   Lifestyle   ? Physical activity:     Days per week: Not on file     Minutes per session: Not on file   ? Stress: Not on file   Relationships   ? Social connections:     Talks on phone: Not on file     Gets together: Not on file     Attends Yazidi service: Not on file     Active member of club or organization: Not on file     Attends meetings of clubs or organizations: Not on file     Relationship status: Not on file   ? Intimate partner violence:     Fear of current or ex partner: Not on file     Emotionally abused: Not on file     Physically abused: Not on file     Forced sexual activity: Not on file   Other Topics Concern   ? Not on file   Social History Narrative   ? Not on file   lived in [facility]  Current Outpatient Medications   Medication Sig  Dispense Refill   ? acetaminophen 500 mg coapsule Take 1 capsule by mouth. Every 4-6 hours PRN     ? bisacodyl (DULCOLAX, BISACODYL,) 10 mg suppository Insert 1 suppository (10 mg total) into the rectum daily as needed (for constipation). 12 suppository 0   ? cetirizine (ZYRTEC) 10 MG tablet Take 10 mg by mouth daily.     ? cholecalciferol, vitamin D3, 1,000 unit tablet Take 1,000 Units by mouth daily.     ? citalopram (CELEXA) 20 MG tablet TAKE ONE TABLET BY MOUTH EVERY DAY 90 tablet 0   ? cloNIDine (CATAPRES-TTS) 0.2 mg/24 hr Place 1 patch on the skin once a week.     ? fluticasone (FLONASE) 50 mcg/actuation nasal spray 1 spray into each nostril daily. 16 g 12   ? gabapentin (NEURONTIN) 100 MG capsule Take 100 mg by mouth 3 (three) times a day.     ? hydrALAZINE (APRESOLINE) 25 MG tablet Take 50 mg by mouth Daily at 8:00 am.. Hold for SBP <150           ? memantine (NAMENDA) 5 MG tablet Take 10 mg by mouth daily.            ? metFORMIN (GLUCOPHAGE) 500 MG tablet Take 1,000 mg by mouth 2 (two) times a day with meals .           ? metoprolol succinate (TOPROL-XL) 25 MG Take 12.5 mg by mouth daily.            ? omeprazole (PRILOSEC) 20 MG capsule Take 20 mg by mouth daily before breakfast.     ? polyethylene glycol (MIRALAX) 17 gram/dose powder Take 17 g by mouth daily. 255 g 0   ? polyvinyl alcohol (LIQUIFILM TEARS) 1.4 % ophthalmic solution Administer 1 drop to both eyes 3 (three) times a day as needed for dry eyes.     ? verapamil (VERELAN PM) 100 mg 24 hr capsule TAKE ONE CAPSULE BY MOUTH EVERY DAY 90 capsule 1   ? white petrolatum (AQUAPHOR ORIGINAL) 41 % Oint Apply 1 application topically 3 (three) times a day as needed.              No current facility-administered medications for this visit.      Allergies   Allergen Reactions   ? Codeine Sulfate          Review of Systems:    Constitutional: Negative.  Negative for fever, chills,has  activity change, appetite change and fatigue.   HENT: Negative for  congestion and facial swelling.    Eyes: Negative for photophobia, redness and visual disturbance.   Respiratory: Negative for cough and chest tightness.    Cardiovascular: Negative for chest pain, palpitations and leg swelling.   Gastrointestinal: Negative for nausea, diarrhea, constipation, blood in stool and abdominal distention.   Genitourinary: Negative.    Musculoskeletal: Negative.   she has bilateral knee osteoarthritis with knee pain  Skin: Negative.    Neurological: Negative for dizziness, tremors, syncope, weakness, light-headedness and headaches.   Hematological: Does not bruise/bleed easily.   Psychiatric/Behavioral: Negative.  She had a flat effect with a poor recall   Has no recall of recent events in her fall and just smiles      Physical Exam:    Weight 206 pounds blood pressure 138/68 temp 98 pulse 83  Obese  GENERAL: no acute distress. Cooperative in conversation.  Has limited recall and mostly smiles to questions  HEENT: pupils are equal, round and reactive. Oral mucosa is moist and intact.  RESP:Chest symmetric. Regular respiratory rate. No stridor.  CVS: S1S2  ABD: Nondistended, soft.  EXTREMITIES: She has pedal and lower extremity edema.   NEURO: non focal. Alert and oriented xSELF   PSYCH: within normal limits. No depression or anxiety.  SKIN: warm dry intact     Labs:    Lab Results   Component Value Date    HGBA1C 6.6 (H) 09/09/2019     Results for orders placed or performed in visit on 06/19/19   Basic Metabolic Panel   Result Value Ref Range    Sodium 141 136 - 145 mmol/L    Potassium 4.3 3.5 - 5.0 mmol/L    Chloride 103 98 - 107 mmol/L    CO2 29 22 - 31 mmol/L    Anion Gap, Calculation 9 5 - 18 mmol/L    Glucose 109 70 - 125 mg/dL    Calcium 9.7 8.5 - 10.5 mg/dL    BUN 23 8 - 28 mg/dL    Creatinine 1.02 0.60 - 1.10 mg/dL    GFR MDRD Af Amer >60 >60 mL/min/1.73m2    GFR MDRD Non Af Amer 53 (L) >60 mL/min/1.73m2     Lab Results   Component Value Date    [id number] 309 06/19/2019      Vitamin D, Total (25-Hydroxy)   Date Value Ref Range Status   06/19/2019 33.5 30.0 - 80.0 ng/mL Final         Assessment/Plan:    Recent history of a fall on 9/2/2019 patient sent to the emergency room.  Noted to have contusion and laceration but refused further imaging and intervention and decided to return home she became very agitated in the emergency room  Dementia with worsening recheck B IMS is 5/15  Taken off Aricept currently on a low-dose of Namenda  Diabetes type 2 currently on a higher dose of metformin  Improvement in A1c to 6.6 on recheck.  On 9/9/2019  Blood sugar checks have been discontinued  Obesity on weight management program with improvement noted in weight  Anxiety and depression currently on Celexa at a low dose with Effexor Wellbutrin discontinued mood has been stable.  Vitamin D deficiency she is adequately replete  Hypertension stable on her multiple medication regimen.  Debilitation she is requiring more cares and can barely ambulate now without assistance.  Cognitively she is declining to.  Daughter remains involved in her care continue with her current care plan  Unfortunately she is experiencing both physical decline with gait instability.  Noticed to be ambulating several times and remains a fall risk.  Unfortunately she refuses any interventions either.  She refused any imaging and x-rays in the hospital and became very agitated continue to monitor mood and behaviors  Electronically signed by: EBRLIN Butts  This progress note was completed using Dragon software and there may be grammatical errors.

## 2021-06-19 NOTE — LETTER
Letter by Kathya Vivas MBBS at      Author: Kathya Vivas MBBS Service: -- Author Type: --    Filed:  Encounter Date: 5/20/2019 Status: (Other)         Patient: Kandi Ontiveros   MR Number: [id number]   YOB: 1940   Date of Visit: 5/20/2019     [facility]      Code Status:  FULL CODE  Visit Type: Review Of Multiple Medical Conditions     Facility:  [facility] [[id number]]           History of Present Illness: Kandi Ontiveros is a 78 y.o. female who is a resident of [facility].  She has periods of anxiety associated with memory loss and  her short-term memory is getting progressively worse.   BIMS 5/15  Recently taken off Aricept and Namenda is now lower dose too.  She has a history of diabetes her last A1c was 7.2.  Her metformin has been increased to 1 g twice a day.  Recheck A1c on 3/15/2019 was 6.9,  She has underlying history of anxiety and depression.  She was on Effexor and Wellbutrin.  Her daughter's request Wellbutrin have been discontinued, she remains on Celexa 20 mg daily ,  mood and behaviors have been stable.  She also has a history of hypertension with elevated blood pressures.  She was on a low-dose of hydralazine she remained metoprolol as well as verapamil and clonidine.  Patient also has morbid obesity.  She is on a portion control diet so far weights have been heavy.  Recheck weights to show an improvement at 208 pounds    Past Medical History:   Diagnosis Date   ? Anxiety     Created by Conversion    ? Benign Adenomatous Polyp Of The Large Intestine     Created by Conversion    ? Chronic Diarrhea Of Unknown Origin     Created by Conversion    ? Dementia of the Alzheimer's type 7/13/2014   ? Diabetes Mellitus     Created by Conversion    ? Esophageal reflux     Created by Conversion    ? Gastritis    ? Herpes Zoster (Shingles)     Created by Conversion    ? Hiatal hernia    ? Hypercholesterolemia     Created by Conversion    ?  Hypertension     Created by Conversion    ? Melanosis Coli     Created by Conversion [facility] Annotation: May  2 2012  1:19PM - Sheila Benavides: colonoscopy  4/30/12    ? Memory Lapses Or Loss     Created by Conversion    ? Obstructive Sleep Apnea     Created by Conversion    ? Osteopenia     Created by Conversion    ? Raynaud's Disease     Created by Conversion    ? Tricuspid Regurgitation     Created by Conversion      No past surgical history on file.  Family History   Problem Relation Age of Onset   ? Hypertension Other    ? Diabetes Other    ? Stroke Other      Social History     Socioeconomic History   ? Marital status:      Spouse name: Not on file   ? Number of children: Not on file   ? Years of education: Not on file   ? Highest education level: Not on file   Occupational History   ? Not on file   Social Needs   ? Financial resource strain: Not on file   ? Food insecurity:     Worry: Not on file     Inability: Not on file   ? Transportation needs:     Medical: Not on file     Non-medical: Not on file   Tobacco Use   ? Smoking status: Never Smoker   ? Smokeless tobacco: Never Used   Substance and Sexual Activity   ? Alcohol use: Not on file   ? Drug use: Not on file   ? Sexual activity: Not on file   Lifestyle   ? Physical activity:     Days per week: Not on file     Minutes per session: Not on file   ? Stress: Not on file   Relationships   ? Social connections:     Talks on phone: Not on file     Gets together: Not on file     Attends Sikhism service: Not on file     Active member of club or organization: Not on file     Attends meetings of clubs or organizations: Not on file     Relationship status: Not on file   ? Intimate partner violence:     Fear of current or ex partner: Not on file     Emotionally abused: Not on file     Physically abused: Not on file     Forced sexual activity: Not on file   Other Topics Concern   ? Not on file   Social History Narrative   ? Not on file   lived in  [place]  Current Outpatient Medications   Medication Sig Dispense Refill   ? acetaminophen 500 mg coapsule Take 1 capsule by mouth. Every 4-6 hours PRN     ? bisacodyl (DULCOLAX, BISACODYL,) 10 mg suppository Insert 1 suppository (10 mg total) into the rectum daily as needed (for constipation). 12 suppository 0   ? cetirizine (ZYRTEC) 10 MG tablet Take 10 mg by mouth daily.     ? cholecalciferol, vitamin D3, 1,000 unit tablet Take 1,000 Units by mouth daily.     ? citalopram (CELEXA) 20 MG tablet TAKE ONE TABLET BY MOUTH EVERY DAY 90 tablet 0   ? cloNIDine (CATAPRES-TTS) 0.2 mg/24 hr Place 1 patch on the skin once a week.     ? donepezil (ARICEPT) 5 MG tablet Take 5 mg by mouth at bedtime.            ? fluticasone (FLONASE) 50 mcg/actuation nasal spray 1 spray into each nostril daily. 16 g 12   ? furosemide (LASIX) 20 MG tablet Take 20 mg by mouth daily.            ? gabapentin (NEURONTIN) 100 MG capsule Take 100 mg by mouth 3 (three) times a day.     ? hydrALAZINE (APRESOLINE) 25 MG tablet Take 50 mg by mouth Daily at 8:00 am.. Hold for SBP <130            ? magnesium oxide 250 mg Tab Take 250 mg by mouth daily.     ? memantine (NAMENDA) 5 MG tablet Take 10 mg by mouth daily.            ? metFORMIN (GLUCOPHAGE) 500 MG tablet Take 1,000 mg by mouth 2 (two) times a day with meals .           ? metoprolol succinate (TOPROL-XL) 25 MG Take 12.5 mg by mouth daily.            ? omeprazole (PRILOSEC) 20 MG capsule Take 20 mg by mouth daily before breakfast.     ? polyethylene glycol (MIRALAX) 17 gram/dose powder Take 17 g by mouth daily. 255 g 0   ? polyvinyl alcohol (LIQUIFILM TEARS) 1.4 % ophthalmic solution Administer 1 drop to both eyes 3 (three) times a day as needed for dry eyes.     ? potassium chloride SA (K-DUR,KLOR-CON) 10 MEQ tablet Take 10 mEq by mouth daily.      ? verapamil (VERELAN PM) 100 mg 24 hr capsule TAKE ONE CAPSULE BY MOUTH EVERY DAY 90 capsule 1   ? white petrolatum (AQUAPHOR ORIGINAL) 41 % Oint Apply 1  application topically 3 (three) times a day as needed.              No current facility-administered medications for this visit.      Allergies   Allergen Reactions   ? Codeine Sulfate          Review of Systems:    Constitutional: Negative.  Negative for fever, chills,has  activity change, appetite change and fatigue.   HENT: Negative for congestion and facial swelling.    Eyes: Negative for photophobia, redness and visual disturbance.   Respiratory: Negative for cough and chest tightness.    Cardiovascular: Negative for chest pain, palpitations and leg swelling.   Gastrointestinal: Negative for nausea, diarrhea, constipation, blood in stool and abdominal distention.   Genitourinary: Negative.    Musculoskeletal: Negative.   she has bilateral knee osteoarthritis with knee pain  Skin: Negative.    Neurological: Negative for dizziness, tremors, syncope, weakness, light-headedness and headaches.   Hematological: Does not bruise/bleed easily.   Psychiatric/Behavioral: Negative.  She had a flat effect with a poor recall       Physical Exam:    Weight 208 pounds blood pressure 138/68 temp 98 pulse 83  Obese  GENERAL: no acute distress. Cooperative in conversation.   HEENT: pupils are equal, round and reactive. Oral mucosa is moist and intact.  RESP:Chest symmetric. Regular respiratory rate. No stridor.  CVS: S1S2  ABD: Nondistended, soft.  EXTREMITIES: No lower extremity edema.   NEURO: non focal. Alert and oriented xSELF   PSYCH: within normal limits. No depression or anxiety.  SKIN: warm dry intact     Labs:    Lab Results   Component Value Date    HGBA1C 6.9 (H) 03/15/2019     Results for orders placed or performed in visit on 01/21/19   Basic Metabolic Panel   Result Value Ref Range    Sodium 140 136 - 145 mmol/L    Potassium 4.4 3.5 - 5.0 mmol/L    Chloride 101 98 - 107 mmol/L    CO2 28 22 - 31 mmol/L    Anion Gap, Calculation 11 5 - 18 mmol/L    Glucose 108 70 - 125 mg/dL    Calcium 9.4 8.5 - 10.5 mg/dL    BUN 23 8  - 28 mg/dL    Creatinine 1.03 0.60 - 1.10 mg/dL    GFR MDRD Af Amer >60 >60 mL/min/1.73m2    GFR MDRD Non Af Amer 52 (L) >60 mL/min/1.73m2     Lab Results   Component Value Date    [id number] 487 01/08/2018     Vitamin D, Total (25-Hydroxy)   Date Value Ref Range Status   01/25/2018 42.3 30.0 - 80.0 ng/mL Final         Assessment/Plan:    Dementia with worsening recheck B IMS is 5/15  Taken off Aricept currently on a low-dose of Namenda  Diabetes type 2 currently on a higher dose of metformin  Improvement in A1c to 6.9 on recheck.  Blood sugar checks have been discontinued  Obesity on weight management program with improvement noted in weight  Anxiety and depression currently on Celexa at a low dose with Effexor Wellbutrin discontinued mood has been stable.  Vitamin D deficiency she is adequately replete  Hypertension stable on her multiple medication regimen.  Debilitation she is requiring more cares and can barely ambulate now without assistance.  Cognitively she is declining to.  Daughter remains involved in her care continue with her current care plan  Electronically signed by: BERLIN Butts  This progress note was completed using Dragon software and there may be grammatical errors.

## 2021-06-19 NOTE — PROGRESS NOTES
NEUROPSYCHOLOGY FEEDBACK NOTE    The purpose of today s appointment was to provide feedback regarding Ms. Ontiveros recent neuropsychological evaluation on May 18th. She arrived on time and accompanied by her daughter. As Ms. Ontiveros is a native [place]ic speaker, the feedback session was conducted with the assistance of a professional .     We discussed the evaluation results including evidence of declines across a number of different measures as compared to testing 5 years ago. I explained that memory performance was especially troubling as Ms. Ontiveros was amnestic (0% retention) across 3 different memory measures with no benefit from recognition cues. I shared that this profile continues to strongly suggest the presence of a medial temporal process (i.e. Alzheimer's disease) with the possible additional impact of cerebrovascular disease. I explained that given the declines in adaptive functioning, Ms. 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     We also spent time discussing recommendations including potential benefit from a cognitive enhancing medication but also the importance of good medication compliance and good control of her vascular risk factors (i.e., diabetes). They indicated readiness to learn for the education provided during this session, and understanding by asking questions and making appropriate comments. They denied further questions at this time but were encouraged to contact this provider with any concerns regarding this evaluation. Recommendations from the report were provided as part of the After Visit Summary.    Brenda Salazar, PhD, LP, ABPP  Clinical Neuropsychologist, LP# [id number]  Board Certified in [place] [address]    For diagnostic and coding  purposes, Ms. Ontiveros has a history of cognitive decline and has been diagnosed with Major Neurocognitive Disorder Due To Multiple Etiologies.     91549: Greater than 30 minutes of the neuropsychologists time was spent providing feedback about neuropsychological test results and answering questions

## 2021-06-19 NOTE — PROGRESS NOTES
[facility]      Code Status:  FULL CODE  Visit Type: Review Of Multiple Medical Conditions     Facility:  [facility] [[id number]]           History of Present Illness: Kandi Ontiveros is a 78 y.o. female who is a resident of [facility].  She has periods of anxiety associated with memory loss and  her short-term memory is getting progressively worse.   BIMS 6/15  She also has underlying history of knee osteoarthritis and was getting steroid injections but is able to ambulate without any assist device as yet patient is a diabetic and also has obstructive sleep apnea  Along with anxiety which is primarily associated with her memory loss.  she is gaining wt.  She is independent with most of her ADLs and in fact helps her   with cares also  Mood and behaviors have been stable appetite has been good.    She is on metformin for dm. She has no concerns    Past Medical History:   Diagnosis Date     Anxiety     Created by Conversion      Benign Adenomatous Polyp Of The Large Intestine     Created by Conversion      Chronic Diarrhea Of Unknown Origin     Created by Conversion      Dementia of the Alzheimer's type 7/13/2014     Diabetes Mellitus     Created by Conversion      Esophageal reflux     Created by Conversion      Gastritis      Herpes Zoster (Shingles)     Created by Conversion      Hiatal hernia      Hypercholesterolemia     Created by Conversion      Hypertension     Created by Conversion      Melanosis Coli     Created by Conversion [facility] Annotation: May  2 2012  1:19PM - Sheila Benavides: colonoscopy  4/30/12      Memory Lapses Or Loss     Created by Conversion      Obstructive Sleep Apnea     Created by Conversion      Osteopenia     Created by Conversion      Raynaud's Disease     Created by Conversion      Tricuspid Regurgitation     Created by Conversion      No past surgical history on file.  Family History   Problem Relation Age of Onset      Hypertension Other      Diabetes Other      Stroke Other      Social History     Social History     Marital status:      Spouse name: N/A     Number of children: N/A     Years of education: N/A     Occupational History     Not on file.     Social History Main Topics     Smoking status: Never Smoker     Smokeless tobacco: Never Used     Alcohol use Not on file     Drug use: Not on file     Sexual activity: Not on file     Other Topics Concern     Not on file     Social History Narrative   lived in [facility]  Current Outpatient Prescriptions   Medication Sig Dispense Refill     acetaminophen 500 mg coapsule Take 1 capsule by mouth. Every 4-6 hours PRN       bisacodyl (DULCOLAX, BISACODYL,) 10 mg suppository Insert 1 suppository (10 mg total) into the rectum daily as needed (for constipation). 12 suppository 0     buPROPion (WELLBUTRIN XL) 150 MG 24 hr tablet Take 1 tablet (150 mg total) by mouth daily. 90 tablet 1     CALCIUM CARBONATE/VITAMIN D3 (CALCIUM 600 WITH VITAMIN D3 ORAL) Take 2 tablets by mouth daily.       cetirizine (ZYRTEC) 10 MG tablet Take 10 mg by mouth daily.       cholecalciferol, vitamin D3, 1,000 unit tablet Take 1,000 Units by mouth daily.       citalopram (CELEXA) 20 MG tablet TAKE ONE TABLET BY MOUTH EVERY DAY 90 tablet 0     dextromethorphan (DELSYM) 30 mg/5 mL liquid Take 60 mg by mouth every 12 (twelve) hours as needed for cough.       fluticasone (FLONASE) 50 mcg/actuation nasal spray 1 spray into each nostril daily. 16 g 12     furosemide (LASIX) 20 MG tablet Take 20 mg by mouth daily.       galantamine (RAZADYNE ER) 24 MG 24 hr capsule Take 1 capsule (24 mg total) by mouth daily with breakfast. 30 capsule 6     hydrALAZINE (APRESOLINE) 10 MG tablet Take 10 mg by mouth 3 (three) times a day. Hold for SBP <130       lansoprazole (PREVACID) 30 MG capsule TAKE 1 CAPSULE BY MOUTH EVERY DAY 90 capsule 1     metFORMIN (GLUCOPHAGE) 500 MG tablet Take 500 mg by mouth daily with supper.         metFORMIN (GLUCOPHAGE) 500 MG tablet Take 250 mg by mouth daily with breakfast.       metoprolol succinate (TOPROL-XL) 25 MG Take 12.5 mg by mouth daily.       OMEGA-3S/DHA/EPA/FISH OIL (OMEGA 3 ORAL) Take 2 capsules by mouth daily.       polyethylene glycol (MIRALAX) 17 gram/dose powder Take 17 g by mouth daily. 255 g 0     potassium chloride SA (K-DUR,KLOR-CON) 10 MEQ tablet Take 10 mEq by mouth daily.        venlafaxine (EFFEXOR XR) 37.5 MG 24 hr capsule Take 2 capsules (75 mg total) by mouth daily. (Patient taking differently: Take 37.5 mg by mouth daily. GDR 3/2018) 60 capsule 1     verapamil (VERELAN PM) 100 mg 24 hr capsule TAKE ONE CAPSULE BY MOUTH EVERY DAY 90 capsule 1     vitamin E 1000 UNIT capsule Take 1,000 Units by mouth 2 (two) times a day.       white petrolatum (AQUAPHOR ORIGINAL) 41 % Oint Apply topically 3 (three) times a day as needed.       No current facility-administered medications for this visit.      Allergies   Allergen Reactions     Codeine Sulfate          Review of Systems:    Constitutional: Negative.  Negative for fever, chills,has  activity change, appetite change and fatigue.   HENT: Negative for congestion and facial swelling.    Eyes: Negative for photophobia, redness and visual disturbance.   Respiratory: Negative for cough and chest tightness.    Cardiovascular: Negative for chest pain, palpitations and leg swelling.   Gastrointestinal: Negative for nausea, diarrhea, constipation, blood in stool and abdominal distention.   Genitourinary: Negative.    Musculoskeletal: Negative.   she has bilateral knee osteoarthritis with knee pain  Skin: Negative.    Neurological: Negative for dizziness, tremors, syncope, weakness, light-headedness and headaches.   Hematological: Does not bruise/bleed easily.   Psychiatric/Behavioral: Negative.  She had a flat effect with a poor recall     Vitals:    07/09/18 1506   BP: 163/69   Pulse: 74   Temp: 98.2  F (36.8  C)       Physical Exam:     GENERAL: no acute distress. Cooperative in conversation.   HEENT: pupils are equal, round and reactive. Oral mucosa is moist and intact.  RESP:Chest symmetric. Regular respiratory rate. No stridor.  CVS: S1S2  ABD: Nondistended, soft.  EXTREMITIES: No lower extremity edema.   NEURO: non focal. Alert and oriented xSELF   PSYCH: within normal limits. No depression or anxiety.  SKIN: warm dry intact     Labs:    Lab Results   Component Value Date    HGBA1C 7.0 (H) 06/25/2018     Results for orders placed or performed in visit on 06/25/18   Basic Metabolic Panel   Result Value Ref Range    Sodium 138 136 - 145 mmol/L    Potassium 4.4 3.5 - 5.0 mmol/L    Chloride 103 98 - 107 mmol/L    CO2 25 22 - 31 mmol/L    Anion Gap, Calculation 10 5 - 18 mmol/L    Glucose 185 (H) 70 - 125 mg/dL    Calcium 9.6 8.5 - 10.5 mg/dL    BUN 31 (H) 8 - 28 mg/dL    Creatinine 1.27 (H) 0.60 - 1.10 mg/dL    GFR MDRD Af Amer 50 (L) >60 mL/min/1.73m2    GFR MDRD Non Af Amer 41 (L) >60 mL/min/1.73m2     Lab Results   Component Value Date    [id number] 487 01/08/2018     Vitamin D, Total (25-Hydroxy)   Date Value Ref Range Status   01/25/2018 42.3 30.0 - 80.0 ng/mL Final         Assessment/Plan:    Impaired cognition/dementia-she remains on galantamine.  Her last CPT was 4.9 ACl 4.4 and [place] 22/30 .BIMS 6/15  Stable mood and behaviors with no change reported  Diabetes-she is diet controlled; due to elevated hemoglobin A1c has been started on a low-dose of metformin  R/c A1c pending  CKD-monitor bmps;last cr 1.2  Hyperlipidemia-she is on fish oil  Anxiety disorder with depression for which patient is on multiple medications including Celexa along with Wellbutrin along with Effexor  Osteoarthritis of the knee  Hypertension currently in hydralazine on a higher dose along with metoprolol and verapamil  Lymphedema and a higher dose of diuretics  Obstructive sleep apnea  GERD-on Prevacid  She has chronic diarrhea  Obstructive sleep apnea-Refused sleep  study  Debilitation  Patient has been progressively gaining weight.  She remains bedbound and frequently refuses cares unless her daughter is present  Electronically signed by: BERLIN Butts  This progress note was completed using Dragon software and there may be grammatical errors.

## 2021-06-19 NOTE — LETTER
Letter by Ashwin Tang NP at      Author: Ashwin Tang NP Service: -- Author Type: --    Filed:  Encounter Date: 6/12/2019 Status: (Other)         Patient: Kandi Ontiveros   MR Number: [id number]   YOB: 1940   Date of Visit: 6/12/2019     [facility]      Facility:    [facility] [[id number]]  205-1 Code Status: FULL CODE      Chief Complaint/Reason for Visit:   Chief Complaint   Patient presents with   ? Review Of Multiple Medical Conditions       HPI:   Kandi is a 78 y.o. female who who is residing at assisted living facility has moved to long-term care with underlying history of dementia, that has been progressive in nature.  She has periods of anxiety associated with memory loss and per daughter her short-term memory is getting progressively worse.  She has a past medical history of right knee osteoarthritis and apparently received steroid injections in the past.  She also is a diabetic that has been diet controlled and has obstructive sleep apnea.  As per the family she has been moved to long-term care so that she is closer to her  who is in the nursing home with her.     LTC: Today and previously she seems oriented though daughter reports having more confusion, previously started on namenda, no change per her.   She denies any pain. Today BP seems more controlled, maintain metoprolol, diltiazem and clonidine patch. Her edema has decreased with minimal pedal edema.  Ambulates per self and usually pushing  around in his wc.  Per dietitian, diet changed and portioned decreased. BIMS 5/15 and PHQ9 2//20 on 5/3/19.    Patient denies pain, headache, chest pain, numbness or tingling, shortest of breath, eating or swallowing concerns, nausea or vomiting, diarrhea or bowel abnormalities, or no new integumentary concerns today. Previously consulted recreation for increased socialization.        Past Medical History:  Past  Medical History:   Diagnosis Date   ? Anxiety     Created by Conversion    ? Benign Adenomatous Polyp Of The Large Intestine     Created by Conversion    ? Chronic Diarrhea Of Unknown Origin     Created by Conversion    ? Dementia of the Alzheimer's type 7/13/2014   ? Diabetes Mellitus     Created by Conversion    ? Esophageal reflux     Created by Conversion    ? Gastritis    ? Herpes Zoster (Shingles)     Created by Conversion    ? Hiatal hernia    ? Hypercholesterolemia     Created by Conversion    ? Hypertension     Created by Conversion    ? Melanosis Coli     Created by Conversion [facility] Annotation: May  2 2012  1:19PM - Sheila Benavides: colonoscopy  4/30/12    ? Memory Lapses Or Loss     Created by Conversion    ? Obstructive Sleep Apnea     Created by Conversion    ? Osteopenia     Created by Conversion    ? Raynaud's Disease     Created by Conversion    ? Tricuspid Regurgitation     Created by Conversion            Surgical History:  No past surgical history on file.    Family History:   Family History   Problem Relation Age of Onset   ? Hypertension Other    ? Diabetes Other    ? Stroke Other        Social History:    Social History     Socioeconomic History   ? Marital status:      Spouse name: Not on file   ? Number of children: Not on file   ? Years of education: Not on file   ? Highest education level: Not on file   Occupational History   ? Not on file   Social Needs   ? Financial resource strain: Not on file   ? Food insecurity:     Worry: Not on file     Inability: Not on file   ? Transportation needs:     Medical: Not on file     Non-medical: Not on file   Tobacco Use   ? Smoking status: Never Smoker   ? Smokeless tobacco: Never Used   Substance and Sexual Activity   ? Alcohol use: Not on file   ? Drug use: Not on file   ? Sexual activity: Not on file   Lifestyle   ? Physical activity:     Days per week: Not on file     Minutes per session: Not on file   ? Stress: Not on file    Relationships   ? Social connections:     Talks on phone: Not on file     Gets together: Not on file     Attends [employer] service: Not on file     Active member of club or organization: Not on file     Attends meetings of clubs or organizations: Not on file     Relationship status: Not on file   ? Intimate partner violence:     Fear of current or ex partner: Not on file     Emotionally abused: Not on file     Physically abused: Not on file     Forced sexual activity: Not on file   Other Topics Concern   ? Not on file   Social History Narrative   ? Not on file     Review of Systems   Constitutional: Negative for activity change, appetite change, diaphoresis and fatigue.        No issues   HENT: Negative for congestion and facial swelling.    Eyes: Negative for photophobia, redness and visual disturbance.   Respiratory: Negative for choking, shortness of breath and wheezing.    Cardiovascular: Positive for leg swelling. Negative for chest pain.        Pedal   Gastrointestinal: Negative for abdominal distention, abdominal pain, blood in stool, constipation and diarrhea.   Endocrine: Negative.    Genitourinary: Negative for difficulty urinating and dysuria.   Musculoskeletal: Negative for back pain and joint swelling.        Bilateral knee pain per OA, taking tylenol   Skin: Negative for color change.        Dry, moisturizer used   Allergic/Immunologic: Negative.    Neurological: Negative for seizures, weakness and headaches.   Hematological: Negative.    Psychiatric/Behavioral: Positive for confusion. Negative for behavioral problems, hallucinations and sleep disturbance. The patient is nervous/anxious.        Vitals:    06/13/19 0725   BP: 146/77   Pulse: 73   Resp: 18   Temp: 97  F (36.1  C)   SpO2: 95%   Weight: 207 lb (93.9 kg)       Physical Exam   Constitutional: She appears well-developed and well-nourished. No distress.   No issues   HENT:   Head: Normocephalic and atraumatic.   Mouth/Throat: Oropharynx is  clear and moist. No oropharyngeal exudate.   Eyes: Pupils are equal, round, and reactive to light. Conjunctivae and EOM are normal. Right eye exhibits no discharge. Left eye exhibits no discharge. No scleral icterus.   Neck: Normal range of motion. Neck supple. No JVD present. No tracheal deviation present.   Intact   Cardiovascular: Normal rate, regular rhythm and normal heart sounds. Exam reveals no gallop and no friction rub.   No murmur heard.  S1S2, no murmur   Pulmonary/Chest: Effort normal and breath sounds normal. No stridor. No respiratory distress. She has no wheezes. She has no rales.   CTA, RA   Abdominal: Soft. Bowel sounds are normal. She exhibits no distension. There is no tenderness. There is no rebound.   No diarrhea or constipation   Genitourinary:   Genitourinary Comments: Deferred   Musculoskeletal: Normal range of motion. She exhibits edema.   1+ LE, mostly pedal, will try discontinuing lasix   Neurological: She is alert. No cranial nerve deficit.   A/O x1-2   Skin: Skin is warm and dry. She is not diaphoretic. No erythema.   Intact, very dry, encouraged to use lotion   Psychiatric: She has a normal mood and affect.   Less agitation, continue Effexor GDR, dc aricept and continue namenda   Nursing note and vitals reviewed.      Medication List:  Current Outpatient Medications   Medication Sig   ? acetaminophen 500 mg coapsule Take 1 capsule by mouth. Every 4-6 hours PRN   ? bisacodyl (DULCOLAX, BISACODYL,) 10 mg suppository Insert 1 suppository (10 mg total) into the rectum daily as needed (for constipation).   ? cetirizine (ZYRTEC) 10 MG tablet Take 10 mg by mouth daily.   ? cholecalciferol, vitamin D3, 1,000 unit tablet Take 1,000 Units by mouth daily.   ? citalopram (CELEXA) 20 MG tablet TAKE ONE TABLET BY MOUTH EVERY DAY   ? cloNIDine (CATAPRES-TTS) 0.2 mg/24 hr Place 1 patch on the skin once a week.   ? fluticasone (FLONASE) 50 mcg/actuation nasal spray 1 spray into each nostril daily.   ?  gabapentin (NEURONTIN) 100 MG capsule Take 100 mg by mouth 3 (three) times a day.   ? hydrALAZINE (APRESOLINE) 25 MG tablet Take 50 mg by mouth Daily at 8:00 am.. Hold for SBP <150         ? memantine (NAMENDA) 5 MG tablet Take 10 mg by mouth daily.          ? metFORMIN (GLUCOPHAGE) 500 MG tablet Take 1,000 mg by mouth 2 (two) times a day with meals .         ? metoprolol succinate (TOPROL-XL) 25 MG Take 12.5 mg by mouth daily.          ? omeprazole (PRILOSEC) 20 MG capsule Take 20 mg by mouth daily before breakfast.   ? polyethylene glycol (MIRALAX) 17 gram/dose powder Take 17 g by mouth daily.   ? polyvinyl alcohol (LIQUIFILM TEARS) 1.4 % ophthalmic solution Administer 1 drop to both eyes 3 (three) times a day as needed for dry eyes.   ? verapamil (VERELAN PM) 100 mg 24 hr capsule TAKE ONE CAPSULE BY MOUTH EVERY DAY   ? white petrolatum (AQUAPHOR ORIGINAL) 41 % Oint Apply 1 application topically 3 (three) times a day as needed.              Labs:  Results for orders placed or performed in visit on 01/21/19   Basic Metabolic Panel   Result Value Ref Range    Sodium 140 136 - 145 mmol/L    Potassium 4.4 3.5 - 5.0 mmol/L    Chloride 101 98 - 107 mmol/L    CO2 28 22 - 31 mmol/L    Anion Gap, Calculation 11 5 - 18 mmol/L    Glucose 108 70 - 125 mg/dL    Calcium 9.4 8.5 - 10.5 mg/dL    BUN 23 8 - 28 mg/dL    Creatinine 1.03 0.60 - 1.10 mg/dL    GFR MDRD Af Amer >60 >60 mL/min/1.73m2    GFR MDRD Non Af Amer 52 (L) >60 mL/min/1.73m2     Lab Results   Component Value Date    WBC 12.2 (H) 05/28/2016    HGB 11.6 (L) 01/14/2019    HCT 32.9 (L) 05/28/2016    MCV 91 05/28/2016     05/28/2016     Vitamin D, Total (25-Hydroxy)   Date Value Ref Range Status   01/25/2018 42.3 30.0 - 80.0 ng/mL Final     Lab Results   Component Value Date    HGBA1C 6.9 (H) 03/15/2019     Lab Results   Component Value Date    NVPLECQT60 487 01/08/2018     Lab Results   Component Value Date    TSH 2.71 06/25/2018       Assessment/Plan:  1.   Hypokalemia per lasix: will dc supplement per dc lasix  2.  TARI: Last Cr 1.03 with GFR 53 pn 1/21/9. Recheck   3.  DM: current Hgb A1c 6.9 on 3/15/19, continue metformin 1000mg two times a day, recheck A1c  4.  Anxiety/depression: continue citalopram 20mg daily, previously discontinued Effexor/wellbutrin, was related to daughter's health concerns, previously decreased effexor in 3/2018.  Minimal PHQ9 score currently  5.  HTN: decrease hydralazine to 50mg at AM, continue verapamil 100mg daily, and will maintain metoprolol 25mg daily. With clonidine patch 0.2mg/wk. -150s, controlled  6. Vit D def: continue D3 1000U, last 42.3 on 1/25/18.  Recheck  7. GERD: PPI, no change.  8. Seasonal allergies: continue fluticasone and cetirizine.  9. Edema: currently minimal pedal, will dc lasix and monitor  10. Constipation: continue miralax daily, no change.  11. Dementia: recently had cognitive assessment, discontinued donepezil, previously increased namenda to 10mg daily. Per daughter no change in memory  12. Socialization: consulted recreation. Reports attending more events.  13. Hypomagnesia: last 1.8 on 3/15/19, dc supplementation per discontinuing lasix       Electronically signed by: Ashwin Tang NP

## 2021-06-19 NOTE — PROGRESS NOTES
[facility]      Facility:    [facility] [[id number]] -1 Code Status: FULL CODE      Chief Complaint/Reason for Visit:   Chief Complaint   Patient presents with     Problem Visit       HPI:   Kandi is a 78 y.o. female who who is residing at assisted living facility has moved to long-term care with underlying history of dementia, that has been progressive in nature.  She has periods of anxiety associated with memory loss and per daughter her short-term memory is getting progressively worse.  She has a past medical history of right knee osteoarthritis and apparently received steroid injections in the past.  She also is a diabetic that has been diet controlled and has obstructive sleep apnea.  As per the family she has been moved to long-term care so that she is closer to her  who is in the nursing home with her.     TCU: Today and previously she seems oriented though not sure per language barrier.  She denies any pain.  Previously we explained to her that we need to give her medication for her diabetes and increase 1 of her medications that she is currently taking for her blood pressure.  This has resulted in decreased renal function.  Previously noticed I ordered to DC her metformin was never carried out.  Fortunately her kidney function has rebounded taking off HCTZ and ACEi as her last Cr improved.  Previously increased hydralazine, today will increase metoprolol.  Her edema had increased, so lasix was increased, no current weight available and waiting for BMP recheck. We also suggested ordering GANGA hose per her edema in her legs, though apparently she is never use of this and has since been DC'd.  Previously she also told me she has really dry skin so I ordered Aquaphor for her 3 times daily as needed, as this has been therapeutic, though today tells me she doesn't use.  Also she needed potassium supplementation, with a recheck potassium of 4.4, another  pending. Her current A1c has worsen as well, so will increase metformin coverage.  BIMS 7/15 on 6/7/18.    Patient denies pain, headache, chest pain, numbness or tingling, shortest of breath, eating or swallowing concerns, nausea or vomiting, diarrhea or bowel abnormalities, or no new integumentary concerns today. Will also have PT see her for exercise and consulted recreation for increased socialization.        Past Medical History:  Past Medical History:   Diagnosis Date     Anxiety     Created by Conversion      Benign Adenomatous Polyp Of The Large Intestine     Created by Conversion      Chronic Diarrhea Of Unknown Origin     Created by Conversion      Dementia of the Alzheimer's type 7/13/2014     Diabetes Mellitus     Created by Conversion      Esophageal reflux     Created by Conversion      Gastritis      Herpes Zoster (Shingles)     Created by Conversion      Hiatal hernia      Hypercholesterolemia     Created by Conversion      Hypertension     Created by Conversion      Melanosis Coli     Created by Conversion [facility] Annotation: May  2 2012  1:19PM - Sheila Benavides: colonoscopy  4/30/12      Memory Lapses Or Loss     Created by Conversion      Obstructive Sleep Apnea     Created by Conversion      Osteopenia     Created by Conversion      Raynaud's Disease     Created by Conversion      Tricuspid Regurgitation     Created by Conversion            Surgical History:  No past surgical history on file.    Family History:   Family History   Problem Relation Age of Onset     Hypertension Other      Diabetes Other      Stroke Other        Social History:    Social History     Social History     Marital status:      Spouse name: N/A     Number of children: N/A     Years of education: N/A     Social History Main Topics     Smoking status: Never Smoker     Smokeless tobacco: Never Used     Alcohol use Not on file     Drug use: Not on file     Sexual activity: Not on file     Other Topics Concern      Not on file     Social History Narrative     Review of Systems   Constitutional: Positive for appetite change. Negative for activity change, diaphoresis and fatigue.   HENT: Negative.  Negative for congestion and facial swelling.    Eyes: Negative.  Negative for visual disturbance.   Respiratory: Negative.  Negative for shortness of breath and wheezing.    Cardiovascular: Negative.  Negative for chest pain.   Gastrointestinal: Negative.  Negative for abdominal distention, abdominal pain, blood in stool, constipation and diarrhea.   Endocrine: Negative.    Genitourinary: Negative.  Negative for dysuria.   Musculoskeletal: Negative for back pain and joint swelling.        Bilateral knee pain per OA, taking tylenol   Skin: Negative.  Negative for color change.        moisturizer used   Allergic/Immunologic: Negative.    Neurological: Negative.  Negative for weakness and headaches.   Hematological: Negative.    Psychiatric/Behavioral: Negative for confusion and sleep disturbance. The patient is nervous/anxious.        Vitals:    07/10/18 1913   BP: 163/79   Pulse: 74   Resp: 18   Temp: 98  F (36.7  C)   SpO2: 94%   Weight: 216 lb (98 kg)       Physical Exam   Constitutional: She is oriented to person, place, and time. She appears well-developed and well-nourished. No distress.   Wants to participate in more outtings, ordered recreation consult   HENT:   Head: Normocephalic and atraumatic.   Mouth/Throat: Oropharynx is clear and moist. No oropharyngeal exudate.   Eyes: Pupils are equal, round, and reactive to light. Right eye exhibits no discharge. Left eye exhibits no discharge. No scleral icterus.   Neck: Normal range of motion. Neck supple. No JVD present. No tracheal deviation present.   Intact   Cardiovascular: Normal rate and regular rhythm.  Exam reveals no gallop and no friction rub.    No murmur heard.  S1S2, no murmur   Pulmonary/Chest: Breath sounds normal. No stridor. No respiratory distress. She has no  wheezes. She has no rales.   CTA   Abdominal: Soft. Bowel sounds are normal. She exhibits no distension. There is no tenderness. There is no rebound.   No diarrhea or constipation   Genitourinary:   Genitourinary Comments: Deferred   Musculoskeletal: Normal range of motion. She exhibits edema.   1-2+ LE edema bilaterally   Neurological: She is alert and oriented to person, place, and time. No cranial nerve deficit.   BIMS 9/15   Skin: Skin is warm and dry. She is not diaphoretic. No erythema.   Intact, very dry, encouraged to use lotion   Psychiatric: She has a normal mood and affect.   Has anxiety and depression, more agitation noted, per cognitive eval will start donepezil   Nursing note and vitals reviewed.      Medication List:  Current Outpatient Prescriptions   Medication Sig     donepezil (ARICEPT) 5 MG tablet Take 5 mg by mouth at bedtime.     acetaminophen 500 mg coapsule Take 1 capsule by mouth. Every 4-6 hours PRN     bisacodyl (DULCOLAX, BISACODYL,) 10 mg suppository Insert 1 suppository (10 mg total) into the rectum daily as needed (for constipation).     buPROPion (WELLBUTRIN XL) 150 MG 24 hr tablet Take 1 tablet (150 mg total) by mouth daily.     CALCIUM CARBONATE/VITAMIN D3 (CALCIUM 600 WITH VITAMIN D3 ORAL) Take 2 tablets by mouth daily.     cetirizine (ZYRTEC) 10 MG tablet Take 10 mg by mouth daily.     cholecalciferol, vitamin D3, 1,000 unit tablet Take 1,000 Units by mouth daily.     citalopram (CELEXA) 20 MG tablet TAKE ONE TABLET BY MOUTH EVERY DAY     dextromethorphan (DELSYM) 30 mg/5 mL liquid Take 60 mg by mouth every 12 (twelve) hours as needed for cough.     fluticasone (FLONASE) 50 mcg/actuation nasal spray 1 spray into each nostril daily.     furosemide (LASIX) 20 MG tablet Take 20 mg by mouth daily.     galantamine (RAZADYNE ER) 24 MG 24 hr capsule Take 1 capsule (24 mg total) by mouth daily with breakfast.     hydrALAZINE (APRESOLINE) 10 MG tablet Take 10 mg by mouth 3 (three) times a  day. Hold for SBP <130     lansoprazole (PREVACID) 30 MG capsule TAKE 1 CAPSULE BY MOUTH EVERY DAY     metFORMIN (GLUCOPHAGE) 500 MG tablet Take 500 mg by mouth daily with supper.      metFORMIN (GLUCOPHAGE) 500 MG tablet Take 250 mg by mouth daily with breakfast.     metoprolol succinate (TOPROL-XL) 25 MG Take 25 mg by mouth daily.      OMEGA-3S/DHA/EPA/FISH OIL (OMEGA 3 ORAL) Take 2 capsules by mouth daily.     polyethylene glycol (MIRALAX) 17 gram/dose powder Take 17 g by mouth daily.     potassium chloride SA (K-DUR,KLOR-CON) 10 MEQ tablet Take 10 mEq by mouth daily.      venlafaxine (EFFEXOR XR) 37.5 MG 24 hr capsule Take 2 capsules (75 mg total) by mouth daily. (Patient taking differently: Take 37.5 mg by mouth daily. GDR 3/2018)     verapamil (VERELAN PM) 100 mg 24 hr capsule TAKE ONE CAPSULE BY MOUTH EVERY DAY     vitamin E 1000 UNIT capsule Take 1,000 Units by mouth 2 (two) times a day.     white petrolatum (AQUAPHOR ORIGINAL) 41 % Oint Apply topically 3 (three) times a day as needed.       Labs:  No results found for this or any previous visit (from the past 240 hour(s)).    Assessment/Plan:  1.  Hypokalemia: continue potassium 10mEq BID, last K 4.4. BMP recheck pending tomorrow.  2.  TARI: Last creatinine 1.27 with GFR of 41 on 6/25/18.   3.  DM: current Hgb A1c 7.0, previous 6.5, increase metformin 500mg at PM and 250mg at AM, recheck A1c in 3 months.   4.  Anxiety/depression: continue citalopram and effexor, related to daughter's health concerns, decreased effexor in 3/2018.  5.  HTN: hydralazine 10mg three times a day, hold if SBP <130, continue verapamil 100mg daily, and will increase metoprolol to 25mg daily.  6. Vit D def: continue D3 1000U, last 42.3 on 1/25/18.  7. GERD: PPI, no change.  8. Seasonal allergies: continue fluticasone and cetirizine.  9. Edema: maintain lasix 20mg two times a day, currently 219lb.  10. Constipation: continue miralax daily, no change.  11. Dementia: recently had  cognitive assessment, started donepezil 5mg at HS, re-evaluate in 6wks.  12. Socialization: consulted recreation.     The care plan has been reviewed and all orders signed. Changes to care plan, if any, as noted. Otherwise, continue care plan of care.      Electronically signed by: Ashwin Tang NP

## 2021-06-20 NOTE — PROGRESS NOTES
[facility]      Facility:    [facility] [[id number]] -1 Code Status: FULL CODE      Chief Complaint/Reason for Visit:   Chief Complaint   Patient presents with     Review Of Multiple Medical Conditions       HPI:   Kandi is a 78 y.o. female who who is residing at assisted living facility has moved to long-term care with underlying history of dementia, that has been progressive in nature.  She has periods of anxiety associated with memory loss and per daughter her short-term memory is getting progressively worse.  She has a past medical history of right knee osteoarthritis and apparently received steroid injections in the past.  She also is a diabetic that has been diet controlled and has obstructive sleep apnea.  As per the family she has been moved to long-term care so that she is closer to her  who is in the nursing home with her.     LTC: Today and previously she seems oriented though not sure per language barrier, will attempt to use dietitian or daughter to interpret.   She denies any pain.    Previously increased hydralazine, today will increase again and maintain metoprolol dose. Her edema had increased, so lasix was increased, weight stable and renal fx improved. We also suggested ordering GANGA hose per her edema in her legs, though apparently she is never use of this and has since been DC'd.  Previously she also told me she has really dry skin so I ordered Aquaphor for her 3 times daily as needed, as this has been therapeutic, though today tells me she doesn't use.  Also she needed potassium supplementation, with a recheck potassium of 4.2. Her current A1c has worsen as well, so previously increased metformin coverage, recheck A1c this month.  Ambulates per self and usually pushing  around in his wc.  Now will trial clonidone patch for ongoing htn. Need to talk with daughter about donepezil addition. BIMS 7/15 on 6/7/18.    Patient denies  pain, headache, chest pain, numbness or tingling, shortest of breath, eating or swallowing concerns, nausea or vomiting, diarrhea or bowel abnormalities, or no new integumentary concerns today. Previously consulted recreation for increased socialization.        Past Medical History:  Past Medical History:   Diagnosis Date     Anxiety     Created by Conversion      Benign Adenomatous Polyp Of The Large Intestine     Created by Conversion      Chronic Diarrhea Of Unknown Origin     Created by Conversion      Dementia of the Alzheimer's type 7/13/2014     Diabetes Mellitus     Created by Conversion      Esophageal reflux     Created by Conversion      Gastritis      Herpes Zoster (Shingles)     Created by Conversion      Hiatal hernia      Hypercholesterolemia     Created by Conversion      Hypertension     Created by Conversion      Melanosis Coli     Created by Conversion [facility] Annotation: May  2 2012  1:19PM - Sheila Benavides: colonoscopy  4/30/12      Memory Lapses Or Loss     Created by Conversion      Obstructive Sleep Apnea     Created by Conversion      Osteopenia     Created by Conversion      Raynaud's Disease     Created by Conversion      Tricuspid Regurgitation     Created by Conversion            Surgical History:  No past surgical history on file.    Family History:   Family History   Problem Relation Age of Onset     Hypertension Other      Diabetes Other      Stroke Other        Social History:    Social History     Social History     Marital status:      Spouse name: N/A     Number of children: N/A     Years of education: N/A     Social History Main Topics     Smoking status: Never Smoker     Smokeless tobacco: Never Used     Alcohol use Not on file     Drug use: Not on file     Sexual activity: Not on file     Other Topics Concern     Not on file     Social History Narrative     Review of Systems   Constitutional: Positive for appetite change. Negative for activity change, diaphoresis  and fatigue.   HENT: Negative.  Negative for congestion and facial swelling.    Eyes: Negative.  Negative for visual disturbance.   Respiratory: Negative.  Negative for shortness of breath and wheezing.    Cardiovascular: Negative.  Negative for chest pain.   Gastrointestinal: Negative.  Negative for abdominal distention, abdominal pain, blood in stool, constipation and diarrhea.   Endocrine: Negative.    Genitourinary: Negative.  Negative for dysuria.   Musculoskeletal: Negative for back pain and joint swelling.        Bilateral knee pain per OA, taking tylenol   Skin: Negative.  Negative for color change.        moisturizer used   Allergic/Immunologic: Negative.    Neurological: Negative.  Negative for weakness and headaches.   Hematological: Negative.    Psychiatric/Behavioral: Negative for confusion and sleep disturbance. The patient is nervous/anxious.        Vitals:    09/15/18 0952   BP: 171/74   Pulse: 64   Resp: 18   Temp: 97.6  F (36.4  C)   SpO2: 97%   Weight: 222 lb (100.7 kg)       Physical Exam   Constitutional: She appears well-developed and well-nourished. No distress.   Wants to participate in more outtings, reported attending more outtings   HENT:   Head: Normocephalic and atraumatic.   Mouth/Throat: Oropharynx is clear and moist. No oropharyngeal exudate.   Eyes: Pupils are equal, round, and reactive to light. Right eye exhibits no discharge. Left eye exhibits no discharge. No scleral icterus.   Neck: Normal range of motion. Neck supple. No JVD present. No tracheal deviation present.   Intact   Cardiovascular: Normal rate and regular rhythm.  Exam reveals no gallop and no friction rub.    No murmur heard.  S1S2, no murmur   Pulmonary/Chest: Breath sounds normal. No stridor. No respiratory distress. She has no wheezes. She has no rales.   CTA   Abdominal: Soft. Bowel sounds are normal. She exhibits no distension. There is no tenderness. There is no rebound.   No diarrhea or constipation    Genitourinary:   Genitourinary Comments: Deferred   Musculoskeletal: Normal range of motion. She exhibits edema.   1-2+ LE edema bilaterally, mostly pedal   Neurological: She is alert. No cranial nerve deficit.   A/O x2-3, BIMS 9/15   Skin: Skin is warm and dry. She is not diaphoretic. No erythema.   Intact, very dry, encouraged to use lotion   Psychiatric: She has a normal mood and affect.   Has anxiety and depression, more agitation noted, per cognitive eval now on donepezil   Nursing note and vitals reviewed.      Medication List:  Current Outpatient Prescriptions   Medication Sig     cloNIDine (CATAPRES-TTS) 0.1 mg/24 hr Place 1 patch on the skin once a week.     acetaminophen 500 mg coapsule Take 1 capsule by mouth. Every 4-6 hours PRN     bisacodyl (DULCOLAX, BISACODYL,) 10 mg suppository Insert 1 suppository (10 mg total) into the rectum daily as needed (for constipation).     buPROPion (WELLBUTRIN XL) 150 MG 24 hr tablet Take 1 tablet (150 mg total) by mouth daily.     CALCIUM CARBONATE/VITAMIN D3 (CALCIUM 600 WITH VITAMIN D3 ORAL) Take 2 tablets by mouth daily.     cetirizine (ZYRTEC) 10 MG tablet Take 10 mg by mouth daily.     cholecalciferol, vitamin D3, 1,000 unit tablet Take 1,000 Units by mouth daily.     citalopram (CELEXA) 20 MG tablet TAKE ONE TABLET BY MOUTH EVERY DAY     dextromethorphan (DELSYM) 30 mg/5 mL liquid Take 60 mg by mouth every 12 (twelve) hours as needed for cough.     donepezil (ARICEPT) 5 MG tablet Take 10 mg by mouth at bedtime.      fluticasone (FLONASE) 50 mcg/actuation nasal spray 1 spray into each nostril daily.     furosemide (LASIX) 20 MG tablet Take 20 mg by mouth 2 (two) times a day at 9am and 6pm.      galantamine (RAZADYNE ER) 24 MG 24 hr capsule Take 1 capsule (24 mg total) by mouth daily with breakfast.     hydrALAZINE (APRESOLINE) 25 MG tablet Take 75 mg by mouth 3 (three) times a day. Hold for SBP <130      lansoprazole (PREVACID) 30 MG capsule TAKE 1 CAPSULE BY  MOUTH EVERY DAY     metFORMIN (GLUCOPHAGE) 500 MG tablet Take 500 mg by mouth daily with supper.      metFORMIN (GLUCOPHAGE) 500 MG tablet Take 250 mg by mouth daily with breakfast.     metoprolol succinate (TOPROL-XL) 25 MG Take 25 mg by mouth daily.      OMEGA-3S/DHA/EPA/FISH OIL (OMEGA 3 ORAL) Take 2 capsules by mouth daily.     polyethylene glycol (MIRALAX) 17 gram/dose powder Take 17 g by mouth daily.     potassium chloride SA (K-DUR,KLOR-CON) 10 MEQ tablet Take 10 mEq by mouth daily.      venlafaxine (EFFEXOR XR) 37.5 MG 24 hr capsule Take 2 capsules (75 mg total) by mouth daily. (Patient taking differently: Take 37.5 mg by mouth daily. GDR 3/2018)     verapamil (VERELAN PM) 100 mg 24 hr capsule TAKE ONE CAPSULE BY MOUTH EVERY DAY     vitamin E 1000 UNIT capsule Take 1,000 Units by mouth 2 (two) times a day.     white petrolatum (AQUAPHOR ORIGINAL) 41 % Oint Apply topically 3 (three) times a day as needed.       Labs:  Results for orders placed or performed in visit on 08/23/18   Basic Metabolic Panel   Result Value Ref Range    Sodium 140 136 - 145 mmol/L    Potassium 4.2 3.5 - 5.0 mmol/L    Chloride 101 98 - 107 mmol/L    CO2 30 22 - 31 mmol/L    Anion Gap, Calculation 9 5 - 18 mmol/L    Glucose 121 70 - 125 mg/dL    Calcium 9.5 8.5 - 10.5 mg/dL    BUN 25 8 - 28 mg/dL    Creatinine 1.11 (H) 0.60 - 1.10 mg/dL    GFR MDRD Af Amer 58 (L) >60 mL/min/1.73m2    GFR MDRD Non Af Amer 48 (L) >60 mL/min/1.73m2     Lab Results   Component Value Date    WBC 12.2 (H) 05/28/2016    HGB 11.1 (L) 05/28/2016    HCT 32.9 (L) 05/28/2016    MCV 91 05/28/2016     05/28/2016     Vitamin D, Total (25-Hydroxy)   Date Value Ref Range Status   01/25/2018 42.3 30.0 - 80.0 ng/mL Final     Lab Results   Component Value Date    HGBA1C 7.0 (H) 06/25/2018     Assessment/Plan:  1.  Hypokalemia: continue potassium 10mEq BID, last K 4.2.  2.  TARI: Last creatinine 1.11 with GFR of 48 on 8/23/18. Improved.    3.  DM: current Hgb A1c 7.0,  previous 6.5, metformin 500mg at PM and 250mg at AM, recheck A1c in sept.  4.  Anxiety/depression: continue citalopram and effexor, related to daughter's health concerns, decreased effexor in 3/2018.  5.  HTN: increase hydralazine 75mg three times a day, hold if SBP <130, continue verapamil 100mg daily, and will maintain metoprolol 25mg daily. Trial clonidine patch 0.1mg/wk  6. Vit D def: continue D3 1000U, last 42.3 on 1/25/18.  7. GERD: PPI, no change.  8. Seasonal allergies: continue fluticasone and cetirizine.  9. Edema: maintain lasix 20mg two times a day, currently 222lb.  Was on larger portion, now d/c'd.   10. Constipation: continue miralax daily, no change.  11. Dementia: recently had cognitive assessment, increase donepezil to 10mg at HS, re-evaluate with daughter.  12. Socialization: consulted recreation. Reports attending more events.    The care plan has been reviewed and all orders signed. Changes to care plan, if any, as noted. Otherwise, continue care plan of care.      Electronically signed by: Ashwin Tang NP

## 2021-06-20 NOTE — LETTER
Letter by Kathya Vivas MBBS at      Author: Kathya Vivas MBBS Service: -- Author Type: --    Filed:  Encounter Date: 6/24/2020 Status: (Other)         Patient: Kandi Ontiveros   MR Number: [id number]   YOB: 1940   Date of Visit: 6/24/2020     [facility]      Code Status:  FULL CODE  Visit Type: Review Of Multiple Medical Conditions     Facility:  [facility] [[id number]]           History of Present Illness: Kandi Ontiveros is a 79 y.o. female who is a resident of [facility].  She has underlying history of dementia with progressive memory impairment.   She remains a very poor historian secondary to that in light of her cognitive decline associated with the fact that she has been noted to be wandering several times she has been moved to the locked memory care unit.  She was noted to be ambulating without her assist device she is not aware and alert to her surroundings any longer    She has a history of hypertension and remains on multiple medications blood pressures have been stable.  Her last few blood pressures actually have been stable to on the low side and she is being monitored closely.    As her dementia has progressed she has been losing weight currently down to 211 pounds.  Staff reports that she occasionally will forget to eat    She also is a diabetic and remains on oral metformin no blood sugar checks are being done last A1c was 6.7    Past Medical History:   Diagnosis Date   ? Anxiety     Created by Conversion    ? Benign Adenomatous Polyp Of The Large Intestine     Created by Conversion    ? Chronic Diarrhea Of Unknown Origin     Created by Conversion    ? Dementia of the Alzheimer's type 7/13/2014   ? Diabetes Mellitus     Created by Conversion    ? Esophageal reflux     Created by Conversion    ? Gastritis    ? Herpes Zoster (Shingles)     Created by Conversion    ? Hiatal hernia    ? Hypercholesterolemia     Created by Conversion    ?  Hypertension     Created by Conversion    ? Melanosis Coli     Created by Conversion [facility] Annotation: May  2 2012  1:19PM - Sheila Benavides: colonoscopy  4/30/12    ? Memory Lapses Or Loss     Created by Conversion    ? Obstructive Sleep Apnea     Created by Conversion    ? Osteopenia     Created by Conversion    ? Raynaud's Disease     Created by Conversion    ? Tricuspid Regurgitation     Created by Conversion      No past surgical history on file.  Family History   Problem Relation Age of Onset   ? Hypertension Other    ? Diabetes Other    ? Stroke Other      Social History     Socioeconomic History   ? Marital status:      Spouse name: Not on file   ? Number of children: Not on file   ? Years of education: Not on file   ? Highest education level: Not on file   Occupational History   ? Not on file   Social Needs   ? Financial resource strain: Not on file   ? Food insecurity     Worry: Not on file     Inability: Not on file   ? Transportation needs     Medical: Not on file     Non-medical: Not on file   Tobacco Use   ? Smoking status: Never Smoker   ? Smokeless tobacco: Never Used   Substance and Sexual Activity   ? Alcohol use: Not on file   ? Drug use: Not on file   ? Sexual activity: Not on file   Lifestyle   ? Physical activity     Days per week: Not on file     Minutes per session: Not on file   ? Stress: Not on file   Relationships   ? Social connections     Talks on phone: Not on file     Gets together: Not on file     Attends Shinto service: Not on file     Active member of club or organization: Not on file     Attends meetings of clubs or organizations: Not on file     Relationship status: Not on file   ? Intimate partner violence     Fear of current or ex partner: Not on file     Emotionally abused: Not on file     Physically abused: Not on file     Forced sexual activity: Not on file   Other Topics Concern   ? Not on file   Social History Narrative   ? Not on file   lived in  [place]  Current Outpatient Medications   Medication Sig Dispense Refill   ? acetaminophen (TYLENOL) 325 MG tablet Take 650 mg by mouth 4 (four) times a day.     ? acetaminophen 500 mg coapsule Take 1 capsule by mouth. Every 4-6 hours PRN     ? bisacodyl (DULCOLAX, BISACODYL,) 10 mg suppository Insert 1 suppository (10 mg total) into the rectum daily as needed (for constipation). 12 suppository 0   ? cetirizine (ZYRTEC) 10 MG tablet Take 10 mg by mouth daily as needed.      ? cholecalciferol, vitamin D3, 1,000 unit tablet Take 1,000 Units by mouth daily.     ? citalopram (CELEXA) 20 MG tablet TAKE ONE TABLET BY MOUTH EVERY DAY (Patient taking differently: 10 mg. Start GDR on 3/9/20) 90 tablet 0   ? fluticasone (FLONASE) 50 mcg/actuation nasal spray 1 spray into each nostril daily. (Patient taking differently: 1 spray into each nostril daily as needed. ) 16 g 12   ? gabapentin (NEURONTIN) 100 MG capsule Take 100 mg by mouth 3 (three) times a day.     ? hydrALAZINE (APRESOLINE) 25 MG tablet Take 50 mg by mouth Daily at 8:00 am.. Hold for SBP <150           ? lisinopriL (PRINIVIL,ZESTRIL) 2.5 MG tablet Take 5 mg by mouth daily.      ? metFORMIN (GLUCOPHAGE) 500 MG tablet Take 500 mg by mouth 2 (two) times a day with meals.      ? metoprolol succinate (TOPROL-XL) 25 MG Take 12.5 mg by mouth daily.            ? omeprazole (PRILOSEC) 20 MG capsule Take 20 mg by mouth daily before breakfast.     ? polyethylene glycol (MIRALAX) 17 gram/dose powder Take 17 g by mouth daily. 255 g 0   ? polyvinyl alcohol (LIQUIFILM TEARS) 1.4 % ophthalmic solution Administer 1 drop to both eyes 3 (three) times a day as needed for dry eyes.     ? venlafaxine (EFFEXOR-XR) 37.5 MG 24 hr capsule Take 37.5 mg by mouth daily.     ? verapamil (VERELAN PM) 100 mg 24 hr capsule TAKE ONE CAPSULE BY MOUTH EVERY DAY 90 capsule 1   ? white petrolatum (AQUAPHOR ORIGINAL) 41 % Oint Apply 1 application topically 3 (three) times a day as needed.              No  current facility-administered medications for this visit.      Allergies   Allergen Reactions   ? Codeine Sulfate          Review of Systems:    Constitutional: Negative.  Negative for fever, chills,has  activity change, appetite change and fatigue.   Has been gradually losing weight  HENT: Negative for congestion and facial swelling.    Eyes: Negative for photophobia, redness and visual disturbance.   Respiratory: Negative for cough and chest tightness.    Cardiovascular: Negative for chest pain, palpitations and leg swelling.   Gastrointestinal: Negative for nausea, diarrhea, constipation, blood in stool and abdominal distention.   Genitourinary: Negative.    Musculoskeletal: Negative.   she has bilateral knee osteoarthritis with knee pain  Skin: Negative.    Neurological: Negative for dizziness, tremors, syncope, weakness, light-headedness and headaches.   Hematological: Does not bruise/bleed easily.   Psychiatric/Behavioral: Negative.  She had a flat effect with a poor recall   Has no recall of recent events       Physical Exam:    Weight 211 pounds blood pressure 123 / 71 temp 98 pulse 83  Obese  GENERAL: no acute distress. Cooperative in conversation.  Has limited recall and mostly smiles to questions  Patient is obese  HEENT: pupils are equal, round and reactive. Oral mucosa is moist and intact.  RESP:Chest symmetric. Regular respiratory rate. No stridor.  CVS: S1S2  ABD: Nondistended, soft.  EXTREMITIES: She has pedal and lower extremity edema.   NEURO: non focal. Alert and oriented xSELF   PSYCH: within normal limits. No depression or anxiety.  SKIN: warm dry intact   Musculoskeletal no focal abnormalities noted to be ambulating without any assist device    Labs:    Lab Results   Component Value Date    HGBA1C 6.7 (H) 03/23/2020     Results for orders placed or performed in visit on 03/23/20   Basic Metabolic Panel   Result Value Ref Range    Sodium 140 136 - 145 mmol/L    Potassium 4.0 3.5 - 5.0 mmol/L     Chloride 105 98 - 107 mmol/L    CO2 25 22 - 31 mmol/L    Anion Gap, Calculation 10 5 - 18 mmol/L    Glucose 85 70 - 125 mg/dL    Calcium 9.4 8.5 - 10.5 mg/dL    BUN 19 8 - 28 mg/dL    Creatinine 0.99 0.60 - 1.10 mg/dL    GFR MDRD Af Amer >60 >60 mL/min/1.73m2    GFR MDRD Non Af Amer 54 (L) >60 mL/min/1.73m2     Lab Results   Component Value Date    [id number] 368 03/23/2020     Vitamin D, Total (25-Hydroxy)   Date Value Ref Range Status   03/23/2020 34.4 30.0 - 80.0 ng/mL Final         Assessment/Plan:    -Dementia with significant cognitive decline  - History of diabetes adequately controlled with a last A1c of 6.6  - Morbid obesity  -History of depression with anxiety  - Hypertension  - Generalized weakness with decline noted    Patient is currently in the long-term care memory care unit because of progressive decline in cognition with dementia.  Mood and behaviors are stable though she remains pleasantly confused.  Blood pressures are stable.  She is on a frequent checks with medication and as her weights are going down her blood pressures are stabilizing.  Diabetic control is adequate on oral metformin last A1c was 6.7.  Suspect that this will drop further as she continues to eat less.  Weights are down to 211 pounds suspected to be secondary to declining oral intake.  She is pleasantly confused and frequently noted to be ambulating without any assist device she is a wander and remains a risk of elopement so she has been moved to a locked memory care unit where she is doing much better  Continue with her current care plan  Staff does report some behaviors including refusal of cares which are generally redirectable or her daughter has been coming over to help but in light of the COVID pandemic that has been an issue but overall mood has been stable    Electronically signed by: BERLIN Butts  This progress note was completed using Dragon software and there may be grammatical errors.

## 2021-06-20 NOTE — PROGRESS NOTES
[facility]      Facility:    [facility] [[id number]] -1 Code Status: FULL CODE      Chief Complaint/Reason for Visit:   Chief Complaint   Patient presents with     Problem Visit       HPI:   Kandi is a 78 y.o. female who who is residing at assisted living facility has moved to long-term care with underlying history of dementia, that has been progressive in nature.  She has periods of anxiety associated with memory loss and per daughter her short-term memory is getting progressively worse.  She has a past medical history of right knee osteoarthritis and apparently received steroid injections in the past.  She also is a diabetic that has been diet controlled and has obstructive sleep apnea.  As per the family she has been moved to long-term care so that she is closer to her  who is in the nursing home with her.     TCU: Today and previously she seems oriented though not sure per language barrier.  She denies any pain.  Previously we explained to her that we need to give her medication for her diabetes and increase 1 of her medications that she is currently taking for her blood pressure.  This has resulted in decreased renal function.  Previously noticed I ordered to DC her metformin was never carried out.  Fortunately her kidney function has rebounded taking off HCTZ and ACEi as her last Cr improved.  Previously increased hydralazine, today will increase again and maintain metoprolol dose.  BP well controlled. Her edema had increased, so lasix was increased, weight stable and renal fx improved. We also suggested ordering GANGA hose per her edema in her legs, though apparently she is never use of this and has since been DC'd.  Previously she also told me she has really dry skin so I ordered Aquaphor for her 3 times daily as needed, as this has been therapeutic, though today tells me she doesn't use.  Also she needed potassium supplementation, with a recheck  potassium of 4.2. Her current A1c has worsen as well, so previously increased metformin coverage.  BIMS 7/15 on 6/7/18.    Patient denies pain, headache, chest pain, numbness or tingling, shortest of breath, eating or swallowing concerns, nausea or vomiting, diarrhea or bowel abnormalities, or no new integumentary concerns today. Will also have PT see her for exercise and consulted recreation for increased socialization.        Past Medical History:  Past Medical History:   Diagnosis Date     Anxiety     Created by Conversion      Benign Adenomatous Polyp Of The Large Intestine     Created by Conversion      Chronic Diarrhea Of Unknown Origin     Created by Conversion      Dementia of the Alzheimer's type 7/13/2014     Diabetes Mellitus     Created by Conversion      Esophageal reflux     Created by Conversion      Gastritis      Herpes Zoster (Shingles)     Created by Conversion      Hiatal hernia      Hypercholesterolemia     Created by Conversion      Hypertension     Created by Conversion      Melanosis Coli     Created by Conversion [facility] Annotation: May  2 2012  1:19PM - Sheila Benavides: colonoscopy  4/30/12      Memory Lapses Or Loss     Created by Conversion      Obstructive Sleep Apnea     Created by Conversion      Osteopenia     Created by Conversion      Raynaud's Disease     Created by Conversion      Tricuspid Regurgitation     Created by Conversion            Surgical History:  No past surgical history on file.    Family History:   Family History   Problem Relation Age of Onset     Hypertension Other      Diabetes Other      Stroke Other        Social History:    Social History     Social History     Marital status:      Spouse name: N/A     Number of children: N/A     Years of education: N/A     Social History Main Topics     Smoking status: Never Smoker     Smokeless tobacco: Never Used     Alcohol use Not on file     Drug use: Not on file     Sexual activity: Not on file     Other  Topics Concern     Not on file     Social History Narrative     Review of Systems   Constitutional: Positive for appetite change. Negative for activity change, diaphoresis and fatigue.   HENT: Negative.  Negative for congestion and facial swelling.    Eyes: Negative.  Negative for visual disturbance.   Respiratory: Negative.  Negative for shortness of breath and wheezing.    Cardiovascular: Negative.  Negative for chest pain.   Gastrointestinal: Negative.  Negative for abdominal distention, abdominal pain, blood in stool, constipation and diarrhea.   Endocrine: Negative.    Genitourinary: Negative.  Negative for dysuria.   Musculoskeletal: Negative for back pain and joint swelling.        Bilateral knee pain per OA, taking tylenol   Skin: Negative.  Negative for color change.        moisturizer used   Allergic/Immunologic: Negative.    Neurological: Negative.  Negative for weakness and headaches.   Hematological: Negative.    Psychiatric/Behavioral: Negative for confusion and sleep disturbance. The patient is nervous/anxious.        Vitals:    08/23/18 1417   BP: 125/78   Pulse: 89   Resp: 18   Temp: 98  F (36.7  C)   SpO2: 94%   Weight: 218 lb (98.9 kg)       Physical Exam   Constitutional: She appears well-developed and well-nourished. No distress.   Wants to participate in more outtings, reported attending more outtings   HENT:   Head: Normocephalic and atraumatic.   Mouth/Throat: Oropharynx is clear and moist. No oropharyngeal exudate.   Eyes: Pupils are equal, round, and reactive to light. Right eye exhibits no discharge. Left eye exhibits no discharge. No scleral icterus.   Neck: Normal range of motion. Neck supple. No JVD present. No tracheal deviation present.   Intact   Cardiovascular: Normal rate and regular rhythm.  Exam reveals no gallop and no friction rub.    No murmur heard.  S1S2, no murmur   Pulmonary/Chest: Breath sounds normal. No stridor. No respiratory distress. She has no wheezes. She has no  rales.   CTA   Abdominal: Soft. Bowel sounds are normal. She exhibits no distension. There is no tenderness. There is no rebound.   No diarrhea or constipation   Genitourinary:   Genitourinary Comments: Deferred   Musculoskeletal: Normal range of motion. She exhibits edema.   1-2+ LE edema bilaterally, mostly pedal   Neurological: She is alert. No cranial nerve deficit.   A/O x2-3, BIMS 9/15   Skin: Skin is warm and dry. She is not diaphoretic. No erythema.   Intact, very dry, encouraged to use lotion   Psychiatric: She has a normal mood and affect.   Has anxiety and depression, more agitation noted, per cognitive eval will start donepezil   Nursing note and vitals reviewed.      Medication List:  Current Outpatient Prescriptions   Medication Sig     acetaminophen 500 mg coapsule Take 1 capsule by mouth. Every 4-6 hours PRN     bisacodyl (DULCOLAX, BISACODYL,) 10 mg suppository Insert 1 suppository (10 mg total) into the rectum daily as needed (for constipation).     buPROPion (WELLBUTRIN XL) 150 MG 24 hr tablet Take 1 tablet (150 mg total) by mouth daily.     CALCIUM CARBONATE/VITAMIN D3 (CALCIUM 600 WITH VITAMIN D3 ORAL) Take 2 tablets by mouth daily.     cetirizine (ZYRTEC) 10 MG tablet Take 10 mg by mouth daily.     cholecalciferol, vitamin D3, 1,000 unit tablet Take 1,000 Units by mouth daily.     citalopram (CELEXA) 20 MG tablet TAKE ONE TABLET BY MOUTH EVERY DAY     dextromethorphan (DELSYM) 30 mg/5 mL liquid Take 60 mg by mouth every 12 (twelve) hours as needed for cough.     donepezil (ARICEPT) 5 MG tablet Take 10 mg by mouth at bedtime.      fluticasone (FLONASE) 50 mcg/actuation nasal spray 1 spray into each nostril daily.     furosemide (LASIX) 20 MG tablet Take 20 mg by mouth 2 (two) times a day at 9am and 6pm.      galantamine (RAZADYNE ER) 24 MG 24 hr capsule Take 1 capsule (24 mg total) by mouth daily with breakfast.     hydrALAZINE (APRESOLINE) 25 MG tablet Take 50 mg by mouth 3 (three) times a day.  Hold for SBP <130      lansoprazole (PREVACID) 30 MG capsule TAKE 1 CAPSULE BY MOUTH EVERY DAY     metFORMIN (GLUCOPHAGE) 500 MG tablet Take 500 mg by mouth daily with supper.      metFORMIN (GLUCOPHAGE) 500 MG tablet Take 250 mg by mouth daily with breakfast.     metoprolol succinate (TOPROL-XL) 25 MG Take 25 mg by mouth daily.      OMEGA-3S/DHA/EPA/FISH OIL (OMEGA 3 ORAL) Take 2 capsules by mouth daily.     polyethylene glycol (MIRALAX) 17 gram/dose powder Take 17 g by mouth daily.     potassium chloride SA (K-DUR,KLOR-CON) 10 MEQ tablet Take 10 mEq by mouth daily.      venlafaxine (EFFEXOR XR) 37.5 MG 24 hr capsule Take 2 capsules (75 mg total) by mouth daily. (Patient taking differently: Take 37.5 mg by mouth daily. GDR 3/2018)     verapamil (VERELAN PM) 100 mg 24 hr capsule TAKE ONE CAPSULE BY MOUTH EVERY DAY     vitamin E 1000 UNIT capsule Take 1,000 Units by mouth 2 (two) times a day.     white petrolatum (AQUAPHOR ORIGINAL) 41 % Oint Apply topically 3 (three) times a day as needed.       Labs:  Results for orders placed or performed in visit on 08/23/18   Basic Metabolic Panel   Result Value Ref Range    Sodium 140 136 - 145 mmol/L    Potassium 4.2 3.5 - 5.0 mmol/L    Chloride 101 98 - 107 mmol/L    CO2 30 22 - 31 mmol/L    Anion Gap, Calculation 9 5 - 18 mmol/L    Glucose 121 70 - 125 mg/dL    Calcium 9.5 8.5 - 10.5 mg/dL    BUN 25 8 - 28 mg/dL    Creatinine 1.11 (H) 0.60 - 1.10 mg/dL    GFR MDRD Af Amer 58 (L) >60 mL/min/1.73m2    GFR MDRD Non Af Amer 48 (L) >60 mL/min/1.73m2     Lab Results   Component Value Date    WBC 12.2 (H) 05/28/2016    HGB 11.1 (L) 05/28/2016    HCT 32.9 (L) 05/28/2016    MCV 91 05/28/2016     05/28/2016     Vitamin D, Total (25-Hydroxy)   Date Value Ref Range Status   01/25/2018 42.3 30.0 - 80.0 ng/mL Final     Lab Results   Component Value Date    HGBA1C 7.0 (H) 06/25/2018     Assessment/Plan:  1.  Hypokalemia: continue potassium 10mEq BID, last K 4.2.  2.  TARI: Last  creatinine 1.11 with GFR of 48 on 8/23/18. Improved.    3.  DM: current Hgb A1c 7.0, previous 6.5, increase metformin 500mg at PM and 250mg at AM, recheck A1c in sept.  4.  Anxiety/depression: continue citalopram and effexor, related to daughter's health concerns, decreased effexor in 3/2018.  5.  HTN: increase hydralazine 50mg three times a day, hold if SBP <130, continue verapamil 100mg daily, and will maintain metoprolol 25mg daily.  6. Vit D def: continue D3 1000U, last 42.3 on 1/25/18.  7. GERD: PPI, no change.  8. Seasonal allergies: continue fluticasone and cetirizine.  9. Edema: maintain lasix 20mg two times a day, currently 218lb.   10. Constipation: continue miralax daily, no change.  11. Dementia: recently had cognitive assessment, increase donepezil to 10mg at HS, re-evaluate with daughter.  12. Socialization: consulted recreation. Reports attending more events.    The care plan has been reviewed and all orders signed. Changes to care plan, if any, as noted. Otherwise, continue care plan of care.      Electronically signed by: Ashwin Tang NP

## 2021-06-20 NOTE — PROGRESS NOTES
[facility]      Facility:    [facility] [[id number]]  205-1 Code Status: FULL CODE      Chief Complaint/Reason for Visit:   Chief Complaint   Patient presents with     Problem Visit       HPI:   Kandi is a 78 y.o. female who who is residing at assisted living facility has moved to long-term care with underlying history of dementia, that has been progressive in nature.  She has periods of anxiety associated with memory loss and per daughter her short-term memory is getting progressively worse.  She has a past medical history of right knee osteoarthritis and apparently received steroid injections in the past.  She also is a diabetic that has been diet controlled and has obstructive sleep apnea.  As per the family she has been moved to long-term care so that she is closer to her  who is in the nursing home with her.     LTC: Today and previously she seems oriented though not sure per language barrier, will attempt to use dietitian or daughter to interpret.   She denies any pain.    Previously increased hydralazine, today will increase again and maintain metoprolol dose. Her edema had increased, so lasix was increased, weight stable and renal fx improved. We also suggested ordering GANGA hose per her edema in her legs, though apparently she is never use of this and has since been DC'd.  Previously she also told me she has really dry skin so I ordered Aquaphor for her 3 times daily as needed, as this has been therapeutic, though today tells me she doesn't use.  Also she needed potassium supplementation, with a recheck potassium of 4.2. Her current A1c has worsen as well, so previously increased metformin coverage, recheck A1c this month.  BIMS 7/15 on 6/7/18.    Patient denies pain, headache, chest pain, numbness or tingling, shortest of breath, eating or swallowing concerns, nausea or vomiting, diarrhea or bowel abnormalities, or no new integumentary concerns today.  Previously consulted recreation for increased socialization.        Past Medical History:  Past Medical History:   Diagnosis Date     Anxiety     Created by Conversion      Benign Adenomatous Polyp Of The Large Intestine     Created by Conversion      Chronic Diarrhea Of Unknown Origin     Created by Conversion      Dementia of the Alzheimer's type 7/13/2014     Diabetes Mellitus     Created by Conversion      Esophageal reflux     Created by Conversion      Gastritis      Herpes Zoster (Shingles)     Created by Conversion      Hiatal hernia      Hypercholesterolemia     Created by Conversion      Hypertension     Created by Conversion      Melanosis Coli     Created by Conversion [facility] Annotation: May  2 2012  1:19PM - Sheila Benavides: colonoscopy  4/30/12      Memory Lapses Or Loss     Created by Conversion      Obstructive Sleep Apnea     Created by Conversion      Osteopenia     Created by Conversion      Raynaud's Disease     Created by Conversion      Tricuspid Regurgitation     Created by Conversion            Surgical History:  No past surgical history on file.    Family History:   Family History   Problem Relation Age of Onset     Hypertension Other      Diabetes Other      Stroke Other        Social History:    Social History     Social History     Marital status:      Spouse name: N/A     Number of children: N/A     Years of education: N/A     Social History Main Topics     Smoking status: Never Smoker     Smokeless tobacco: Never Used     Alcohol use Not on file     Drug use: Not on file     Sexual activity: Not on file     Other Topics Concern     Not on file     Social History Narrative     Review of Systems   Constitutional: Positive for appetite change. Negative for activity change, diaphoresis and fatigue.   HENT: Negative.  Negative for congestion and facial swelling.    Eyes: Negative.  Negative for visual disturbance.   Respiratory: Negative.  Negative for shortness of breath and  wheezing.    Cardiovascular: Negative.  Negative for chest pain.   Gastrointestinal: Negative.  Negative for abdominal distention, abdominal pain, blood in stool, constipation and diarrhea.   Endocrine: Negative.    Genitourinary: Negative.  Negative for dysuria.   Musculoskeletal: Negative for back pain and joint swelling.        Bilateral knee pain per OA, taking tylenol   Skin: Negative.  Negative for color change.        moisturizer used   Allergic/Immunologic: Negative.    Neurological: Negative.  Negative for weakness and headaches.   Hematological: Negative.    Psychiatric/Behavioral: Negative for confusion and sleep disturbance. The patient is nervous/anxious.        Vitals:    09/09/18 1101   BP: 125/78   Pulse: 89   Resp: 18   Temp: 98  F (36.7  C)   SpO2: 96%   Weight: 220 lb (99.8 kg)       Physical Exam   Constitutional: She appears well-developed and well-nourished. No distress.   Wants to participate in more outtings, reported attending more outtings   HENT:   Head: Normocephalic and atraumatic.   Mouth/Throat: Oropharynx is clear and moist. No oropharyngeal exudate.   Eyes: Pupils are equal, round, and reactive to light. Right eye exhibits no discharge. Left eye exhibits no discharge. No scleral icterus.   Neck: Normal range of motion. Neck supple. No JVD present. No tracheal deviation present.   Intact   Cardiovascular: Normal rate and regular rhythm.  Exam reveals no gallop and no friction rub.    No murmur heard.  S1S2, no murmur   Pulmonary/Chest: Breath sounds normal. No stridor. No respiratory distress. She has no wheezes. She has no rales.   CTA   Abdominal: Soft. Bowel sounds are normal. She exhibits no distension. There is no tenderness. There is no rebound.   No diarrhea or constipation   Genitourinary:   Genitourinary Comments: Deferred   Musculoskeletal: Normal range of motion. She exhibits edema.   1-2+ LE edema bilaterally, mostly pedal   Neurological: She is alert. No cranial nerve  deficit.   A/O x2-3, BIMS 9/15   Skin: Skin is warm and dry. She is not diaphoretic. No erythema.   Intact, very dry, encouraged to use lotion   Psychiatric: She has a normal mood and affect.   Has anxiety and depression, more agitation noted, per cognitive eval will start donepezil   Nursing note and vitals reviewed.      Medication List:  Current Outpatient Prescriptions   Medication Sig     acetaminophen 500 mg coapsule Take 1 capsule by mouth. Every 4-6 hours PRN     bisacodyl (DULCOLAX, BISACODYL,) 10 mg suppository Insert 1 suppository (10 mg total) into the rectum daily as needed (for constipation).     buPROPion (WELLBUTRIN XL) 150 MG 24 hr tablet Take 1 tablet (150 mg total) by mouth daily.     CALCIUM CARBONATE/VITAMIN D3 (CALCIUM 600 WITH VITAMIN D3 ORAL) Take 2 tablets by mouth daily.     cetirizine (ZYRTEC) 10 MG tablet Take 10 mg by mouth daily.     cholecalciferol, vitamin D3, 1,000 unit tablet Take 1,000 Units by mouth daily.     citalopram (CELEXA) 20 MG tablet TAKE ONE TABLET BY MOUTH EVERY DAY     dextromethorphan (DELSYM) 30 mg/5 mL liquid Take 60 mg by mouth every 12 (twelve) hours as needed for cough.     donepezil (ARICEPT) 5 MG tablet Take 10 mg by mouth at bedtime.      fluticasone (FLONASE) 50 mcg/actuation nasal spray 1 spray into each nostril daily.     furosemide (LASIX) 20 MG tablet Take 20 mg by mouth 2 (two) times a day at 9am and 6pm.      galantamine (RAZADYNE ER) 24 MG 24 hr capsule Take 1 capsule (24 mg total) by mouth daily with breakfast.     hydrALAZINE (APRESOLINE) 25 MG tablet Take 75 mg by mouth 3 (three) times a day. Hold for SBP <130      lansoprazole (PREVACID) 30 MG capsule TAKE 1 CAPSULE BY MOUTH EVERY DAY     metFORMIN (GLUCOPHAGE) 500 MG tablet Take 500 mg by mouth daily with supper.      metFORMIN (GLUCOPHAGE) 500 MG tablet Take 250 mg by mouth daily with breakfast.     metoprolol succinate (TOPROL-XL) 25 MG Take 25 mg by mouth daily.      OMEGA-3S/DHA/EPA/FISH OIL  (OMEGA 3 ORAL) Take 2 capsules by mouth daily.     polyethylene glycol (MIRALAX) 17 gram/dose powder Take 17 g by mouth daily.     potassium chloride SA (K-DUR,KLOR-CON) 10 MEQ tablet Take 10 mEq by mouth daily.      venlafaxine (EFFEXOR XR) 37.5 MG 24 hr capsule Take 2 capsules (75 mg total) by mouth daily. (Patient taking differently: Take 37.5 mg by mouth daily. GDR 3/2018)     verapamil (VERELAN PM) 100 mg 24 hr capsule TAKE ONE CAPSULE BY MOUTH EVERY DAY     vitamin E 1000 UNIT capsule Take 1,000 Units by mouth 2 (two) times a day.     white petrolatum (AQUAPHOR ORIGINAL) 41 % Oint Apply topically 3 (three) times a day as needed.       Labs:  Results for orders placed or performed in visit on 08/23/18   Basic Metabolic Panel   Result Value Ref Range    Sodium 140 136 - 145 mmol/L    Potassium 4.2 3.5 - 5.0 mmol/L    Chloride 101 98 - 107 mmol/L    CO2 30 22 - 31 mmol/L    Anion Gap, Calculation 9 5 - 18 mmol/L    Glucose 121 70 - 125 mg/dL    Calcium 9.5 8.5 - 10.5 mg/dL    BUN 25 8 - 28 mg/dL    Creatinine 1.11 (H) 0.60 - 1.10 mg/dL    GFR MDRD Af Amer 58 (L) >60 mL/min/1.73m2    GFR MDRD Non Af Amer 48 (L) >60 mL/min/1.73m2     Lab Results   Component Value Date    WBC 12.2 (H) 05/28/2016    HGB 11.1 (L) 05/28/2016    HCT 32.9 (L) 05/28/2016    MCV 91 05/28/2016     05/28/2016     Vitamin D, Total (25-Hydroxy)   Date Value Ref Range Status   01/25/2018 42.3 30.0 - 80.0 ng/mL Final     Lab Results   Component Value Date    HGBA1C 7.0 (H) 06/25/2018     Assessment/Plan:  1.  Hypokalemia: continue potassium 10mEq BID, last K 4.2.  2.  TARI: Last creatinine 1.11 with GFR of 48 on 8/23/18. Improved.    3.  DM: current Hgb A1c 7.0, previous 6.5, increase metformin 500mg at PM and 250mg at AM, recheck A1c in sept.  4.  Anxiety/depression: continue citalopram and effexor, related to daughter's health concerns, decreased effexor in 3/2018.  5.  HTN: increase hydralazine 75mg three times a day, hold if SBP <130,  continue verapamil 100mg daily, and will maintain metoprolol 25mg daily.  6. Vit D def: continue D3 1000U, last 42.3 on 1/25/18.  7. GERD: PPI, no change.  8. Seasonal allergies: continue fluticasone and cetirizine.  9. Edema: maintain lasix 20mg two times a day, currently 220lb.   10. Constipation: continue miralax daily, no change.  11. Dementia: recently had cognitive assessment, increase donepezil to 10mg at HS, re-evaluate with daughter.  12. Socialization: consulted recreation. Reports attending more events.    The care plan has been reviewed and all orders signed. Changes to care plan, if any, as noted. Otherwise, continue care plan of care.      Electronically signed by: Ashwin Tang NP

## 2021-06-20 NOTE — LETTER
Letter by Kathya Viavs MBBS at      Author: Kathya Vivas MBBS Service: -- Author Type: --    Filed:  Encounter Date: 2/19/2020 Status: (Other)         Patient: Kandi Ontiveros   MR Number: [id number]   YOB: 1940   Date of Visit: 2/19/2020     [facility]      Code Status:  FULL CODE  Visit Type: Review Of Multiple Medical Conditions     Facility:  [facility] [[id number]]           History of Present Illness: Kandi Ontiveros is a 79 y.o. female who is a resident of [facility].  She has underlying history of dementia with progressive memory impairment.  In light of the fact that she has been noted several several times to be wandering.  It is felt safe for that she should move to long-term care memory care unit and family is comfortable with that decision.  Her  will be moving with her to  She has a history of hypertension and remains on multiple medications blood pressures have been stable.  Blood pressures are checked often and she has been given medications appropriately.  Weights are reviewed and at 217 pounds are stable she has not had any recent weight loss.  Family is noticing she is eating well.  Patient is a diabetic A1c's have been stable she remains on metformin.  Her recheck A1c was 6.6    Past Medical History:   Diagnosis Date   ? Anxiety     Created by Conversion    ? Benign Adenomatous Polyp Of The Large Intestine     Created by Conversion    ? Chronic Diarrhea Of Unknown Origin     Created by Conversion    ? Dementia of the Alzheimer's type 7/13/2014   ? Diabetes Mellitus     Created by Conversion    ? Esophageal reflux     Created by Conversion    ? Gastritis    ? Herpes Zoster (Shingles)     Created by Conversion    ? Hiatal hernia    ? Hypercholesterolemia     Created by Conversion    ? Hypertension     Created by Conversion    ? Melanosis Coli     Created by Conversion [facility] Annotation: May  2 2012  1:19PM - Makayla  Sheila: colonoscopy  4/30/12    ? Memory Lapses Or Loss     Created by Conversion    ? Obstructive Sleep Apnea     Created by Conversion    ? Osteopenia     Created by Conversion    ? Raynaud's Disease     Created by Conversion    ? Tricuspid Regurgitation     Created by Conversion      No past surgical history on file.  Family History   Problem Relation Age of Onset   ? Hypertension Other    ? Diabetes Other    ? Stroke Other      Social History     Socioeconomic History   ? Marital status:      Spouse name: Not on file   ? Number of children: Not on file   ? Years of education: Not on file   ? Highest education level: Not on file   Occupational History   ? Not on file   Social Needs   ? Financial resource strain: Not on file   ? Food insecurity:     Worry: Not on file     Inability: Not on file   ? Transportation needs:     Medical: Not on file     Non-medical: Not on file   Tobacco Use   ? Smoking status: Never Smoker   ? Smokeless tobacco: Never Used   Substance and Sexual Activity   ? Alcohol use: Not on file   ? Drug use: Not on file   ? Sexual activity: Not on file   Lifestyle   ? Physical activity:     Days per week: Not on file     Minutes per session: Not on file   ? Stress: Not on file   Relationships   ? Social connections:     Talks on phone: Not on file     Gets together: Not on file     Attends Bahai service: Not on file     Active member of club or organization: Not on file     Attends meetings of clubs or organizations: Not on file     Relationship status: Not on file   ? Intimate partner violence:     Fear of current or ex partner: Not on file     Emotionally abused: Not on file     Physically abused: Not on file     Forced sexual activity: Not on file   Other Topics Concern   ? Not on file   Social History Narrative   ? Not on file   lived in [facility]  Current Outpatient Medications   Medication Sig Dispense Refill   ? acetaminophen (TYLENOL) 325 MG tablet Take 650 mg by mouth 4 (four)  times a day.     ? acetaminophen 500 mg coapsule Take 1 capsule by mouth. Every 4-6 hours PRN     ? bisacodyl (DULCOLAX, BISACODYL,) 10 mg suppository Insert 1 suppository (10 mg total) into the rectum daily as needed (for constipation). 12 suppository 0   ? cetirizine (ZYRTEC) 10 MG tablet Take 10 mg by mouth daily.     ? cholecalciferol, vitamin D3, 1,000 unit tablet Take 1,000 Units by mouth daily.     ? citalopram (CELEXA) 20 MG tablet TAKE ONE TABLET BY MOUTH EVERY DAY 90 tablet 0   ? cloNIDine (CATAPRES-TTS) 0.2 mg/24 hr Place 1 patch on the skin once a week.     ? fluticasone (FLONASE) 50 mcg/actuation nasal spray 1 spray into each nostril daily. 16 g 12   ? gabapentin (NEURONTIN) 100 MG capsule Take 100 mg by mouth 3 (three) times a day.     ? hydrALAZINE (APRESOLINE) 25 MG tablet Take 50 mg by mouth Daily at 8:00 am.. Hold for SBP <150           ? ibuprofen (ADVIL,MOTRIN) 600 MG tablet Take 600 mg by mouth every 6 (six) hours as needed for pain.     ? memantine (NAMENDA) 5 MG tablet Take 10 mg by mouth at bedtime.      ? metFORMIN (GLUCOPHAGE) 500 MG tablet Take 750 mg by mouth 2 (two) times a day with meals.      ? metoprolol succinate (TOPROL-XL) 25 MG Take 12.5 mg by mouth daily.            ? omeprazole (PRILOSEC) 20 MG capsule Take 20 mg by mouth daily before breakfast.     ? polyethylene glycol (MIRALAX) 17 gram/dose powder Take 17 g by mouth daily. 255 g 0   ? polyvinyl alcohol (LIQUIFILM TEARS) 1.4 % ophthalmic solution Administer 1 drop to both eyes 3 (three) times a day as needed for dry eyes.     ? venlafaxine (EFFEXOR-XR) 37.5 MG 24 hr capsule Take 37.5 mg by mouth daily.     ? verapamil (VERELAN PM) 100 mg 24 hr capsule TAKE ONE CAPSULE BY MOUTH EVERY DAY 90 capsule 1   ? white petrolatum (AQUAPHOR ORIGINAL) 41 % Oint Apply 1 application topically 3 (three) times a day as needed.              No current facility-administered medications for this visit.      Allergies   Allergen Reactions   ?  Codeine Sulfate          Review of Systems:    Constitutional: Negative.  Negative for fever, chills,has  activity change, appetite change and fatigue.   HENT: Negative for congestion and facial swelling.    Eyes: Negative for photophobia, redness and visual disturbance.   Respiratory: Negative for cough and chest tightness.    Cardiovascular: Negative for chest pain, palpitations and leg swelling.   Gastrointestinal: Negative for nausea, diarrhea, constipation, blood in stool and abdominal distention.   Genitourinary: Negative.    Musculoskeletal: Negative.   she has bilateral knee osteoarthritis with knee pain  Skin: Negative.    Neurological: Negative for dizziness, tremors, syncope, weakness, light-headedness and headaches.   Hematological: Does not bruise/bleed easily.   Psychiatric/Behavioral: Negative.  She had a flat effect with a poor recall   Has no recall of recent events       Physical Exam:    Weight 217 pounds blood pressure 138/68 temp 98 pulse 83  Obese  GENERAL: no acute distress. Cooperative in conversation.  Has limited recall and mostly smiles to questions  Patient is obese  HEENT: pupils are equal, round and reactive. Oral mucosa is moist and intact.  RESP:Chest symmetric. Regular respiratory rate. No stridor.  CVS: S1S2  ABD: Nondistended, soft.  EXTREMITIES: She has pedal and lower extremity edema.   NEURO: non focal. Alert and oriented xSELF   PSYCH: within normal limits. No depression or anxiety.  SKIN: warm dry intact   Musculoskeletal no focal abnormalities noted to be ambulating without any assist device    Labs:    Lab Results   Component Value Date    HGBA1C 6.6 (H) 09/09/2019     Results for orders placed or performed in visit on 06/19/19   Basic Metabolic Panel   Result Value Ref Range    Sodium 141 136 - 145 mmol/L    Potassium 4.3 3.5 - 5.0 mmol/L    Chloride 103 98 - 107 mmol/L    CO2 29 22 - 31 mmol/L    Anion Gap, Calculation 9 5 - 18 mmol/L    Glucose 109 70 - 125 mg/dL     Calcium 9.7 8.5 - 10.5 mg/dL    BUN 23 8 - 28 mg/dL    Creatinine 1.02 0.60 - 1.10 mg/dL    GFR MDRD Af Amer >60 >60 mL/min/1.73m2    GFR MDRD Non Af Amer 53 (L) >60 mL/min/1.73m2     Lab Results   Component Value Date    IXAAVANI60 309 06/19/2019     Vitamin D, Total (25-Hydroxy)   Date Value Ref Range Status   06/19/2019 33.5 30.0 - 80.0 ng/mL Final         Assessment/Plan:    -Dementia with significant cognitive decline  - History of diabetes adequately controlled with a last A1c of 6.6  - Morbid obesity  -History of depression with anxiety  - Hypertension  - Generalized weakness with decline noted    Patient has been declining cognitively unfortunately due to language barrier no recent  testing available for review.  Daughter present at bedside however agrees with staff decision to move to another floor which is a long-term care memory care unit.  Plan is to recheck another A1c on her her target goal is less than 8 for her A1c.  Monitor trends before adjusting metformin further.  Monitor mood and behaviors she remains on Effexor as well as Celexa.  Unfortunately patient continues to have significant resistance to care  She will refuse cares and all interventions unless her daughter is present even her other daughter cannot help her much so we will continue with her psych meds.  SHe frequently even will refuse showers and cleaning efforts  Recheck routine labs including TSH BMP and CBC in the morning.  Discontinue her Namenda.  She now has advanced dementia with no proven benefit from Namenda  Change her Claritin and Flonase to as needed  Adjust medications further based on labs    Electronically signed by: BERLIN Butts  This progress note was completed using Dragon software and there may be grammatical errors.

## 2021-06-20 NOTE — PROGRESS NOTES
[facility]      Facility:    [facility] [[id number]] -1 Code Status: FULL CODE      Chief Complaint/Reason for Visit:   Chief Complaint   Patient presents with     Review Of Multiple Medical Conditions       HPI:   Kandi is a 78 y.o. female who who is residing at assisted living facility has moved to long-term care with underlying history of dementia, that has been progressive in nature.  She has periods of anxiety associated with memory loss and per daughter her short-term memory is getting progressively worse.  She has a past medical history of right knee osteoarthritis and apparently received steroid injections in the past.  She also is a diabetic that has been diet controlled and has obstructive sleep apnea.  As per the family she has been moved to long-term care so that she is closer to her  who is in the nursing home with her.     LTC: Today and previously she seems oriented though not sure per language barrier, will attempt to use dietitian or daughter to interpret.   She denies any pain.    Previously increased hydralazine, today will increase again and maintain metoprolol dose. Her edema had increased, so lasix was increased, weight stable and renal fx improved. We also suggested ordering GANGA hose per her edema in her legs, though apparently she is never use of this and has since been DC'd.  Previously she also told me she has really dry skin so I ordered Aquaphor for her 3 times daily as needed, as this has been therapeutic, though today tells me she doesn't use.  Also she needed potassium supplementation, with a recheck potassium of 4.2. Her current A1c has worsen as well, so previously increased metformin coverage, now will increase monitoring again.  Ambulates per self and usually pushing  around in his wc.  Now will trial clonidone patch for ongoing htn. Need to talk with daughter about donepezil addition. Will talk with dietitian about  20lb increase since dec. BIMS 8/15 and PHQ9 8/20 on 9/6/18.    Patient denies pain, headache, chest pain, numbness or tingling, shortest of breath, eating or swallowing concerns, nausea or vomiting, diarrhea or bowel abnormalities, or no new integumentary concerns today. Previously consulted recreation for increased socialization.        Past Medical History:  Past Medical History:   Diagnosis Date     Anxiety     Created by Conversion      Benign Adenomatous Polyp Of The Large Intestine     Created by Conversion      Chronic Diarrhea Of Unknown Origin     Created by Conversion      Dementia of the Alzheimer's type 7/13/2014     Diabetes Mellitus     Created by Conversion      Esophageal reflux     Created by Conversion      Gastritis      Herpes Zoster (Shingles)     Created by Conversion      Hiatal hernia      Hypercholesterolemia     Created by Conversion      Hypertension     Created by Conversion      Melanosis Coli     Created by Conversion [facility] Annotation: May  2 2012  1:19PM - Sheila Benavides: colonoscopy  4/30/12      Memory Lapses Or Loss     Created by Conversion      Obstructive Sleep Apnea     Created by Conversion      Osteopenia     Created by Conversion      Raynaud's Disease     Created by Conversion      Tricuspid Regurgitation     Created by Conversion            Surgical History:  No past surgical history on file.    Family History:   Family History   Problem Relation Age of Onset     Hypertension Other      Diabetes Other      Stroke Other        Social History:    Social History     Social History     Marital status:      Spouse name: N/A     Number of children: N/A     Years of education: N/A     Social History Main Topics     Smoking status: Never Smoker     Smokeless tobacco: Never Used     Alcohol use Not on file     Drug use: Not on file     Sexual activity: Not on file     Other Topics Concern     Not on file     Social History Narrative     Review of Systems    Constitutional: Positive for appetite change. Negative for activity change, diaphoresis and fatigue.   HENT: Negative.  Negative for congestion and facial swelling.    Eyes: Negative.  Negative for visual disturbance.   Respiratory: Negative.  Negative for shortness of breath and wheezing.    Cardiovascular: Negative.  Negative for chest pain.   Gastrointestinal: Negative.  Negative for abdominal distention, abdominal pain, blood in stool, constipation and diarrhea.   Endocrine: Negative.    Genitourinary: Negative.  Negative for dysuria.   Musculoskeletal: Negative for back pain and joint swelling.        Bilateral knee pain per OA, taking tylenol   Skin: Negative.  Negative for color change.        moisturizer used   Allergic/Immunologic: Negative.    Neurological: Negative.  Negative for weakness and headaches.   Hematological: Negative.    Psychiatric/Behavioral: Negative for confusion and sleep disturbance. The patient is nervous/anxious.        Vitals:    10/07/18 0858   BP: 143/77   Pulse: 71   Resp: 16   Temp: 98  F (36.7  C)   SpO2: 97%   Weight: (!) 225 lb (102.1 kg)       Physical Exam   Constitutional: She appears well-developed and well-nourished. No distress.   Wants to participate in more outtings, reported attending more outtings   HENT:   Head: Normocephalic and atraumatic.   Mouth/Throat: Oropharynx is clear and moist. No oropharyngeal exudate.   Eyes: Pupils are equal, round, and reactive to light. Right eye exhibits no discharge. Left eye exhibits no discharge. No scleral icterus.   Neck: Normal range of motion. Neck supple. No JVD present. No tracheal deviation present.   Intact   Cardiovascular: Normal rate and regular rhythm.  Exam reveals no gallop and no friction rub.    No murmur heard.  S1S2, no murmur   Pulmonary/Chest: Breath sounds normal. No stridor. No respiratory distress. She has no wheezes. She has no rales.   CTA   Abdominal: Soft. Bowel sounds are normal. She exhibits no  distension. There is no tenderness. There is no rebound.   No diarrhea or constipation   Genitourinary:   Genitourinary Comments: Deferred   Musculoskeletal: Normal range of motion. She exhibits edema.   1-2+ LE edema bilaterally, mostly pedal   Neurological: She is alert. No cranial nerve deficit.   A/O x2-3, BIMS 9/15   Skin: Skin is warm and dry. She is not diaphoretic. No erythema.   Intact, very dry, encouraged to use lotion   Psychiatric: She has a normal mood and affect.   Has anxiety and depression, more agitation noted, per cognitive eval now on donepezil   Nursing note and vitals reviewed.      Medication List:  Current Outpatient Prescriptions   Medication Sig     acetaminophen 500 mg coapsule Take 1 capsule by mouth. Every 4-6 hours PRN     bisacodyl (DULCOLAX, BISACODYL,) 10 mg suppository Insert 1 suppository (10 mg total) into the rectum daily as needed (for constipation).     buPROPion (WELLBUTRIN XL) 150 MG 24 hr tablet Take 1 tablet (150 mg total) by mouth daily.     CALCIUM CARBONATE/VITAMIN D3 (CALCIUM 600 WITH VITAMIN D3 ORAL) Take 2 tablets by mouth daily.     cetirizine (ZYRTEC) 10 MG tablet Take 10 mg by mouth daily.     cholecalciferol, vitamin D3, 1,000 unit tablet Take 1,000 Units by mouth daily.     citalopram (CELEXA) 20 MG tablet TAKE ONE TABLET BY MOUTH EVERY DAY     cloNIDine (CATAPRES-TTS) 0.1 mg/24 hr Place 1 patch on the skin once a week.     dextromethorphan (DELSYM) 30 mg/5 mL liquid Take 60 mg by mouth every 12 (twelve) hours as needed for cough.     donepezil (ARICEPT) 5 MG tablet Take 10 mg by mouth at bedtime.      fluticasone (FLONASE) 50 mcg/actuation nasal spray 1 spray into each nostril daily.     furosemide (LASIX) 20 MG tablet Take 20 mg by mouth 2 (two) times a day at 9am and 6pm.      galantamine (RAZADYNE ER) 24 MG 24 hr capsule Take 1 capsule (24 mg total) by mouth daily with breakfast.     hydrALAZINE (APRESOLINE) 25 MG tablet Take 75 mg by mouth 3 (three) times a  day. Hold for SBP <130      lansoprazole (PREVACID) 30 MG capsule TAKE 1 CAPSULE BY MOUTH EVERY DAY     metFORMIN (GLUCOPHAGE) 500 MG tablet Take 500 mg by mouth daily with supper.      metFORMIN (GLUCOPHAGE) 500 MG tablet Take 250 mg by mouth daily with breakfast.     metoprolol succinate (TOPROL-XL) 25 MG Take 25 mg by mouth daily.      OMEGA-3S/DHA/EPA/FISH OIL (OMEGA 3 ORAL) Take 2 capsules by mouth daily.     polyethylene glycol (MIRALAX) 17 gram/dose powder Take 17 g by mouth daily.     potassium chloride SA (K-DUR,KLOR-CON) 10 MEQ tablet Take 10 mEq by mouth daily.      venlafaxine (EFFEXOR XR) 37.5 MG 24 hr capsule Take 2 capsules (75 mg total) by mouth daily. (Patient taking differently: Take 37.5 mg by mouth daily. GDR 3/2018)     verapamil (VERELAN PM) 100 mg 24 hr capsule TAKE ONE CAPSULE BY MOUTH EVERY DAY     vitamin E 1000 UNIT capsule Take 1,000 Units by mouth 2 (two) times a day.     white petrolatum (AQUAPHOR ORIGINAL) 41 % Oint Apply topically 3 (three) times a day as needed.       Labs:  Results for orders placed or performed in visit on 08/23/18   Basic Metabolic Panel   Result Value Ref Range    Sodium 140 136 - 145 mmol/L    Potassium 4.2 3.5 - 5.0 mmol/L    Chloride 101 98 - 107 mmol/L    CO2 30 22 - 31 mmol/L    Anion Gap, Calculation 9 5 - 18 mmol/L    Glucose 121 70 - 125 mg/dL    Calcium 9.5 8.5 - 10.5 mg/dL    BUN 25 8 - 28 mg/dL    Creatinine 1.11 (H) 0.60 - 1.10 mg/dL    GFR MDRD Af Amer 58 (L) >60 mL/min/1.73m2    GFR MDRD Non Af Amer 48 (L) >60 mL/min/1.73m2     Lab Results   Component Value Date    WBC 12.2 (H) 05/28/2016    HGB 11.1 (L) 05/28/2016    HCT 32.9 (L) 05/28/2016    MCV 91 05/28/2016     05/28/2016     Vitamin D, Total (25-Hydroxy)   Date Value Ref Range Status   01/25/2018 42.3 30.0 - 80.0 ng/mL Final     Lab Results   Component Value Date    HGBA1C 7.6 (H) 10/03/2018     Assessment/Plan:  1.  Hypokalemia: continue potassium 10mEq BID, last K 4.2.  2.  TAIR: Last  creatinine 1.11 with GFR of 48 on 8/23/18. Improved.    3.  DM: current Hgb A1c 7.6, previous 7.0, metformin 500mg at PM and 250mg at AM, will restart four times a day monitoring  4.  Anxiety/depression: continue citalopram 20mg daily, wellbutrin 150mg and effexor, related to daughter's health concerns, decreased effexor in 3/2018.  5.  HTN: increase hydralazine 75mg three times a day, hold if SBP <130, continue verapamil 100mg daily, and will maintain metoprolol 25mg daily. Trial clonidine patch 0.1mg/wk  6. Vit D def: continue D3 1000U, last 42.3 on 1/25/18.  7. GERD: PPI, no change.  8. Seasonal allergies: continue fluticasone and cetirizine.  9. Edema: maintain lasix 20mg two times a day, currently 225lb.  Was on larger portion, now d/c'd.  Will talk with dietitian as well.  10. Constipation: continue miralax daily, no change.  11. Dementia: recently had cognitive assessment, increase donepezil to 10mg at HS, re-evaluate with daughter.  12. Socialization: consulted recreation. Reports attending more events.    The care plan has been reviewed and all orders signed. Changes to care plan, if any, as noted. Otherwise, continue care plan of care.      Electronically signed by: Ashwin Tang NP

## 2021-06-20 NOTE — LETTER
"Letter by Ashwin Tang NP at      Author: Ashwin Tang NP Service: -- Author Type: --    Filed:  Encounter Date: 4/17/2020 Status: (Other)         Patient: Kandi Ontiveros   MR Number: [id number]   YOB: 1940   Date of Visit: 4/17/2020     [facility]   Video Visit    Code Status: FULL CODE    Kandi Ontiveros is a 79 y.o. female who is being evaluated via a billable video visit.      The patient has been notified of following:     \"This video visit will be conducted via a call between you and your physician/provider. We have found that certain health care needs can be provided without the need for an in-person physical exam.  This service lets us provide the care you need with a video conversation.  If a prescription is necessary we can send it to the facility team.  If lab work is needed we can place an order through the facility team to have that test done at a later time.    If during the course of the call the physician/provider feels a video visit is not appropriate, you will not be charged for this service.\"     Physician/provider has received verbal consent for a Video Visit from the patient? Yes    Patient would like the video invitation sent by: Send to :     Video Start Time: 1205    Chief Complaint/Reason for Visit:  Chief Complaint   Patient presents with   ? [facility] Care Coordination - Home Visit-Annual Assessment       HPI:   Kandi is a 79 y.o. female who is residing at assisted living facility has moved to long-term care as of 6/2017 with underlying history of dementia, that has been progressive in nature.  She has periods of anxiety associated with memory loss and per daughter her short-term memory is getting progressively worse.  She has a past medical history of right knee osteoarthritis and apparently received steroid injections in the past.  She also is a diabetic that has been diet controlled and has obstructive sleep apnea.  As " per the family she has been moved to long-term care so that she is closer to her  who is in the nursing home with her.      LTC: Today and previously she seems oriented though daughter reports having more confusion, previously started on namenda, no change per her so was discontinued.  She denies any pain. Today BP seems controlled, maintain metoprolol and diltiazem.  Per pharmacy recommendations clonidine discontinued. Additionally restarted lisinopril. Her edema BLE is stable so lasix discontinued prior.  Her DM is well controlled requiring a titration of her metformin.  She ambulates per self and usually pushes  around in his wc, though had some falls in the past months (Aug/sept 2019). Also per dietitian, diet was changed and portions decreased per weight gain. BIMS 6/15 and PHQ9 2/20 on 8/6/19.  Per cognitive decline, was moved to the  in 1/2020. Today offers no other issues.    I have reviewed and updated the patient's Past Medical History, Social History, Family History and Medication List.    ALLERGIES  Codeine sulfate    Review of Systems    Vitals:    04/17/20 1259   BP: 118/70   Pulse: 65   Resp: 16   Temp: 98  F (36.7  C)   SpO2: 95%   Weight: 217 lb (98.4 kg)         Labs:  Results for orders placed or performed in visit on 03/23/20   Basic Metabolic Panel   Result Value Ref Range    Sodium 140 136 - 145 mmol/L    Potassium 4.0 3.5 - 5.0 mmol/L    Chloride 105 98 - 107 mmol/L    CO2 25 22 - 31 mmol/L    Anion Gap, Calculation 10 5 - 18 mmol/L    Glucose 85 70 - 125 mg/dL    Calcium 9.4 8.5 - 10.5 mg/dL    BUN 19 8 - 28 mg/dL    Creatinine 0.99 0.60 - 1.10 mg/dL    GFR MDRD Af Amer >60 >60 mL/min/1.73m2    GFR MDRD Non Af Amer 54 (L) >60 mL/min/1.73m2     Lab Results   Component Value Date    WBC 8.6 03/23/2020    HGB 11.0 (L) 03/23/2020    HCT 35.0 03/23/2020    MCV 98 03/23/2020     03/23/2020     Lab Results   Component Value Date    HGBA1C 6.7 (H) 03/23/2020       Additional  provider notes:     Assessment/Plan:    Hx of TARI: Last Cr 0.99 with GFR 54 on 3/23/20     DM: current Hgb A1c 6.7 on 2/25/20 so decreased metformin 500mg two times a day     Anxiety/depression: per pharmacy recs will decreased citalopram to 10mg and continue Effexor 37.5 daily (intent to wean celexa off), previously discontinued wellbutrin, previously decreased effexor in 3/2018.  Minimal PHQ9 2 on 5/13/19     HTN: continue hydralazine 50mg at AM, continue verapamil 100mg daily, and will maintain metoprolol 12.5mg daily. Per pharmacy recs dc clonidine patch to 0.1mg/wk and increase lisinopril to 5mg daily. Monitor BP in evening x1wk     Vit D def: continue D3 1000U, last 34.4     Hypomagnesia: last 1.7 on 3/23/20     Hx of hypokalemia per lasix: will dc supplement per dc lasix     GERD: PPI, no change.     Seasonal allergies: recently changed fluticasone and cetirizine to PRN     Edema: currently minimal pedal, dc'd lasix prior. Wt 216lbs     Constipation: continue miralax daily, no change     Dementia: had cognitive assessment, discontinued donepezil, previously increased namenda to 10mg daily. Per daughter no change in memory so namenda discontinued as well     Socialization: consulted recreation. No real change per daughter     Disposition: per cognitive decline was moved to  with       Case Management:  I have reviewed the facility/SNF care plan/MDS which was done 4/8/20, including the falls risk, nutrition and pain screening. I also reviewed the current immunizations, and preventive care.. Future cancer screening is not clinically indicated secondary to age/goals of care.   Patient's desire to return to the community is not assessible due to cognitive impairment.    Advance Directive Discussion:    I reviewed the current advanced directives as reflected in EPIC and the facility chart. I did not due to cognitive impairment review the advance directives with the resident.     Team Discussion:  I  communicated with the appropriate disciplines involved with the Plan of Care:   Nursing      Patient Goal:  Patient's goal is pain control and comfort.    Information reviewed:  Medications, vital signs, orders, and nursing notes.      Video-Visit Details    Type of service:  Video Visit    Video End Time (time video stopped): 1220    Originating Location (pt. Location):[facility] [[id number]]    Distant Location (provider location):  [facility]     Mode of Communication:  FaceTime Video Conference      Ashwin Tang NP

## 2021-06-20 NOTE — LETTER
Letter by Ashwin Tang NP at      Author: Ashwin Tang NP Service: -- Author Type: --    Filed:  Encounter Date: 3/6/2020 Status: (Other)         Patient: Kandi Ontiveros   MR Number: [id number]   YOB: 1940   Date of Visit: 3/6/2020     [facility]      Facility:    [facility] [[id number]] -1 Code Status: FULL CODE      Chief Complaint/Reason for Visit:    Chief Complaint   Patient presents with   ? Review Of Multiple Medical Conditions     Dementia, DM       HPI:   Kandi is a 79 y.o. female who who is residing at assisted living facility has moved to long-term care as of 6/2017 with underlying history of dementia, that has been progressive in nature.  She has periods of anxiety associated with memory loss and per daughter her short-term memory is getting progressively worse.  She has a past medical history of right knee osteoarthritis and apparently received steroid injections in the past.  She also is a diabetic that has been diet controlled and has obstructive sleep apnea.  As per the family she has been moved to long-term care so that she is closer to her  who is in the nursing home with her.     LTC: Today and previously she seems oriented though daughter reports having more confusion, previously started on namenda, no change per her and is still continuing to decline.  She denies any pain. Today BP seems controlled, maintain metoprolol and diltiazem.  Per pharmacy recommendations will decrease clonidine patch with the intent to dc soon. Additionally restarted lisinopril. Her edema BLE is stable so lasix discontinued prior.  Her DM is well controlled requiring a titration of her metformin.  She ambulates per self and usually pushes  around in his wc, though had some falls in the past months (Aug/sept 2019). Also per dietitian, diet was changed and portioned decreased per weight gain. BIMS 6/15 and PHQ9  2/20 on 8/6/19.  Per cognitive decline, was moved to the  in 1/2020.    Recent ER visits:  She was sent to [facility] on 9/2/2019 status post fall.  She suffered a laceration on the left side of her face, additional imaging was negative so was returned to long-term care the same day.    Again on 10/6/2019 she was sent to [facility] status post having witnessed fall on the sidewalk.  She did again suffer a laceration over her left eyebrow, imaging negative so return to care center the same day.    Past Medical History:  Past Medical History:   Diagnosis Date   ? Anxiety     Created by Conversion    ? Benign Adenomatous Polyp Of The Large Intestine     Created by Conversion    ? Chronic Diarrhea Of Unknown Origin     Created by Conversion    ? Dementia of the Alzheimer's type 7/13/2014   ? Diabetes Mellitus     Created by Conversion    ? Esophageal reflux     Created by Conversion    ? Gastritis    ? Herpes Zoster (Shingles)     Created by Conversion    ? Hiatal hernia    ? Hypercholesterolemia     Created by Conversion    ? Hypertension     Created by Conversion    ? Melanosis Coli     Created by Conversion [facility] Annotation: May  2 2012  1:19PM - Sheila Benavides: colonoscopy  4/30/12    ? Memory Lapses Or Loss     Created by Conversion    ? Obstructive Sleep Apnea     Created by Conversion    ? Osteopenia     Created by Conversion    ? Raynaud's Disease     Created by Conversion    ? Tricuspid Regurgitation     Created by Conversion            Surgical History:  No past surgical history on file.    Family History:   Family History   Problem Relation Age of Onset   ? Hypertension Other    ? Diabetes Other    ? Stroke Other        Social History:    Social History     Socioeconomic History   ? Marital status:      Spouse name: Not on file   ? Number of children: Not on file   ? Years of education: Not on file   ? Highest education level: Not on file   Occupational History   ? Not on file    Social Needs   ? Financial resource strain: Not on file   ? Food insecurity:     Worry: Not on file     Inability: Not on file   ? Transportation needs:     Medical: Not on file     Non-medical: Not on file   Tobacco Use   ? Smoking status: Never Smoker   ? Smokeless tobacco: Never Used   Substance and Sexual Activity   ? Alcohol use: Not on file   ? Drug use: Not on file   ? Sexual activity: Not on file   Lifestyle   ? Physical activity:     Days per week: Not on file     Minutes per session: Not on file   ? Stress: Not on file   Relationships   ? Social connections:     Talks on phone: Not on file     Gets together: Not on file     Attends Holiness service: Not on file     Active member of club or organization: Not on file     Attends meetings of clubs or organizations: Not on file     Relationship status: Not on file   ? Intimate partner violence:     Fear of current or ex partner: Not on file     Emotionally abused: Not on file     Physically abused: Not on file     Forced sexual activity: Not on file   Other Topics Concern   ? Not on file   Social History Narrative   ? Not on file     Review of Systems   Constitutional: Negative for activity change, appetite change, diaphoresis and fatigue.        No issues   HENT: Negative for congestion and facial swelling.    Eyes: Negative for photophobia, redness and visual disturbance.   Respiratory: Negative for choking, shortness of breath and wheezing.    Cardiovascular: Positive for leg swelling. Negative for chest pain.        Pedal   Gastrointestinal: Negative for abdominal distention, abdominal pain, blood in stool, constipation and diarrhea.   Endocrine: Negative.    Genitourinary: Negative for difficulty urinating and dysuria.   Musculoskeletal: Negative for back pain and joint swelling.        Bilateral knee pain per OA, taking tylenol   Skin: Negative for color change.        intact   Allergic/Immunologic: Negative.    Neurological: Negative for seizures,  "weakness and headaches.   Hematological: Negative.    Psychiatric/Behavioral: Positive for confusion. Negative for behavioral problems, hallucinations and sleep disturbance. The patient is nervous/anxious.         Declining mentally       Vitals:    03/07/20 1220   BP: 138/75   Pulse: 75   Resp: 17   Temp: 97  F (36.1  C)   SpO2: 95%   Weight: 217 lb (98.4 kg)   Height: 5' 4\" (1.626 m)       Physical Exam   Constitutional: She appears well-developed and well-nourished. No distress.   Was moved to    HENT:   Head: Normocephalic and atraumatic.   Mouth/Throat: Oropharynx is clear and moist. No oropharyngeal exudate.   Eyes: Pupils are equal, round, and reactive to light. Conjunctivae and EOM are normal. Right eye exhibits no discharge. Left eye exhibits no discharge. No scleral icterus.   Neck: Normal range of motion. Neck supple. No JVD present. No tracheal deviation present.   Intact   Cardiovascular: Normal rate, regular rhythm and normal heart sounds. Exam reveals no gallop and no friction rub.   No murmur heard.  Pulmonary/Chest: Effort normal and breath sounds normal. No stridor. No respiratory distress. She has no wheezes. She has no rales.   CTA, RA   Abdominal: Soft. Bowel sounds are normal. She exhibits no distension. There is no abdominal tenderness. There is no rebound.   No diarrhea or constipation   Genitourinary:    Genitourinary Comments: Deferred     Musculoskeletal: Normal range of motion.         General: Edema present.      Comments: 1+ LE, mostly pedal, lasix discontinued   Neurological: She is alert. No cranial nerve deficit.   A/O x1, namenda was discontinued   Skin: Skin is warm and dry. She is not diaphoretic. No erythema.   Intact, very dry   Psychiatric: She has a normal mood and affect.   Less agitation, Effexor weaned, dc aricept and continue namenda   Nursing note and vitals reviewed.      Medication List:  Current Outpatient Medications   Medication Sig   ? cloNIDine (CATAPRES-TTS) 0.1 " mg/24 hr Place 1 patch on the skin once a week.   ? lisinopriL (PRINIVIL,ZESTRIL) 2.5 MG tablet Take 2.5 mg by mouth daily.   ? acetaminophen (TYLENOL) 325 MG tablet Take 650 mg by mouth 4 (four) times a day.   ? acetaminophen 500 mg coapsule Take 1 capsule by mouth. Every 4-6 hours PRN   ? bisacodyl (DULCOLAX, BISACODYL,) 10 mg suppository Insert 1 suppository (10 mg total) into the rectum daily as needed (for constipation).   ? cetirizine (ZYRTEC) 10 MG tablet Take 10 mg by mouth daily as needed.    ? cholecalciferol, vitamin D3, 1,000 unit tablet Take 1,000 Units by mouth daily.   ? citalopram (CELEXA) 20 MG tablet TAKE ONE TABLET BY MOUTH EVERY DAY (Patient taking differently: 10 mg. Start GDR on 3/9/20)   ? fluticasone (FLONASE) 50 mcg/actuation nasal spray 1 spray into each nostril daily. (Patient taking differently: 1 spray into each nostril daily as needed. )   ? gabapentin (NEURONTIN) 100 MG capsule Take 100 mg by mouth 3 (three) times a day.   ? hydrALAZINE (APRESOLINE) 25 MG tablet Take 50 mg by mouth Daily at 8:00 am.. Hold for SBP <150         ? metFORMIN (GLUCOPHAGE) 500 MG tablet Take 500 mg by mouth 2 (two) times a day with meals.    ? metoprolol succinate (TOPROL-XL) 25 MG Take 12.5 mg by mouth daily.          ? omeprazole (PRILOSEC) 20 MG capsule Take 20 mg by mouth daily before breakfast.   ? polyethylene glycol (MIRALAX) 17 gram/dose powder Take 17 g by mouth daily.   ? polyvinyl alcohol (LIQUIFILM TEARS) 1.4 % ophthalmic solution Administer 1 drop to both eyes 3 (three) times a day as needed for dry eyes.   ? venlafaxine (EFFEXOR-XR) 37.5 MG 24 hr capsule Take 37.5 mg by mouth daily.   ? verapamil (VERELAN PM) 100 mg 24 hr capsule TAKE ONE CAPSULE BY MOUTH EVERY DAY   ? white petrolatum (AQUAPHOR ORIGINAL) 41 % Oint Apply 1 application topically 3 (three) times a day as needed.              Labs:  Results for orders placed or performed in visit on 02/25/20   Basic Metabolic Panel   Result Value  Ref Range    Sodium 138 136 - 145 mmol/L    Potassium 4.2 3.5 - 5.0 mmol/L    Chloride 102 98 - 107 mmol/L    CO2 23 22 - 31 mmol/L    Anion Gap, Calculation 13 5 - 18 mmol/L    Glucose 232 (H) 70 - 125 mg/dL    Calcium 9.7 8.5 - 10.5 mg/dL    BUN 25 8 - 28 mg/dL    Creatinine 1.10 0.60 - 1.10 mg/dL    GFR MDRD Af Amer 58 (L) >60 mL/min/1.73m2    GFR MDRD Non Af Amer 48 (L) >60 mL/min/1.73m2     Lab Results   Component Value Date    WBC 12.2 (H) 05/28/2016    HGB 11.6 (L) 01/14/2019    HCT 32.9 (L) 05/28/2016    MCV 91 05/28/2016     05/28/2016     Vitamin D, Total (25-Hydroxy)   Date Value Ref Range Status   06/19/2019 33.5 30.0 - 80.0 ng/mL Final     Lab Results   Component Value Date    HGBA1C 6.7 (H) 02/25/2020     Lab Results   Component Value Date    [id number] 309 06/19/2019     Lab Results   Component Value Date    TSH 1.51 02/25/2020       Assessment/Plan:      Hx of TARI: Last Cr 1.02 with GFR 53 on 6/19/19, recheck BMP    DM: current Hgb A1c 6.7 on 2/25/20 so decreased metformin 500mg two times a day    Anxiety/depression: per pharmacy recs will decrease citalopram to 10mg and continue Effexor 37.5 daily (intent to wean celexa off),  previously discontinued wellbutrin, previously decreased effexor in 3/2018.  Minimal PHQ9 2 on 5/13/19    HTN: continue hydralazine 50mg at AM, continue verapamil 100mg daily, and will maintain metoprolol 12.5mg daily. Per pharmacy recs will decrease clonidine patch to 0.1mg/wk and restart lisinopril per renal hx/DM     Vit D def: continue D3 1000U, last 33.5.  Recheck    Hypomagnesia: last 1.9 on 6/19/19, dc supplementation per discontinuing lasix     Hx of hypokalemia per lasix: will dc supplement per dc lasix    GERD: PPI, no change.    Seasonal allergies: recently changed fluticasone and cetirizine to PRN    Edema: currently minimal pedal, dc'd lasix prior. Wt 216lbs    Constipation: continue miralax daily, no change    Dementia: had cognitive assessment,  discontinued donepezil, previously increased namenda to 10mg daily. Per daughter no change in memory so namenda discontinues as well    Socialization: consulted recreation. No real change per daughter    Disposition: per cognitive decline was moved to  with       Electronically signed by: Ashwin Tang NP

## 2021-06-20 NOTE — LETTER
Letter by Ashwin Tang NP at      Author: Ashwin Tang NP Service: -- Author Type: --    Filed:  Encounter Date: 7/16/2020 Status: (Other)         Patient: Kandi Ontiveros   MR Number: [id number]   YOB: 1940   Date of Visit: 7/16/2020     [facility]      Facility:    [facility] [[id number]] -1 Code Status: FULL CODE      Chief Complaint/Reason for Visit:    Chief Complaint   Patient presents with   ? Review Of Multiple Medical Conditions       HPI:   Kandi is a 80 y.o. female who who is residing at assisted living facility has moved to long-term care as of 6/2017 with underlying history of dementia, that has been progressive in nature.  She has periods of anxiety associated with memory loss and per daughter her short-term memory is getting progressively worse.  She has a past medical history of right knee osteoarthritis and apparently received steroid injections in the past.  She also is a diabetic that has been diet controlled and has obstructive sleep apnea.  As per the family she has been moved to long-term care so that she is closer to her  who is in the nursing home with her.     LTC: Today and previously she seems oriented though daughter reports having more confusion, previously started on namenda, no change per her and is still continuing to decline, so namenda was discontinued.  She denies any pain. Today BP seems controlled, maintained on metoprolol, lisinopril, hydralazine and diltiazem.   Her edema BLE is stable so lasix was discontinued prior.  Her DM is well controlled with metformin.  She ambulates per self and usually pushes  around in his wc, though had some falls in the past months (Aug/sept 2019). Also per dietitian, diet was changed and portioned decreased per weight gain.   Per cognitive decline, was moved to the  in 1/2020 with . Today no changes needed.    Recent ER  visits:  She was sent to [facility] on 9/2/2019 status post fall.  She suffered a laceration on the left side of her face, additional imaging was negative so was returned to long-term care the same day.    Again on 10/6/2019 she was sent to [facility] status post having witnessed fall on the sidewalk.  She did again suffer a laceration over her left eyebrow, imaging negative so return to care center the same day.    Past Medical History:  Past Medical History:   Diagnosis Date   ? Anxiety     Created by Conversion    ? Benign Adenomatous Polyp Of The Large Intestine     Created by Conversion    ? Chronic Diarrhea Of Unknown Origin     Created by Conversion    ? Dementia of the Alzheimer's type 7/13/2014   ? Diabetes Mellitus     Created by Conversion    ? Esophageal reflux     Created by Conversion    ? Gastritis    ? Herpes Zoster (Shingles)     Created by Conversion    ? Hiatal hernia    ? Hypercholesterolemia     Created by Conversion    ? Hypertension     Created by Conversion    ? Melanosis Coli     Created by Conversion [facility] Annotation: May  2 2012  1:19PM - Sheila Benavides: colonoscopy  4/30/12    ? Memory Lapses Or Loss     Created by Conversion    ? Obstructive Sleep Apnea     Created by Conversion    ? Osteopenia     Created by Conversion    ? Raynaud's Disease     Created by Conversion    ? Tricuspid Regurgitation     Created by Conversion            Surgical History:  No past surgical history on file.    Family History:   Family History   Problem Relation Age of Onset   ? Hypertension Other    ? Diabetes Other    ? Stroke Other        Social History:    Social History     Socioeconomic History   ? Marital status:      Spouse name: Not on file   ? Number of children: Not on file   ? Years of education: Not on file   ? Highest education level: Not on file   Occupational History   ? Not on file   Social Needs   ? Financial resource strain: Not on file   ? Food insecurity      Worry: Not on file     Inability: Not on file   ? Transportation needs     Medical: Not on file     Non-medical: Not on file   Tobacco Use   ? Smoking status: Never Smoker   ? Smokeless tobacco: Never Used   Substance and Sexual Activity   ? Alcohol use: Not on file   ? Drug use: Not on file   ? Sexual activity: Not on file   Lifestyle   ? Physical activity     Days per week: Not on file     Minutes per session: Not on file   ? Stress: Not on file   Relationships   ? Social connections     Talks on phone: Not on file     Gets together: Not on file     Attends Protestant service: Not on file     Active member of club or organization: Not on file     Attends meetings of clubs or organizations: Not on file     Relationship status: Not on file   ? Intimate partner violence     Fear of current or ex partner: Not on file     Emotionally abused: Not on file     Physically abused: Not on file     Forced sexual activity: Not on file   Other Topics Concern   ? Not on file   Social History Narrative   ? Not on file     Review of Systems   Constitutional: Negative for activity change, appetite change, diaphoresis and fatigue.        No issues   HENT: Negative for congestion and facial swelling.    Eyes: Negative for photophobia, redness and visual disturbance.   Respiratory: Negative for choking, shortness of breath and wheezing.    Cardiovascular: Positive for leg swelling. Negative for chest pain.        Pedal   Gastrointestinal: Negative for abdominal distention, abdominal pain, blood in stool, constipation and diarrhea.   Endocrine: Negative.    Genitourinary: Negative for difficulty urinating and dysuria.   Musculoskeletal: Negative for back pain and joint swelling.        Bilateral knee pain per OA, taking tylenol   Skin: Negative for color change.        intact   Allergic/Immunologic: Negative.    Neurological: Negative for seizures, weakness and headaches.   Hematological: Negative.    Psychiatric/Behavioral: Positive  "for confusion. Negative for behavioral problems, hallucinations and sleep disturbance. The patient is nervous/anxious.         Declining mentally       Vitals:    07/17/20 1814   BP: 149/77   Pulse: 67   Resp: 18   Temp: 97  F (36.1  C)   SpO2: 94%   Weight: 199 lb (90.3 kg)   Height: 5' 4\" (1.626 m)       Physical Exam   Constitutional: She appears well-developed and well-nourished. No distress.   No acute issues   HENT:   Head: Normocephalic and atraumatic.   Mouth/Throat: Oropharynx is clear and moist. No oropharyngeal exudate.   Eyes: Pupils are equal, round, and reactive to light. Conjunctivae and EOM are normal. Right eye exhibits no discharge. Left eye exhibits no discharge. No scleral icterus.   Neck: Normal range of motion. Neck supple. No JVD present. No tracheal deviation present.   Intact   Cardiovascular: Normal rate.   Pulmonary/Chest: Effort normal. No stridor. No respiratory distress.   RA, no AS   Abdominal: Soft. She exhibits no distension.   No diarrhea or constipation reported   Genitourinary:    Genitourinary Comments: incontinent     Musculoskeletal: Normal range of motion.         General: Edema present.      Comments: 1+ LE, mostly pedal, lasix discontinued prior   Neurological: She is alert. No cranial nerve deficit.   A/O x1, namenda was discontinued   Skin: Skin is warm and dry. She is not diaphoretic. No erythema.   Intact, very dry   Psychiatric: She has a normal mood and affect.   Less agitation, Effexor weaned, dc aricept and continue namenda   Nursing note and vitals reviewed.      Medication List:  Current Outpatient Medications   Medication Sig   ? acetaminophen (TYLENOL) 325 MG tablet Take 650 mg by mouth 4 (four) times a day.   ? bisacodyl (DULCOLAX, BISACODYL,) 10 mg suppository Insert 1 suppository (10 mg total) into the rectum daily as needed (for constipation).   ? cetirizine (ZYRTEC) 10 MG tablet Take 10 mg by mouth daily as needed.    ? cholecalciferol, vitamin D3, 1,000 " unit tablet Take 1,000 Units by mouth daily.   ? fluticasone (FLONASE) 50 mcg/actuation nasal spray 1 spray into each nostril daily. (Patient taking differently: 1 spray into each nostril daily as needed. )   ? gabapentin (NEURONTIN) 100 MG capsule Take 100 mg by mouth 3 (three) times a day.   ? hydrALAZINE (APRESOLINE) 25 MG tablet Take 50 mg by mouth Daily at 8:00 am.. Hold for SBP <150         ? lisinopriL (PRINIVIL,ZESTRIL) 2.5 MG tablet Take 5 mg by mouth daily.    ? metFORMIN (GLUCOPHAGE) 500 MG tablet Take 500 mg by mouth 2 (two) times a day with meals.    ? metoprolol succinate (TOPROL-XL) 25 MG Take 12.5 mg by mouth daily.          ? polyethylene glycol (MIRALAX) 17 gram/dose powder Take 17 g by mouth daily.   ? polyvinyl alcohol (LIQUIFILM TEARS) 1.4 % ophthalmic solution Administer 1 drop to both eyes 3 (three) times a day as needed for dry eyes.   ? venlafaxine (EFFEXOR-XR) 37.5 MG 24 hr capsule Take 37.5 mg by mouth daily.   ? verapamil (VERELAN PM) 100 mg 24 hr capsule TAKE ONE CAPSULE BY MOUTH EVERY DAY   ? white petrolatum (AQUAPHOR ORIGINAL) 41 % Oint Apply 1 application topically 3 (three) times a day as needed.              Labs:  Results for orders placed or performed in visit on 03/23/20   Basic Metabolic Panel   Result Value Ref Range    Sodium 140 136 - 145 mmol/L    Potassium 4.0 3.5 - 5.0 mmol/L    Chloride 105 98 - 107 mmol/L    CO2 25 22 - 31 mmol/L    Anion Gap, Calculation 10 5 - 18 mmol/L    Glucose 85 70 - 125 mg/dL    Calcium 9.4 8.5 - 10.5 mg/dL    BUN 19 8 - 28 mg/dL    Creatinine 0.99 0.60 - 1.10 mg/dL    GFR MDRD Af Amer >60 >60 mL/min/1.73m2    GFR MDRD Non Af Amer 54 (L) >60 mL/min/1.73m2     Lab Results   Component Value Date    WBC 8.6 03/23/2020    HGB 11.0 (L) 03/23/2020    HCT 35.0 03/23/2020    MCV 98 03/23/2020     03/23/2020     Vitamin D, Total (25-Hydroxy)   Date Value Ref Range Status   03/23/2020 34.4 30.0 - 80.0 ng/mL Final     Lab Results   Component Value  Date    HGBA1C 6.7 (H) 03/23/2020     Lab Results   Component Value Date    [id number] 368 03/23/2020     Lab Results   Component Value Date    TSH 1.22 03/23/2020       Assessment/Plan:      Hx of TARI: Last Cr 0.99 with GFR 54 on 3/23/20    DM: current Hgb A1c 6.7 on 2/25/20, metformin 500mg two times a day, recheck    Anxiety/depression: have discontinued citalopram and continue Effexor 37.5 daily,  previously discontinued wellbutrin, previously decreased effexor in 3/2018.  Minimal PHQ9 2 on 5/13/19    HTN: continue hydralazine 50mg at AM, continue verapamil 100mg daily, and will maintain metoprolol 12.5mg daily. Per pharmacy recs have discontinued clonidine patch and started lisinopril 5mg daily per renal hx/DM. SBP <140     Vit D def: continue D3 1000U, last 34.4 on 3/23/20    Hypomagnesia: last 1.7 on 3/23/20    Hx of hypokalemia per lasix: dc'd supplement per dc lasix    GERD: PPI, no change.    Seasonal allergies: recently changed fluticasone and cetirizine to PRN    Edema: currently minimal pedal, wt down to 199lb    Constipation: continue miralax daily, no change    Dementia: had cognitive assessment, discontinued donepezil and namenda per no change with medication    Socialization: consulted recreation. No real change per daughter    Disposition: per cognitive decline was moved to  with . Ambulating per self      Electronically signed by: Ashwin Tang NP

## 2021-06-20 NOTE — LETTER
Letter by Ashwin Tang NP at      Author: Ashwin Tang NP Service: -- Author Type: --    Filed:  Encounter Date: 9/17/2020 Status: (Other)         Patient: Kandi Ontiveros   MR Number: [id number]   YOB: 1940   Date of Visit: 9/17/2020     [facility]      Facility:    [facility] [[id number]] -1 Code Status: FULL CODE      Chief Complaint/Reason for Visit:    Chief Complaint   Patient presents with   ? Review Of Multiple Medical Conditions     dementia       HPI:   Kandi is a 80 y.o. female who who is residing at assisted living facility has moved to long-term care as of 6/2017 with underlying history of dementia, that has been progressive in nature.  She has periods of anxiety associated with memory loss and per daughter her short-term memory is getting progressively worse.  She has a past medical history of right knee osteoarthritis and apparently received steroid injections in the past.  She also is a diabetic that has been diet controlled and has obstructive sleep apnea.  As per the family she has been moved to long-term care so that she is closer to her  who is in the nursing home with her.     LTC: Today and previously she seems oriented though daughter reports having more confusion, previously started on namenda, no change per her and is still continuing to decline, so namenda was discontinued.  She denies any pain. Historically BP controlled, maintained on metoprolol, lisinopril, hydralazine and diltiazem.   Her edema BLE was stable so lasix discontinued prior.  Her DM is well controlled with metformin.   Per cognitive decline, was moved to the  in 1/2020 with . She ambulates per self and usually pushes  around in his wc, though had some falls in the past months (Aug/sept 2019). Previous diet was changed and portions decreased per weight gain, though now per cognition declining not eating as  much per report. No recent weight available.      Recent ER visits:  She was sent to [facility] on 9/2/2019 status post fall.  She suffered a laceration on the left side of her face, additional imaging was negative so was returned to long-term care the same day.    Again on 10/6/2019 she was sent to [facility] status post having witnessed fall on the sidewalk.  She did again suffer a laceration over her left eyebrow, imaging negative so return to care center the same day.    Past Medical History:  Past Medical History:   Diagnosis Date   ? Anxiety     Created by Conversion    ? Benign Adenomatous Polyp Of The Large Intestine     Created by Conversion    ? Chronic Diarrhea Of Unknown Origin     Created by Conversion    ? Dementia of the Alzheimer's type 7/13/2014   ? Diabetes Mellitus     Created by Conversion    ? Esophageal reflux     Created by Conversion    ? Gastritis    ? Herpes Zoster (Shingles)     Created by Conversion    ? Hiatal hernia    ? Hypercholesterolemia     Created by Conversion    ? Hypertension     Created by Conversion    ? Melanosis Coli     Created by Conversion [facility] Annotation: May  2 2012  1:19PM - Sheila Benavides: colonoscopy  4/30/12    ? Memory Lapses Or Loss     Created by Conversion    ? Obstructive Sleep Apnea     Created by Conversion    ? Osteopenia     Created by Conversion    ? Raynaud's Disease     Created by Conversion    ? Tricuspid Regurgitation     Created by Conversion            Surgical History:  No past surgical history on file.    Family History:   Family History   Problem Relation Age of Onset   ? Hypertension Other    ? Diabetes Other    ? Stroke Other        Social History:    Social History     Socioeconomic History   ? Marital status:      Spouse name: Not on file   ? Number of children: Not on file   ? Years of education: Not on file   ? Highest education level: Not on file   Occupational History   ? Not on file   Social Needs   ?  Financial resource strain: Not on file   ? Food insecurity     Worry: Not on file     Inability: Not on file   ? Transportation needs     Medical: Not on file     Non-medical: Not on file   Tobacco Use   ? Smoking status: Never Smoker   ? Smokeless tobacco: Never Used   Substance and Sexual Activity   ? Alcohol use: Not on file   ? Drug use: Not on file   ? Sexual activity: Not on file   Lifestyle   ? Physical activity     Days per week: Not on file     Minutes per session: Not on file   ? Stress: Not on file   Relationships   ? Social connections     Talks on phone: Not on file     Gets together: Not on file     Attends Rastafarian service: Not on file     Active member of club or organization: Not on file     Attends meetings of clubs or organizations: Not on file     Relationship status: Not on file   ? Intimate partner violence     Fear of current or ex partner: Not on file     Emotionally abused: Not on file     Physically abused: Not on file     Forced sexual activity: Not on file   Other Topics Concern   ? Not on file   Social History Narrative   ? Not on file     Review of Systems   Constitutional: Negative for activity change, appetite change, diaphoresis and fatigue.        No issues   HENT: Negative for congestion and facial swelling.    Eyes: Negative for photophobia, redness and visual disturbance.   Respiratory: Negative for choking, shortness of breath and wheezing.    Cardiovascular: Positive for leg swelling. Negative for chest pain.        Pedal   Gastrointestinal: Negative for abdominal distention, abdominal pain, blood in stool, constipation and diarrhea.   Endocrine: Negative.    Genitourinary: Negative for difficulty urinating and dysuria.   Musculoskeletal: Negative for back pain and joint swelling.        Bilateral knee pain per OA, taking tylenol   Skin: Negative for color change.        intact   Allergic/Immunologic: Negative.    Neurological: Negative for seizures, weakness and headaches.    Hematological: Negative.    Psychiatric/Behavioral: Positive for confusion. Negative for behavioral problems, hallucinations and sleep disturbance. The patient is nervous/anxious.        Vitals:    09/19/20 1043   BP: 143/76   Pulse: 83   Resp: 18   Temp: 97  F (36.1  C)   SpO2: 97%       Physical Exam   Constitutional: She appears well-developed and well-nourished. No distress.   No acute issues   HENT:   Head: Normocephalic and atraumatic.   Mouth/Throat: Oropharynx is clear and moist. No oropharyngeal exudate.   Eyes: Pupils are equal, round, and reactive to light. Conjunctivae and EOM are normal. Right eye exhibits no discharge. Left eye exhibits no discharge. No scleral icterus.   Neck: Normal range of motion. Neck supple. No JVD present. No tracheal deviation present.   Intact   Cardiovascular: Normal rate.   Pulmonary/Chest: Effort normal. No stridor. No respiratory distress.   RA, no AS per covid 19 recommendation   Abdominal: Soft. She exhibits no distension.   No diarrhea or constipation reported   Genitourinary:    Genitourinary Comments: incontinent     Musculoskeletal: Normal range of motion.         General: Edema present.      Comments: 1+ LE, mostly pedal, denies pain   Neurological: She is alert. No cranial nerve deficit.   A/O x1   Skin: Skin is warm and dry. She is not diaphoretic. No erythema.   Intact, very dry   Psychiatric: She has a normal mood and affect.   No behaviors reported   Nursing note and vitals reviewed.      Medication List:  Current Outpatient Medications   Medication Sig   ? acetaminophen (TYLENOL) 325 MG tablet Take 650 mg by mouth 4 (four) times a day.   ? bisacodyl (DULCOLAX, BISACODYL,) 10 mg suppository Insert 1 suppository (10 mg total) into the rectum daily as needed (for constipation).   ? cetirizine (ZYRTEC) 10 MG tablet Take 10 mg by mouth daily as needed.    ? cholecalciferol, vitamin D3, 1,000 unit tablet Take 1,000 Units by mouth daily.   ? fluticasone (FLONASE)  50 mcg/actuation nasal spray 1 spray into each nostril daily. (Patient taking differently: 1 spray into each nostril daily as needed. )   ? gabapentin (NEURONTIN) 100 MG capsule Take 100 mg by mouth 3 (three) times a day.   ? hydrALAZINE (APRESOLINE) 25 MG tablet Take 50 mg by mouth Daily at 8:00 am.. Hold for SBP <150         ? lisinopriL (PRINIVIL,ZESTRIL) 2.5 MG tablet Take 5 mg by mouth daily.    ? metFORMIN (GLUCOPHAGE) 500 MG tablet Take 500 mg by mouth 2 (two) times a day with meals.    ? metoprolol succinate (TOPROL-XL) 25 MG Take 12.5 mg by mouth daily.          ? polyethylene glycol (MIRALAX) 17 gram/dose powder Take 17 g by mouth daily.   ? polyvinyl alcohol (LIQUIFILM TEARS) 1.4 % ophthalmic solution Administer 1 drop to both eyes 3 (three) times a day as needed for dry eyes.   ? venlafaxine (EFFEXOR-XR) 37.5 MG 24 hr capsule Take 37.5 mg by mouth daily.   ? verapamil (VERELAN PM) 100 mg 24 hr capsule TAKE ONE CAPSULE BY MOUTH EVERY DAY   ? white petrolatum (AQUAPHOR ORIGINAL) 41 % Oint Apply 1 application topically 3 (three) times a day as needed.              Labs:  Results for orders placed or performed in visit on 09/17/20   Basic Metabolic Panel   Result Value Ref Range    Sodium 140 136 - 145 mmol/L    Potassium 4.5 3.5 - 5.0 mmol/L    Chloride 102 98 - 107 mmol/L    CO2 29 22 - 31 mmol/L    Anion Gap, Calculation 9 5 - 18 mmol/L    Glucose 121 70 - 125 mg/dL    Calcium 10.0 8.5 - 10.5 mg/dL    BUN 28 8 - 28 mg/dL    Creatinine 1.05 0.60 - 1.10 mg/dL    GFR MDRD Af Amer >60 >60 mL/min/1.73m2    GFR MDRD Non Af Amer 51 (L) >60 mL/min/1.73m2     Lab Results   Component Value Date    WBC 8.6 03/23/2020    HGB 11.0 (L) 03/23/2020    HCT 35.0 03/23/2020    MCV 98 03/23/2020     03/23/2020     Vitamin D, Total (25-Hydroxy)   Date Value Ref Range Status   03/23/2020 34.4 30.0 - 80.0 ng/mL Final     Lab Results   Component Value Date    HGBA1C 6.6 (H) 07/30/2020     Lab Results   Component Value  Date    [id number] 368 03/23/2020     Lab Results   Component Value Date    TSH 1.22 03/23/2020       Assessment/Plan:      Hx of TARI: Last Cr 0.99 with GFR 54 on 3/23/20    DM: current Hgb A1c 6.6 on 7/30/20, continue metformin 500mg two times a day    Anxiety/depression: have discontinued citalopram and continue Effexor 37.5 daily,  previously discontinued wellbutrin, previously decreased effexor in 3/2018.  Minimal PHQ9 0 on 7/8/20    HTN: continue hydralazine 50mg at AM, continue verapamil 100mg daily, and will maintain metoprolol 12.5mg daily. Prior per pharmacy recs have discontinued clonidine patch and continue lisinopril 5mg daily per renal hx/DM. SBP <140     Vit D def: continue D3 1000U, last 34.4 on 3/23/20    Hypomagnesia: last 1.7 on 3/23/20    Hx of hypokalemia per lasix: dc'd supplement per dc lasix    GERD: PPI, no change.    Seasonal allergies: recently changed fluticasone and cetirizine to PRN    Edema: currently minimal pedal, no current weight available    Constipation: continue miralax daily, no change    Dementia: had cognitive assessment, discontinued donepezil and namenda per no change with medication    Socialization: consulted recreation. No real change per daughter    Disposition: Resides in [facility]. Ambulating per self      Electronically signed by: Ashwin Tang NP

## 2021-06-21 NOTE — LETTER
Letter by Ashwin Tang NP at      Author: Ashwin Tang NP Service: -- Author Type: --    Filed:  Encounter Date: 2/24/2021 Status: (Other)         Patient: Kandi Ontiveros   MR Number: [id number]   YOB: 1940   Date of Visit: 2/24/2021     [facility]      Facility:    [facility] [[id number]]  Code Status: DNR      Chief Complaint/Reason for Visit:    Chief Complaint   Patient presents with   ? FVP Care Coordination - Home Visit-Annual Assessment       HPI:   Kandi is a 80 y.o. female who resided at assisted living facility has moved to long-term care as of 6/2017 with underlying history of dementia, that has been progressive in nature.  She has periods of anxiety associated with memory loss and per daughter her short-term memory is getting progressively worse.  She has a past medical history of right knee osteoarthritis and apparently received steroid injections in the past.  She also is a diabetic that has been diet controlled and has obstructive sleep apnea.  As per the family she has been moved to long-term care so that she is closer to her  who is in the nursing home with her.     LTC: Today and previously she seems oriented though daughter reports having more confusion, previously started on namenda, no change per her and is still continuing to decline, so namenda was discontinued.  She denies any pain. Historically BP controlled, maintained on metoprolol, lisinopril, hydralazine and diltiazem.   Her edema BLE was stable so lasix discontinued prior.  Her DM is well controlled with metformin.   Per cognitive decline, was moved to the  in 1/2020 with . She ambulates per self and usually pushes  around in his wc, though had some falls in the past months (Aug/sept 2019). Previous diet was changed and portions decreased per weight gain, though now per cognition declining not eating as much per report  with subsequent weight loss, dietitian following. She has weekly visits with  again, previous on hold per covid outbreak in facility. No medication changes needed today again.      Recent ER visits:  She was sent to [facility] on 9/2/2019 status post fall.  She suffered a laceration on the left side of her face, additional imaging was negative so was returned to long-term care the same day.    Again on 10/6/2019 she was sent to [facility] status post having witnessed fall on the sidewalk.  She did again suffer a laceration over her left eyebrow, imaging negative so return to care center the same day.    Past Medical History:  Past Medical History:   Diagnosis Date   ? Anxiety     Created by Conversion    ? Benign Adenomatous Polyp Of The Large Intestine     Created by Conversion    ? Chronic Diarrhea Of Unknown Origin     Created by Conversion    ? Dementia of the Alzheimer's type 7/13/2014   ? Diabetes Mellitus     Created by Conversion    ? Esophageal reflux     Created by Conversion    ? Gastritis    ? Herpes Zoster (Shingles)     Created by Conversion    ? Hiatal hernia    ? Hypercholesterolemia     Created by Conversion    ? Hypertension     Created by Conversion    ? Melanosis Coli     Created by Conversion [facility] Annotation: May  2 2012  1:19PM - Sheila Benavides: colonoscopy  4/30/12    ? Memory Lapses Or Loss     Created by Conversion    ? Obstructive Sleep Apnea     Created by Conversion    ? Osteopenia     Created by Conversion    ? Raynaud's Disease     Created by Conversion    ? Tricuspid Regurgitation     Created by Conversion            Surgical History:  No past surgical history on file.    Family History:   Family History   Problem Relation Age of Onset   ? Hypertension Other    ? Diabetes Other    ? Stroke Other        Social History:    Social History     Socioeconomic History   ? Marital status:      Spouse name: Not on file   ? Number of children: Not on file    ? Years of education: Not on file   ? Highest education level: Not on file   Occupational History   ? Not on file   Social Needs   ? Financial resource strain: Not on file   ? Food insecurity     Worry: Not on file     Inability: Not on file   ? Transportation needs     Medical: Not on file     Non-medical: Not on file   Tobacco Use   ? Smoking status: Never Smoker   ? Smokeless tobacco: Never Used   Substance and Sexual Activity   ? Alcohol use: Not on file   ? Drug use: Not on file   ? Sexual activity: Not on file   Lifestyle   ? Physical activity     Days per week: Not on file     Minutes per session: Not on file   ? Stress: Not on file   Relationships   ? Social connections     Talks on phone: Not on file     Gets together: Not on file     Attends Yazdanism service: Not on file     Active member of club or organization: Not on file     Attends meetings of clubs or organizations: Not on file     Relationship status: Not on file   ? Intimate partner violence     Fear of current or ex partner: Not on file     Emotionally abused: Not on file     Physically abused: Not on file     Forced sexual activity: Not on file   Other Topics Concern   ? Not on file   Social History Narrative   ? Not on file     Review of Systems   Constitutional: Negative for activity change, appetite change, diaphoresis and fatigue.        No issues   HENT: Negative for congestion and facial swelling.    Eyes: Negative for photophobia, redness and visual disturbance.   Respiratory: Negative for choking, shortness of breath and wheezing.    Cardiovascular: Positive for leg swelling. Negative for chest pain.        Pedal   Gastrointestinal: Negative for abdominal distention, abdominal pain, blood in stool, constipation and diarrhea.   Endocrine: Negative.    Genitourinary: Negative for difficulty urinating and dysuria.   Musculoskeletal: Negative for back pain and joint swelling.        Bilateral knee pain per OA, taking tylenol   Skin:  Negative for color change.        intact   Allergic/Immunologic: Negative.    Neurological: Negative for seizures, weakness and headaches.   Hematological: Negative.    Psychiatric/Behavioral: Positive for confusion. Negative for behavioral problems, hallucinations and sleep disturbance. The patient is nervous/anxious.        Vitals:    02/24/21 1854   BP: 148/70   Pulse: 70   Resp: 16   Temp: 97  F (36.1  C)   SpO2: 94%       Physical Exam   Constitutional: She appears well-developed and well-nourished. No distress.   No acute issues   HENT:   Head: Normocephalic and atraumatic.   Mouth/Throat: Oropharynx is clear and moist. No oropharyngeal exudate.   Eyes: Pupils are equal, round, and reactive to light. Conjunctivae and EOM are normal. Right eye exhibits no discharge. Left eye exhibits no discharge. No scleral icterus.   Neck: Normal range of motion. Neck supple. No JVD present. No tracheal deviation present.   Intact   Cardiovascular: Normal rate.   Pulmonary/Chest: Effort normal. No stridor. No respiratory distress.   RA, CTA   Abdominal: Soft. She exhibits no distension.   No diarrhea or constipation reported   Genitourinary:    Genitourinary Comments: incontinent     Musculoskeletal: Normal range of motion.         General: Edema present.      Comments: 1+ LE, mostly pedal, denies pain   Neurological: She is alert. No cranial nerve deficit.   A/O x1   Skin: Skin is warm and dry. She is not diaphoretic. No erythema.   Intact, very dry   Psychiatric: She has a normal mood and affect.   No behaviors reported   Nursing note and vitals reviewed.      Medication List:  Current Outpatient Medications   Medication Sig   ? acetaminophen (TYLENOL) 325 MG tablet Take 650 mg by mouth 4 (four) times a day.   ? bisacodyl (DULCOLAX, BISACODYL,) 10 mg suppository Insert 1 suppository (10 mg total) into the rectum daily as needed (for constipation).   ? cetirizine (ZYRTEC) 10 MG tablet Take 10 mg by mouth daily as needed.     ? cholecalciferol, vitamin D3, 1,000 unit tablet Take 1,000 Units by mouth daily.   ? fluticasone propionate (FLONASE) 50 mcg/actuation nasal spray Apply 1 spray into each nostril daily as needed for rhinitis.   ? gabapentin (NEURONTIN) 100 MG capsule Take 100 mg by mouth 3 (three) times a day.   ? hydrALAZINE (APRESOLINE) 25 MG tablet Take 50 mg by mouth Daily at 8:00 am.. Hold for SBP <150         ? lisinopriL (PRINIVIL,ZESTRIL) 2.5 MG tablet Take 5 mg by mouth daily.    ? metoprolol succinate (TOPROL-XL) 25 MG Take 12.5 mg by mouth daily.          ? polyethylene glycol (MIRALAX) 17 gram/dose powder Take 17 g by mouth daily.   ? venlafaxine (EFFEXOR-XR) 37.5 MG 24 hr capsule Take 37.5 mg by mouth daily.   ? verapamil (VERELAN PM) 100 mg 24 hr capsule TAKE ONE CAPSULE BY MOUTH EVERY DAY   ? white petrolatum (AQUAPHOR ORIGINAL) 41 % Oint Apply 1 application topically 3 (three) times a day as needed.              Labs:  Results for orders placed or performed in visit on 09/17/20   Basic Metabolic Panel   Result Value Ref Range    Sodium 140 136 - 145 mmol/L    Potassium 4.5 3.5 - 5.0 mmol/L    Chloride 102 98 - 107 mmol/L    CO2 29 22 - 31 mmol/L    Anion Gap, Calculation 9 5 - 18 mmol/L    Glucose 121 70 - 125 mg/dL    Calcium 10.0 8.5 - 10.5 mg/dL    BUN 28 8 - 28 mg/dL    Creatinine 1.05 0.60 - 1.10 mg/dL    GFR MDRD Af Amer >60 >60 mL/min/1.73m2    GFR MDRD Non Af Amer 51 (L) >60 mL/min/1.73m2     Lab Results   Component Value Date    WBC 8.6 03/23/2020    HGB 11.0 (L) 03/23/2020    HCT 35.0 03/23/2020    MCV 98 03/23/2020     03/23/2020     Vitamin D, Total (25-Hydroxy)   Date Value Ref Range Status   03/23/2020 34.4 30.0 - 80.0 ng/mL Final     Lab Results   Component Value Date    HGBA1C 6.7 (H) 01/21/2021     Lab Results   Component Value Date    EMTIOTDW36 368 03/23/2020     Lab Results   Component Value Date    TSH 1.22 03/23/2020       Assessment/Plan:      Hx of TARI: Last Cr 1.05 with GFR 51 on  9/17/20, recheck in March 2021    DM: current Hgb A1c 6.7 on 1/21/21, continue metformin 250mg two times a day, plan to decrease further in future    Anxiety/depression: have discontinued citalopram and continue Effexor 37.5 daily,  previously discontinued wellbutrin, previously decreased effexor in 3/2018.  Minimal PHQ9 0 on 7/8/20    HTN: continue hydralazine 50mg at AM, continue verapamil 100mg daily, and will maintain metoprolol 12.5mg daily. Prior per pharmacy recs have discontinued clonidine patch and continue lisinopril 5mg daily per renal hx/DM. SBP <140     Vit D def: continue D3 1000U, last 34.4 on 3/23/20    Hypomagnesia: last 1.7 on 3/23/20    Hx of hypokalemia per lasix: dc'd supplement per dc lasix    GERD: PPI, no change.    Seasonal allergies: recently changed fluticasone and cetirizine to PRN    Edema: currently minimal pedal, 209lb    Morbid obesity: last BMI 35.8    Constipation: continue miralax daily, no change    Dementia: had cognitive assessment, discontinued donepezil and namenda prior    Socialization: consulted recreation. No real change per daughter    Disposition: Resides in LTC [facility] and  in regular LTC. Ambulating per self      Case Management:  I have reviewed the facility/SNF care plan/MDS which was done 2/24/21, including the falls risk, nutrition and pain screening. I also reviewed the current immunizations, and preventive care.. Future cancer screening is not clinically indicated secondary to age/goals of care.   Patient's desire to return to the community is not assessible due to cognitive impairment.    Advance Directive Discussion:    I reviewed the current advanced directives as reflected in EPIC and the facility chart. I did not due to cognitive impairment review the advance directives with the resident.     Team Discussion:  I communicated with the appropriate disciplines involved with the Plan of Care:   Nursing      Patient Goal:  Patient's goal is unobtainable  secondary to cognitive impairment.    Information reviewed:  Medications, vital signs, orders, and nursing notes.      Electronically signed by: Ashwin Tang NP

## 2021-06-21 NOTE — LETTER
Letter by Kathya Vivas MBBS at      Author: Kathya Vivas MBBS Service: -- Author Type: --    Filed:  Encounter Date: 4/12/2021 Status: (Other)         Patient: Kandi Ontiveros   MR Number: [id number]   YOB: 1940   Date of Visit: 4/12/2021     [facility]      Code Status:  FULL CODE  Visit Type: Review Of Multiple Medical Conditions     Facility:  [facility] [[id number]]           History of Present Illness: Kandi Ontiveros is a 80 y.o. female who is a resident of [facility].  She has underlying history of dementia with progressive memory impairment.   Last BIMS 3/15  Due to certain behaviors including resisting cares and elopement episodes.  Physically she remains debilitated but noted to be ambulating independently without any assist device.  Weights are stable at 215 pounds today.  She is a diabetic and last A1c done is 6.7 which is an adequate control in this elderly female.  Speaks very little and is a native Bulgarian speaker      Past Medical History:   Diagnosis Date   ? Anxiety     Created by Conversion    ? Benign Adenomatous Polyp Of The Large Intestine     Created by Conversion    ? Chronic Diarrhea Of Unknown Origin     Created by Conversion    ? Dementia of the Alzheimer's type 7/13/2014   ? Diabetes Mellitus     Created by Conversion    ? Esophageal reflux     Created by Conversion    ? Gastritis    ? Herpes Zoster (Shingles)     Created by Conversion    ? Hiatal hernia    ? Hypercholesterolemia     Created by Conversion    ? Hypertension     Created by Conversion    ? Melanosis Coli     Created by Conversion [facility] Annotation: May  2 2012  1:19PM - Sheila Benavides: colonoscopy  4/30/12    ? Memory Lapses Or Loss     Created by Conversion    ? Obstructive Sleep Apnea     Created by Conversion    ? Osteopenia     Created by Conversion    ? Raynaud's Disease     Created by Conversion    ? Tricuspid Regurgitation     Created by  Conversion      No past surgical history on file.  Family History   Problem Relation Age of Onset   ? Hypertension Other    ? Diabetes Other    ? Stroke Other      Social History     Socioeconomic History   ? Marital status:      Spouse name: Not on file   ? Number of children: Not on file   ? Years of education: Not on file   ? Highest education level: Not on file   Occupational History   ? Not on file   Social Needs   ? Financial resource strain: Not on file   ? Food insecurity     Worry: Not on file     Inability: Not on file   ? Transportation needs     Medical: Not on file     Non-medical: Not on file   Tobacco Use   ? Smoking status: Never Smoker   ? Smokeless tobacco: Never Used   Substance and Sexual Activity   ? Alcohol use: Not on file   ? Drug use: Not on file   ? Sexual activity: Not on file   Lifestyle   ? Physical activity     Days per week: Not on file     Minutes per session: Not on file   ? Stress: Not on file   Relationships   ? Social connections     Talks on phone: Not on file     Gets together: Not on file     Attends Islam service: Not on file     Active member of club or organization: Not on file     Attends meetings of clubs or organizations: Not on file     Relationship status: Not on file   ? Intimate partner violence     Fear of current or ex partner: Not on file     Emotionally abused: Not on file     Physically abused: Not on file     Forced sexual activity: Not on file   Other Topics Concern   ? Not on file   Social History Narrative   ? Not on file   lived in prison  Current Outpatient Medications   Medication Sig Dispense Refill   ? acetaminophen (TYLENOL) 325 MG tablet Take 650 mg by mouth 4 (four) times a day.     ? bisacodyl (DULCOLAX, BISACODYL,) 10 mg suppository Insert 1 suppository (10 mg total) into the rectum daily as needed (for constipation). 12 suppository 0   ? cetirizine (ZYRTEC) 10 MG tablet Take 10 mg by mouth daily as needed.      ? cholecalciferol, vitamin  D3, 1,000 unit tablet Take 1,000 Units by mouth daily.     ? fluticasone propionate (FLONASE) 50 mcg/actuation nasal spray Apply 1 spray into each nostril daily as needed for rhinitis.     ? gabapentin (NEURONTIN) 100 MG capsule Take 100 mg by mouth 3 (three) times a day.     ? hydrALAZINE (APRESOLINE) 25 MG tablet Take 50 mg by mouth Daily at 8:00 am.. Hold for SBP <150           ? lisinopriL (PRINIVIL,ZESTRIL) 2.5 MG tablet Take 5 mg by mouth daily.      ? metoprolol succinate (TOPROL-XL) 25 MG Take 12.5 mg by mouth daily.            ? polyethylene glycol (MIRALAX) 17 gram/dose powder Take 17 g by mouth daily. 255 g 0   ? venlafaxine (EFFEXOR-XR) 37.5 MG 24 hr capsule Take 37.5 mg by mouth daily.     ? verapamil (VERELAN PM) 100 mg 24 hr capsule TAKE ONE CAPSULE BY MOUTH EVERY DAY 90 capsule 1   ? white petrolatum (AQUAPHOR ORIGINAL) 41 % Oint Apply 1 application topically 3 (three) times a day as needed.              No current facility-administered medications for this visit.      Allergies   Allergen Reactions   ? Codeine Sulfate          Review of Systems:    Constitutional: Negative.  Negative for fever, chills,has  activity change, appetite change and fatigue.   Has been gradually losing weight but now weights appear to be stable  HENT: Negative for congestion and facial swelling.    Eyes: Negative for photophobia, redness and visual disturbance.   Respiratory: Negative for cough and chest tightness.    Cardiovascular: Negative for chest pain, palpitations and leg swelling.   Gastrointestinal: Negative for nausea, diarrhea, constipation, blood in stool and abdominal distention.   Genitourinary: Negative.    Musculoskeletal: Negative.   she has bilateral knee osteoarthritis with knee pain  Skin: Negative.    Neurological: Negative for dizziness, tremors, syncope, weakness, light-headedness and headaches.   Hematological: Does not bruise/bleed easily.   Psychiatric/Behavioral: Negative.  She had a flat effect  with a poor recall   Has no recall of recent events   Staff does report some behaviors including continued resistance to cares but overall mood and behaviors have been stable      Physical Exam:    Weight 215 pounds blood pressure 126 / 71 temp 98 pulse 83  Obese  GENERAL: no acute distress. Cooperative in conversation.  Has limited recall and mostly smiles to questions  Patient is obese  HEENT: pupils are equal, round and reactive. Oral mucosa is moist and intact.  RESP:Chest symmetric. Regular respiratory rate. No stridor.  CVS: S1S2  ABD: Nondistended, soft.  EXTREMITIES: She has pedal and lower extremity edema.   NEURO: non focal. Alert and oriented xSELF   PSYCH: within normal limits. No depression or anxiety.  SKIN: warm dry intact   Musculoskeletal no focal abnormalities noted to be ambulating without any assist device    Labs:    Lab Results   Component Value Date    HGBA1C 6.7 (H) 01/21/2021     Results for orders placed or performed in visit on 09/17/20   Basic Metabolic Panel   Result Value Ref Range    Sodium 140 136 - 145 mmol/L    Potassium 4.5 3.5 - 5.0 mmol/L    Chloride 102 98 - 107 mmol/L    CO2 29 22 - 31 mmol/L    Anion Gap, Calculation 9 5 - 18 mmol/L    Glucose 121 70 - 125 mg/dL    Calcium 10.0 8.5 - 10.5 mg/dL    BUN 28 8 - 28 mg/dL    Creatinine 1.05 0.60 - 1.10 mg/dL    GFR MDRD Af Amer >60 >60 mL/min/1.73m2    GFR MDRD Non Af Amer 51 (L) >60 mL/min/1.73m2     Lab Results   Component Value Date    [id number] 368 03/23/2020     Vitamin D, Total (25-Hydroxy)   Date Value Ref Range Status   03/23/2020 34.4 30.0 - 80.0 ng/mL Final         Assessment/Plan:    -Dementia with significant cognitive decline  - History of diabetes adequately controlled with a last A1c of 6.7  - Morbid obesity  -History of depression with anxiety  - Hypertension  - Generalized weakness with decline noted    Patient is currently in the long-term care memory care unit because of progressive decline in cognition with  dementia.  She continues to have decline.  Her last BIMS is 3/15.  She has certain behaviors including refusal and resistance to cares and elopement episodes but overall has been stable.  Ambulating independently without any assist device.  Weights are stable at 215 pounds.  No worsening lymphedema noted.  Diabetic control adequate with a last A1c on 1/21/2021 at 6.7 which is adequate.  Blood pressures are stable.  Continue with her care plan      Electronically signed by: BERLIN Butts  This progress note was completed using Dragon software and there may be grammatical errors.

## 2021-06-21 NOTE — LETTER
Letter by Ashwin Tang NP at      Author: Ashwin Tang NP Service: -- Author Type: --    Filed:  Encounter Date: 11/9/2020 Status: (Other)         Patient: Kandi Ontiveros   MR Number: [id number]   YOB: 1940   Date of Visit: 11/9/2020     [facility]      Facility:    [facility] [[id number]]  Code Status: DNR      Chief Complaint/Reason for Visit:    Chief Complaint   Patient presents with   ? Review Of Multiple Medical Conditions     DM       HPI:   Kandi is a 80 y.o. female who who is residing at assisted living facility has moved to long-term care as of 6/2017 with underlying history of dementia, that has been progressive in nature.  She has periods of anxiety associated with memory loss and per daughter her short-term memory is getting progressively worse.  She has a past medical history of right knee osteoarthritis and apparently received steroid injections in the past.  She also is a diabetic that has been diet controlled and has obstructive sleep apnea.  As per the family she has been moved to long-term care so that she is closer to her  who is in the nursing home with her.     LTC: Today and previously she seems oriented though daughter reports having more confusion, previously started on namenda, no change per her and is still continuing to decline, so namenda was discontinued.  She denies any pain. Historically BP controlled, maintained on metoprolol, lisinopril, hydralazine and diltiazem.   Her edema BLE was stable so lasix discontinued prior.  Her DM is well controlled with metformin.   Per cognitive decline, was moved to the  in 1/2020 with . She ambulates per self and usually pushes  around in his wc, though had some falls in the past months (Aug/sept 2019). Previous diet was changed and portions decreased per weight gain, though now per cognition declining not eating as much per report  with subsequent weight loss, dietitian following. She has weekly visits with , but currently on hold per covid recommendations.      Recent ER visits:  She was sent to [facility] on 9/2/2019 status post fall.  She suffered a laceration on the left side of her face, additional imaging was negative so was returned to long-term care the same day.    Again on 10/6/2019 she was sent to [facility] status post having witnessed fall on the sidewalk.  She did again suffer a laceration over her left eyebrow, imaging negative so return to care center the same day.    Past Medical History:  Past Medical History:   Diagnosis Date   ? Anxiety     Created by Conversion    ? Benign Adenomatous Polyp Of The Large Intestine     Created by Conversion    ? Chronic Diarrhea Of Unknown Origin     Created by Conversion    ? Dementia of the Alzheimer's type 7/13/2014   ? Diabetes Mellitus     Created by Conversion    ? Esophageal reflux     Created by Conversion    ? Gastritis    ? Herpes Zoster (Shingles)     Created by Conversion    ? Hiatal hernia    ? Hypercholesterolemia     Created by Conversion    ? Hypertension     Created by Conversion    ? Melanosis Coli     Created by Conversion [facility] Annotation: May  2 2012  1:19PM - Sheila Benavides: colonoscopy  4/30/12    ? Memory Lapses Or Loss     Created by Conversion    ? Obstructive Sleep Apnea     Created by Conversion    ? Osteopenia     Created by Conversion    ? Raynaud's Disease     Created by Conversion    ? Tricuspid Regurgitation     Created by Conversion            Surgical History:  No past surgical history on file.    Family History:   Family History   Problem Relation Age of Onset   ? Hypertension Other    ? Diabetes Other    ? Stroke Other        Social History:    Social History     Socioeconomic History   ? Marital status:      Spouse name: Not on file   ? Number of children: Not on file   ? Years of education: Not on file   ? Highest  education level: Not on file   Occupational History   ? Not on file   Social Needs   ? Financial resource strain: Not on file   ? Food insecurity     Worry: Not on file     Inability: Not on file   ? Transportation needs     Medical: Not on file     Non-medical: Not on file   Tobacco Use   ? Smoking status: Never Smoker   ? Smokeless tobacco: Never Used   Substance and Sexual Activity   ? Alcohol use: Not on file   ? Drug use: Not on file   ? Sexual activity: Not on file   Lifestyle   ? Physical activity     Days per week: Not on file     Minutes per session: Not on file   ? Stress: Not on file   Relationships   ? Social connections     Talks on phone: Not on file     Gets together: Not on file     Attends [employer] service: Not on file     Active member of club or organization: Not on file     Attends meetings of clubs or organizations: Not on file     Relationship status: Not on file   ? Intimate partner violence     Fear of current or ex partner: Not on file     Emotionally abused: Not on file     Physically abused: Not on file     Forced sexual activity: Not on file   Other Topics Concern   ? Not on file   Social History Narrative   ? Not on file     Review of Systems   Constitutional: Negative for activity change, appetite change, diaphoresis and fatigue.        No issues   HENT: Negative for congestion and facial swelling.    Eyes: Negative for photophobia, redness and visual disturbance.   Respiratory: Negative for choking, shortness of breath and wheezing.    Cardiovascular: Positive for leg swelling. Negative for chest pain.        Pedal   Gastrointestinal: Negative for abdominal distention, abdominal pain, blood in stool, constipation and diarrhea.   Endocrine: Negative.    Genitourinary: Negative for difficulty urinating and dysuria.   Musculoskeletal: Negative for back pain and joint swelling.        Bilateral knee pain per OA, taking tylenol   Skin: Negative for color change.        intact  "  Allergic/Immunologic: Negative.    Neurological: Negative for seizures, weakness and headaches.   Hematological: Negative.    Psychiatric/Behavioral: Positive for confusion. Negative for behavioral problems, hallucinations and sleep disturbance. The patient is nervous/anxious.        Vitals:    11/09/20 1637   BP: 134/78   Pulse: 68   Resp: 16   Temp: 98  F (36.7  C)   SpO2: 95%   Weight: 209 lb (94.8 kg)   Height: 5' 4\" (1.626 m)       Physical Exam   Constitutional: She appears well-developed and well-nourished. No distress.   No acute issues   HENT:   Head: Normocephalic and atraumatic.   Mouth/Throat: Oropharynx is clear and moist. No oropharyngeal exudate.   Eyes: Pupils are equal, round, and reactive to light. Conjunctivae and EOM are normal. Right eye exhibits no discharge. Left eye exhibits no discharge. No scleral icterus.   Neck: Normal range of motion. Neck supple. No JVD present. No tracheal deviation present.   Intact   Cardiovascular: Normal rate.   Pulmonary/Chest: Effort normal. No stridor. No respiratory distress.   RA, no AS per covid 19 recommendation   Abdominal: Soft. She exhibits no distension.   No diarrhea or constipation reported   Genitourinary:    Genitourinary Comments: incontinent     Musculoskeletal: Normal range of motion.         General: Edema present.      Comments: 1+ LE, mostly pedal, denies pain   Neurological: She is alert. No cranial nerve deficit.   A/O x1   Skin: Skin is warm and dry. She is not diaphoretic. No erythema.   Intact, very dry   Psychiatric: She has a normal mood and affect.   No behaviors reported   Nursing note and vitals reviewed.      Medication List:  Current Outpatient Medications   Medication Sig   ? acetaminophen (TYLENOL) 325 MG tablet Take 650 mg by mouth 4 (four) times a day.   ? bisacodyl (DULCOLAX, BISACODYL,) 10 mg suppository Insert 1 suppository (10 mg total) into the rectum daily as needed (for constipation).   ? cetirizine (ZYRTEC) 10 MG " tablet Take 10 mg by mouth daily as needed.    ? cholecalciferol, vitamin D3, 1,000 unit tablet Take 1,000 Units by mouth daily.   ? fluticasone (FLONASE) 50 mcg/actuation nasal spray 1 spray into each nostril daily. (Patient taking differently: 1 spray into each nostril daily as needed. )   ? gabapentin (NEURONTIN) 100 MG capsule Take 100 mg by mouth 3 (three) times a day.   ? hydrALAZINE (APRESOLINE) 25 MG tablet Take 50 mg by mouth Daily at 8:00 am.. Hold for SBP <150         ? lisinopriL (PRINIVIL,ZESTRIL) 2.5 MG tablet Take 5 mg by mouth daily.    ? metFORMIN (GLUCOPHAGE) 500 MG tablet Take 500 mg by mouth 2 (two) times a day with meals.    ? metoprolol succinate (TOPROL-XL) 25 MG Take 12.5 mg by mouth daily.          ? polyethylene glycol (MIRALAX) 17 gram/dose powder Take 17 g by mouth daily.   ? polyvinyl alcohol (LIQUIFILM TEARS) 1.4 % ophthalmic solution Administer 1 drop to both eyes 3 (three) times a day as needed for dry eyes.   ? venlafaxine (EFFEXOR-XR) 37.5 MG 24 hr capsule Take 37.5 mg by mouth daily.   ? verapamil (VERELAN PM) 100 mg 24 hr capsule TAKE ONE CAPSULE BY MOUTH EVERY DAY   ? white petrolatum (AQUAPHOR ORIGINAL) 41 % Oint Apply 1 application topically 3 (three) times a day as needed.              Labs:  Results for orders placed or performed in visit on 09/17/20   Basic Metabolic Panel   Result Value Ref Range    Sodium 140 136 - 145 mmol/L    Potassium 4.5 3.5 - 5.0 mmol/L    Chloride 102 98 - 107 mmol/L    CO2 29 22 - 31 mmol/L    Anion Gap, Calculation 9 5 - 18 mmol/L    Glucose 121 70 - 125 mg/dL    Calcium 10.0 8.5 - 10.5 mg/dL    BUN 28 8 - 28 mg/dL    Creatinine 1.05 0.60 - 1.10 mg/dL    GFR MDRD Af Amer >60 >60 mL/min/1.73m2    GFR MDRD Non Af Amer 51 (L) >60 mL/min/1.73m2     Lab Results   Component Value Date    WBC 8.6 03/23/2020    HGB 11.0 (L) 03/23/2020    HCT 35.0 03/23/2020    MCV 98 03/23/2020     03/23/2020     Vitamin D, Total (25-Hydroxy)   Date Value Ref Range  Status   03/23/2020 34.4 30.0 - 80.0 ng/mL Final     Lab Results   Component Value Date    HGBA1C 6.6 (H) 07/30/2020     Lab Results   Component Value Date    [id number] 368 03/23/2020     Lab Results   Component Value Date    TSH 1.22 03/23/2020       Assessment/Plan:      Hx of TARI: Last Cr 1.05 with GFR 51 on 9/17/20    DM: current Hgb A1c 6.6 on 7/30/20, continue metformin 250mg two times a day, if Alc still low will titrate metformin. Recheck    Anxiety/depression: have discontinued citalopram and continue Effexor 37.5 daily,  previously discontinued wellbutrin, previously decreased effexor in 3/2018.  Minimal PHQ9 0 on 7/8/20    HTN: continue hydralazine 50mg at AM, continue verapamil 100mg daily, and will maintain metoprolol 12.5mg daily. Prior per pharmacy recs have discontinued clonidine patch and continue lisinopril 5mg daily per renal hx/DM. SBP <140     Vit D def: continue D3 1000U, last 34.4 on 3/23/20    Hypomagnesia: last 1.7 on 3/23/20    Hx of hypokalemia per lasix: dc'd supplement per dc lasix    GERD: PPI, no change.    Seasonal allergies: recently changed fluticasone and cetirizine to PRN    Edema: currently minimal pedal, 209lb    Morbid obesity: last BMI 35.8    Constipation: continue miralax daily, no change    Dementia: had cognitive assessment, discontinued donepezil and namenda per no change with medication    Socialization: consulted recreation. No real change per daughter    Disposition: Resides in [facility]. Ambulating per self      Electronically signed by: Ashwin Tang NP

## 2021-06-21 NOTE — LETTER
Letter by Ashwin Tang NP at      Author: Ashwin Tang NP Service: -- Author Type: --    Filed:  Encounter Date: 1/20/2021 Status: (Other)         Patient: Kandi Ontiveros   MR Number: [id number]   YOB: 1940   Date of Visit: 1/20/2021     [facility]      Facility:    [facility] [[id number]]  Code Status: DNR      Chief Complaint/Reason for Visit:    Chief Complaint   Patient presents with   ? Review Of Multiple Medical Conditions     dementia       HPI:   Kandi is a 80 y.o. female who who is residing at assisted living facility has moved to long-term care as of 6/2017 with underlying history of dementia, that has been progressive in nature.  She has periods of anxiety associated with memory loss and per daughter her short-term memory is getting progressively worse.  She has a past medical history of right knee osteoarthritis and apparently received steroid injections in the past.  She also is a diabetic that has been diet controlled and has obstructive sleep apnea.  As per the family she has been moved to long-term care so that she is closer to her  who is in the nursing home with her.     LTC: Today and previously she seems oriented though daughter reports having more confusion, previously started on namenda, no change per her and is still continuing to decline, so namenda was discontinued.  She denies any pain. Historically BP controlled, maintained on metoprolol, lisinopril, hydralazine and diltiazem.   Her edema BLE was stable so lasix discontinued prior.  Her DM is well controlled with metformin.   Per cognitive decline, was moved to the  in 1/2020 with . She ambulates per self and usually pushes  around in his wc, though had some falls in the past months (Aug/sept 2019). Previous diet was changed and portions decreased per weight gain, though now per cognition declining not eating as much per  report with subsequent weight loss, dietitian following. She has weekly visits with  again, previous on hold per covid outbreak in facility. No issues reported this visit.      Recent ER visits:  She was sent to [facility] on 9/2/2019 status post fall.  She suffered a laceration on the left side of her face, additional imaging was negative so was returned to long-term care the same day.    Again on 10/6/2019 she was sent to [facility] status post having witnessed fall on the sidewalk.  She did again suffer a laceration over her left eyebrow, imaging negative so return to care center the same day.    Past Medical History:  Past Medical History:   Diagnosis Date   ? Anxiety     Created by Conversion    ? Benign Adenomatous Polyp Of The Large Intestine     Created by Conversion    ? Chronic Diarrhea Of Unknown Origin     Created by Conversion    ? Dementia of the Alzheimer's type 7/13/2014   ? Diabetes Mellitus     Created by Conversion    ? Esophageal reflux     Created by Conversion    ? Gastritis    ? Herpes Zoster (Shingles)     Created by Conversion    ? Hiatal hernia    ? Hypercholesterolemia     Created by Conversion    ? Hypertension     Created by Conversion    ? Melanosis Coli     Created by Conversion [facility] Annotation: May  2 2012  1:19PM - Sheila Benavides: colonoscopy  4/30/12    ? Memory Lapses Or Loss     Created by Conversion    ? Obstructive Sleep Apnea     Created by Conversion    ? Osteopenia     Created by Conversion    ? Raynaud's Disease     Created by Conversion    ? Tricuspid Regurgitation     Created by Conversion            Surgical History:  No past surgical history on file.    Family History:   Family History   Problem Relation Age of Onset   ? Hypertension Other    ? Diabetes Other    ? Stroke Other        Social History:    Social History     Socioeconomic History   ? Marital status:      Spouse name: Not on file   ? Number of children: Not on file   ?  Years of education: Not on file   ? Highest education level: Not on file   Occupational History   ? Not on file   Social Needs   ? Financial resource strain: Not on file   ? Food insecurity     Worry: Not on file     Inability: Not on file   ? Transportation needs     Medical: Not on file     Non-medical: Not on file   Tobacco Use   ? Smoking status: Never Smoker   ? Smokeless tobacco: Never Used   Substance and Sexual Activity   ? Alcohol use: Not on file   ? Drug use: Not on file   ? Sexual activity: Not on file   Lifestyle   ? Physical activity     Days per week: Not on file     Minutes per session: Not on file   ? Stress: Not on file   Relationships   ? Social connections     Talks on phone: Not on file     Gets together: Not on file     Attends [employer] service: Not on file     Active member of club or organization: Not on file     Attends meetings of clubs or organizations: Not on file     Relationship status: Not on file   ? Intimate partner violence     Fear of current or ex partner: Not on file     Emotionally abused: Not on file     Physically abused: Not on file     Forced sexual activity: Not on file   Other Topics Concern   ? Not on file   Social History Narrative   ? Not on file     Review of Systems   Constitutional: Negative for activity change, appetite change, diaphoresis and fatigue.        No issues   HENT: Negative for congestion and facial swelling.    Eyes: Negative for photophobia, redness and visual disturbance.   Respiratory: Negative for choking, shortness of breath and wheezing.    Cardiovascular: Positive for leg swelling. Negative for chest pain.        Pedal   Gastrointestinal: Negative for abdominal distention, abdominal pain, blood in stool, constipation and diarrhea.   Endocrine: Negative.    Genitourinary: Negative for difficulty urinating and dysuria.   Musculoskeletal: Negative for back pain and joint swelling.        Bilateral knee pain per OA, taking tylenol   Skin: Negative  "for color change.        intact   Allergic/Immunologic: Negative.    Neurological: Negative for seizures, weakness and headaches.   Hematological: Negative.    Psychiatric/Behavioral: Positive for confusion. Negative for behavioral problems, hallucinations and sleep disturbance. The patient is nervous/anxious.        Vitals:    01/20/21 1437   BP: 160/76   Pulse: 80   Resp: 20   Temp: 98  F (36.7  C)   SpO2: 95%   Weight: 209 lb (94.8 kg)   Height: 5' 4\" (1.626 m)       Physical Exam   Constitutional: She appears well-developed and well-nourished. No distress.   No acute issues   HENT:   Head: Normocephalic and atraumatic.   Mouth/Throat: Oropharynx is clear and moist. No oropharyngeal exudate.   Eyes: Pupils are equal, round, and reactive to light. Conjunctivae and EOM are normal. Right eye exhibits no discharge. Left eye exhibits no discharge. No scleral icterus.   Neck: Normal range of motion. Neck supple. No JVD present. No tracheal deviation present.   Intact   Cardiovascular: Normal rate.   Pulmonary/Chest: Effort normal. No stridor. No respiratory distress.   RA, CTA   Abdominal: Soft. She exhibits no distension.   No diarrhea or constipation reported   Genitourinary:    Genitourinary Comments: incontinent     Musculoskeletal: Normal range of motion.         General: Edema present.      Comments: 1+ LE, mostly pedal, denies pain   Neurological: She is alert. No cranial nerve deficit.   A/O x1   Skin: Skin is warm and dry. She is not diaphoretic. No erythema.   Intact, very dry   Psychiatric: She has a normal mood and affect.   No behaviors reported   Nursing note and vitals reviewed.      Medication List:  Current Outpatient Medications   Medication Sig   ? acetaminophen (TYLENOL) 325 MG tablet Take 650 mg by mouth 4 (four) times a day.   ? bisacodyl (DULCOLAX, BISACODYL,) 10 mg suppository Insert 1 suppository (10 mg total) into the rectum daily as needed (for constipation).   ? cetirizine (ZYRTEC) 10 MG " tablet Take 10 mg by mouth daily as needed.    ? cholecalciferol, vitamin D3, 1,000 unit tablet Take 1,000 Units by mouth daily.   ? fluticasone (FLONASE) 50 mcg/actuation nasal spray 1 spray into each nostril daily. (Patient taking differently: 1 spray into each nostril daily as needed. )   ? gabapentin (NEURONTIN) 100 MG capsule Take 100 mg by mouth 3 (three) times a day.   ? hydrALAZINE (APRESOLINE) 25 MG tablet Take 50 mg by mouth Daily at 8:00 am.. Hold for SBP <150         ? lisinopriL (PRINIVIL,ZESTRIL) 2.5 MG tablet Take 5 mg by mouth daily.    ? metFORMIN (GLUCOPHAGE) 500 MG tablet Take 250 mg by mouth 2 (two) times a day with meals.    ? metoprolol succinate (TOPROL-XL) 25 MG Take 12.5 mg by mouth daily.          ? polyethylene glycol (MIRALAX) 17 gram/dose powder Take 17 g by mouth daily.   ? polyvinyl alcohol (LIQUIFILM TEARS) 1.4 % ophthalmic solution Administer 1 drop to both eyes 3 (three) times a day as needed for dry eyes.   ? venlafaxine (EFFEXOR-XR) 37.5 MG 24 hr capsule Take 37.5 mg by mouth daily.   ? verapamil (VERELAN PM) 100 mg 24 hr capsule TAKE ONE CAPSULE BY MOUTH EVERY DAY   ? white petrolatum (AQUAPHOR ORIGINAL) 41 % Oint Apply 1 application topically 3 (three) times a day as needed.              Labs:  Results for orders placed or performed in visit on 09/17/20   Basic Metabolic Panel   Result Value Ref Range    Sodium 140 136 - 145 mmol/L    Potassium 4.5 3.5 - 5.0 mmol/L    Chloride 102 98 - 107 mmol/L    CO2 29 22 - 31 mmol/L    Anion Gap, Calculation 9 5 - 18 mmol/L    Glucose 121 70 - 125 mg/dL    Calcium 10.0 8.5 - 10.5 mg/dL    BUN 28 8 - 28 mg/dL    Creatinine 1.05 0.60 - 1.10 mg/dL    GFR MDRD Af Amer >60 >60 mL/min/1.73m2    GFR MDRD Non Af Amer 51 (L) >60 mL/min/1.73m2     Lab Results   Component Value Date    WBC 8.6 03/23/2020    HGB 11.0 (L) 03/23/2020    HCT 35.0 03/23/2020    MCV 98 03/23/2020     03/23/2020     Vitamin D, Total (25-Hydroxy)   Date Value Ref Range  Status   03/23/2020 34.4 30.0 - 80.0 ng/mL Final     Lab Results   Component Value Date    HGBA1C 6.6 (H) 07/30/2020     Lab Results   Component Value Date    [id number] 368 03/23/2020     Lab Results   Component Value Date    TSH 1.22 03/23/2020       Assessment/Plan:      Hx of TARI: Last Cr 1.05 with GFR 51 on 9/17/20, recheck in March 2021    DM: current Hgb A1c 6.6 on 7/30/20, continue metformin 250mg two times a day, if Alc still low will titrate metformin. Recheck    Anxiety/depression: have discontinued citalopram and continue Effexor 37.5 daily,  previously discontinued wellbutrin, previously decreased effexor in 3/2018.  Minimal PHQ9 0 on 7/8/20    HTN: continue hydralazine 50mg at AM, continue verapamil 100mg daily, and will maintain metoprolol 12.5mg daily. Prior per pharmacy recs have discontinued clonidine patch and continue lisinopril 5mg daily per renal hx/DM. SBP <140     Vit D def: continue D3 1000U, last 34.4 on 3/23/20    Hypomagnesia: last 1.7 on 3/23/20    Hx of hypokalemia per lasix: dc'd supplement per dc lasix    GERD: PPI, no change.    Seasonal allergies: recently changed fluticasone and cetirizine to PRN    Edema: currently minimal pedal, 209lb. (stable)    Morbid obesity: last BMI 35.8    Constipation: continue miralax daily, no change    Dementia: had cognitive assessment, discontinued donepezil and namenda per no change with medication    Socialization: consulted recreation. No real change per daughter    Disposition: Resides in LTC  and  in regular LTC. Ambulating per self      Electronically signed by: Ashwin Tang NP

## 2021-06-21 NOTE — PROGRESS NOTES
[facility]      Facility:    [facility] [[id number]] -1 Code Status: FULL CODE      Chief Complaint/Reason for Visit:   Chief Complaint   Patient presents with     Problem Visit       HPI:   Kandi is a 78 y.o. female who who is residing at assisted living facility has moved to long-term care with underlying history of dementia, that has been progressive in nature.  She has periods of anxiety associated with memory loss and per daughter her short-term memory is getting progressively worse.  She has a past medical history of right knee osteoarthritis and apparently received steroid injections in the past.  She also is a diabetic that has been diet controlled and has obstructive sleep apnea.  As per the family she has been moved to long-term care so that she is closer to her  who is in the nursing home with her.     LTC: Today and previously she seems oriented though not sure per language barrier, will attempt to use dietitian or daughter to interpret.   She denies any pain.    Previously increased hydralazine, today will increase again and maintain metoprolol dose. Her edema had increased, so lasix was increased, weight stable and renal fx improved. We also suggested ordering GANGA hose per her edema in her legs, though apparently she is never use of this and has since been DC'd.  Previously she also told me she has really dry skin so I ordered Aquaphor for her 3 times daily as needed, as this has been therapeutic, though today tells me she doesn't use.  Also she needed potassium supplementation, with a recheck potassium of 4.2. Her current A1c has worsen as well, so previously increased metformin coverage, now will increase monitoring again.  Ambulates per self and usually pushing  around in his wc.  Now will increase clonidone patch for ongoing htn. Need to talk with daughter about donepezil addition.  Per dietitian, diet changed and portioned  decreased. BIMS 8/15 and PHQ9 6/20 on 9/6/18.    Patient denies pain, headache, chest pain, numbness or tingling, shortest of breath, eating or swallowing concerns, nausea or vomiting, diarrhea or bowel abnormalities, or no new integumentary concerns today. Previously consulted recreation for increased socialization.        Past Medical History:  Past Medical History:   Diagnosis Date     Anxiety     Created by Conversion      Benign Adenomatous Polyp Of The Large Intestine     Created by Conversion      Chronic Diarrhea Of Unknown Origin     Created by Conversion      Dementia of the Alzheimer's type 7/13/2014     Diabetes Mellitus     Created by Conversion      Esophageal reflux     Created by Conversion      Gastritis      Herpes Zoster (Shingles)     Created by Conversion      Hiatal hernia      Hypercholesterolemia     Created by Conversion      Hypertension     Created by Conversion      Melanosis Coli     Created by Conversion [employer] Annotation: May  2 2012  1:19PM - Sheila Benavides: colonoscopy  4/30/12      Memory Lapses Or Loss     Created by Conversion      Obstructive Sleep Apnea     Created by Conversion      Osteopenia     Created by Conversion      Raynaud's Disease     Created by Conversion      Tricuspid Regurgitation     Created by Conversion            Surgical History:  No past surgical history on file.    Family History:   Family History   Problem Relation Age of Onset     Hypertension Other      Diabetes Other      Stroke Other        Social History:    Social History     Social History     Marital status:      Spouse name: N/A     Number of children: N/A     Years of education: N/A     Social History Main Topics     Smoking status: Never Smoker     Smokeless tobacco: Never Used     Alcohol use Not on file     Drug use: Not on file     Sexual activity: Not on file     Other Topics Concern     Not on file     Social History Narrative     Review of Systems   Constitutional: Positive  for appetite change. Negative for activity change, diaphoresis and fatigue.   HENT: Negative.  Negative for congestion and facial swelling.    Eyes: Negative.  Negative for visual disturbance.   Respiratory: Negative.  Negative for shortness of breath and wheezing.    Cardiovascular: Negative.  Negative for chest pain.   Gastrointestinal: Negative.  Negative for abdominal distention, abdominal pain, blood in stool, constipation and diarrhea.   Endocrine: Negative.    Genitourinary: Negative.  Negative for dysuria.   Musculoskeletal: Negative for back pain and joint swelling.        Bilateral knee pain per OA, taking tylenol   Skin: Negative.  Negative for color change.        moisturizer used   Allergic/Immunologic: Negative.    Neurological: Negative.  Negative for weakness and headaches.   Hematological: Negative.    Psychiatric/Behavioral: Negative for confusion and sleep disturbance. The patient is nervous/anxious.        Vitals:    10/13/18 0834   BP: 154/70   Pulse: 68   Resp: 18   Temp: 98  F (36.7  C)   SpO2: 97%   Weight: 221 lb (100.2 kg)       Physical Exam   Constitutional: She appears well-developed and well-nourished. No distress.   Wants to participate in more outtings, reported attending more outtings   HENT:   Head: Normocephalic and atraumatic.   Mouth/Throat: Oropharynx is clear and moist. No oropharyngeal exudate.   Eyes: Pupils are equal, round, and reactive to light. Right eye exhibits no discharge. Left eye exhibits no discharge. No scleral icterus.   Neck: Normal range of motion. Neck supple. No JVD present. No tracheal deviation present.   Intact   Cardiovascular: Normal rate and regular rhythm.  Exam reveals no gallop and no friction rub.    No murmur heard.  S1S2, no murmur   Pulmonary/Chest: Breath sounds normal. No stridor. No respiratory distress. She has no wheezes. She has no rales.   CTA   Abdominal: Soft. Bowel sounds are normal. She exhibits no distension. There is no tenderness.  There is no rebound.   No diarrhea or constipation   Genitourinary:   Genitourinary Comments: Deferred   Musculoskeletal: Normal range of motion. She exhibits edema.   1-2+ LE edema bilaterally, mostly pedal   Neurological: She is alert. No cranial nerve deficit.   A/O x2-3, BIMS 9/15   Skin: Skin is warm and dry. She is not diaphoretic. No erythema.   Intact, very dry, encouraged to use lotion   Psychiatric: She has a normal mood and affect.   Has anxiety and depression, more agitation noted, per cognitive eval now on donepezil   Nursing note and vitals reviewed.      Medication List:  Current Outpatient Prescriptions   Medication Sig     cloNIDine (CATAPRES-TTS) 0.2 mg/24 hr Place 1 patch on the skin once a week.     acetaminophen 500 mg coapsule Take 1 capsule by mouth. Every 4-6 hours PRN     bisacodyl (DULCOLAX, BISACODYL,) 10 mg suppository Insert 1 suppository (10 mg total) into the rectum daily as needed (for constipation).     buPROPion (WELLBUTRIN XL) 150 MG 24 hr tablet Take 1 tablet (150 mg total) by mouth daily.     CALCIUM CARBONATE/VITAMIN D3 (CALCIUM 600 WITH VITAMIN D3 ORAL) Take 2 tablets by mouth daily.     cetirizine (ZYRTEC) 10 MG tablet Take 10 mg by mouth daily.     cholecalciferol, vitamin D3, 1,000 unit tablet Take 1,000 Units by mouth daily.     citalopram (CELEXA) 20 MG tablet TAKE ONE TABLET BY MOUTH EVERY DAY     dextromethorphan (DELSYM) 30 mg/5 mL liquid Take 60 mg by mouth every 12 (twelve) hours as needed for cough.     donepezil (ARICEPT) 5 MG tablet Take 10 mg by mouth at bedtime.      fluticasone (FLONASE) 50 mcg/actuation nasal spray 1 spray into each nostril daily.     furosemide (LASIX) 20 MG tablet Take 20 mg by mouth 2 (two) times a day at 9am and 6pm.      galantamine (RAZADYNE ER) 24 MG 24 hr capsule Take 1 capsule (24 mg total) by mouth daily with breakfast.     hydrALAZINE (APRESOLINE) 25 MG tablet Take 75 mg by mouth 3 (three) times a day. Hold for SBP <130       lansoprazole (PREVACID) 30 MG capsule TAKE 1 CAPSULE BY MOUTH EVERY DAY     metFORMIN (GLUCOPHAGE) 500 MG tablet Take 750 mg by mouth daily with supper.      metFORMIN (GLUCOPHAGE) 500 MG tablet Take 500 mg by mouth daily with breakfast.      metoprolol succinate (TOPROL-XL) 25 MG Take 25 mg by mouth daily.      OMEGA-3S/DHA/EPA/FISH OIL (OMEGA 3 ORAL) Take 2 capsules by mouth daily.     polyethylene glycol (MIRALAX) 17 gram/dose powder Take 17 g by mouth daily.     potassium chloride SA (K-DUR,KLOR-CON) 10 MEQ tablet Take 10 mEq by mouth daily.      venlafaxine (EFFEXOR XR) 37.5 MG 24 hr capsule Take 2 capsules (75 mg total) by mouth daily. (Patient taking differently: Take 37.5 mg by mouth daily. GDR 3/2018)     verapamil (VERELAN PM) 100 mg 24 hr capsule TAKE ONE CAPSULE BY MOUTH EVERY DAY     vitamin E 1000 UNIT capsule Take 1,000 Units by mouth 2 (two) times a day.     white petrolatum (AQUAPHOR ORIGINAL) 41 % Oint Apply topically 3 (three) times a day as needed.       Labs:  Results for orders placed or performed in visit on 08/23/18   Basic Metabolic Panel   Result Value Ref Range    Sodium 140 136 - 145 mmol/L    Potassium 4.2 3.5 - 5.0 mmol/L    Chloride 101 98 - 107 mmol/L    CO2 30 22 - 31 mmol/L    Anion Gap, Calculation 9 5 - 18 mmol/L    Glucose 121 70 - 125 mg/dL    Calcium 9.5 8.5 - 10.5 mg/dL    BUN 25 8 - 28 mg/dL    Creatinine 1.11 (H) 0.60 - 1.10 mg/dL    GFR MDRD Af Amer 58 (L) >60 mL/min/1.73m2    GFR MDRD Non Af Amer 48 (L) >60 mL/min/1.73m2     Lab Results   Component Value Date    WBC 12.2 (H) 05/28/2016    HGB 11.1 (L) 05/28/2016    HCT 32.9 (L) 05/28/2016    MCV 91 05/28/2016     05/28/2016     Vitamin D, Total (25-Hydroxy)   Date Value Ref Range Status   01/25/2018 42.3 30.0 - 80.0 ng/mL Final     Lab Results   Component Value Date    HGBA1C 7.6 (H) 10/03/2018     Assessment/Plan:  1.  Hypokalemia: continue potassium 10mEq BID, last K 4.2.  2.  TARI: Last creatinine 1.11 with GFR of  48 on 8/23/18. Improved.    3.  DM: current Hgb A1c 7.6, previous 7.0, metformin 750mg at PM and 500mg at AM, will maintain four times a day monitoring x2wks  4.  Anxiety/depression: continue citalopram 20mg daily, wellbutrin 150mg and effexor, related to daughter's health concerns, decreased effexor in 3/2018.  5.  HTN: increase hydralazine 75mg three times a day, hold if SBP <130, continue verapamil 100mg daily, and will maintain metoprolol 25mg daily. Will increase clonidine patch 0.2mg/wk  6. Vit D def: continue D3 1000U, last 42.3 on 1/25/18.  7. GERD: PPI, no change.  8. Seasonal allergies: continue fluticasone and cetirizine.  9. Edema: maintain lasix 20mg two times a day, currently 221lb.  Was on larger portion, now discontinued and DM diet started per dietitian.   10. Constipation: continue miralax daily, no change.  11. Dementia: recently had cognitive assessment, increase donepezil to 10mg at HS, re-evaluate with daughter.  12. Socialization: consulted recreation. Reports attending more events.    The care plan has been reviewed and all orders signed. Changes to care plan, if any, as noted. Otherwise, continue care plan of care.      Electronically signed by: Ashwin Tang NP

## 2021-06-21 NOTE — PROGRESS NOTES
[facility]      Facility:    [facility] [[id number]] -1 Code Status: FULL CODE      Chief Complaint/Reason for Visit:   Chief Complaint   Patient presents with     Problem Visit       HPI:   Kandi is a 78 y.o. female who who is residing at assisted living facility has moved to long-term care with underlying history of dementia, that has been progressive in nature.  She has periods of anxiety associated with memory loss and per daughter her short-term memory is getting progressively worse.  She has a past medical history of right knee osteoarthritis and apparently received steroid injections in the past.  She also is a diabetic that has been diet controlled and has obstructive sleep apnea.  As per the family she has been moved to long-term care so that she is closer to her  who is in the nursing home with her.     LTC: Today and previously she seems oriented though not sure per language barrier, will attempt to use dietitian or daughter to interpret.   She denies any pain.    Previously increased hydralazine, today will increase again and maintain metoprolol dose. Her edema had increased, so lasix was increased, weight stable and renal fx improved. We also suggested ordering GANGA hose per her edema in her legs, though apparently she is never use of this and has since been DC'd.  Previously she also told me she has really dry skin so I ordered Aquaphor for her 3 times daily as needed, as this has been therapeutic, though today tells me she doesn't use.  Also she needed potassium supplementation, with a recheck potassium of 4.2. Her current A1c has worsen as well, so previously increased metformin coverage, now will increase monitoring again.  Ambulates per self and usually pushing  around in his wc.  Now will continue clonidone patch for ongoing htn. Need to talk with daughter about donepezil addition.  Per dietitian, diet changed and portioned  decreased. BIMS 8/15 and PHQ9 6/20 on 9/6/18.    Patient denies pain, headache, chest pain, numbness or tingling, shortest of breath, eating or swallowing concerns, nausea or vomiting, diarrhea or bowel abnormalities, or no new integumentary concerns today. Previously consulted recreation for increased socialization.        Past Medical History:  Past Medical History:   Diagnosis Date     Anxiety     Created by Conversion      Benign Adenomatous Polyp Of The Large Intestine     Created by Conversion      Chronic Diarrhea Of Unknown Origin     Created by Conversion      Dementia of the Alzheimer's type 7/13/2014     Diabetes Mellitus     Created by Conversion      Esophageal reflux     Created by Conversion      Gastritis      Herpes Zoster (Shingles)     Created by Conversion      Hiatal hernia      Hypercholesterolemia     Created by Conversion      Hypertension     Created by Conversion      Melanosis Coli     Created by Conversion [employer] Annotation: May  2 2012  1:19PM - Sheila Benavides: colonoscopy  4/30/12      Memory Lapses Or Loss     Created by Conversion      Obstructive Sleep Apnea     Created by Conversion      Osteopenia     Created by Conversion      Raynaud's Disease     Created by Conversion      Tricuspid Regurgitation     Created by Conversion            Surgical History:  No past surgical history on file.    Family History:   Family History   Problem Relation Age of Onset     Hypertension Other      Diabetes Other      Stroke Other        Social History:    Social History     Social History     Marital status:      Spouse name: N/A     Number of children: N/A     Years of education: N/A     Social History Main Topics     Smoking status: Never Smoker     Smokeless tobacco: Never Used     Alcohol use Not on file     Drug use: Not on file     Sexual activity: Not on file     Other Topics Concern     Not on file     Social History Narrative     Review of Systems   Constitutional: Positive  for appetite change. Negative for activity change, diaphoresis and fatigue.   HENT: Negative.  Negative for congestion and facial swelling.    Eyes: Negative.  Negative for visual disturbance.   Respiratory: Negative.  Negative for shortness of breath and wheezing.    Cardiovascular: Negative.  Negative for chest pain.   Gastrointestinal: Negative.  Negative for abdominal distention, abdominal pain, blood in stool, constipation and diarrhea.   Endocrine: Negative.    Genitourinary: Negative.  Negative for dysuria.   Musculoskeletal: Negative for back pain and joint swelling.        Bilateral knee pain per OA, taking tylenol   Skin: Negative.  Negative for color change.        moisturizer used   Allergic/Immunologic: Negative.    Neurological: Negative.  Negative for weakness and headaches.   Hematological: Negative.    Psychiatric/Behavioral: Negative for confusion and sleep disturbance. The patient is nervous/anxious.        Vitals:    10/27/18 1105   BP: 172/75   Pulse: 66   Resp: 18   Temp: 98  F (36.7  C)   SpO2: 98%   Weight: (!) 226 lb (102.5 kg)       Physical Exam   Constitutional: She appears well-developed and well-nourished. No distress.   Wants to participate in more outtings, reported attending more outtings   HENT:   Head: Normocephalic and atraumatic.   Mouth/Throat: Oropharynx is clear and moist. No oropharyngeal exudate.   Eyes: Pupils are equal, round, and reactive to light. Right eye exhibits no discharge. Left eye exhibits no discharge. No scleral icterus.   Neck: Normal range of motion. Neck supple. No JVD present. No tracheal deviation present.   Intact   Cardiovascular: Normal rate and regular rhythm.  Exam reveals no gallop and no friction rub.    No murmur heard.  S1S2, no murmur   Pulmonary/Chest: Breath sounds normal. No stridor. No respiratory distress. She has no wheezes. She has no rales.   CTA   Abdominal: Soft. Bowel sounds are normal. She exhibits no distension. There is no  tenderness. There is no rebound.   No diarrhea or constipation   Genitourinary:   Genitourinary Comments: Deferred   Musculoskeletal: Normal range of motion. She exhibits edema.   1-2+ LE edema bilaterally, mostly pedal   Neurological: She is alert. No cranial nerve deficit.   A/O x2-3, BIMS 8/15   Skin: Skin is warm and dry. She is not diaphoretic. No erythema.   Intact, very dry, encouraged to use lotion   Psychiatric: She has a normal mood and affect.   Less agitation, continue Effexor GDR, continue aricept   Nursing note and vitals reviewed.      Medication List:  Current Outpatient Prescriptions   Medication Sig     acetaminophen 500 mg coapsule Take 1 capsule by mouth. Every 4-6 hours PRN     bisacodyl (DULCOLAX, BISACODYL,) 10 mg suppository Insert 1 suppository (10 mg total) into the rectum daily as needed (for constipation).     buPROPion (WELLBUTRIN XL) 150 MG 24 hr tablet Take 1 tablet (150 mg total) by mouth daily.     CALCIUM CARBONATE/VITAMIN D3 (CALCIUM 600 WITH VITAMIN D3 ORAL) Take 2 tablets by mouth daily.     cetirizine (ZYRTEC) 10 MG tablet Take 10 mg by mouth daily.     cholecalciferol, vitamin D3, 1,000 unit tablet Take 1,000 Units by mouth daily.     citalopram (CELEXA) 20 MG tablet TAKE ONE TABLET BY MOUTH EVERY DAY     cloNIDine (CATAPRES-TTS) 0.2 mg/24 hr Place 1 patch on the skin once a week.     dextromethorphan (DELSYM) 30 mg/5 mL liquid Take 60 mg by mouth every 12 (twelve) hours as needed for cough.     donepezil (ARICEPT) 5 MG tablet Take 10 mg by mouth at bedtime.      fluticasone (FLONASE) 50 mcg/actuation nasal spray 1 spray into each nostril daily.     furosemide (LASIX) 20 MG tablet Take 20 mg by mouth 2 (two) times a day at 9am and 6pm.      galantamine (RAZADYNE ER) 24 MG 24 hr capsule Take 1 capsule (24 mg total) by mouth daily with breakfast.     hydrALAZINE (APRESOLINE) 25 MG tablet Take 75 mg by mouth 3 (three) times a day. Hold for SBP <130      lansoprazole (PREVACID) 30  MG capsule TAKE 1 CAPSULE BY MOUTH EVERY DAY     metFORMIN (GLUCOPHAGE) 500 MG tablet Take 1,000 mg by mouth daily with supper.      metFORMIN (GLUCOPHAGE) 500 MG tablet Take 500 mg by mouth daily with breakfast.      metoprolol succinate (TOPROL-XL) 25 MG Take 25 mg by mouth daily.      OMEGA-3S/DHA/EPA/FISH OIL (OMEGA 3 ORAL) Take 2 capsules by mouth daily.     polyethylene glycol (MIRALAX) 17 gram/dose powder Take 17 g by mouth daily.     potassium chloride SA (K-DUR,KLOR-CON) 10 MEQ tablet Take 10 mEq by mouth daily.      venlafaxine (EFFEXOR XR) 37.5 MG 24 hr capsule Take 2 capsules (75 mg total) by mouth daily. (Patient taking differently: Take 37.5 mg by mouth daily. GDR 3/2018)     verapamil (VERELAN PM) 100 mg 24 hr capsule TAKE ONE CAPSULE BY MOUTH EVERY DAY     vitamin E 1000 UNIT capsule Take 1,000 Units by mouth 2 (two) times a day.     white petrolatum (AQUAPHOR ORIGINAL) 41 % Oint Apply topically 3 (three) times a day as needed.       Labs:  Results for orders placed or performed in visit on 10/26/18   Basic Metabolic Panel   Result Value Ref Range    Sodium 139 136 - 145 mmol/L    Potassium 3.7 3.5 - 5.0 mmol/L    Chloride 97 (L) 98 - 107 mmol/L    CO2 33 (H) 22 - 31 mmol/L    Anion Gap, Calculation 9 5 - 18 mmol/L    Glucose 118 70 - 125 mg/dL    Calcium 9.8 8.5 - 10.5 mg/dL    BUN 30 (H) 8 - 28 mg/dL    Creatinine 1.21 (H) 0.60 - 1.10 mg/dL    GFR MDRD Af Amer 52 (L) >60 mL/min/1.73m2    GFR MDRD Non Af Amer 43 (L) >60 mL/min/1.73m2     Lab Results   Component Value Date    WBC 12.2 (H) 05/28/2016    HGB 11.1 (L) 05/28/2016    HCT 32.9 (L) 05/28/2016    MCV 91 05/28/2016     05/28/2016     Vitamin D, Total (25-Hydroxy)   Date Value Ref Range Status   01/25/2018 42.3 30.0 - 80.0 ng/mL Final     Lab Results   Component Value Date    HGBA1C 7.6 (H) 10/03/2018     Assessment/Plan:  1.  Hypokalemia: continue potassium 10mEq BID, last K 3.7 on 10/26/18.  2.  TARI: Last creatinine 1.21 with GFR of  43 on 10/26/18. Improved.    3.  DM: current Hgb A1c 7.6, previous 7.0, metformin 1000mg at PM and 500mg at AM, will maintain four times a day monitoring x2wks  4.  Anxiety/depression: continue citalopram 20mg daily, wellbutrin 150mg and effexor, related to daughter's health concerns, decreased effexor in 3/2018.  5.  HTN: increase hydralazine 75mg three times a day, hold if SBP <130, continue verapamil 100mg daily, and will maintain metoprolol 25mg daily. With clonidine patch 0.2mg/wk  6. Vit D def: continue D3 1000U, last 42.3 on 1/25/18.  7. GERD: PPI, no change.  8. Seasonal allergies: continue fluticasone and cetirizine.  9. Edema: maintain lasix 20mg two times a day, currently 226lb.  Was on larger portion, now discontinued and CSC diet started per dietitian.   10. Constipation: continue miralax daily, no change.  11. Dementia: recently had cognitive assessment, donepezil 10mg at HS, re-evaluate with daughter.  12. Socialization: consulted recreation. Reports attending more events.      The care plan has been reviewed and all orders signed. Changes to care plan, if any, as noted. Otherwise, continue care plan of care.  The total time spent with this patient was greater than 40 minutes, with greater than 50% spent in counseling and coordination of care that included multiple issues per ongoing DM tx.      Electronically signed by: Ashwin Tang NP

## 2021-06-21 NOTE — PROGRESS NOTES
[facility]      Facility:    [facility] [[id number]] -1 Code Status: FULL CODE      Chief Complaint/Reason for Visit:   Chief Complaint   Patient presents with     Problem Visit       HPI:   Kandi is a 78 y.o. female who who is residing at assisted living facility has moved to long-term care with underlying history of dementia, that has been progressive in nature.  She has periods of anxiety associated with memory loss and per daughter her short-term memory is getting progressively worse.  She has a past medical history of right knee osteoarthritis and apparently received steroid injections in the past.  She also is a diabetic that has been diet controlled and has obstructive sleep apnea.  As per the family she has been moved to long-term care so that she is closer to her  who is in the nursing home with her.     LTC: Today and previously she seems oriented though not sure per language barrier, will attempt to use dietitian or daughter to interpret.   She denies any pain.    Previously increased hydralazine, today will increase again and maintain metoprolol dose. Her edema had increased, so lasix was increased, weight stable and renal fx improved. We also suggested ordering GANGA hose per her edema in her legs, though apparently she is never use of this and has since been DC'd.  Previously she also told me she has really dry skin so I ordered Aquaphor for her 3 times daily as needed, as this has been therapeutic, though today tells me she doesn't use.  Also she needed potassium supplementation, with a recheck potassium of 4.2. Her current A1c has worsen as well, so previously increased metformin coverage, now will verify adequate coverage with monitoring again, BS <180.  Ambulates per self and usually pushing  around in his wc.  Now will continue clonidone patch for ongoing htn, effective. Per daughter, adding donepezil has not done much, will trial  namenda.  Per dietitian, diet changed and portioned decreased. BIMS 8/15 and PHQ9 6/20 on 9/6/18.    Patient denies pain, headache, chest pain, numbness or tingling, shortest of breath, eating or swallowing concerns, nausea or vomiting, diarrhea or bowel abnormalities, or no new integumentary concerns today. Previously consulted recreation for increased socialization.        Past Medical History:  Past Medical History:   Diagnosis Date     Anxiety     Created by Conversion      Benign Adenomatous Polyp Of The Large Intestine     Created by Conversion      Chronic Diarrhea Of Unknown Origin     Created by Conversion      Dementia of the Alzheimer's type 7/13/2014     Diabetes Mellitus     Created by Conversion      Esophageal reflux     Created by Conversion      Gastritis      Herpes Zoster (Shingles)     Created by Conversion      Hiatal hernia      Hypercholesterolemia     Created by Conversion      Hypertension     Created by Conversion      Melanosis Coli     Created by Conversion [facility] Annotation: May  2 2012  1:19PM - Sheila Benavides: colonoscopy  4/30/12      Memory Lapses Or Loss     Created by Conversion      Obstructive Sleep Apnea     Created by Conversion      Osteopenia     Created by Conversion      Raynaud's Disease     Created by Conversion      Tricuspid Regurgitation     Created by Conversion            Surgical History:  No past surgical history on file.    Family History:   Family History   Problem Relation Age of Onset     Hypertension Other      Diabetes Other      Stroke Other        Social History:    Social History     Socioeconomic History     Marital status:      Spouse name: Not on file     Number of children: Not on file     Years of education: Not on file     Highest education level: Not on file   Social Needs     Financial resource strain: Not on file     Food insecurity - worry: Not on file     Food insecurity - inability: Not on file     Transportation needs - medical:  Not on file     Transportation needs - non-medical: Not on file   Occupational History     Not on file   Tobacco Use     Smoking status: Never Smoker     Smokeless tobacco: Never Used   Substance and Sexual Activity     Alcohol use: Not on file     Drug use: Not on file     Sexual activity: Not on file   Other Topics Concern     Not on file   Social History Narrative     Not on file     Review of Systems   Constitutional: Negative for activity change, appetite change, diaphoresis and fatigue.   HENT: Negative.  Negative for congestion and facial swelling.    Eyes: Negative.  Negative for visual disturbance.   Respiratory: Negative.  Negative for shortness of breath and wheezing.    Cardiovascular: Negative.  Negative for chest pain.   Gastrointestinal: Negative.  Negative for abdominal distention, abdominal pain, blood in stool, constipation and diarrhea.   Endocrine: Negative.    Genitourinary: Negative.  Negative for dysuria.   Musculoskeletal: Negative for back pain and joint swelling.        Bilateral knee pain per OA, taking tylenol   Skin: Negative.  Negative for color change.        Dry, moisturizer used   Allergic/Immunologic: Negative.    Neurological: Negative.  Negative for weakness and headaches.   Hematological: Negative.    Psychiatric/Behavioral: Negative for sleep disturbance. The patient is nervous/anxious.        Vitals:    11/24/18 1016   BP: 132/80   Pulse: 88   Resp: 18   Temp: 97  F (36.1  C)   SpO2: 94%   Weight: 218 lb (98.9 kg)       Physical Exam   Constitutional: She appears well-developed and well-nourished. No distress.   Wants to participate in more outtings, reported attending more outtings   HENT:   Head: Normocephalic and atraumatic.   Mouth/Throat: Oropharynx is clear and moist. No oropharyngeal exudate.   Eyes: Pupils are equal, round, and reactive to light. Right eye exhibits no discharge. Left eye exhibits no discharge. No scleral icterus.   Neck: Normal range of motion. Neck  supple. No JVD present. No tracheal deviation present.   Intact   Cardiovascular: Normal rate, regular rhythm and normal heart sounds. Exam reveals no gallop and no friction rub.   No murmur heard.  S1S2, no murmur   Pulmonary/Chest: Breath sounds normal. No stridor. No respiratory distress. She has no wheezes. She has no rales.   CTA   Abdominal: Soft. Bowel sounds are normal. She exhibits no distension. There is no tenderness. There is no rebound.   No diarrhea or constipation   Genitourinary:   Genitourinary Comments: Deferred   Musculoskeletal: Normal range of motion. She exhibits edema.   1-2+ LE edema bilaterally, mostly pedal   Neurological: She is alert. No cranial nerve deficit.   A/O x2-3, BIMS 8/15   Skin: Skin is warm and dry. She is not diaphoretic. No erythema.   Intact, very dry, encouraged to use lotion   Psychiatric: She has a normal mood and affect.   Less agitation, continue Effexor GDR, continue aricept, trial namenda   Nursing note and vitals reviewed.      Medication List:  Current Outpatient Medications   Medication Sig     acetaminophen 500 mg coapsule Take 1 capsule by mouth. Every 4-6 hours PRN     bisacodyl (DULCOLAX, BISACODYL,) 10 mg suppository Insert 1 suppository (10 mg total) into the rectum daily as needed (for constipation).     buPROPion (WELLBUTRIN XL) 150 MG 24 hr tablet Take 1 tablet (150 mg total) by mouth daily.     CALCIUM CARBONATE/VITAMIN D3 (CALCIUM 600 WITH VITAMIN D3 ORAL) Take 2 tablets by mouth daily.     cetirizine (ZYRTEC) 10 MG tablet Take 10 mg by mouth daily.     cholecalciferol, vitamin D3, 1,000 unit tablet Take 1,000 Units by mouth daily.     citalopram (CELEXA) 20 MG tablet TAKE ONE TABLET BY MOUTH EVERY DAY     cloNIDine (CATAPRES-TTS) 0.2 mg/24 hr Place 1 patch on the skin once a week.     dextromethorphan (DELSYM) 30 mg/5 mL liquid Take 60 mg by mouth every 12 (twelve) hours as needed for cough.     donepezil (ARICEPT) 5 MG tablet Take 10 mg by mouth at  bedtime.      fluticasone (FLONASE) 50 mcg/actuation nasal spray 1 spray into each nostril daily.     furosemide (LASIX) 20 MG tablet Take 20 mg by mouth 2 (two) times a day at 9am and 6pm.      hydrALAZINE (APRESOLINE) 25 MG tablet Take 75 mg by mouth 3 (three) times a day. Hold for SBP <130      lansoprazole (PREVACID) 30 MG capsule TAKE 1 CAPSULE BY MOUTH EVERY DAY     memantine (NAMENDA) 5 MG tablet Take 5 mg by mouth daily.     metFORMIN (GLUCOPHAGE) 500 MG tablet Take 1,000 mg by mouth 2 (two) times a day with meals .           metoprolol succinate (TOPROL-XL) 25 MG Take 25 mg by mouth daily.      polyethylene glycol (MIRALAX) 17 gram/dose powder Take 17 g by mouth daily.     potassium chloride SA (K-DUR,KLOR-CON) 10 MEQ tablet Take 10 mEq by mouth daily.      venlafaxine (EFFEXOR XR) 37.5 MG 24 hr capsule Take 2 capsules (75 mg total) by mouth daily. (Patient taking differently: Take 37.5 mg by mouth daily. GDR 3/2018)     verapamil (VERELAN PM) 100 mg 24 hr capsule TAKE ONE CAPSULE BY MOUTH EVERY DAY     white petrolatum (AQUAPHOR ORIGINAL) 41 % Oint Apply topically 3 (three) times a day as needed.       Labs:  Results for orders placed or performed in visit on 10/26/18   Basic Metabolic Panel   Result Value Ref Range    Sodium 139 136 - 145 mmol/L    Potassium 3.7 3.5 - 5.0 mmol/L    Chloride 97 (L) 98 - 107 mmol/L    CO2 33 (H) 22 - 31 mmol/L    Anion Gap, Calculation 9 5 - 18 mmol/L    Glucose 118 70 - 125 mg/dL    Calcium 9.8 8.5 - 10.5 mg/dL    BUN 30 (H) 8 - 28 mg/dL    Creatinine 1.21 (H) 0.60 - 1.10 mg/dL    GFR MDRD Af Amer 52 (L) >60 mL/min/1.73m2    GFR MDRD Non Af Amer 43 (L) >60 mL/min/1.73m2     Lab Results   Component Value Date    WBC 12.2 (H) 05/28/2016    HGB 11.1 (L) 05/28/2016    HCT 32.9 (L) 05/28/2016    MCV 91 05/28/2016     05/28/2016     Vitamin D, Total (25-Hydroxy)   Date Value Ref Range Status   01/25/2018 42.3 30.0 - 80.0 ng/mL Final     Lab Results   Component Value Date     HGBA1C 7.6 (H) 10/03/2018     Assessment/Plan:  1.  Hypokalemia: continue potassium 10mEq BID, last K 3.7 on 10/26/18.  2.  TARI: Last creatinine 1.21 with GFR of 43 on 10/26/18. Improved.    3.  DM: current Hgb A1c 7.6, previous 7.0, increased metformin to 1000mg two times a day,  will maintain four times a day monitoring x2wks, BS <180  4.  Anxiety/depression: continue citalopram 20mg daily, wellbutrin 150mg and effexor, related to daughter's health concerns, decreased effexor in 3/2018.  5.  HTN: increase hydralazine 75mg three times a day, hold if SBP <130, continue verapamil 100mg daily, and will maintain metoprolol 25mg daily. With clonidine patch 0.2mg/wk. -150s  6. Vit D def: continue D3 1000U, last 42.3 on 1/25/18.  7. GERD: PPI, no change.  8. Seasonal allergies: continue fluticasone and cetirizine.  9. Edema: maintain lasix 20mg two times a day, currently 218lb.  Was on larger portion, now discontinued and CSC diet started per dietitian.   10. Constipation: continue miralax daily, no change.  11. Dementia: recently had cognitive assessment, donepezil 10mg at HS, previously started namenda 5mg daily.   12. Socialization: consulted recreation. Reports attending more events.      The care plan has been reviewed and all orders signed. Changes to care plan, if any, as noted. Otherwise, continue care plan of care.  The total time spent with this patient was greater than 30 minutes, with greater than 50% spent in counseling and coordination of care that included multiple issues per ongoing DM tx and weight gain with daughter.      Electronically signed by: Ashwin Tang NP

## 2021-06-21 NOTE — PROGRESS NOTES
[facility]      Facility:    [facility] [[id number]] -1 Code Status: FULL CODE      Chief Complaint/Reason for Visit:   Chief Complaint   Patient presents with     Review Of Multiple Medical Conditions       HPI:   Kandi is a 78 y.o. female who who is residing at assisted living facility has moved to long-term care with underlying history of dementia, that has been progressive in nature.  She has periods of anxiety associated with memory loss and per daughter her short-term memory is getting progressively worse.  She has a past medical history of right knee osteoarthritis and apparently received steroid injections in the past.  She also is a diabetic that has been diet controlled and has obstructive sleep apnea.  As per the family she has been moved to long-term care so that she is closer to her  who is in the nursing home with her.     LTC: Today and previously she seems oriented though not sure per language barrier, will attempt to use dietitian or daughter to interpret.   She denies any pain.    Previously increased hydralazine, today will increase again and maintain metoprolol dose. Her edema had increased, so lasix was increased, weight stable and renal fx improved. We also suggested ordering GANGA hose per her edema in her legs, though apparently she is never use of this and has since been DC'd.  Previously she also told me she has really dry skin so I ordered Aquaphor for her 3 times daily as needed, as this has been therapeutic, though today tells me she doesn't use.  Also she needed potassium supplementation, with a recheck potassium of 4.2. Her current A1c has worsen as well, so previously increased metformin coverage, now will increase monitoring again, BS <180.  Ambulates per self and usually pushing  around in his wc.  Now will continue clonidone patch for ongoing htn, effective. Per daughter, adding donepezil has not done much, will trial  namenda.  Per dietitian, diet changed and portioned decreased. BIMS 8/15 and PHQ9 6/20 on 9/6/18.    Patient denies pain, headache, chest pain, numbness or tingling, shortest of breath, eating or swallowing concerns, nausea or vomiting, diarrhea or bowel abnormalities, or no new integumentary concerns today. Previously consulted recreation for increased socialization.        Past Medical History:  Past Medical History:   Diagnosis Date     Anxiety     Created by Conversion      Benign Adenomatous Polyp Of The Large Intestine     Created by Conversion      Chronic Diarrhea Of Unknown Origin     Created by Conversion      Dementia of the Alzheimer's type 7/13/2014     Diabetes Mellitus     Created by Conversion      Esophageal reflux     Created by Conversion      Gastritis      Herpes Zoster (Shingles)     Created by Conversion      Hiatal hernia      Hypercholesterolemia     Created by Conversion      Hypertension     Created by Conversion      Melanosis Coli     Created by Conversion [facility] Annotation: May  2 2012  1:19PM - Sheila Benavides: colonoscopy  4/30/12      Memory Lapses Or Loss     Created by Conversion      Obstructive Sleep Apnea     Created by Conversion      Osteopenia     Created by Conversion      Raynaud's Disease     Created by Conversion      Tricuspid Regurgitation     Created by Conversion            Surgical History:  No past surgical history on file.    Family History:   Family History   Problem Relation Age of Onset     Hypertension Other      Diabetes Other      Stroke Other        Social History:    Social History     Social History     Marital status:      Spouse name: N/A     Number of children: N/A     Years of education: N/A     Social History Main Topics     Smoking status: Never Smoker     Smokeless tobacco: Never Used     Alcohol use Not on file     Drug use: Not on file     Sexual activity: Not on file     Other Topics Concern     Not on file     Social History  Narrative     Review of Systems   Constitutional: Positive for appetite change. Negative for activity change, diaphoresis and fatigue.   HENT: Negative.  Negative for congestion and facial swelling.    Eyes: Negative.  Negative for visual disturbance.   Respiratory: Negative.  Negative for shortness of breath and wheezing.    Cardiovascular: Negative.  Negative for chest pain.   Gastrointestinal: Negative.  Negative for abdominal distention, abdominal pain, blood in stool, constipation and diarrhea.   Endocrine: Negative.    Genitourinary: Negative.  Negative for dysuria.   Musculoskeletal: Negative for back pain and joint swelling.        Bilateral knee pain per OA, taking tylenol   Skin: Negative.  Negative for color change.        moisturizer used   Allergic/Immunologic: Negative.    Neurological: Negative.  Negative for weakness and headaches.   Hematological: Negative.    Psychiatric/Behavioral: Negative for confusion and sleep disturbance. The patient is nervous/anxious.        Vitals:    11/08/18 1912   BP: 126/78   Pulse: 66   Resp: 18   Temp: 97  F (36.1  C)   SpO2: 98%   Weight: 217 lb (98.4 kg)       Physical Exam   Constitutional: She appears well-developed and well-nourished. No distress.   Wants to participate in more outtings, reported attending more outtings   HENT:   Head: Normocephalic and atraumatic.   Mouth/Throat: Oropharynx is clear and moist. No oropharyngeal exudate.   Eyes: Pupils are equal, round, and reactive to light. Right eye exhibits no discharge. Left eye exhibits no discharge. No scleral icterus.   Neck: Normal range of motion. Neck supple. No JVD present. No tracheal deviation present.   Intact   Cardiovascular: Normal rate and regular rhythm.  Exam reveals no gallop and no friction rub.    No murmur heard.  S1S2, no murmur   Pulmonary/Chest: Breath sounds normal. No stridor. No respiratory distress. She has no wheezes. She has no rales.   CTA   Abdominal: Soft. Bowel sounds are  normal. She exhibits no distension. There is no tenderness. There is no rebound.   No diarrhea or constipation   Genitourinary:   Genitourinary Comments: Deferred   Musculoskeletal: Normal range of motion. She exhibits edema.   1-2+ LE edema bilaterally, mostly pedal   Neurological: She is alert. No cranial nerve deficit.   A/O x2-3, BIMS 8/15   Skin: Skin is warm and dry. She is not diaphoretic. No erythema.   Intact, very dry, encouraged to use lotion   Psychiatric: She has a normal mood and affect.   Less agitation, continue Effexor GDR, continue aricept, trial namenda   Nursing note and vitals reviewed.      Medication List:  Current Outpatient Prescriptions   Medication Sig     memantine (NAMENDA) 5 MG tablet Take 5 mg by mouth daily.     acetaminophen 500 mg coapsule Take 1 capsule by mouth. Every 4-6 hours PRN     bisacodyl (DULCOLAX, BISACODYL,) 10 mg suppository Insert 1 suppository (10 mg total) into the rectum daily as needed (for constipation).     buPROPion (WELLBUTRIN XL) 150 MG 24 hr tablet Take 1 tablet (150 mg total) by mouth daily.     CALCIUM CARBONATE/VITAMIN D3 (CALCIUM 600 WITH VITAMIN D3 ORAL) Take 2 tablets by mouth daily.     cetirizine (ZYRTEC) 10 MG tablet Take 10 mg by mouth daily.     cholecalciferol, vitamin D3, 1,000 unit tablet Take 1,000 Units by mouth daily.     citalopram (CELEXA) 20 MG tablet TAKE ONE TABLET BY MOUTH EVERY DAY     cloNIDine (CATAPRES-TTS) 0.2 mg/24 hr Place 1 patch on the skin once a week.     dextromethorphan (DELSYM) 30 mg/5 mL liquid Take 60 mg by mouth every 12 (twelve) hours as needed for cough.     donepezil (ARICEPT) 5 MG tablet Take 10 mg by mouth at bedtime.      fluticasone (FLONASE) 50 mcg/actuation nasal spray 1 spray into each nostril daily.     furosemide (LASIX) 20 MG tablet Take 20 mg by mouth 2 (two) times a day at 9am and 6pm.      hydrALAZINE (APRESOLINE) 25 MG tablet Take 75 mg by mouth 3 (three) times a day. Hold for SBP <130      lansoprazole  (PREVACID) 30 MG capsule TAKE 1 CAPSULE BY MOUTH EVERY DAY     metFORMIN (GLUCOPHAGE) 500 MG tablet Take 1,000 mg by mouth daily with supper.      metFORMIN (GLUCOPHAGE) 500 MG tablet Take 500 mg by mouth daily with breakfast.      metoprolol succinate (TOPROL-XL) 25 MG Take 25 mg by mouth daily.      polyethylene glycol (MIRALAX) 17 gram/dose powder Take 17 g by mouth daily.     potassium chloride SA (K-DUR,KLOR-CON) 10 MEQ tablet Take 10 mEq by mouth daily.      venlafaxine (EFFEXOR XR) 37.5 MG 24 hr capsule Take 2 capsules (75 mg total) by mouth daily. (Patient taking differently: Take 37.5 mg by mouth daily. GDR 3/2018)     verapamil (VERELAN PM) 100 mg 24 hr capsule TAKE ONE CAPSULE BY MOUTH EVERY DAY     white petrolatum (AQUAPHOR ORIGINAL) 41 % Oint Apply topically 3 (three) times a day as needed.       Labs:  Results for orders placed or performed in visit on 10/26/18   Basic Metabolic Panel   Result Value Ref Range    Sodium 139 136 - 145 mmol/L    Potassium 3.7 3.5 - 5.0 mmol/L    Chloride 97 (L) 98 - 107 mmol/L    CO2 33 (H) 22 - 31 mmol/L    Anion Gap, Calculation 9 5 - 18 mmol/L    Glucose 118 70 - 125 mg/dL    Calcium 9.8 8.5 - 10.5 mg/dL    BUN 30 (H) 8 - 28 mg/dL    Creatinine 1.21 (H) 0.60 - 1.10 mg/dL    GFR MDRD Af Amer 52 (L) >60 mL/min/1.73m2    GFR MDRD Non Af Amer 43 (L) >60 mL/min/1.73m2     Lab Results   Component Value Date    WBC 12.2 (H) 05/28/2016    HGB 11.1 (L) 05/28/2016    HCT 32.9 (L) 05/28/2016    MCV 91 05/28/2016     05/28/2016     Vitamin D, Total (25-Hydroxy)   Date Value Ref Range Status   01/25/2018 42.3 30.0 - 80.0 ng/mL Final     Lab Results   Component Value Date    HGBA1C 7.6 (H) 10/03/2018     Assessment/Plan:  1.  Hypokalemia: continue potassium 10mEq BID, last K 3.7 on 10/26/18.  2.  TARI: Last creatinine 1.21 with GFR of 43 on 10/26/18. Improved.    3.  DM: current Hgb A1c 7.6, previous 7.0, metformin 1000mg at PM and 500mg at AM, will maintain four times a day  monitoring x2wks, BS <180  4.  Anxiety/depression: continue citalopram 20mg daily, wellbutrin 150mg and effexor, related to daughter's health concerns, decreased effexor in 3/2018.  5.  HTN: increase hydralazine 75mg three times a day, hold if SBP <130, continue verapamil 100mg daily, and will maintain metoprolol 25mg daily. With clonidine patch 0.2mg/wk. -150s  6. Vit D def: continue D3 1000U, last 42.3 on 1/25/18.  7. GERD: PPI, no change.  8. Seasonal allergies: continue fluticasone and cetirizine.  9. Edema: maintain lasix 20mg two times a day, currently 225lb.  Was on larger portion, now discontinued and CSC diet started per dietitian.   10. Constipation: continue miralax daily, no change.  11. Dementia: recently had cognitive assessment, donepezil 10mg at HS, now start namenda 5mg daily.   12. Socialization: consulted recreation. Reports attending more events.      The care plan has been reviewed and all orders signed. Changes to care plan, if any, as noted. Otherwise, continue care plan of care.  The total time spent with this patient was greater than 50 minutes, with greater than 50% spent in counseling and coordination of care that included multiple issues per ongoing DM tx and weight gain with daughter.      Electronically signed by: Ashwin Tang NP

## 2021-06-22 NOTE — TELEPHONE ENCOUNTER
This patient's medication list and chart were reviewed as part of the service provided by [facility].    Assessment/Recommendations:  1. (Depreession/anxiety):  Pt is on Citalopram 20mg daily, Bupropion XL 150mg daily, Venlafaxine XR 37.5mg daily.  Appears Bupropion was started thinking it may help with energy and concentration.  Of note, this med can have significant side effect of anxiety/agitation.  Venlafaxine XR has been weaned down, and it is duplicative to continue with both SSRI and SNRI.  Consider d'c of Venlafaxine XR, and in future, d'c of Bupropion since it may be contributing to anxiety/agitation.   2. (Dementia):  Noted in previous encounter that daughter questioned benefit of Donepezil.  May be beneficial to consider reduction in dose to 5mg and monitor for any cognitive changes over next month.  If feel that cognition worsens, could increase back to 10mg, however, if no noted change, may consider d'c.  H/o diarrhea noted, and Donepezil may contribute.   May continue taper up of Memantine and monitor.  Of note, Memantine tends to have added benefit of being helpful for anxiety, which may be beneficial in this pt.  3. (Diabetes):  Pt with estGFR 43ml/min.  Recommended that if eGFR between 30 and <45ml/min during metformin therapy to consider risks/benefits of continuation of therapy and if therapy is continued, dose reduction of 50% and monitoring of renal fxn every 3mo is recommended.  Consider recheck BMP this month and if estGFR still <45, please consider reducing Metformin to 500mg two times a day.  May also contribute to diarrhea.  Of note, a1c goal <8-8.5% reasonable in this elderly patient with multiple co-morbidities, cognitive decline, limited life expectancy.  4. (Allergies):  On both flonase and cetirizine scheduled.  Consider changing cetirizine to prn.  Does have some anticholinergic effects, and symptoms may be able to be controlled with nasal spray  alone.    Sara Cunha, Pharm.D.,[facility]  Board Certified Geriatric Pharmacist  Medication Therapy Management Pharmacist  [phone]

## 2021-06-22 NOTE — PROGRESS NOTES
[facility]      Code Status:  FULL CODE  Visit Type: Review Of Multiple Medical Conditions     Facility:  [facility] [[id number]]           History of Present Illness: Kandi Ontiveros is a 78 y.o. female who is a resident of [facility].  She has periods of anxiety associated with memory loss and  her short-term memory is getting progressively worse.   BIMS 8/15  She also has underlying history of knee osteoarthritis and was getting steroid injections but is able to ambulate without any assist device as yet patient is a diabetic and also has obstructive sleep apnea  Along with anxiety which is primarily associated with her memory loss.  she is gaining wt.  She is independent with most of her ADLs and in fact helps her   with cares also  Mood and behaviors have been stable appetite has been good.    She is on metformin for dm. She has no concerns    Past Medical History:   Diagnosis Date     Anxiety     Created by Conversion      Benign Adenomatous Polyp Of The Large Intestine     Created by Conversion      Chronic Diarrhea Of Unknown Origin     Created by Conversion      Dementia of the Alzheimer's type 7/13/2014     Diabetes Mellitus     Created by Conversion      Esophageal reflux     Created by Conversion      Gastritis      Herpes Zoster (Shingles)     Created by Conversion      Hiatal hernia      Hypercholesterolemia     Created by Conversion      Hypertension     Created by Conversion      Melanosis Coli     Created by Conversion [facility] Annotation: May  2 2012  1:19PM - Sheila Benavides: colonoscopy  4/30/12      Memory Lapses Or Loss     Created by Conversion      Obstructive Sleep Apnea     Created by Conversion      Osteopenia     Created by Conversion      Raynaud's Disease     Created by Conversion      Tricuspid Regurgitation     Created by Conversion      No past surgical history on file.  Family History   Problem Relation Age of Onset      Hypertension Other      Diabetes Other      Stroke Other      Social History     Socioeconomic History     Marital status:      Spouse name: Not on file     Number of children: Not on file     Years of education: Not on file     Highest education level: Not on file   Social Needs     Financial resource strain: Not on file     Food insecurity - worry: Not on file     Food insecurity - inability: Not on file     Transportation needs - medical: Not on file     Transportation needs - non-medical: Not on file   Occupational History     Not on file   Tobacco Use     Smoking status: Never Smoker     Smokeless tobacco: Never Used   Substance and Sexual Activity     Alcohol use: Not on file     Drug use: Not on file     Sexual activity: Not on file   Other Topics Concern     Not on file   Social History Narrative     Not on file   lived in [facility]  Current Outpatient Medications   Medication Sig Dispense Refill     acetaminophen 500 mg coapsule Take 1 capsule by mouth. Every 4-6 hours PRN       bisacodyl (DULCOLAX, BISACODYL,) 10 mg suppository Insert 1 suppository (10 mg total) into the rectum daily as needed (for constipation). 12 suppository 0     buPROPion (WELLBUTRIN XL) 150 MG 24 hr tablet Take 1 tablet (150 mg total) by mouth daily. 90 tablet 1     CALCIUM CARBONATE/VITAMIN D3 (CALCIUM 600 WITH VITAMIN D3 ORAL) Take 2 tablets by mouth daily.       cetirizine (ZYRTEC) 10 MG tablet Take 10 mg by mouth daily.       cholecalciferol, vitamin D3, 1,000 unit tablet Take 1,000 Units by mouth daily.       citalopram (CELEXA) 20 MG tablet TAKE ONE TABLET BY MOUTH EVERY DAY 90 tablet 0     cloNIDine (CATAPRES-TTS) 0.2 mg/24 hr Place 1 patch on the skin once a week.       dextromethorphan (DELSYM) 30 mg/5 mL liquid Take 60 mg by mouth every 12 (twelve) hours as needed for cough.       donepezil (ARICEPT) 5 MG tablet Take 10 mg by mouth at bedtime.        fluticasone (FLONASE) 50 mcg/actuation nasal spray 1 spray into each  nostril daily. 16 g 12     furosemide (LASIX) 20 MG tablet Take 20 mg by mouth 2 (two) times a day at 9am and 6pm.        hydrALAZINE (APRESOLINE) 25 MG tablet Take 75 mg by mouth 3 (three) times a day. Hold for SBP <130        lansoprazole (PREVACID) 30 MG capsule TAKE 1 CAPSULE BY MOUTH EVERY DAY 90 capsule 1     memantine (NAMENDA) 5 MG tablet Take 5 mg by mouth daily.       metFORMIN (GLUCOPHAGE) 500 MG tablet Take 1,000 mg by mouth 2 (two) times a day with meals .             metoprolol succinate (TOPROL-XL) 25 MG Take 25 mg by mouth daily.        polyethylene glycol (MIRALAX) 17 gram/dose powder Take 17 g by mouth daily. 255 g 0     potassium chloride SA (K-DUR,KLOR-CON) 10 MEQ tablet Take 10 mEq by mouth daily.        venlafaxine (EFFEXOR XR) 37.5 MG 24 hr capsule Take 2 capsules (75 mg total) by mouth daily. (Patient taking differently: Take 37.5 mg by mouth daily. GDR 3/2018) 60 capsule 1     verapamil (VERELAN PM) 100 mg 24 hr capsule TAKE ONE CAPSULE BY MOUTH EVERY DAY 90 capsule 1     white petrolatum (AQUAPHOR ORIGINAL) 41 % Oint Apply topically 3 (three) times a day as needed.       No current facility-administered medications for this visit.      Allergies   Allergen Reactions     Codeine Sulfate          Review of Systems:    Constitutional: Negative.  Negative for fever, chills,has  activity change, appetite change and fatigue.   HENT: Negative for congestion and facial swelling.    Eyes: Negative for photophobia, redness and visual disturbance.   Respiratory: Negative for cough and chest tightness.    Cardiovascular: Negative for chest pain, palpitations and leg swelling.   Gastrointestinal: Negative for nausea, diarrhea, constipation, blood in stool and abdominal distention.   Genitourinary: Negative.    Musculoskeletal: Negative.   she has bilateral knee osteoarthritis with knee pain  Skin: Negative.    Neurological: Negative for dizziness, tremors, syncope, weakness, light-headedness and  headaches.   Hematological: Does not bruise/bleed easily.   Psychiatric/Behavioral: Negative.  She had a flat effect with a poor recall   Wt 218lb bp134/85 t98 p90    Physical Exam:    GENERAL: no acute distress. Cooperative in conversation.   HEENT: pupils are equal, round and reactive. Oral mucosa is moist and intact.  RESP:Chest symmetric. Regular respiratory rate. No stridor.  CVS: S1S2  ABD: Nondistended, soft.  EXTREMITIES: No lower extremity edema.   NEURO: non focal. Alert and oriented xSELF   PSYCH: within normal limits. No depression or anxiety.  SKIN: warm dry intact     Labs:    Lab Results   Component Value Date    HGBA1C 7.6 (H) 10/03/2018     Results for orders placed or performed in visit on 10/26/18   Basic Metabolic Panel   Result Value Ref Range    Sodium 139 136 - 145 mmol/L    Potassium 3.7 3.5 - 5.0 mmol/L    Chloride 97 (L) 98 - 107 mmol/L    CO2 33 (H) 22 - 31 mmol/L    Anion Gap, Calculation 9 5 - 18 mmol/L    Glucose 118 70 - 125 mg/dL    Calcium 9.8 8.5 - 10.5 mg/dL    BUN 30 (H) 8 - 28 mg/dL    Creatinine 1.21 (H) 0.60 - 1.10 mg/dL    GFR MDRD Af Amer 52 (L) >60 mL/min/1.73m2    GFR MDRD Non Af Amer 43 (L) >60 mL/min/1.73m2     Lab Results   Component Value Date    SOMGVBWT63 487 01/08/2018     Vitamin D, Total (25-Hydroxy)   Date Value Ref Range Status   01/25/2018 42.3 30.0 - 80.0 ng/mL Final         Assessment/Plan:    Impaired cognition/dementia-she remains on galantamine.  Her last CPT was 4.9 ACl 4.4 and [place] 22/30 .BIMS 8/15  Stable mood and behaviors with no change reported  Diabetes-  Last A1c was 7.6 Metformin increased to 1 g twice daily continue with blood sugar checks most of her blood sugars around 140  Recheck hemoglobin A1c scheduled  CKD-monitor bmps;last cr 1.2  Hyperlipidemia-she is on fish oil  Anxiety disorder with depression for which patient is on multiple medications including Celexa along with Wellbutrin along with Effexor  Osteoarthritis of the knee  Hypertension  currently in hydralazine on a higher dose along with metoprolol and verapamil now also on a clonidine patch  Lymphedema and a higher dose of diuretics weights currently stable  Obstructive sleep apnea  GERD-on Prevacid  She has chronic diarrhea  Obstructive sleep apnea-Refused sleep study  Debilitation  Patient has been progressively gaining weight.  She remains bedbound and frequently refuses cares unless her daughter is present she otherwise remains stable with no new behaviors  Electronically signed by: BERLIN Butts  This progress note was completed using Dragon software and there may be grammatical errors.

## 2021-06-23 NOTE — TELEPHONE ENCOUNTER
[facility] Nurse Triage Telephone Note      Provider: ZANDRA Houston  Facility: [facility] Type: LTC    Caller: Sunshine   Call Back Number:  [phone]    Allergies: Codeine sulfate    Reason for call: Pt fell and hit her head while she was helping dress her . VS and Neuro WNL, not on any blood thinners at this time. also has a small abrasion on the left arm     Verbal Order/Direction given by Provider: monitor pt close for any neuro changes or VS changes    Provider giving order: ZANDRA Houston    Verbal order given to: Sunshine Israel RN

## 2021-06-23 NOTE — TELEPHONE ENCOUNTER
[facility] Nurse Triage Telephone Note      Provider: ZANDRA Houston  Facility: [facility] Type: LTC    Caller: Bryson  Call Back Number:  [phone]    Allergies: Codeine sulfate    Reason for call: Heme, BMP results. Hgb 11.6 (11.1), Cr 1.38 (1.22, 1.21) Lasix dose reduction just recently done., GFR 37 (43).     Verbal Order/Direction given by Provider: NNO    Provider giving order: ZANDRA Houston    Verbal order given to: Bryson Mayers RN

## 2021-06-23 NOTE — PROGRESS NOTES
[facility]      Facility:    [facility] [[id number]] -1 Code Status: FULL CODE      Chief Complaint/Reason for Visit:   Chief Complaint   Patient presents with     Review Of Multiple Medical Conditions       HPI:   Kandi is a 78 y.o. female who who is residing at assisted living facility has moved to long-term care with underlying history of dementia, that has been progressive in nature.  She has periods of anxiety associated with memory loss and per daughter her short-term memory is getting progressively worse.  She has a past medical history of right knee osteoarthritis and apparently received steroid injections in the past.  She also is a diabetic that has been diet controlled and has obstructive sleep apnea.  As per the family she has been moved to long-term care so that she is closer to her  who is in the nursing home with her.     LTC: Today and previously she seems oriented though not sure per language barrier, will attempt to use dietitian or daughter to interpret.   She denies any pain.  Today BP seems more controlled, maintain metoprolol, diltiazem and clonidine patch. Her edema has decreased, weight now decreasing as well and renal fx improved. We also suggested ordering GANGA hose per her edema in her legs, though apparently she is never use of this and has since been DC'd.    Also she needed potassium supplementation, with a recheck potassium of 4.2. Her current A1c has worsen as well, so previously increased metformin coverage, now will verify adequate coverage with monitoring again, BS <180.  Ambulates per self and usually pushing  around in his wc.   Per daughter, tried donepezil, though per pharm will up titrate namenda instead.  Per dietitian, diet changed and portioned decreased. BIMS 9/15 and PHQ9 7/20 on 11/27/18.    Patient denies pain, headache, chest pain, numbness or tingling, shortest of breath, eating or swallowing concerns,  nausea or vomiting, diarrhea or bowel abnormalities, or no new integumentary concerns today. Previously consulted recreation for increased socialization.        Past Medical History:  Past Medical History:   Diagnosis Date     Anxiety     Created by Conversion      Benign Adenomatous Polyp Of The Large Intestine     Created by Conversion      Chronic Diarrhea Of Unknown Origin     Created by Conversion      Dementia of the Alzheimer's type 7/13/2014     Diabetes Mellitus     Created by Conversion      Esophageal reflux     Created by Conversion      Gastritis      Herpes Zoster (Shingles)     Created by Conversion      Hiatal hernia      Hypercholesterolemia     Created by Conversion      Hypertension     Created by Conversion      Melanosis Coli     Created by Conversion [facility] Annotation: May  2 2012  1:19PM - Sheila Benavides: colonoscopy  4/30/12      Memory Lapses Or Loss     Created by Conversion      Obstructive Sleep Apnea     Created by Conversion      Osteopenia     Created by Conversion      Raynaud's Disease     Created by Conversion      Tricuspid Regurgitation     Created by Conversion            Surgical History:  No past surgical history on file.    Family History:   Family History   Problem Relation Age of Onset     Hypertension Other      Diabetes Other      Stroke Other        Social History:    Social History     Socioeconomic History     Marital status:      Spouse name: Not on file     Number of children: Not on file     Years of education: Not on file     Highest education level: Not on file   Social Needs     Financial resource strain: Not on file     Food insecurity - worry: Not on file     Food insecurity - inability: Not on file     Transportation needs - medical: Not on file     Transportation needs - non-medical: Not on file   Occupational History     Not on file   Tobacco Use     Smoking status: Never Smoker     Smokeless tobacco: Never Used   Substance and Sexual Activity      Alcohol use: Not on file     Drug use: Not on file     Sexual activity: Not on file   Other Topics Concern     Not on file   Social History Narrative     Not on file     Review of Systems   Constitutional: Negative for activity change, appetite change, diaphoresis and fatigue.   HENT: Negative for congestion and facial swelling.    Eyes: Negative for photophobia, redness and visual disturbance.   Respiratory: Negative for choking, shortness of breath and wheezing.    Cardiovascular: Positive for leg swelling. Negative for chest pain.   Gastrointestinal: Negative for abdominal distention, abdominal pain, blood in stool, constipation and diarrhea.   Endocrine: Negative.    Genitourinary: Negative for dysuria.   Musculoskeletal: Negative for back pain and joint swelling.        Bilateral knee pain per OA, taking tylenol   Skin: Negative for color change.        Dry, moisturizer used   Allergic/Immunologic: Negative.    Neurological: Negative for seizures, weakness and headaches.   Hematological: Negative.    Psychiatric/Behavioral: Positive for confusion. Negative for behavioral problems, hallucinations and sleep disturbance. The patient is nervous/anxious.        Vitals:    01/13/19 1429   BP: 131/72   Pulse: 77   Resp: 18   Temp: 98  F (36.7  C)   SpO2: 95%   Weight: 213 lb (96.6 kg)       Physical Exam   Constitutional: She appears well-developed and well-nourished. No distress.   reported attending more outtings   HENT:   Head: Normocephalic and atraumatic.   Mouth/Throat: Oropharynx is clear and moist. No oropharyngeal exudate.   Eyes: Conjunctivae and EOM are normal. Pupils are equal, round, and reactive to light. Right eye exhibits no discharge. Left eye exhibits no discharge. No scleral icterus.   Neck: Normal range of motion. Neck supple. No JVD present. No tracheal deviation present.   Intact   Cardiovascular: Normal rate, regular rhythm and normal heart sounds. Exam reveals no gallop and no friction rub.    No murmur heard.  S1S2, no murmur   Pulmonary/Chest: Effort normal and breath sounds normal. No stridor. No respiratory distress. She has no wheezes. She has no rales.   CTA, RA   Abdominal: Soft. Bowel sounds are normal. She exhibits no distension. There is no tenderness. There is no rebound.   No diarrhea or constipation   Genitourinary:   Genitourinary Comments: Deferred   Musculoskeletal: Normal range of motion. She exhibits edema.   1+ LE edema bilaterally, mostly pedal   Neurological: She is alert. No cranial nerve deficit.   A/O x2-3, BIMS 9/15 on 11/17/18   Skin: Skin is warm and dry. She is not diaphoretic. No erythema.   Intact, very dry, encouraged to use lotion   Psychiatric: She has a normal mood and affect.   Less agitation, continue Effexor GDR, continue aricept, trial namenda   Nursing note and vitals reviewed.      Medication List:  Current Outpatient Medications   Medication Sig     acetaminophen 500 mg coapsule Take 1 capsule by mouth. Every 4-6 hours PRN     bisacodyl (DULCOLAX, BISACODYL,) 10 mg suppository Insert 1 suppository (10 mg total) into the rectum daily as needed (for constipation).     cetirizine (ZYRTEC) 10 MG tablet Take 10 mg by mouth daily.     cholecalciferol, vitamin D3, 1,000 unit tablet Take 1,000 Units by mouth daily.     citalopram (CELEXA) 20 MG tablet TAKE ONE TABLET BY MOUTH EVERY DAY     cloNIDine (CATAPRES-TTS) 0.2 mg/24 hr Place 1 patch on the skin once a week.     donepezil (ARICEPT) 5 MG tablet Take 5 mg by mouth at bedtime.            fluticasone (FLONASE) 50 mcg/actuation nasal spray 1 spray into each nostril daily.     furosemide (LASIX) 20 MG tablet Take 20 mg by mouth 2 (two) times a day at 9am and 6pm.      hydrALAZINE (APRESOLINE) 25 MG tablet Take 50 mg by mouth Daily at 8:00 am.. Hold for SBP <130            memantine (NAMENDA) 5 MG tablet Take 10 mg by mouth daily.            metFORMIN (GLUCOPHAGE) 500 MG tablet Take 1,000 mg by mouth 2 (two) times a day  with meals .           metoprolol succinate (TOPROL-XL) 25 MG Take 12.5 mg by mouth daily.            omeprazole (PRILOSEC) 20 MG capsule Take 20 mg by mouth daily before breakfast.     polyethylene glycol (MIRALAX) 17 gram/dose powder Take 17 g by mouth daily.     polyvinyl alcohol (LIQUIFILM TEARS) 1.4 % ophthalmic solution Administer 1 drop to both eyes 3 (three) times a day as needed for dry eyes.     potassium chloride SA (K-DUR,KLOR-CON) 10 MEQ tablet Take 10 mEq by mouth daily.      verapamil (VERELAN PM) 100 mg 24 hr capsule TAKE ONE CAPSULE BY MOUTH EVERY DAY     white petrolatum (AQUAPHOR ORIGINAL) 41 % Oint Apply 1 application topically 3 (three) times a day as needed.              Labs:  Results for orders placed or performed in visit on 01/09/19   Basic Metabolic Panel   Result Value Ref Range    Sodium 139 136 - 145 mmol/L    Potassium 3.4 (L) 3.5 - 5.0 mmol/L    Chloride 98 98 - 107 mmol/L    CO2 32 (H) 22 - 31 mmol/L    Anion Gap, Calculation 9 5 - 18 mmol/L    Glucose 126 (H) 70 - 125 mg/dL    Calcium 9.0 8.5 - 10.5 mg/dL    BUN 24 8 - 28 mg/dL    Creatinine 1.22 (H) 0.60 - 1.10 mg/dL    GFR MDRD Af Amer 52 (L) >60 mL/min/1.73m2    GFR MDRD Non Af Amer 43 (L) >60 mL/min/1.73m2     Lab Results   Component Value Date    WBC 12.2 (H) 05/28/2016    HGB 11.1 (L) 05/28/2016    HCT 32.9 (L) 05/28/2016    MCV 91 05/28/2016     05/28/2016     Vitamin D, Total (25-Hydroxy)   Date Value Ref Range Status   01/25/2018 42.3 30.0 - 80.0 ng/mL Final     Lab Results   Component Value Date    HGBA1C 7.3 (H) 12/21/2018     Assessment/Plan:  1.  Hypokalemia: continue potassium 10mEq BID, last K 3.4 on 1/9/19, have decreased lasix dosing, recheck BMP in 1 wk  2.  TARI: Last creatinine 1.22 with GFR of 43 on 11/9/19. Stable. Recheck BMP   3.  DM: current Hgb A1c 7.6, previous 7.0, increased metformin to 1000mg two times a day,  will maintain four times a day monitoring x2wks, BS <180  4.  Anxiety/depression:  continue citalopram 20mg daily, will discontinue Effexor/wellbutrin, related to daughter's health concerns, previously decreased effexor in 3/2018.  5.  HTN: decrease hydralazine to 50mg at AM, continue verapamil 100mg daily, and will maintain metoprolol 25mg daily. With clonidine patch 0.2mg/wk. -150s  6. Vit D def: continue D3 1000U, last 42.3 on 1/25/18.  7. GERD: PPI, no change.  8. Seasonal allergies: continue fluticasone and cetirizine.  9. Edema: decrease lasix to 20mg a day, currently 213lb.  Was on larger portion, now discontinued and CSC diet started per dietitian.   10. Constipation: continue miralax daily, no change.  11. Dementia: recently had cognitive assessment, will decrease donepezil to 5mg at HS, increase namenda to 10mg daily.   12. Socialization: consulted recreation. Reports attending more events.  13. Hypomagnesia: start mag oxide 250mg daily, last 1.7 on 1/9/19      The care plan has been reviewed and all orders signed. Changes to care plan, if any, as noted. Otherwise, continue care plan of care.  The total time spent with this patient was greater than 30 minutes, with greater than 50% spent in counseling and coordination of care that included multiple issues per Htn and weight loss.      Electronically signed by: Ashwin Tang NP

## 2021-06-25 NOTE — PROGRESS NOTES
[facility]      Facility:    [facility] [[id number]] -1 Code Status: FULL CODE      Chief Complaint/Reason for Visit:   Chief Complaint   Patient presents with     Review Of Multiple Medical Conditions       HPI:   Kandi is a 78 y.o. female who who is residing at assisted living facility has moved to long-term care with underlying history of dementia, that has been progressive in nature.  She has periods of anxiety associated with memory loss and per daughter her short-term memory is getting progressively worse.  She has a past medical history of right knee osteoarthritis and apparently received steroid injections in the past.  She also is a diabetic that has been diet controlled and has obstructive sleep apnea.  As per the family she has been moved to long-term care so that she is closer to her  who is in the nursing home with her.     LTC: Today and previously she seems oriented though not sure per language barrier, will attempt to use dietitian or daughter to interpret.   She denies any pain.  Today BP seems more controlled, maintain metoprolol, diltiazem and clonidine patch. Her edema has decreased, weight now decreasing as well and renal fx improved.  Ambulates per self and usually pushing  around in his wc.   Per daughter, tried donepezil, though per pharm will up titrate namenda instead and dc donepezil.  Today, per daughter memory has not improved. Per dietitian, diet changed and portioned decreased. BIMS 6/15 and PHQ9 7/20 on 2/19/19.  Recheck A1c and mag.    Patient denies pain, headache, chest pain, numbness or tingling, shortest of breath, eating or swallowing concerns, nausea or vomiting, diarrhea or bowel abnormalities, or no new integumentary concerns today. Previously consulted recreation for increased socialization.        Past Medical History:  Past Medical History:   Diagnosis Date     Anxiety     Created by Conversion      Benign  Adenomatous Polyp Of The Large Intestine     Created by Conversion      Chronic Diarrhea Of Unknown Origin     Created by Conversion      Dementia of the Alzheimer's type 7/13/2014     Diabetes Mellitus     Created by Conversion      Esophageal reflux     Created by Conversion      Gastritis      Herpes Zoster (Shingles)     Created by Conversion      Hiatal hernia      Hypercholesterolemia     Created by Conversion      Hypertension     Created by Conversion      Melanosis Coli     Created by Conversion [facility] Annotation: May  2 2012  1:19PM - Sheila Benavides: colonoscopy  4/30/12      Memory Lapses Or Loss     Created by Conversion      Obstructive Sleep Apnea     Created by Conversion      Osteopenia     Created by Conversion      Raynaud's Disease     Created by Conversion      Tricuspid Regurgitation     Created by Conversion            Surgical History:  No past surgical history on file.    Family History:   Family History   Problem Relation Age of Onset     Hypertension Other      Diabetes Other      Stroke Other        Social History:    Social History     Socioeconomic History     Marital status:      Spouse name: Not on file     Number of children: Not on file     Years of education: Not on file     Highest education level: Not on file   Occupational History     Not on file   Social Needs     Financial resource strain: Not on file     Food insecurity:     Worry: Not on file     Inability: Not on file     Transportation needs:     Medical: Not on file     Non-medical: Not on file   Tobacco Use     Smoking status: Never Smoker     Smokeless tobacco: Never Used   Substance and Sexual Activity     Alcohol use: Not on file     Drug use: Not on file     Sexual activity: Not on file   Lifestyle     Physical activity:     Days per week: Not on file     Minutes per session: Not on file     Stress: Not on file   Relationships     Social connections:     Talks on phone: Not on file     Gets together: Not  on file     Attends Alevism service: Not on file     Active member of club or organization: Not on file     Attends meetings of clubs or organizations: Not on file     Relationship status: Not on file     Intimate partner violence:     Fear of current or ex partner: Not on file     Emotionally abused: Not on file     Physically abused: Not on file     Forced sexual activity: Not on file   Other Topics Concern     Not on file   Social History Narrative     Not on file     Review of Systems   Constitutional: Negative for activity change, appetite change, diaphoresis and fatigue.   HENT: Negative for congestion and facial swelling.    Eyes: Negative for photophobia, redness and visual disturbance.   Respiratory: Negative for choking, shortness of breath and wheezing.    Cardiovascular: Positive for leg swelling. Negative for chest pain.   Gastrointestinal: Negative for abdominal distention, abdominal pain, blood in stool, constipation and diarrhea.   Endocrine: Negative.    Genitourinary: Negative for dysuria.   Musculoskeletal: Negative for back pain and joint swelling.        Bilateral knee pain per OA, taking tylenol   Skin: Negative for color change.        Dry, moisturizer used   Allergic/Immunologic: Negative.    Neurological: Negative for seizures, weakness and headaches.   Hematological: Negative.    Psychiatric/Behavioral: Positive for confusion. Negative for behavioral problems, hallucinations and sleep disturbance. The patient is nervous/anxious.        Vitals:    03/14/19 1646   BP: 138/70   Pulse: 70   Resp: 16   Temp: 97.8  F (36.6  C)   SpO2: 94%   Weight: 209 lb (94.8 kg)       Physical Exam   Constitutional: She appears well-developed and well-nourished. No distress.   reported attending more outtings   HENT:   Head: Normocephalic and atraumatic.   Mouth/Throat: Oropharynx is clear and moist. No oropharyngeal exudate.   Eyes: Conjunctivae and EOM are normal. Pupils are equal, round, and reactive to  light. Right eye exhibits no discharge. Left eye exhibits no discharge. No scleral icterus.   Neck: Normal range of motion. Neck supple. No JVD present. No tracheal deviation present.   Intact   Cardiovascular: Normal rate, regular rhythm and normal heart sounds. Exam reveals no gallop and no friction rub.   No murmur heard.  S1S2, no murmur   Pulmonary/Chest: Effort normal and breath sounds normal. No stridor. No respiratory distress. She has no wheezes. She has no rales.   CTA, RA   Abdominal: Soft. Bowel sounds are normal. She exhibits no distension. There is no tenderness. There is no rebound.   No diarrhea or constipation   Genitourinary:   Genitourinary Comments: Deferred   Musculoskeletal: Normal range of motion. She exhibits edema.   1+ LE edema bilaterally, mostly pedal   Neurological: She is alert. No cranial nerve deficit.   A/O x2-3, BIMS 6/15 on 2/19/19   Skin: Skin is warm and dry. She is not diaphoretic. No erythema.   Intact, very dry, encouraged to use lotion   Psychiatric: She has a normal mood and affect.   Less agitation, continue Effexor GDR, dc aricept and continue namenda   Nursing note and vitals reviewed.      Medication List:  Current Outpatient Medications   Medication Sig     gabapentin (NEURONTIN) 100 MG capsule Take 100 mg by mouth 3 (three) times a day.     acetaminophen 500 mg coapsule Take 1 capsule by mouth. Every 4-6 hours PRN     bisacodyl (DULCOLAX, BISACODYL,) 10 mg suppository Insert 1 suppository (10 mg total) into the rectum daily as needed (for constipation).     cetirizine (ZYRTEC) 10 MG tablet Take 10 mg by mouth daily.     cholecalciferol, vitamin D3, 1,000 unit tablet Take 1,000 Units by mouth daily.     citalopram (CELEXA) 20 MG tablet TAKE ONE TABLET BY MOUTH EVERY DAY     cloNIDine (CATAPRES-TTS) 0.2 mg/24 hr Place 1 patch on the skin once a week.     donepezil (ARICEPT) 5 MG tablet Take 5 mg by mouth at bedtime.            fluticasone (FLONASE) 50 mcg/actuation nasal  spray 1 spray into each nostril daily.     furosemide (LASIX) 20 MG tablet Take 20 mg by mouth daily.            hydrALAZINE (APRESOLINE) 25 MG tablet Take 50 mg by mouth Daily at 8:00 am.. Hold for SBP <130            magnesium oxide 250 mg Tab Take 250 mg by mouth daily.     memantine (NAMENDA) 5 MG tablet Take 10 mg by mouth daily.            metFORMIN (GLUCOPHAGE) 500 MG tablet Take 1,000 mg by mouth 2 (two) times a day with meals .           metoprolol succinate (TOPROL-XL) 25 MG Take 12.5 mg by mouth daily.            omeprazole (PRILOSEC) 20 MG capsule Take 20 mg by mouth daily before breakfast.     polyethylene glycol (MIRALAX) 17 gram/dose powder Take 17 g by mouth daily.     polyvinyl alcohol (LIQUIFILM TEARS) 1.4 % ophthalmic solution Administer 1 drop to both eyes 3 (three) times a day as needed for dry eyes.     potassium chloride SA (K-DUR,KLOR-CON) 10 MEQ tablet Take 10 mEq by mouth daily.      verapamil (VERELAN PM) 100 mg 24 hr capsule TAKE ONE CAPSULE BY MOUTH EVERY DAY     white petrolatum (AQUAPHOR ORIGINAL) 41 % Oint Apply 1 application topically 3 (three) times a day as needed.              Labs:  Results for orders placed or performed in visit on 01/21/19   Basic Metabolic Panel   Result Value Ref Range    Sodium 140 136 - 145 mmol/L    Potassium 4.4 3.5 - 5.0 mmol/L    Chloride 101 98 - 107 mmol/L    CO2 28 22 - 31 mmol/L    Anion Gap, Calculation 11 5 - 18 mmol/L    Glucose 108 70 - 125 mg/dL    Calcium 9.4 8.5 - 10.5 mg/dL    BUN 23 8 - 28 mg/dL    Creatinine 1.03 0.60 - 1.10 mg/dL    GFR MDRD Af Amer >60 >60 mL/min/1.73m2    GFR MDRD Non Af Amer 52 (L) >60 mL/min/1.73m2     Lab Results   Component Value Date    WBC 12.2 (H) 05/28/2016    HGB 11.6 (L) 01/14/2019    HCT 32.9 (L) 05/28/2016    MCV 91 05/28/2016     05/28/2016     Vitamin D, Total (25-Hydroxy)   Date Value Ref Range Status   01/25/2018 42.3 30.0 - 80.0 ng/mL Final     Lab Results   Component Value Date    HGBA1C 7.2 (H)  01/14/2019     Assessment/Plan:  1.  Hypokalemia: continue potassium 10mEq BID, last K 4.4 on 1/21/19, have decreased lasix dosing  2.  TARI: Last creatinine 1.03 with GFR 53 pn 1/21/9.   3.  DM: current Hgb A1c 7.2, increased metformin to 1000mg two times a day, recheck A1c  4.  Anxiety/depression: continue citalopram 20mg daily, will discontinue Effexor/wellbutrin, related to daughter's health concerns, previously decreased effexor in 3/2018.  5.  HTN: decrease hydralazine to 50mg at AM, continue verapamil 100mg daily, and will maintain metoprolol 25mg daily. With clonidine patch 0.2mg/wk. -150s, monitor BP two times a day x1wk  6. Vit D def: continue D3 1000U, last 42.3 on 1/25/18.  7. GERD: PPI, no change.  8. Seasonal allergies: continue fluticasone and cetirizine.  9. Edema: decrease lasix to 20mg a day, currently 213lb.  Was on larger portion, now discontinued and CSC diet started per dietitian. Improved.  10. Constipation: continue miralax daily, no change.  11. Dementia: recently had cognitive assessment, will discontinue donepezil, previously increased namenda to 10mg daily. Per daughter no change in memory  12. Socialization: consulted recreation. Reports attending more events.  13. Hypomagnesia: start mag oxide 250mg daily, last 1.7 on 1/9/19. Recheck      Electronically signed by: Ashwin Tang NP

## 2021-06-26 NOTE — PROGRESS NOTES
[facility]    Facility:   [facility] [[id number]]   Code Status: FULL CODE      CHIEF COMPLAINT/REASON FOR VISIT:  Chief Complaint   Patient presents with     P Care Coordination - Regulatory     dementia, HTN       HISTORY:      HPI: Kandi is a 80 y.o. female with a past medical history for dementia, HTN, HLD, DM2, NEDRA, vit d def, anxiety.      Today, she denies any pain or discomforts.  She does have vascular changes to her bilateral lower extremities and soft nonpitting edema.      HTN: BPs trending high with -180s.  She takes verapamil, Toprol, lisinopril and hydralazine.  She has not had a BMP in over 6 months.     DM2: last A1c Jan 2021 was controlled at 6.7 without medications.      Anxiety and behaviors are minimal however she is an elopement risk due to good mobility and cognitive impairment.  She requires to be on a locked memory unit for her own safety. She is on venlafaxine    Earlier this month, patient was found limping and reported that she fell (unwitnessed), xray was negative for acute changes and pain in knees and ankles improved with supportive cares and tylenol.     Past Medical History:   Diagnosis Date     Anxiety     Created by Conversion      Benign Adenomatous Polyp Of The Large Intestine     Created by Conversion      Chronic Diarrhea Of Unknown Origin     Created by Conversion      Dementia of the Alzheimer's type 7/13/2014     Diabetes Mellitus     Created by Conversion      Esophageal reflux     Created by Conversion      Gastritis      Herpes Zoster (Shingles)     Created by Conversion      Hiatal hernia      Hypercholesterolemia     Created by Conversion      Hypertension     Created by Conversion      Melanosis Coli     Created by Conversion [facility] Annotation: May  2 2012  1:19PM - Sheila Benavides: colonoscopy  4/30/12      Memory Lapses Or Loss     Created by Conversion      Obstructive Sleep Apnea     Created by  "Conversion      Osteopenia     Created by Conversion      Raynaud's Disease     Created by Conversion      Tricuspid Regurgitation     Created by Conversion              Family History   Problem Relation Age of Onset     Hypertension Other      Diabetes Other      Stroke Other      Social History     Socioeconomic History     Marital status:      Spouse name: Not on file     Number of children: Not on file     Years of education: Not on file     Highest education level: Not on file   Occupational History     Not on file   Social Needs     Financial resource strain: Not on file     Food insecurity     Worry: Not on file     Inability: Not on file     Transportation needs     Medical: Not on file     Non-medical: Not on file   Tobacco Use     Smoking status: Never Smoker     Smokeless tobacco: Never Used   Substance and Sexual Activity     Alcohol use: Not on file     Drug use: Not on file     Sexual activity: Not on file   Lifestyle     Physical activity     Days per week: Not on file     Minutes per session: Not on file     Stress: Not on file   Relationships     Social connections     Talks on phone: Not on file     Gets together: Not on file     Attends Confucianist service: Not on file     Active member of club or organization: Not on file     Attends meetings of clubs or organizations: Not on file     Relationship status: Not on file     Intimate partner violence     Fear of current or ex partner: Not on file     Emotionally abused: Not on file     Physically abused: Not on file     Forced sexual activity: Not on file   Other Topics Concern     Not on file   Social History Narrative     Not on file         Review of Systems     Limited due to speaks Urdu only but denies any pain or discomforts.     Vitals:    06/16/21 0952   BP: (!) 187/73   Pulse: 84   Resp: 16   Temp: 97.6  F (36.4  C)   SpO2: 96%   Weight: 218 lb 6.4 oz (99.1 kg)   Height: 5' 4\" (1.626 m)       Physical Exam   GENERAL APPEARANCE: Well " developed, well nourished, in no acute distress.  HEENT: normocephalic, atraumatic  PERRL, sclerae anicteric, conjunctivae clear and moist, EOM intact  LUNGS: Lung sounds CTA, no adventitious sounds, respiratory effort normal.  CARD: RRR, S1, S2, 3/6 systolic murmur, gallops, rubs  ABD: Soft and nontender with normal bowel sounds.   MSK: Muscle strength and tone were equal bilaterally  EXTREMITIES: soft nonpitting edema BLE, hyperpigmentation to Bilateral shins.   NEURO: Alert with cognitive impairment, Face is symmetric.  SKIN: Inspection of the skin reveals no rashes, ulcerations or petechiae.  PSYCH: euthymic            LABS:   No results found for this or any previous visit (from the past 240 hour(s)).    Case Management:  I have reviewed the facility/SNF care plan/MDS which was done 5/26/2021, including the falls risk, nutrition and pain screening. I also reviewed the current immunizations, and preventive care.. Future cancer screening is not clinically indicated secondary to age/goals of care.   Patient's desire to return to the community is present, but is not able due to care needs .    Information reviewed:  Medications, vital signs, orders, and nursing notes.    ASSESSMENT:      ICD-10-CM    1. Hypertension  I10    2. Alzheimer's dementia with behavioral disturbance, unspecified timing of dementia onset (H)  G30.9     F02.81    3. Type 2 diabetes mellitus with hyperglycemia, without long-term current use of insulin (H)  E11.65    4. Vitamin D deficiency  E55.9    5. Anemia, unspecified type  D64.9    6. Depression, unspecified depression type  F32.9        PLAN:    HTN: trending high, increase Toprol XL, continue with verapamil, lisinopril.  Likely could increase hydralazine to two times a day or consider hydrochlorothiazide. Check a BMP    Alzheimer's dementia: elopement risk, however can be redirected in [place], continue on venlafaxine for anxiety and depression.     DM: controlled on no medications.      Vitamin D def: check Vitamin D and consider dcing cholecalciferol if normal.     Anemia: question of cause, no recent CBC, check CBC    Depression with anxiety: stable, continue with venlafaxine.     Electronically signed by: Suri Chand CNP

## 2021-07-04 NOTE — LETTER
Letter by Suri Chand CNP at      Author: Suri Chand CNP Service: -- Author Type: --    Filed:  Encounter Date: 6/16/2021 Status: (Other)         [facility]  [address] [place] [id number]                                  June 16, 2021    Patient: Kandi Ontiveros   MR Number: [id number]   YOB: 1940   Date of Visit: 6/16/2021     Dear  Home:    Thank you for referring Kandi Ontiveros to me for evaluation. Below are the relevant portions of my assessment and plan of care.    If you have questions, please do not hesitate to call me. I look forward to following Kandi along with you.    Sincerely,        Suri Chand CNP          CC  No Recipients  Suri Chand CNP  6/16/2021  4:06 PM  Signed  [facility]    Facility:   [facility] [[id number]]   Code Status: FULL CODE      CHIEF COMPLAINT/REASON FOR VISIT:  Chief Complaint   Patient presents with   ? FVP Care Coordination - Regulatory     dementia, HTN       HISTORY:      HPI: Kandi is a 80 y.o. female with a past medical history for dementia, HTN, HLD, DM2, NEDRA, vit d def, anxiety.      Today, she denies any pain or discomforts.  She does have vascular changes to her bilateral lower extremities and soft nonpitting edema.      HTN: BPs trending high with -180s.  She takes verapamil, Toprol, lisinopril and hydralazine.  She has not had a BMP in over 6 months.     DM2: last A1c Jan 2021 was controlled at 6.7 without medications.      Anxiety and behaviors are minimal however she is an elopement risk due to good mobility and cognitive impairment.  She requires to be on a locked memory unit for her own safety. She is on venlafaxine    Earlier this month, patient was found limping and reported that she fell (unwitnessed), xray was negative for acute changes and pain in knees and ankles improved with supportive cares and tylenol.      Past Medical History:   Diagnosis Date   ? Anxiety     Created by Conversion    ? Benign Adenomatous Polyp Of The Large Intestine     Created by Conversion    ? Chronic Diarrhea Of Unknown Origin     Created by Conversion    ? Dementia of the Alzheimer's type 7/13/2014   ? Diabetes Mellitus     Created by Conversion    ? Esophageal reflux     Created by Conversion    ? Gastritis    ? Herpes Zoster (Shingles)     Created by Conversion    ? Hiatal hernia    ? Hypercholesterolemia     Created by Conversion    ? Hypertension     Created by Conversion    ? Melanosis Coli     Created by Conversion [facility] Annotation: May  2 2012  1:19PM - Sheila Benavides: colonoscopy  4/30/12    ? Memory Lapses Or Loss     Created by Conversion    ? Obstructive Sleep Apnea     Created by Conversion    ? Osteopenia     Created by Conversion    ? Raynaud's Disease     Created by Conversion    ? Tricuspid Regurgitation     Created by Conversion              Family History   Problem Relation Age of Onset   ? Hypertension Other    ? Diabetes Other    ? Stroke Other      Social History     Socioeconomic History   ? Marital status:      Spouse name: Not on file   ? Number of children: Not on file   ? Years of education: Not on file   ? Highest education level: Not on file   Occupational History   ? Not on file   Social Needs   ? Financial resource strain: Not on file   ? Food insecurity     Worry: Not on file     Inability: Not on file   ? Transportation needs     Medical: Not on file     Non-medical: Not on file   Tobacco Use   ? Smoking status: Never Smoker   ? Smokeless tobacco: Never Used   Substance and Sexual Activity   ? Alcohol use: Not on file   ? Drug use: Not on file   ? Sexual activity: Not on file   Lifestyle   ? Physical activity     Days per week: Not on file     Minutes per session: Not on file   ? Stress: Not on file   Relationships   ? Social connections     Talks on phone: Not on file     Gets together: Not on  "file     Attends Yarsanism service: Not on file     Active member of club or organization: Not on file     Attends meetings of clubs or organizations: Not on file     Relationship status: Not on file   ? Intimate partner violence     Fear of current or ex partner: Not on file     Emotionally abused: Not on file     Physically abused: Not on file     Forced sexual activity: Not on file   Other Topics Concern   ? Not on file   Social History Narrative   ? Not on file         Review of Systems     Limited due to speaks Lithuanian only but denies any pain or discomforts.     Vitals:    06/16/21 0952   BP: (!) 187/73   Pulse: 84   Resp: 16   Temp: 97.6  F (36.4  C)   SpO2: 96%   Weight: 218 lb 6.4 oz (99.1 kg)   Height: 5' 4\" (1.626 m)       Physical Exam   GENERAL APPEARANCE: Well developed, well nourished, in no acute distress.  HEENT: normocephalic, atraumatic  PERRL, sclerae anicteric, conjunctivae clear and moist, EOM intact  LUNGS: Lung sounds CTA, no adventitious sounds, respiratory effort normal.  CARD: RRR, S1, S2, 3/6 systolic murmur, gallops, rubs  ABD: Soft and nontender with normal bowel sounds.   MSK: Muscle strength and tone were equal bilaterally  EXTREMITIES: soft nonpitting edema BLE, hyperpigmentation to Bilateral shins.   NEURO: Alert with cognitive impairment, Face is symmetric.  SKIN: Inspection of the skin reveals no rashes, ulcerations or petechiae.  PSYCH: euthymic            LABS:   No results found for this or any previous visit (from the past 240 hour(s)).    Case Management:  I have reviewed the facility/SNF care plan/MDS which was done 5/26/2021, including the falls risk, nutrition and pain screening. I also reviewed the current immunizations, and preventive care.. Future cancer screening is not clinically indicated secondary to age/goals of care.   Patient's desire to return to the community is present, but is not able due to care needs .    Information reviewed:  Medications, vital signs, " orders, and nursing notes.    ASSESSMENT:      ICD-10-CM    1. Hypertension  I10    2. Alzheimer's dementia with behavioral disturbance, unspecified timing of dementia onset (H)  G30.9     F02.81    3. Type 2 diabetes mellitus with hyperglycemia, without long-term current use of insulin (H)  E11.65    4. Vitamin D deficiency  E55.9    5. Anemia, unspecified type  D64.9    6. Depression, unspecified depression type  F32.9        PLAN:    HTN: trending high, increase Toprol XL, continue with verapamil, lisinopril.  Likely could increase hydralazine to two times a day or consider hydrochlorothiazide. Check a BMP    Alzheimer's dementia: elopement risk, however can be redirected in [place], continue on venlafaxine for anxiety and depression.     DM: controlled on no medications.     Vitamin D def: check Vitamin D and consider dcing cholecalciferol if normal.     Anemia: question of cause, no recent CBC, check CBC    Depression with anxiety: stable, continue with venlafaxine.     Electronically signed by: Suri Chand CNP

## 2021-07-06 VITALS
HEIGHT: 64 IN | HEART RATE: 84 BPM | BODY MASS INDEX: 37.28 KG/M2 | WEIGHT: 218.4 LBS | SYSTOLIC BLOOD PRESSURE: 187 MMHG | DIASTOLIC BLOOD PRESSURE: 73 MMHG | OXYGEN SATURATION: 96 % | TEMPERATURE: 97.6 F | RESPIRATION RATE: 16 BRPM

## 2021-07-13 ENCOUNTER — RECORDS - HEALTHEAST (OUTPATIENT)
Dept: ADMINISTRATIVE | Facility: CLINIC | Age: 81
End: 2021-07-13

## 2021-07-21 ENCOUNTER — RECORDS - HEALTHEAST (OUTPATIENT)
Dept: ADMINISTRATIVE | Facility: CLINIC | Age: 81
End: 2021-07-21

## 2021-08-02 ENCOUNTER — LAB REQUISITION (OUTPATIENT)
Dept: LAB | Facility: CLINIC | Age: 81
End: 2021-08-02
Payer: COMMERCIAL

## 2021-08-02 DIAGNOSIS — U07.1 COVID-19: ICD-10-CM

## 2021-08-03 LAB — SARS-COV-2 RNA RESP QL NAA+PROBE: NEGATIVE

## 2021-08-03 PROCEDURE — U0003 INFECTIOUS AGENT DETECTION BY NUCLEIC ACID (DNA OR RNA); SEVERE ACUTE RESPIRATORY SYNDROME CORONAVIRUS 2 (SARS-COV-2) (CORONAVIRUS DISEASE [COVID-19]), AMPLIFIED PROBE TECHNIQUE, MAKING USE OF HIGH THROUGHPUT TECHNOLOGIES AS DESCRIBED BY CMS-2020-01-R: HCPCS | Mod: ORL | Performed by: FAMILY MEDICINE

## 2021-08-04 ENCOUNTER — LAB REQUISITION (OUTPATIENT)
Dept: LAB | Facility: CLINIC | Age: 81
End: 2021-08-04
Payer: COMMERCIAL

## 2021-08-04 DIAGNOSIS — U07.1 COVID-19: ICD-10-CM

## 2021-08-06 ENCOUNTER — LAB REQUISITION (OUTPATIENT)
Dept: LAB | Facility: CLINIC | Age: 81
End: 2021-08-06
Payer: COMMERCIAL

## 2021-08-06 DIAGNOSIS — U07.1 COVID-19: ICD-10-CM

## 2021-08-09 PROCEDURE — U0005 INFEC AGEN DETEC AMPLI PROBE: HCPCS | Mod: ORL | Performed by: FAMILY MEDICINE

## 2021-08-10 ENCOUNTER — LAB REQUISITION (OUTPATIENT)
Dept: LAB | Facility: CLINIC | Age: 81
End: 2021-08-10
Payer: COMMERCIAL

## 2021-08-10 DIAGNOSIS — U07.1 COVID-19: ICD-10-CM

## 2021-08-10 LAB — SARS-COV-2 RNA RESP QL NAA+PROBE: NEGATIVE

## 2021-08-11 ENCOUNTER — NURSING HOME VISIT (OUTPATIENT)
Dept: GERIATRICS | Facility: CLINIC | Age: 81
End: 2021-08-11
Payer: COMMERCIAL

## 2021-08-11 VITALS
WEIGHT: 226.2 LBS | HEIGHT: 64 IN | HEART RATE: 84 BPM | DIASTOLIC BLOOD PRESSURE: 77 MMHG | RESPIRATION RATE: 18 BRPM | TEMPERATURE: 96.9 F | SYSTOLIC BLOOD PRESSURE: 181 MMHG | OXYGEN SATURATION: 96 % | BODY MASS INDEX: 38.62 KG/M2

## 2021-08-11 DIAGNOSIS — E66.01 MORBID OBESITY (H): ICD-10-CM

## 2021-08-11 DIAGNOSIS — F32.A DEPRESSION, UNSPECIFIED DEPRESSION TYPE: ICD-10-CM

## 2021-08-11 DIAGNOSIS — I10 ESSENTIAL HYPERTENSION: ICD-10-CM

## 2021-08-11 DIAGNOSIS — G30.9 ALZHEIMER'S DEMENTIA WITH BEHAVIORAL DISTURBANCE, UNSPECIFIED TIMING OF DEMENTIA ONSET: Primary | ICD-10-CM

## 2021-08-11 DIAGNOSIS — F02.81 ALZHEIMER'S DEMENTIA WITH BEHAVIORAL DISTURBANCE, UNSPECIFIED TIMING OF DEMENTIA ONSET: Primary | ICD-10-CM

## 2021-08-11 DIAGNOSIS — E11.65 TYPE 2 DIABETES MELLITUS WITH HYPERGLYCEMIA, WITHOUT LONG-TERM CURRENT USE OF INSULIN (H): ICD-10-CM

## 2021-08-11 PROCEDURE — 99309 SBSQ NF CARE MODERATE MDM 30: CPT | Performed by: FAMILY MEDICINE

## 2021-08-11 RX ORDER — BISACODYL 10 MG
10 SUPPOSITORY, RECTAL RECTAL DAILY PRN
COMMUNITY
End: 2023-01-01

## 2021-08-11 RX ORDER — FLUTICASONE PROPIONATE 50 MCG
1 SPRAY, SUSPENSION (ML) NASAL DAILY PRN
COMMUNITY
End: 2023-01-01

## 2021-08-11 RX ORDER — HYDRALAZINE HYDROCHLORIDE 50 MG/1
50 TABLET, FILM COATED ORAL DAILY
COMMUNITY
End: 2022-06-01

## 2021-08-11 RX ORDER — ACETAMINOPHEN 500 MG
500 TABLET ORAL EVERY 4 HOURS PRN
COMMUNITY
End: 2022-04-27

## 2021-08-11 RX ORDER — GABAPENTIN 100 MG/1
100 CAPSULE ORAL DAILY
COMMUNITY
End: 2023-01-01

## 2021-08-11 RX ORDER — ACETAMINOPHEN 325 MG/1
650 TABLET ORAL EVERY 6 HOURS PRN
COMMUNITY
End: 2023-01-01

## 2021-08-11 RX ORDER — VERAPAMIL HYDROCHLORIDE 100 MG/1
120 CAPSULE, EXTENDED RELEASE ORAL DAILY
COMMUNITY
End: 2023-01-01

## 2021-08-11 RX ORDER — POLYETHYLENE GLYCOL 3350 17 G/17G
17 POWDER, FOR SOLUTION ORAL EVERY MORNING
COMMUNITY

## 2021-08-11 RX ORDER — METOPROLOL SUCCINATE 25 MG/1
25 TABLET, EXTENDED RELEASE ORAL DAILY
COMMUNITY
End: 2022-04-27

## 2021-08-11 RX ORDER — CETIRIZINE HYDROCHLORIDE 10 MG/1
10 TABLET ORAL DAILY PRN
COMMUNITY
End: 2022-02-09

## 2021-08-11 RX ORDER — MINERAL OIL/HYDROPHIL PETROLAT
OINTMENT (GRAM) TOPICAL 3 TIMES DAILY PRN
COMMUNITY
End: 2023-01-01

## 2021-08-11 RX ORDER — LISINOPRIL 5 MG/1
10 TABLET ORAL DAILY
COMMUNITY
End: 2022-06-01

## 2021-08-11 ASSESSMENT — MIFFLIN-ST. JEOR: SCORE: 1486.04

## 2021-08-11 NOTE — PROGRESS NOTES
[facility]      Code Status:  FULL CODE  Visit Type: Review Of Multiple Medical Conditions     Facility:  [facility] [[id number]]           History of Present Illness: Kandi Ontiveros is a 80 y.o. female who is a resident of [facility].  She has underlying history of dementia with progressive memory impairment.   Last BIMS 3/15  Due to certain behaviors including resisting cares and elopement episodes.  Physically she remains debilitated but noted to be ambulating independently without any assist device.  Patient had a fall which was unwitnessed recently about 2 months ago.  She was complaining of knee pain then and imaging was done.  No fractures were identified.  Unfortunately she could not tell me if she had any pain today.  There is a significant language barrier with her being a [place] Island[place] speaker  She is a diabetic and last A1c done is 6.7 which is an adequate control in this elderly female.  Speaks very little and is a native [place]er speaker  Weights are up almost 10 pounds since my last visit to 226 pounds    Past Medical History:   Diagnosis Date     Anxiety     Created by Conversion      Benign Adenomatous Polyp Of The Large Intestine     Created by Conversion      Chronic Diarrhea Of Unknown Origin     Created by Conversion      Dementia of the Alzheimer's type 7/13/2014     Diabetes Mellitus     Created by Conversion      Esophageal reflux     Created by Conversion      Gastritis      Herpes Zoster (Shingles)     Created by Conversion      Hiatal hernia      Hypercholesterolemia     Created by Conversion      Hypertension     Created by Conversion      Melanosis Coli     Created by Conversion [facility] Annotation: May  2 2012  1:19PM - Sheila Benavides: colonoscopy  4/30/12      Memory Lapses Or Loss     Created by Conversion      Obstructive Sleep Apnea     Created by Conversion      Osteopenia     Created by Conversion      Raynaud's Disease     Created by  Conversion      Tricuspid Regurgitation     Created by Conversion      No past surgical history on file.  Family History   Problem Relation Age of Onset     Hypertension Other      Diabetes Other      Stroke Other      Social History     Socioeconomic History     Marital status:      Spouse name: Not on file     Number of children: Not on file     Years of education: Not on file     Highest education level: Not on file   Occupational History     Not on file   Social Needs     Financial resource strain: Not on file     Food insecurity     Worry: Not on file     Inability: Not on file     Transportation needs     Medical: Not on file     Non-medical: Not on file   Tobacco Use     Smoking status: Never Smoker     Smokeless tobacco: Never Used   Substance and Sexual Activity     Alcohol use: Not on file     Drug use: Not on file     Sexual activity: Not on file   Lifestyle     Physical activity     Days per week: Not on file     Minutes per session: Not on file     Stress: Not on file   Relationships     Social connections     Talks on phone: Not on file     Gets together: Not on file     Attends Holiness service: Not on file     Active member of club or organization: Not on file     Attends meetings of clubs or organizations: Not on file     Relationship status: Not on file     Intimate partner violence     Fear of current or ex partner: Not on file     Emotionally abused: Not on file     Physically abused: Not on file     Forced sexual activity: Not on file   Other Topics Concern     Not on file   Social History Narrative     Not on file   lived in [facility]  Current Outpatient Medications   Medication Sig Dispense Refill     acetaminophen (TYLENOL) 325 MG tablet Take 650 mg by mouth 4 (four) times a day.       bisacodyl (DULCOLAX, BISACODYL,) 10 mg suppository Insert 1 suppository (10 mg total) into the rectum daily as needed (for constipation). 12 suppository 0     cetirizine (ZYRTEC) 10 MG tablet Take 10 mg by  mouth daily as needed.        cholecalciferol, vitamin D3, 1,000 unit tablet Take 1,000 Units by mouth daily.       fluticasone propionate (FLONASE) 50 mcg/actuation nasal spray Apply 1 spray into each nostril daily as needed for rhinitis.       gabapentin (NEURONTIN) 100 MG capsule Take 100 mg by mouth 3 (three) times a day.       hydrALAZINE (APRESOLINE) 25 MG tablet Take 50 mg by mouth Daily at 8:00 am.. Hold for SBP <150             lisinopriL (PRINIVIL,ZESTRIL) 2.5 MG tablet Take 5 mg by mouth daily.        metoprolol succinate (TOPROL-XL) 25 MG Take 12.5 mg by mouth daily.              polyethylene glycol (MIRALAX) 17 gram/dose powder Take 17 g by mouth daily. 255 g 0     venlafaxine (EFFEXOR-XR) 37.5 MG 24 hr capsule Take 37.5 mg by mouth daily.       verapamil (VERELAN PM) 100 mg 24 hr capsule TAKE ONE CAPSULE BY MOUTH EVERY DAY 90 capsule 1     white petrolatum (AQUAPHOR ORIGINAL) 41 % Oint Apply 1 application topically 3 (three) times a day as needed.              No current facility-administered medications for this visit.      Allergies   Allergen Reactions     Codeine Sulfate          Review of Systems:    Constitutional: Negative.  Negative for fever, chills,has  activity change, appetite change and fatigue.   Has been gradually losing weight but now weights appear to be stable  HENT: Negative for congestion and facial swelling.    Eyes: Negative for photophobia, redness and visual disturbance.   Respiratory: Negative for cough and chest tightness.    Cardiovascular: Negative for chest pain, palpitations and leg swelling.   Gastrointestinal: Negative for nausea, diarrhea, constipation, blood in stool and abdominal distention.   Genitourinary: Negative.    Musculoskeletal: Negative.   she has bilateral knee osteoarthritis with knee pain  Skin: Negative.    Neurological: Negative for dizziness, tremors, syncope, weakness, light-headedness and headaches.   Hematological: Does not bruise/bleed easily.  "  Psychiatric/Behavioral: Negative.  She had a flat effect with a poor recall   Has no recall of recent events   Staff does report some behaviors including continued resistance to cares but overall mood and behaviors have been stable      Physical Exam:    Blood pressure (!) 181/77, pulse 84, temperature 96.9  F (36.1  C), resp. rate 18, height 1.626 m (5' 4\"), weight 102.6 kg (226 lb 3.2 oz), SpO2 96 %.    R/c bp 126/71  Weight 226  pounds blood pressure 126 / 71 temp 98 pulse 83  Obese  GENERAL: no acute distress. Cooperative in conversation.  Has limited recall and mostly smiles to questions  Patient is obese  HEENT: pupils are equal, round and reactive. Oral mucosa is moist and intact.  RESP:Chest symmetric. Regular respiratory rate. No stridor.  CVS: S1S2  ABD: Nondistended, soft.  EXTREMITIES: She has pedal and lower extremity edema.   NEURO: non focal. Alert and oriented xSELF   PSYCH: within normal limits. No depression or anxiety.  SKIN: warm dry intact   Musculoskeletal no focal abnormalities noted to be ambulating without any assist device    Labs:    Lab Results   Component Value Date    HGBA1C 6.7 (H) 01/21/2021     Results for orders placed or performed in visit on 09/17/20   Basic Metabolic Panel   Result Value Ref Range    Sodium 140 136 - 145 mmol/L    Potassium 4.5 3.5 - 5.0 mmol/L    Chloride 102 98 - 107 mmol/L    CO2 29 22 - 31 mmol/L    Anion Gap, Calculation 9 5 - 18 mmol/L    Glucose 121 70 - 125 mg/dL    Calcium 10.0 8.5 - 10.5 mg/dL    BUN 28 8 - 28 mg/dL    Creatinine 1.05 0.60 - 1.10 mg/dL    GFR MDRD Af Amer >60 >60 mL/min/1.73m2    GFR MDRD Non Af Amer 51 (L) >60 mL/min/1.73m2     Lab Results   Component Value Date    SOVWNLUD28 368 03/23/2020     Vitamin D, Total (25-Hydroxy)   Date Value Ref Range Status   03/23/2020 34.4 30.0 - 80.0 ng/mL Final         Assessment/Plan:    -Dementia with significant cognitive decline  - History of diabetes adequately controlled with a last A1c of " 6.7  - Morbid obesity  -History of depression with anxiety  - Hypertension  - Generalized weakness with decline noted    Pt is stable with no new concerns.  Weights reviewed are stable to.  Her last A1c for diabetes management was adequate at 6.7.  Plan is to recheck another A1c soon.  Blood pressures are somewhat elevated but a recheck was normal  Continue with her care plan  He remains a poor historian due to advanced dementia and language barrier      Electronically signed by: BERLIN Butts  This progress note was completed using Dragon software and there may be grammatical errors.

## 2021-08-11 NOTE — LETTER
8/11/2021        RE: Kandi Ontiveros  [facility]  [address] [address]        [facility]      Code Status:  FULL CODE  Visit Type: Review Of Multiple Medical Conditions     Facility:  [facility] [[id number]]           History of Present Illness: Kandi Ontiveros is a 80 y.o. female who is a resident of [facility].  She has underlying history of dementia with progressive memory impairment.   Last BIMS 3/15  Due to certain behaviors including resisting cares and elopement episodes.  Physically she remains debilitated but noted to be ambulating independently without any assist device.  Patient had a fall which was unwitnessed recently about 2 months ago.  She was complaining of knee pain then and imaging was done.  No fractures were identified.  Unfortunately she could not tell me if she had any pain today.  There is a significant language barrier with her being a [place] speaker  She is a diabetic and last A1c done is 6.7 which is an adequate control in this elderly female.  Speaks very little and is a native [place] speaker  Weights are up almost 10 pounds since my last visit to 226 pounds    Past Medical History:   Diagnosis Date     Anxiety     Created by Conversion      Benign Adenomatous Polyp Of The Large Intestine     Created by Conversion      Chronic Diarrhea Of Unknown Origin     Created by Conversion      Dementia of the Alzheimer's type 7/13/2014     Diabetes Mellitus     Created by Conversion      Esophageal reflux     Created by Conversion      Gastritis      Herpes Zoster (Shingles)     Created by Conversion      Hiatal hernia      Hypercholesterolemia     Created by Conversion      Hypertension     Created by Conversion      Melanosis Coli     Created by Conversion [facility] Annotation: May  2 2012  1:19PM - Sheila Benavides: colonoscopy  4/30/12      Memory Lapses Or Loss     Created by Conversion      Obstructive Sleep Apnea      Created by Conversion      Osteopenia     Created by Conversion      Raynaud's Disease     Created by Conversion      Tricuspid Regurgitation     Created by Conversion      No past surgical history on file.  Family History   Problem Relation Age of Onset     Hypertension Other      Diabetes Other      Stroke Other      Social History     Socioeconomic History     Marital status:      Spouse name: Not on file     Number of children: Not on file     Years of education: Not on file     Highest education level: Not on file   Occupational History     Not on file   Social Needs     Financial resource strain: Not on file     Food insecurity     Worry: Not on file     Inability: Not on file     Transportation needs     Medical: Not on file     Non-medical: Not on file   Tobacco Use     Smoking status: Never Smoker     Smokeless tobacco: Never Used   Substance and Sexual Activity     Alcohol use: Not on file     Drug use: Not on file     Sexual activity: Not on file   Lifestyle     Physical activity     Days per week: Not on file     Minutes per session: Not on file     Stress: Not on file   Relationships     Social connections     Talks on phone: Not on file     Gets together: Not on file     Attends Sikh service: Not on file     Active member of club or organization: Not on file     Attends meetings of clubs or organizations: Not on file     Relationship status: Not on file     Intimate partner violence     Fear of current or ex partner: Not on file     Emotionally abused: Not on file     Physically abused: Not on file     Forced sexual activity: Not on file   Other Topics Concern     Not on file   Social History Narrative     Not on file   lived in [place]  Current Outpatient Medications   Medication Sig Dispense Refill     acetaminophen (TYLENOL) 325 MG tablet Take 650 mg by mouth 4 (four) times a day.       bisacodyl (DULCOLAX, BISACODYL,) 10 mg suppository Insert 1 suppository (10 mg total) into the rectum  daily as needed (for constipation). 12 suppository 0     cetirizine (ZYRTEC) 10 MG tablet Take 10 mg by mouth daily as needed.        cholecalciferol, vitamin D3, 1,000 unit tablet Take 1,000 Units by mouth daily.       fluticasone propionate (FLONASE) 50 mcg/actuation nasal spray Apply 1 spray into each nostril daily as needed for rhinitis.       gabapentin (NEURONTIN) 100 MG capsule Take 100 mg by mouth 3 (three) times a day.       hydrALAZINE (APRESOLINE) 25 MG tablet Take 50 mg by mouth Daily at 8:00 am.. Hold for SBP <150             lisinopriL (PRINIVIL,ZESTRIL) 2.5 MG tablet Take 5 mg by mouth daily.        metoprolol succinate (TOPROL-XL) 25 MG Take 12.5 mg by mouth daily.              polyethylene glycol (MIRALAX) 17 gram/dose powder Take 17 g by mouth daily. 255 g 0     venlafaxine (EFFEXOR-XR) 37.5 MG 24 hr capsule Take 37.5 mg by mouth daily.       verapamil (VERELAN PM) 100 mg 24 hr capsule TAKE ONE CAPSULE BY MOUTH EVERY DAY 90 capsule 1     white petrolatum (AQUAPHOR ORIGINAL) 41 % Oint Apply 1 application topically 3 (three) times a day as needed.              No current facility-administered medications for this visit.      Allergies   Allergen Reactions     Codeine Sulfate          Review of Systems:    Constitutional: Negative.  Negative for fever, chills,has  activity change, appetite change and fatigue.   Has been gradually losing weight but now weights appear to be stable  HENT: Negative for congestion and facial swelling.    Eyes: Negative for photophobia, redness and visual disturbance.   Respiratory: Negative for cough and chest tightness.    Cardiovascular: Negative for chest pain, palpitations and leg swelling.   Gastrointestinal: Negative for nausea, diarrhea, constipation, blood in stool and abdominal distention.   Genitourinary: Negative.    Musculoskeletal: Negative.   she has bilateral knee osteoarthritis with knee pain  Skin: Negative.    Neurological: Negative for dizziness,  "tremors, syncope, weakness, light-headedness and headaches.   Hematological: Does not bruise/bleed easily.   Psychiatric/Behavioral: Negative.  She had a flat effect with a poor recall   Has no recall of recent events   Staff does report some behaviors including continued resistance to cares but overall mood and behaviors have been stable      Physical Exam:    Blood pressure (!) 181/77, pulse 84, temperature 96.9  F (36.1  C), resp. rate 18, height 1.626 m (5' 4\"), weight 102.6 kg (226 lb 3.2 oz), SpO2 96 %.    R/c bp 126/71  Weight 226  pounds blood pressure 126 / 71 temp 98 pulse 83  Obese  GENERAL: no acute distress. Cooperative in conversation.  Has limited recall and mostly smiles to questions  Patient is obese  HEENT: pupils are equal, round and reactive. Oral mucosa is moist and intact.  RESP:Chest symmetric. Regular respiratory rate. No stridor.  CVS: S1S2  ABD: Nondistended, soft.  EXTREMITIES: She has pedal and lower extremity edema.   NEURO: non focal. Alert and oriented xSELF   PSYCH: within normal limits. No depression or anxiety.  SKIN: warm dry intact   Musculoskeletal no focal abnormalities noted to be ambulating without any assist device    Labs:    Lab Results   Component Value Date    HGBA1C 6.7 (H) 01/21/2021     Results for orders placed or performed in visit on 09/17/20   Basic Metabolic Panel   Result Value Ref Range    Sodium 140 136 - 145 mmol/L    Potassium 4.5 3.5 - 5.0 mmol/L    Chloride 102 98 - 107 mmol/L    CO2 29 22 - 31 mmol/L    Anion Gap, Calculation 9 5 - 18 mmol/L    Glucose 121 70 - 125 mg/dL    Calcium 10.0 8.5 - 10.5 mg/dL    BUN 28 8 - 28 mg/dL    Creatinine 1.05 0.60 - 1.10 mg/dL    GFR MDRD Af Amer >60 >60 mL/min/1.73m2    GFR MDRD Non Af Amer 51 (L) >60 mL/min/1.73m2     Lab Results   Component Value Date    GUZIRKNY84 368 03/23/2020     Vitamin D, Total (25-Hydroxy)   Date Value Ref Range Status   03/23/2020 34.4 30.0 - 80.0 ng/mL Final         Assessment/Plan:  "   -Dementia with significant cognitive decline  - History of diabetes adequately controlled with a last A1c of 6.7  - Morbid obesity  -History of depression with anxiety  - Hypertension  - Generalized weakness with decline noted    Pt is stable with no new concerns.  Weights reviewed are stable to.  Her last A1c for diabetes management was adequate at 6.7.  Plan is to recheck another A1c soon.  Blood pressures are somewhat elevated but a recheck was normal  Continue with her care plan  He remains a poor historian due to advanced dementia and language barrier      Electronically signed by: BERLIN Butts  This progress note was completed using Dragon software and there may be grammatical errors.            Sincerely,        BERLIN Butts

## 2021-08-12 ENCOUNTER — LAB REQUISITION (OUTPATIENT)
Dept: LAB | Facility: CLINIC | Age: 81
End: 2021-08-12
Payer: COMMERCIAL

## 2021-08-12 DIAGNOSIS — U07.1 COVID-19: ICD-10-CM

## 2021-08-12 PROCEDURE — U0003 INFECTIOUS AGENT DETECTION BY NUCLEIC ACID (DNA OR RNA); SEVERE ACUTE RESPIRATORY SYNDROME CORONAVIRUS 2 (SARS-COV-2) (CORONAVIRUS DISEASE [COVID-19]), AMPLIFIED PROBE TECHNIQUE, MAKING USE OF HIGH THROUGHPUT TECHNOLOGIES AS DESCRIBED BY CMS-2020-01-R: HCPCS | Mod: ORL | Performed by: FAMILY MEDICINE

## 2021-08-13 LAB — SARS-COV-2 RNA RESP QL NAA+PROBE: NEGATIVE

## 2021-08-17 ENCOUNTER — LAB REQUISITION (OUTPATIENT)
Dept: LAB | Facility: CLINIC | Age: 81
End: 2021-08-17
Payer: COMMERCIAL

## 2021-08-17 DIAGNOSIS — U07.1 COVID-19: ICD-10-CM

## 2021-08-17 PROCEDURE — U0003 INFECTIOUS AGENT DETECTION BY NUCLEIC ACID (DNA OR RNA); SEVERE ACUTE RESPIRATORY SYNDROME CORONAVIRUS 2 (SARS-COV-2) (CORONAVIRUS DISEASE [COVID-19]), AMPLIFIED PROBE TECHNIQUE, MAKING USE OF HIGH THROUGHPUT TECHNOLOGIES AS DESCRIBED BY CMS-2020-01-R: HCPCS | Mod: ORL | Performed by: FAMILY MEDICINE

## 2021-08-18 LAB — SARS-COV-2 RNA RESP QL NAA+PROBE: NEGATIVE

## 2021-08-19 PROCEDURE — U0005 INFEC AGEN DETEC AMPLI PROBE: HCPCS | Mod: ORL | Performed by: FAMILY MEDICINE

## 2021-08-20 ENCOUNTER — LAB REQUISITION (OUTPATIENT)
Dept: LAB | Facility: CLINIC | Age: 81
End: 2021-08-20
Payer: COMMERCIAL

## 2021-08-20 DIAGNOSIS — U07.1 COVID-19: ICD-10-CM

## 2021-08-20 LAB — SARS-COV-2 RNA RESP QL NAA+PROBE: NEGATIVE

## 2021-08-24 ENCOUNTER — LAB REQUISITION (OUTPATIENT)
Dept: LAB | Facility: CLINIC | Age: 81
End: 2021-08-24
Payer: COMMERCIAL

## 2021-08-24 DIAGNOSIS — U07.1 COVID-19: ICD-10-CM

## 2021-08-24 PROCEDURE — U0003 INFECTIOUS AGENT DETECTION BY NUCLEIC ACID (DNA OR RNA); SEVERE ACUTE RESPIRATORY SYNDROME CORONAVIRUS 2 (SARS-COV-2) (CORONAVIRUS DISEASE [COVID-19]), AMPLIFIED PROBE TECHNIQUE, MAKING USE OF HIGH THROUGHPUT TECHNOLOGIES AS DESCRIBED BY CMS-2020-01-R: HCPCS | Mod: ORL | Performed by: FAMILY MEDICINE

## 2021-08-25 LAB — SARS-COV-2 RNA RESP QL NAA+PROBE: NEGATIVE

## 2021-08-26 PROCEDURE — U0003 INFECTIOUS AGENT DETECTION BY NUCLEIC ACID (DNA OR RNA); SEVERE ACUTE RESPIRATORY SYNDROME CORONAVIRUS 2 (SARS-COV-2) (CORONAVIRUS DISEASE [COVID-19]), AMPLIFIED PROBE TECHNIQUE, MAKING USE OF HIGH THROUGHPUT TECHNOLOGIES AS DESCRIBED BY CMS-2020-01-R: HCPCS | Mod: ORL | Performed by: FAMILY MEDICINE

## 2021-08-27 ENCOUNTER — LAB REQUISITION (OUTPATIENT)
Dept: LAB | Facility: CLINIC | Age: 81
End: 2021-08-27
Payer: COMMERCIAL

## 2021-08-27 DIAGNOSIS — U07.1 COVID-19: ICD-10-CM

## 2021-08-27 LAB — SARS-COV-2 RNA RESP QL NAA+PROBE: NEGATIVE

## 2021-08-30 ENCOUNTER — LAB REQUISITION (OUTPATIENT)
Dept: LAB | Facility: CLINIC | Age: 81
End: 2021-08-30
Payer: COMMERCIAL

## 2021-08-30 DIAGNOSIS — U07.1 COVID-19: ICD-10-CM

## 2021-08-30 PROCEDURE — U0005 INFEC AGEN DETEC AMPLI PROBE: HCPCS | Mod: ORL | Performed by: FAMILY MEDICINE

## 2021-08-31 LAB — SARS-COV-2 RNA RESP QL NAA+PROBE: NEGATIVE

## 2021-09-01 PROCEDURE — U0005 INFEC AGEN DETEC AMPLI PROBE: HCPCS | Mod: ORL | Performed by: FAMILY MEDICINE

## 2021-09-02 LAB — SARS-COV-2 RNA RESP QL NAA+PROBE: NEGATIVE

## 2021-10-04 ENCOUNTER — LAB REQUISITION (OUTPATIENT)
Dept: LAB | Facility: CLINIC | Age: 81
End: 2021-10-04
Payer: COMMERCIAL

## 2021-10-04 DIAGNOSIS — U07.1 COVID-19: ICD-10-CM

## 2021-10-04 PROCEDURE — U0003 INFECTIOUS AGENT DETECTION BY NUCLEIC ACID (DNA OR RNA); SEVERE ACUTE RESPIRATORY SYNDROME CORONAVIRUS 2 (SARS-COV-2) (CORONAVIRUS DISEASE [COVID-19]), AMPLIFIED PROBE TECHNIQUE, MAKING USE OF HIGH THROUGHPUT TECHNOLOGIES AS DESCRIBED BY CMS-2020-01-R: HCPCS | Mod: ORL | Performed by: FAMILY MEDICINE

## 2021-10-06 LAB — SARS-COV-2 RNA RESP QL NAA+PROBE: NOT DETECTED

## 2021-10-07 PROCEDURE — U0003 INFECTIOUS AGENT DETECTION BY NUCLEIC ACID (DNA OR RNA); SEVERE ACUTE RESPIRATORY SYNDROME CORONAVIRUS 2 (SARS-COV-2) (CORONAVIRUS DISEASE [COVID-19]), AMPLIFIED PROBE TECHNIQUE, MAKING USE OF HIGH THROUGHPUT TECHNOLOGIES AS DESCRIBED BY CMS-2020-01-R: HCPCS | Mod: ORL | Performed by: FAMILY MEDICINE

## 2021-10-08 ENCOUNTER — LAB REQUISITION (OUTPATIENT)
Dept: LAB | Facility: CLINIC | Age: 81
End: 2021-10-08
Payer: COMMERCIAL

## 2021-10-08 DIAGNOSIS — U07.1 COVID-19: ICD-10-CM

## 2021-10-10 LAB — SARS-COV-2 RNA RESP QL NAA+PROBE: NOT DETECTED

## 2021-10-11 ENCOUNTER — LAB REQUISITION (OUTPATIENT)
Dept: LAB | Facility: CLINIC | Age: 81
End: 2021-10-11
Payer: COMMERCIAL

## 2021-10-11 DIAGNOSIS — U07.1 COVID-19: ICD-10-CM

## 2021-10-12 PROCEDURE — U0003 INFECTIOUS AGENT DETECTION BY NUCLEIC ACID (DNA OR RNA); SEVERE ACUTE RESPIRATORY SYNDROME CORONAVIRUS 2 (SARS-COV-2) (CORONAVIRUS DISEASE [COVID-19]), AMPLIFIED PROBE TECHNIQUE, MAKING USE OF HIGH THROUGHPUT TECHNOLOGIES AS DESCRIBED BY CMS-2020-01-R: HCPCS | Mod: ORL | Performed by: FAMILY MEDICINE

## 2021-10-13 ENCOUNTER — LAB REQUISITION (OUTPATIENT)
Dept: LAB | Facility: CLINIC | Age: 81
End: 2021-10-13
Payer: COMMERCIAL

## 2021-10-13 DIAGNOSIS — U07.1 COVID-19: ICD-10-CM

## 2021-10-14 LAB — SARS-COV-2 RNA RESP QL NAA+PROBE: NOT DETECTED

## 2021-10-14 PROCEDURE — U0003 INFECTIOUS AGENT DETECTION BY NUCLEIC ACID (DNA OR RNA); SEVERE ACUTE RESPIRATORY SYNDROME CORONAVIRUS 2 (SARS-COV-2) (CORONAVIRUS DISEASE [COVID-19]), AMPLIFIED PROBE TECHNIQUE, MAKING USE OF HIGH THROUGHPUT TECHNOLOGIES AS DESCRIBED BY CMS-2020-01-R: HCPCS | Mod: ORL | Performed by: FAMILY MEDICINE

## 2021-10-15 LAB — SARS-COV-2 RNA RESP QL NAA+PROBE: NOT DETECTED

## 2021-10-18 PROCEDURE — U0003 INFECTIOUS AGENT DETECTION BY NUCLEIC ACID (DNA OR RNA); SEVERE ACUTE RESPIRATORY SYNDROME CORONAVIRUS 2 (SARS-COV-2) (CORONAVIRUS DISEASE [COVID-19]), AMPLIFIED PROBE TECHNIQUE, MAKING USE OF HIGH THROUGHPUT TECHNOLOGIES AS DESCRIBED BY CMS-2020-01-R: HCPCS | Mod: ORL | Performed by: FAMILY MEDICINE

## 2021-10-19 ENCOUNTER — LAB REQUISITION (OUTPATIENT)
Dept: LAB | Facility: CLINIC | Age: 81
End: 2021-10-19
Payer: COMMERCIAL

## 2021-10-19 DIAGNOSIS — U07.1 COVID-19: ICD-10-CM

## 2021-10-21 LAB — SARS-COV-2 RNA RESP QL NAA+PROBE: NOT DETECTED

## 2021-10-25 ENCOUNTER — NURSING HOME VISIT (OUTPATIENT)
Dept: GERIATRICS | Facility: CLINIC | Age: 81
End: 2021-10-25
Payer: COMMERCIAL

## 2021-10-25 VITALS
DIASTOLIC BLOOD PRESSURE: 80 MMHG | HEART RATE: 78 BPM | OXYGEN SATURATION: 97 % | HEIGHT: 64 IN | WEIGHT: 225.2 LBS | SYSTOLIC BLOOD PRESSURE: 175 MMHG | BODY MASS INDEX: 38.45 KG/M2 | TEMPERATURE: 98.4 F | RESPIRATION RATE: 20 BRPM

## 2021-10-25 DIAGNOSIS — G30.9 ALZHEIMER'S DEMENTIA WITH BEHAVIORAL DISTURBANCE, UNSPECIFIED TIMING OF DEMENTIA ONSET: ICD-10-CM

## 2021-10-25 DIAGNOSIS — F32.A DEPRESSION, UNSPECIFIED DEPRESSION TYPE: ICD-10-CM

## 2021-10-25 DIAGNOSIS — E11.65 TYPE 2 DIABETES MELLITUS WITH HYPERGLYCEMIA, WITHOUT LONG-TERM CURRENT USE OF INSULIN (H): ICD-10-CM

## 2021-10-25 DIAGNOSIS — E55.9 VITAMIN D DEFICIENCY: ICD-10-CM

## 2021-10-25 DIAGNOSIS — F02.81 ALZHEIMER'S DEMENTIA WITH BEHAVIORAL DISTURBANCE, UNSPECIFIED TIMING OF DEMENTIA ONSET: ICD-10-CM

## 2021-10-25 DIAGNOSIS — N18.30 RENAL FAILURE, CHRONIC, STAGE 3 (MODERATE) (H): ICD-10-CM

## 2021-10-25 DIAGNOSIS — I10 UNCONTROLLED HYPERTENSION: Primary | ICD-10-CM

## 2021-10-25 PROCEDURE — 99309 SBSQ NF CARE MODERATE MDM 30: CPT | Performed by: NURSE PRACTITIONER

## 2021-10-25 PROCEDURE — U0003 INFECTIOUS AGENT DETECTION BY NUCLEIC ACID (DNA OR RNA); SEVERE ACUTE RESPIRATORY SYNDROME CORONAVIRUS 2 (SARS-COV-2) (CORONAVIRUS DISEASE [COVID-19]), AMPLIFIED PROBE TECHNIQUE, MAKING USE OF HIGH THROUGHPUT TECHNOLOGIES AS DESCRIBED BY CMS-2020-01-R: HCPCS | Mod: ORL | Performed by: FAMILY MEDICINE

## 2021-10-25 ASSESSMENT — MIFFLIN-ST. JEOR: SCORE: 1481.5

## 2021-10-25 NOTE — PROGRESS NOTES
[facility] GERIATRIC SERVICES  Chief Complaint   Patient presents with     correction Regulatory     [place] Medical Record Number:  [id number]  Place of Service where encounter took place:  [facility] () [[id number]]    HPI:    Kandi Gannon  is 81 year old (1940), who is being seen today for a federally mandated E/M visit.  She has a past medical history for dementia, hypertension, HLD, diabetes, NEDRA, vitamin D deficiency and anxiety.    Today I see her sitting at a table reading in [place].  She does answer my questions when asked in [place] and she denies any pain or discomforts.    Blood pressures have been trending up and systolics running 150-180s.  During my last visit in June I did increase metoprolol XL and continued on verapamil, lisinopril and hydralazine.  Her BMP that was drawn at that time revealed slightly worsened renal function with a creatinine at 1.23 and a BUN of 38.    Her mood has been very stable and she has been able to wean off of venlafaxine in the past 3 or 4 months.      ALLERGIES:Codeine sulfate [codeine]  PAST MEDICAL HISTORY: No past medical history on file.  PAST SURGICAL HISTORY:   has no past surgical history on file.  FAMILY HISTORY: family history includes Cerebrovascular Disease in an other family member; Diabetes in an other family member; Hypertension in an other family member.  SOCIAL HISTORY:  reports that she has never smoked. She has never used smokeless tobacco.    MEDICATIONS:     Review of your medicines          Accurate as of October 25, 2021  2:19 PM. If you have any questions, ask your nurse or doctor.            CONTINUE these medicines which may have CHANGED, or have new prescriptions. If we are uncertain of the size of tablets/capsules you have at home, strength may be listed as something that might have changed.      Dose / Directions   * acetaminophen 500 MG tablet  Commonly known as: TYLENOL  This may have changed: Another  medication with the same name was removed. Continue taking this medication, and follow the directions you see here.  Changed by: Suri Chand NP      Dose: 500 mg  Take 500 mg by mouth every 4 hours as needed for pain every 4-6 hrs, DO NOT EXCEED 4GM/24 HOURS ACETAMINOPHEN  Refills: 0     * acetaminophen 325 MG tablet  Commonly known as: TYLENOL  This may have changed: Another medication with the same name was removed. Continue taking this medication, and follow the directions you see here.  Changed by: Suri Chand NP      Dose: 650 mg  Take 650 mg by mouth 4 times daily NTE 4gm/24hrs  Refills: 0     bisacodyl 10 MG suppository  Commonly known as: DULCOLAX  This may have changed: Another medication with the same name was removed. Continue taking this medication, and follow the directions you see here.  Changed by: Suri Chand NP      Dose: 10 mg  Place 10 mg rectally daily as needed for constipation  Refills: 0     cetirizine 10 MG tablet  Commonly known as: zyrTEC  This may have changed: Another medication with the same name was removed. Continue taking this medication, and follow the directions you see here.  Changed by: Suri Chand NP      Dose: 10 mg  Take 10 mg by mouth daily as needed for allergies  Refills: 0     fluticasone 50 MCG/ACT nasal spray  Commonly known as: FLONASE  This may have changed: Another medication with the same name was removed. Continue taking this medication, and follow the directions you see here.  Changed by: Suri Chand NP      Dose: 1 spray  Spray 1 spray into both nostrils daily as needed for rhinitis or allergies  Refills: 0     gabapentin 100 MG capsule  Commonly known as: NEURONTIN  This may have changed: Another medication with the same name was removed. Continue taking this medication, and follow the directions you see here.  Changed by: Suri Chand NP      Dose: 100-200 mg  Take 100-200 mg by mouth 2 times daily Take 100mg in the AM and 200mg at  HS.  Refills: 0     hydrALAZINE 50 MG tablet  Commonly known as: APRESOLINE  This may have changed: Another medication with the same name was removed. Continue taking this medication, and follow the directions you see here.  Changed by: Suri Chand NP      Dose: 50 mg  Take 50 mg by mouth 2 times daily  Refills: 0     lisinopril 5 MG tablet  Commonly known as: ZESTRIL  This may have changed: Another medication with the same name was removed. Continue taking this medication, and follow the directions you see here.  Changed by: Suri Chand NP      Dose: 5 mg  Take 5 mg by mouth daily  Refills: 0     metoprolol succinate ER 25 MG 24 hr tablet  Commonly known as: TOPROL-XL  This may have changed: Another medication with the same name was removed. Continue taking this medication, and follow the directions you see here.  Changed by: Suri Chand NP      Dose: 25 mg  Take 25 mg by mouth daily Hold for SBP less than or equal to 100 or HR less than or equal to 60  Refills: 0     mineral oil-hydrophilic petrolatum external ointment  This may have changed: Another medication with the same name was removed. Continue taking this medication, and follow the directions you see here.  Changed by: Suri Chand NP      Apply topically 3 times daily as needed  Refills: 0     polyethylene glycol 17 g packet  Commonly known as: MIRALAX  This may have changed: Another medication with the same name was removed. Continue taking this medication, and follow the directions you see here.  Changed by: Suri Chand NP      Dose: 17 g  Take 17 g by mouth daily  Refills: 0     verapamil  MG 24 hr capsule  Commonly known as: VERELAN  This may have changed: Another medication with the same name was removed. Continue taking this medication, and follow the directions you see here.  Changed by: Suri Chand NP      Dose: 100 mg  Take 100 mg by mouth daily  Refills: 0         * This list has 2 medication(s) that are the  "same as other medications prescribed for you. Read the directions carefully, and ask your doctor or other care provider to review them with you.            STOP taking    cholecalciferol 25 MCG (1000 UT) Tabs  Stopped by: Suri Chand NP        venlafaxine 37.5 MG 24 hr capsule  Commonly known as: EFFEXOR-XR  Stopped by: Suri Chand NP             Case Management:  I have reviewed the care plan and MDS and do agree with the plan. Patient's desire to return to the community is present, but is not able due to care needs . Information reviewed:  Medications, vital signs, orders, and nursing notes.    ROS:  Nursing denies any issues and patient denies any pain or discomforts.    Exam:  Vital signs:BP (!) 175/80   Pulse 78   Temp 98.4  F (36.9  C)   Resp 20   Ht 1.626 m (5' 4\")   Wt 102.2 kg (225 lb 3.2 oz)   SpO2 97%   BMI 38.66 kg/m     GENERAL APPEARANCE: Well developed, well nourished, in no acute distress.  HEENT: normocephalic, atraumatic   sclerae anicteric, conjunctivae clear and moist, EOM intact  LUNGS: Lung sounds CTA, no adventitious sounds, respiratory effort normal.  CARD: RRR, S1, S2, without murmurs, gallops, rubs   MSK: Muscle strength and tone were equal bilaterally. Moves all extremities easily and intentionally.   EXTREMITIES: No cyanosis, clubbing or edema.  NEURO: Alert cognitive impairment.   Face is symmetric.  SKIN: Inspection of the skin reveals no rashes, ulcerations or petechiae.  PSYCH: euthymic          Lab/Diagnostic data:   Lab Results   Component Value Date    WBC 9.4 06/18/2021     Lab Results   Component Value Date    RBC 3.47 06/18/2021     Lab Results   Component Value Date    HGB 11.0 06/18/2021     Lab Results   Component Value Date    HCT 34.2 06/18/2021     Lab Results   Component Value Date    MCV 99 06/18/2021     Lab Results   Component Value Date    MCH 31.7 06/18/2021     Lab Results   Component Value Date    MCHC 32.2 06/18/2021     Lab Results   Component " Value Date    RDW 14.4 06/18/2021     Lab Results   Component Value Date     06/18/2021     Last Comprehensive Metabolic Panel:  Sodium   Date Value Ref Range Status   06/18/2021 137 136 - 145 mmol/L Final     Potassium   Date Value Ref Range Status   06/18/2021 4.1 3.5 - 5.0 mmol/L Final     Chloride   Date Value Ref Range Status   06/18/2021 101 98 - 107 mmol/L Final     Carbon Dioxide (CO2)   Date Value Ref Range Status   06/18/2021 28 22 - 31 mmol/L Final     Anion Gap   Date Value Ref Range Status   06/18/2021 8 5 - 18 mmol/L Final     Glucose   Date Value Ref Range Status   06/18/2021 228 (H) 70 - 125 mg/dL Final     Urea Nitrogen   Date Value Ref Range Status   06/18/2021 38 (H) 8 - 28 mg/dL Final     Creatinine   Date Value Ref Range Status   06/18/2021 1.23 (H) 0.60 - 1.10 mg/dL Final     GFR Estimate   Date Value Ref Range Status   06/18/2021 42 (L) >60 mL/min/1.73m2 Final     Calcium   Date Value Ref Range Status   06/18/2021 9.1 8.5 - 10.5 mg/dL Final         ASSESSMENT/PLAN  Uncontrolled hypertension  Blood pressures have been high for some time.  Will increase hydralazine to 50 mg twice daily likely will need to increase that to 3 times daily in the future.  Continue with current doses of Toprol-XL, verapamil and lisinopril.  Could consider increasing lisinopril however I would like to see if her renal function has improved since last lab check.    Alzheimer's dementia with behavioral disturbance, unspecified timing of dementia onset (H)  Requiring memory care and supervision.  Is ambulatory and at risk for elopement.    Type 2 diabetes mellitus with hyperglycemia, without long-term current use of insulin (H)  Check an A1c, no medications.    Renal failure, chronic, stage 3 (moderate) (H)  Check renal function.  Due to GFR dropping will change gabapentin to renal dosing from gabapentin 100 mg 3 times daily to gabapentin 200 mg at at bedtime and 100 mg in the a.m.    Depression, unspecified  depression type  Mood has been stable after discontinuing venlafaxine.  We will continue to monitor    Vitamin D deficiency  Check vitamin D level as she has a history of vitamin D deficiency and has been taken off her vitamin D supplement.        Electronically signed by:  Suri Chand NP

## 2021-10-25 NOTE — LETTER
10/25/2021        RE: Kandi Gannon  [facility]  [address] [facility] [id number]         HEALTH GERIATRIC SERVICES  Chief Complaint   Patient presents with     [facility] Medical Record Number:  [id number]  Place of Service where encounter took place:  [facility] () [[id number]]    HPI:    Kandi Gannon  is 81 year old (1940), who is being seen today for a federally mandated E/M visit.  She has a past medical history for dementia, hypertension, HLD, diabetes, NEDRA, vitamin D deficiency and anxiety.    Today I see her sitting at a table reading in Venezuelan.  She does answer my questions when asked in Venezuelan and she denies any pain or discomforts.    Blood pressures have been trending up and systolics running 150-180s.  During my last visit in June I did increase metoprolol XL and continued on verapamil, lisinopril and hydralazine.  Her BMP that was drawn at that time revealed slightly worsened renal function with a creatinine at 1.23 and a BUN of 38.    Her mood has been very stable and she has been able to wean off of venlafaxine in the past 3 or 4 months.      ALLERGIES:Codeine sulfate [codeine]  PAST MEDICAL HISTORY: No past medical history on file.  PAST SURGICAL HISTORY:   has no past surgical history on file.  FAMILY HISTORY: family history includes Cerebrovascular Disease in an other family member; Diabetes in an other family member; Hypertension in an other family member.  SOCIAL HISTORY:  reports that she has never smoked. She has never used smokeless tobacco.    MEDICATIONS:     Review of your medicines          Accurate as of October 25, 2021  2:19 PM. If you have any questions, ask your nurse or doctor.            CONTINUE these medicines which may have CHANGED, or have new prescriptions. If we are uncertain of the size of tablets/capsules you have at home, strength may be listed as something that might have changed.      Dose /  Directions   * acetaminophen 500 MG tablet  Commonly known as: TYLENOL  This may have changed: Another medication with the same name was removed. Continue taking this medication, and follow the directions you see here.  Changed by: Suri Chand NP      Dose: 500 mg  Take 500 mg by mouth every 4 hours as needed for pain every 4-6 hrs, DO NOT EXCEED 4GM/24 HOURS ACETAMINOPHEN  Refills: 0     * acetaminophen 325 MG tablet  Commonly known as: TYLENOL  This may have changed: Another medication with the same name was removed. Continue taking this medication, and follow the directions you see here.  Changed by: Suri Chand NP      Dose: 650 mg  Take 650 mg by mouth 4 times daily NTE 4gm/24hrs  Refills: 0     bisacodyl 10 MG suppository  Commonly known as: DULCOLAX  This may have changed: Another medication with the same name was removed. Continue taking this medication, and follow the directions you see here.  Changed by: Suri Chand NP      Dose: 10 mg  Place 10 mg rectally daily as needed for constipation  Refills: 0     cetirizine 10 MG tablet  Commonly known as: zyrTEC  This may have changed: Another medication with the same name was removed. Continue taking this medication, and follow the directions you see here.  Changed by: Suri Chand NP      Dose: 10 mg  Take 10 mg by mouth daily as needed for allergies  Refills: 0     fluticasone 50 MCG/ACT nasal spray  Commonly known as: FLONASE  This may have changed: Another medication with the same name was removed. Continue taking this medication, and follow the directions you see here.  Changed by: Suri Chand NP      Dose: 1 spray  Spray 1 spray into both nostrils daily as needed for rhinitis or allergies  Refills: 0     gabapentin 100 MG capsule  Commonly known as: NEURONTIN  This may have changed: Another medication with the same name was removed. Continue taking this medication, and follow the directions you see here.  Changed by: Suri YANCEY  KIESHA Chand      Dose: 100-200 mg  Take 100-200 mg by mouth 2 times daily Take 100mg in the AM and 200mg at HS.  Refills: 0     hydrALAZINE 50 MG tablet  Commonly known as: APRESOLINE  This may have changed: Another medication with the same name was removed. Continue taking this medication, and follow the directions you see here.  Changed by: Suri Chand NP      Dose: 50 mg  Take 50 mg by mouth 2 times daily  Refills: 0     lisinopril 5 MG tablet  Commonly known as: ZESTRIL  This may have changed: Another medication with the same name was removed. Continue taking this medication, and follow the directions you see here.  Changed by: Suri Chand NP      Dose: 5 mg  Take 5 mg by mouth daily  Refills: 0     metoprolol succinate ER 25 MG 24 hr tablet  Commonly known as: TOPROL-XL  This may have changed: Another medication with the same name was removed. Continue taking this medication, and follow the directions you see here.  Changed by: Suri Chand NP      Dose: 25 mg  Take 25 mg by mouth daily Hold for SBP less than or equal to 100 or HR less than or equal to 60  Refills: 0     mineral oil-hydrophilic petrolatum external ointment  This may have changed: Another medication with the same name was removed. Continue taking this medication, and follow the directions you see here.  Changed by: Suri Chand NP      Apply topically 3 times daily as needed  Refills: 0     polyethylene glycol 17 g packet  Commonly known as: MIRALAX  This may have changed: Another medication with the same name was removed. Continue taking this medication, and follow the directions you see here.  Changed by: Suri Chand NP      Dose: 17 g  Take 17 g by mouth daily  Refills: 0     verapamil  MG 24 hr capsule  Commonly known as: VERELAN  This may have changed: Another medication with the same name was removed. Continue taking this medication, and follow the directions you see here.  Changed by: Suri Chand NP       "Dose: 100 mg  Take 100 mg by mouth daily  Refills: 0         * This list has 2 medication(s) that are the same as other medications prescribed for you. Read the directions carefully, and ask your doctor or other care provider to review them with you.            STOP taking    cholecalciferol 25 MCG (1000 UT) Tabs  Stopped by: Suri Chand NP        venlafaxine 37.5 MG 24 hr capsule  Commonly known as: EFFEXOR-XR  Stopped by: Suri Chand NP             Case Management:  I have reviewed the care plan and MDS and do agree with the plan. Patient's desire to return to the community is present, but is not able due to care needs . Information reviewed:  Medications, vital signs, orders, and nursing notes.    ROS:  Nursing denies any issues and patient denies any pain or discomforts.    Exam:  Vital signs:BP (!) 175/80   Pulse 78   Temp 98.4  F (36.9  C)   Resp 20   Ht 1.626 m (5' 4\")   Wt 102.2 kg (225 lb 3.2 oz)   SpO2 97%   BMI 38.66 kg/m     GENERAL APPEARANCE: Well developed, well nourished, in no acute distress.  HEENT: normocephalic, atraumatic   sclerae anicteric, conjunctivae clear and moist, EOM intact  LUNGS: Lung sounds CTA, no adventitious sounds, respiratory effort normal.  CARD: RRR, S1, S2, without murmurs, gallops, rubs   MSK: Muscle strength and tone were equal bilaterally. Moves all extremities easily and intentionally.   EXTREMITIES: No cyanosis, clubbing or edema.  NEURO: Alert cognitive impairment.   Face is symmetric.  SKIN: Inspection of the skin reveals no rashes, ulcerations or petechiae.  PSYCH: euthymic          Lab/Diagnostic data:   Lab Results   Component Value Date    WBC 9.4 06/18/2021     Lab Results   Component Value Date    RBC 3.47 06/18/2021     Lab Results   Component Value Date    HGB 11.0 06/18/2021     Lab Results   Component Value Date    HCT 34.2 06/18/2021     Lab Results   Component Value Date    MCV 99 06/18/2021     Lab Results   Component Value Date    MCH " 31.7 06/18/2021     Lab Results   Component Value Date    MCHC 32.2 06/18/2021     Lab Results   Component Value Date    RDW 14.4 06/18/2021     Lab Results   Component Value Date     06/18/2021     Last Comprehensive Metabolic Panel:  Sodium   Date Value Ref Range Status   06/18/2021 137 136 - 145 mmol/L Final     Potassium   Date Value Ref Range Status   06/18/2021 4.1 3.5 - 5.0 mmol/L Final     Chloride   Date Value Ref Range Status   06/18/2021 101 98 - 107 mmol/L Final     Carbon Dioxide (CO2)   Date Value Ref Range Status   06/18/2021 28 22 - 31 mmol/L Final     Anion Gap   Date Value Ref Range Status   06/18/2021 8 5 - 18 mmol/L Final     Glucose   Date Value Ref Range Status   06/18/2021 228 (H) 70 - 125 mg/dL Final     Urea Nitrogen   Date Value Ref Range Status   06/18/2021 38 (H) 8 - 28 mg/dL Final     Creatinine   Date Value Ref Range Status   06/18/2021 1.23 (H) 0.60 - 1.10 mg/dL Final     GFR Estimate   Date Value Ref Range Status   06/18/2021 42 (L) >60 mL/min/1.73m2 Final     Calcium   Date Value Ref Range Status   06/18/2021 9.1 8.5 - 10.5 mg/dL Final         ASSESSMENT/PLAN  Uncontrolled hypertension  Blood pressures have been high for some time.  Will increase hydralazine to 50 mg twice daily likely will need to increase that to 3 times daily in the future.  Continue with current doses of Toprol-XL, verapamil and lisinopril.  Could consider increasing lisinopril however I would like to see if her renal function has improved since last lab check.    Alzheimer's dementia with behavioral disturbance, unspecified timing of dementia onset (H)  Requiring memory care and supervision.  Is ambulatory and at risk for elopement.    Type 2 diabetes mellitus with hyperglycemia, without long-term current use of insulin (H)  Check an A1c, no medications.    Renal failure, chronic, stage 3 (moderate) (H)  Check renal function.  Due to GFR dropping will change gabapentin to renal dosing from gabapentin 100  mg 3 times daily to gabapentin 200 mg at at bedtime and 100 mg in the a.m.    Depression, unspecified depression type  Mood has been stable after discontinuing venlafaxine.  We will continue to monitor    Vitamin D deficiency  Check vitamin D level as she has a history of vitamin D deficiency and has been taken off her vitamin D supplement.        Electronically signed by:  Suri Chand NP              Sincerely,        Suri Chand NP

## 2021-10-26 ENCOUNTER — LAB REQUISITION (OUTPATIENT)
Dept: LAB | Facility: CLINIC | Age: 81
End: 2021-10-26
Payer: COMMERCIAL

## 2021-10-26 DIAGNOSIS — U07.1 COVID-19: ICD-10-CM

## 2021-10-27 LAB — SARS-COV-2 RNA RESP QL NAA+PROBE: NOT DETECTED

## 2021-11-15 PROCEDURE — U0003 INFECTIOUS AGENT DETECTION BY NUCLEIC ACID (DNA OR RNA); SEVERE ACUTE RESPIRATORY SYNDROME CORONAVIRUS 2 (SARS-COV-2) (CORONAVIRUS DISEASE [COVID-19]), AMPLIFIED PROBE TECHNIQUE, MAKING USE OF HIGH THROUGHPUT TECHNOLOGIES AS DESCRIBED BY CMS-2020-01-R: HCPCS | Mod: ORL | Performed by: FAMILY MEDICINE

## 2021-11-16 ENCOUNTER — LAB REQUISITION (OUTPATIENT)
Dept: LAB | Facility: CLINIC | Age: 81
End: 2021-11-16
Payer: COMMERCIAL

## 2021-11-16 DIAGNOSIS — U07.1 COVID-19: ICD-10-CM

## 2021-11-17 LAB
SARS-COV-2 RNA RESP QL NAA+PROBE: NORMAL
SARS-COV-2 RNA RESP QL NAA+PROBE: NOT DETECTED

## 2021-11-17 PROCEDURE — U0005 INFEC AGEN DETEC AMPLI PROBE: HCPCS | Mod: ORL | Performed by: FAMILY MEDICINE

## 2021-11-18 ENCOUNTER — LAB REQUISITION (OUTPATIENT)
Dept: LAB | Facility: CLINIC | Age: 81
End: 2021-11-18
Payer: COMMERCIAL

## 2021-11-18 DIAGNOSIS — U07.1 COVID-19: ICD-10-CM

## 2021-11-19 LAB — SARS-COV-2 RNA RESP QL NAA+PROBE: NEGATIVE

## 2021-11-19 PROCEDURE — U0005 INFEC AGEN DETEC AMPLI PROBE: HCPCS | Mod: ORL | Performed by: FAMILY MEDICINE

## 2021-11-20 ENCOUNTER — LAB REQUISITION (OUTPATIENT)
Dept: LAB | Facility: CLINIC | Age: 81
End: 2021-11-20
Payer: COMMERCIAL

## 2021-11-20 DIAGNOSIS — U07.1 COVID-19: ICD-10-CM

## 2021-11-21 LAB — SARS-COV-2 RNA RESP QL NAA+PROBE: NEGATIVE

## 2021-11-23 PROCEDURE — U0003 INFECTIOUS AGENT DETECTION BY NUCLEIC ACID (DNA OR RNA); SEVERE ACUTE RESPIRATORY SYNDROME CORONAVIRUS 2 (SARS-COV-2) (CORONAVIRUS DISEASE [COVID-19]), AMPLIFIED PROBE TECHNIQUE, MAKING USE OF HIGH THROUGHPUT TECHNOLOGIES AS DESCRIBED BY CMS-2020-01-R: HCPCS | Mod: ORL | Performed by: FAMILY MEDICINE

## 2021-11-24 ENCOUNTER — LAB REQUISITION (OUTPATIENT)
Dept: LAB | Facility: CLINIC | Age: 81
End: 2021-11-24
Payer: COMMERCIAL

## 2021-11-24 DIAGNOSIS — U07.1 COVID-19: ICD-10-CM

## 2021-11-25 LAB — SARS-COV-2 RNA RESP QL NAA+PROBE: NEGATIVE

## 2021-11-29 ENCOUNTER — LAB REQUISITION (OUTPATIENT)
Dept: LAB | Facility: CLINIC | Age: 81
End: 2021-11-29
Payer: COMMERCIAL

## 2021-11-29 DIAGNOSIS — U07.1 COVID-19: ICD-10-CM

## 2021-11-30 ENCOUNTER — LAB REQUISITION (OUTPATIENT)
Dept: LAB | Facility: CLINIC | Age: 81
End: 2021-11-30
Payer: COMMERCIAL

## 2021-11-30 DIAGNOSIS — U07.1 COVID-19: ICD-10-CM

## 2021-11-30 PROCEDURE — U0003 INFECTIOUS AGENT DETECTION BY NUCLEIC ACID (DNA OR RNA); SEVERE ACUTE RESPIRATORY SYNDROME CORONAVIRUS 2 (SARS-COV-2) (CORONAVIRUS DISEASE [COVID-19]), AMPLIFIED PROBE TECHNIQUE, MAKING USE OF HIGH THROUGHPUT TECHNOLOGIES AS DESCRIBED BY CMS-2020-01-R: HCPCS | Mod: ORL | Performed by: FAMILY MEDICINE

## 2021-12-01 ENCOUNTER — NURSING HOME VISIT (OUTPATIENT)
Dept: GERIATRICS | Facility: CLINIC | Age: 81
End: 2021-12-01
Payer: COMMERCIAL

## 2021-12-01 VITALS
TEMPERATURE: 97 F | WEIGHT: 225 LBS | RESPIRATION RATE: 18 BRPM | HEART RATE: 71 BPM | OXYGEN SATURATION: 95 % | DIASTOLIC BLOOD PRESSURE: 78 MMHG | HEIGHT: 64 IN | BODY MASS INDEX: 38.41 KG/M2 | SYSTOLIC BLOOD PRESSURE: 152 MMHG

## 2021-12-01 DIAGNOSIS — G30.9 ALZHEIMER'S DEMENTIA WITH BEHAVIORAL DISTURBANCE, UNSPECIFIED TIMING OF DEMENTIA ONSET: Primary | ICD-10-CM

## 2021-12-01 DIAGNOSIS — I10 UNCONTROLLED HYPERTENSION: ICD-10-CM

## 2021-12-01 DIAGNOSIS — I10 ESSENTIAL HYPERTENSION: ICD-10-CM

## 2021-12-01 DIAGNOSIS — N18.30 RENAL FAILURE, CHRONIC, STAGE 3 (MODERATE) (H): ICD-10-CM

## 2021-12-01 DIAGNOSIS — F02.81 ALZHEIMER'S DEMENTIA WITH BEHAVIORAL DISTURBANCE, UNSPECIFIED TIMING OF DEMENTIA ONSET: Primary | ICD-10-CM

## 2021-12-01 DIAGNOSIS — E11.65 TYPE 2 DIABETES MELLITUS WITH HYPERGLYCEMIA, WITHOUT LONG-TERM CURRENT USE OF INSULIN (H): ICD-10-CM

## 2021-12-01 LAB — SARS-COV-2 RNA RESP QL NAA+PROBE: NEGATIVE

## 2021-12-01 PROCEDURE — 99309 SBSQ NF CARE MODERATE MDM 30: CPT | Performed by: FAMILY MEDICINE

## 2021-12-01 ASSESSMENT — MIFFLIN-ST. JEOR: SCORE: 1480.59

## 2021-12-01 NOTE — PROGRESS NOTES
[facility]      Code Status:  FULL CODE  Visit Type: Review Of Multiple Medical Conditions     Facility:  [facility] [[id number]]           History of Present Illness: Kandi Ontiveros is a 80 y.o. female who is a resident of [facility].  She has underlying history of dementia with progressive memory impairment.   Last BIMS 3/15; remains at baseline  Due to certain behaviors including resisting cares and elopement episodes.  Physically she remains debilitated but noted to be ambulating independently without any assist device.  There is a significant language barrier with her being a [place]ian speaker  She is a diabetic and last A1c done is 6.7 which is an adequate control in this elderly female.  BG  Checks stopped  Speaks very little and is a native [place]ian speaker  Weights are up  to 225 pounds    Past Medical History:   Diagnosis Date     Anxiety     Created by Conversion      Benign Adenomatous Polyp Of The Large Intestine     Created by Conversion      Chronic Diarrhea Of Unknown Origin     Created by Conversion      Dementia of the Alzheimer's type 7/13/2014     Diabetes Mellitus     Created by Conversion      Esophageal reflux     Created by Conversion      Gastritis      Herpes Zoster (Shingles)     Created by Conversion      Hiatal hernia      Hypercholesterolemia     Created by Conversion      Hypertension     Created by Conversion      Melanosis Coli     Created by Conversion [facility] Annotation: May  2 2012  1:19PM - Sheila Benavides: colonoscopy  4/30/12      Memory Lapses Or Loss     Created by Conversion      Obstructive Sleep Apnea     Created by Conversion      Osteopenia     Created by Conversion      Raynaud's Disease     Created by Conversion      Tricuspid Regurgitation     Created by Conversion      No past surgical history on file.  Family History   Problem Relation Age of Onset     Hypertension Other      Diabetes Other      Stroke Other      Social  History     Socioeconomic History     Marital status:      Spouse name: Not on file     Number of children: Not on file     Years of education: Not on file     Highest education level: Not on file   Occupational History     Not on file   Social Needs     Financial resource strain: Not on file     Food insecurity     Worry: Not on file     Inability: Not on file     Transportation needs     Medical: Not on file     Non-medical: Not on file   Tobacco Use     Smoking status: Never Smoker     Smokeless tobacco: Never Used   Substance and Sexual Activity     Alcohol use: Not on file     Drug use: Not on file     Sexual activity: Not on file   Lifestyle     Physical activity     Days per week: Not on file     Minutes per session: Not on file     Stress: Not on file   Relationships     Social connections     Talks on phone: Not on file     Gets together: Not on file     Attends Religion service: Not on file     Active member of club or organization: Not on file     Attends meetings of clubs or organizations: Not on file     Relationship status: Not on file     Intimate partner violence     Fear of current or ex partner: Not on file     Emotionally abused: Not on file     Physically abused: Not on file     Forced sexual activity: Not on file   Other Topics Concern     Not on file   Social History Narrative     Not on file   lived in [facility]  Current Outpatient Medications   Medication Sig Dispense Refill     acetaminophen (TYLENOL) 325 MG tablet Take 650 mg by mouth 4 (four) times a day.       bisacodyl (DULCOLAX, BISACODYL,) 10 mg suppository Insert 1 suppository (10 mg total) into the rectum daily as needed (for constipation). 12 suppository 0     cetirizine (ZYRTEC) 10 MG tablet Take 10 mg by mouth daily as needed.        cholecalciferol, vitamin D3, 1,000 unit tablet Take 1,000 Units by mouth daily.       fluticasone propionate (FLONASE) 50 mcg/actuation nasal spray Apply 1 spray into each nostril daily as needed  for rhinitis.       gabapentin (NEURONTIN) 100 MG capsule Take 100 mg by mouth 3 (three) times a day.       hydrALAZINE (APRESOLINE) 25 MG tablet Take 50 mg by mouth Daily at 8:00 am.. Hold for SBP <150             lisinopriL (PRINIVIL,ZESTRIL) 2.5 MG tablet Take 5 mg by mouth daily.        metoprolol succinate (TOPROL-XL) 25 MG Take 12.5 mg by mouth daily.              polyethylene glycol (MIRALAX) 17 gram/dose powder Take 17 g by mouth daily. 255 g 0     venlafaxine (EFFEXOR-XR) 37.5 MG 24 hr capsule Take 37.5 mg by mouth daily.       verapamil (VERELAN PM) 100 mg 24 hr capsule TAKE ONE CAPSULE BY MOUTH EVERY DAY 90 capsule 1     white petrolatum (AQUAPHOR ORIGINAL) 41 % Oint Apply 1 application topically 3 (three) times a day as needed.              No current facility-administered medications for this visit.      Allergies   Allergen Reactions     Codeine Sulfate          Review of Systems:    Constitutional: Negative.  Negative for fever, chills,has  activity change, appetite change and fatigue.   Has been gradually losing weight but now weights appear to be stable  HENT: Negative for congestion and facial swelling.    Eyes: Negative for photophobia, redness and visual disturbance.   Respiratory: Negative for cough and chest tightness.    Cardiovascular: Negative for chest pain, palpitations and leg swelling.   Gastrointestinal: Negative for nausea, diarrhea, constipation, blood in stool and abdominal distention.   Genitourinary: Negative.    Musculoskeletal: Negative.   she has bilateral knee osteoarthritis with knee pain  Skin: Negative.    Neurological: Negative for dizziness, tremors, syncope, weakness, light-headedness and headaches.   Hematological: Does not bruise/bleed easily.   Psychiatric/Behavioral: Negative.  She had a flat effect with a poor recall   Has no recall of recent events   Staff does report some behaviors including continued resistance to cares but overall mood and behaviors have been  stable      Physical Exam:    There were no vitals taken for this visit.    R/c bp 126/71  Weight 226  pounds blood pressure 126 / 71 temp 98 pulse 83  Obese  GENERAL: no acute distress. Cooperative in conversation.  Has limited recall and mostly smiles to questions  Patient is obese  HEENT: pupils are equal, round and reactive. Oral mucosa is moist and intact.  RESP:Chest symmetric. Regular respiratory rate. No stridor.  CVS: S1S2  ABD: Nondistended, soft.  EXTREMITIES: She has pedal and lower extremity edema.   NEURO: non focal. Alert and oriented xSELF   PSYCH: within normal limits. No depression or anxiety.  SKIN: warm dry intact   Musculoskeletal no focal abnormalities noted to be ambulating without any assist device    Labs:    Lab Results   Component Value Date    HGBA1C 6.7 (H) 01/21/2021     Results for orders placed or performed in visit on 09/17/20   Basic Metabolic Panel   Result Value Ref Range    Sodium 140 136 - 145 mmol/L    Potassium 4.5 3.5 - 5.0 mmol/L    Chloride 102 98 - 107 mmol/L    CO2 29 22 - 31 mmol/L    Anion Gap, Calculation 9 5 - 18 mmol/L    Glucose 121 70 - 125 mg/dL    Calcium 10.0 8.5 - 10.5 mg/dL    BUN 28 8 - 28 mg/dL    Creatinine 1.05 0.60 - 1.10 mg/dL    GFR MDRD Af Amer >60 >60 mL/min/1.73m2    GFR MDRD Non Af Amer 51 (L) >60 mL/min/1.73m2     Lab Results   Component Value Date    [id number] 368 03/23/2020     Vitamin D, Total (25-Hydroxy)   Date Value Ref Range Status   03/23/2020 34.4 30.0 - 80.0 ng/mL Final         Assessment/Plan:    -Dementia with significant cognitive decline  - History of diabetes adequately controlled with a last A1c of 6.7  - Morbid obesity  -History of depression with anxiety  - Hypertension  - Generalized weakness with decline noted    Pt is stable with no new concerns.  Communication difficult due to language barrier and dementia  Mood is stable  Dm control is adequate  Needs a r/c hg aic  wts stable  No pain concerns      Electronically signed by:  BERLIN Butts  This progress note was completed using Dragon software and there may be grammatical errors.

## 2021-12-01 NOTE — LETTER
12/1/2021        RE: Kandi Gannon  [facility]  [address] [place] [address]        [facility]      Code Status:  FULL CODE  Visit Type: Review Of Multiple Medical Conditions     Facility:  [facility] [[id number]]           History of Present Illness: Kandi Ontiveros is a 80 y.o. female who is a resident of [facility].  She has underlying history of dementia with progressive memory impairment.   Last BIMS 3/15; remains at baseline  Due to certain behaviors including resisting cares and elopement episodes.  Physically she remains debilitated but noted to be ambulating independently without any assist device.  There is a significant language barrier with her being a Danish speaker  She is a diabetic and last A1c done is 6.7 which is an adequate control in this elderly female.  BG  Checks stopped  Speaks very little and is a native Danish speaker  Weights are up  to 225 pounds    Past Medical History:   Diagnosis Date     Anxiety     Created by Conversion      Benign Adenomatous Polyp Of The Large Intestine     Created by Conversion      Chronic Diarrhea Of Unknown Origin     Created by Conversion      Dementia of the Alzheimer's type 7/13/2014     Diabetes Mellitus     Created by Conversion      Esophageal reflux     Created by Conversion      Gastritis      Herpes Zoster (Shingles)     Created by Conversion      Hiatal hernia      Hypercholesterolemia     Created by Conversion      Hypertension     Created by Conversion      Melanosis Coli     Created by Conversion [facility] Annotation: May  2 2012  1:19PM - Sheila Benavides: colonoscopy  4/30/12      Memory Lapses Or Loss     Created by Conversion      Obstructive Sleep Apnea     Created by Conversion      Osteopenia     Created by Conversion      Raynaud's Disease     Created by Conversion      Tricuspid Regurgitation     Created by Conversion      No past surgical history on file.  Family  History   Problem Relation Age of Onset     Hypertension Other      Diabetes Other      Stroke Other      Social History     Socioeconomic History     Marital status:      Spouse name: Not on file     Number of children: Not on file     Years of education: Not on file     Highest education level: Not on file   Occupational History     Not on file   Social Needs     Financial resource strain: Not on file     Food insecurity     Worry: Not on file     Inability: Not on file     Transportation needs     Medical: Not on file     Non-medical: Not on file   Tobacco Use     Smoking status: Never Smoker     Smokeless tobacco: Never Used   Substance and Sexual Activity     Alcohol use: Not on file     Drug use: Not on file     Sexual activity: Not on file   Lifestyle     Physical activity     Days per week: Not on file     Minutes per session: Not on file     Stress: Not on file   Relationships     Social connections     Talks on phone: Not on file     Gets together: Not on file     Attends Orthodoxy service: Not on file     Active member of club or organization: Not on file     Attends meetings of clubs or organizations: Not on file     Relationship status: Not on file     Intimate partner violence     Fear of current or ex partner: Not on file     Emotionally abused: Not on file     Physically abused: Not on file     Forced sexual activity: Not on file   Other Topics Concern     Not on file   Social History Narrative     Not on file   lived in [facility]  Current Outpatient Medications   Medication Sig Dispense Refill     acetaminophen (TYLENOL) 325 MG tablet Take 650 mg by mouth 4 (four) times a day.       bisacodyl (DULCOLAX, BISACODYL,) 10 mg suppository Insert 1 suppository (10 mg total) into the rectum daily as needed (for constipation). 12 suppository 0     cetirizine (ZYRTEC) 10 MG tablet Take 10 mg by mouth daily as needed.        cholecalciferol, vitamin D3, 1,000 unit tablet Take 1,000 Units by mouth daily.        fluticasone propionate (FLONASE) 50 mcg/actuation nasal spray Apply 1 spray into each nostril daily as needed for rhinitis.       gabapentin (NEURONTIN) 100 MG capsule Take 100 mg by mouth 3 (three) times a day.       hydrALAZINE (APRESOLINE) 25 MG tablet Take 50 mg by mouth Daily at 8:00 am.. Hold for SBP <150             lisinopriL (PRINIVIL,ZESTRIL) 2.5 MG tablet Take 5 mg by mouth daily.        metoprolol succinate (TOPROL-XL) 25 MG Take 12.5 mg by mouth daily.              polyethylene glycol (MIRALAX) 17 gram/dose powder Take 17 g by mouth daily. 255 g 0     venlafaxine (EFFEXOR-XR) 37.5 MG 24 hr capsule Take 37.5 mg by mouth daily.       verapamil (VERELAN PM) 100 mg 24 hr capsule TAKE ONE CAPSULE BY MOUTH EVERY DAY 90 capsule 1     white petrolatum (AQUAPHOR ORIGINAL) 41 % Oint Apply 1 application topically 3 (three) times a day as needed.              No current facility-administered medications for this visit.      Allergies   Allergen Reactions     Codeine Sulfate          Review of Systems:    Constitutional: Negative.  Negative for fever, chills,has  activity change, appetite change and fatigue.   Has been gradually losing weight but now weights appear to be stable  HENT: Negative for congestion and facial swelling.    Eyes: Negative for photophobia, redness and visual disturbance.   Respiratory: Negative for cough and chest tightness.    Cardiovascular: Negative for chest pain, palpitations and leg swelling.   Gastrointestinal: Negative for nausea, diarrhea, constipation, blood in stool and abdominal distention.   Genitourinary: Negative.    Musculoskeletal: Negative.   she has bilateral knee osteoarthritis with knee pain  Skin: Negative.    Neurological: Negative for dizziness, tremors, syncope, weakness, light-headedness and headaches.   Hematological: Does not bruise/bleed easily.   Psychiatric/Behavioral: Negative.  She had a flat effect with a poor recall   Has no recall of recent events   Staff  does report some behaviors including continued resistance to cares but overall mood and behaviors have been stable      Physical Exam:    There were no vitals taken for this visit.    R/c bp 126/71  Weight 226  pounds blood pressure 126 / 71 temp 98 pulse 83  Obese  GENERAL: no acute distress. Cooperative in conversation.  Has limited recall and mostly smiles to questions  Patient is obese  HEENT: pupils are equal, round and reactive. Oral mucosa is moist and intact.  RESP:Chest symmetric. Regular respiratory rate. No stridor.  CVS: S1S2  ABD: Nondistended, soft.  EXTREMITIES: She has pedal and lower extremity edema.   NEURO: non focal. Alert and oriented xSELF   PSYCH: within normal limits. No depression or anxiety.  SKIN: warm dry intact   Musculoskeletal no focal abnormalities noted to be ambulating without any assist device    Labs:    Lab Results   Component Value Date    HGBA1C 6.7 (H) 01/21/2021     Results for orders placed or performed in visit on 09/17/20   Basic Metabolic Panel   Result Value Ref Range    Sodium 140 136 - 145 mmol/L    Potassium 4.5 3.5 - 5.0 mmol/L    Chloride 102 98 - 107 mmol/L    CO2 29 22 - 31 mmol/L    Anion Gap, Calculation 9 5 - 18 mmol/L    Glucose 121 70 - 125 mg/dL    Calcium 10.0 8.5 - 10.5 mg/dL    BUN 28 8 - 28 mg/dL    Creatinine 1.05 0.60 - 1.10 mg/dL    GFR MDRD Af Amer >60 >60 mL/min/1.73m2    GFR MDRD Non Af Amer 51 (L) >60 mL/min/1.73m2     Lab Results   Component Value Date    YDRFRWTG20 368 03/23/2020     Vitamin D, Total (25-Hydroxy)   Date Value Ref Range Status   03/23/2020 34.4 30.0 - 80.0 ng/mL Final         Assessment/Plan:    -Dementia with significant cognitive decline  - History of diabetes adequately controlled with a last A1c of 6.7  - Morbid obesity  -History of depression with anxiety  - Hypertension  - Generalized weakness with decline noted    Pt is stable with no new concerns.  Communication difficult due to language barrier and dementia  Mood is  stable  Dm control is adequate  Needs a r/c hg aic  wts stable  No pain concerns      Electronically signed by: BERLIN Butts  This progress note was completed using Dragon software and there may be grammatical errors.            Sincerely,        BERLIN Butts

## 2021-12-02 ENCOUNTER — LAB REQUISITION (OUTPATIENT)
Dept: LAB | Facility: CLINIC | Age: 81
End: 2021-12-02
Payer: COMMERCIAL

## 2021-12-02 DIAGNOSIS — U07.1 COVID-19: ICD-10-CM

## 2021-12-03 ENCOUNTER — LAB REQUISITION (OUTPATIENT)
Dept: LAB | Facility: CLINIC | Age: 81
End: 2021-12-03
Payer: COMMERCIAL

## 2021-12-03 DIAGNOSIS — U07.1 COVID-19: ICD-10-CM

## 2021-12-03 PROCEDURE — U0003 INFECTIOUS AGENT DETECTION BY NUCLEIC ACID (DNA OR RNA); SEVERE ACUTE RESPIRATORY SYNDROME CORONAVIRUS 2 (SARS-COV-2) (CORONAVIRUS DISEASE [COVID-19]), AMPLIFIED PROBE TECHNIQUE, MAKING USE OF HIGH THROUGHPUT TECHNOLOGIES AS DESCRIBED BY CMS-2020-01-R: HCPCS | Mod: ORL | Performed by: FAMILY MEDICINE

## 2021-12-04 LAB — SARS-COV-2 RNA RESP QL NAA+PROBE: NEGATIVE

## 2021-12-07 LAB — SARS-COV-2 RNA RESP QL NAA+PROBE: NEGATIVE

## 2021-12-07 PROCEDURE — U0005 INFEC AGEN DETEC AMPLI PROBE: HCPCS | Mod: ORL | Performed by: FAMILY MEDICINE

## 2021-12-08 ENCOUNTER — LAB REQUISITION (OUTPATIENT)
Dept: LAB | Facility: CLINIC | Age: 81
End: 2021-12-08
Payer: COMMERCIAL

## 2021-12-08 DIAGNOSIS — U07.1 COVID-19: ICD-10-CM

## 2021-12-10 ENCOUNTER — LAB REQUISITION (OUTPATIENT)
Dept: LAB | Facility: CLINIC | Age: 81
End: 2021-12-10
Payer: COMMERCIAL

## 2021-12-10 DIAGNOSIS — U07.1 COVID-19: ICD-10-CM

## 2021-12-10 PROCEDURE — U0003 INFECTIOUS AGENT DETECTION BY NUCLEIC ACID (DNA OR RNA); SEVERE ACUTE RESPIRATORY SYNDROME CORONAVIRUS 2 (SARS-COV-2) (CORONAVIRUS DISEASE [COVID-19]), AMPLIFIED PROBE TECHNIQUE, MAKING USE OF HIGH THROUGHPUT TECHNOLOGIES AS DESCRIBED BY CMS-2020-01-R: HCPCS | Mod: ORL | Performed by: FAMILY MEDICINE

## 2021-12-11 LAB — SARS-COV-2 RNA RESP QL NAA+PROBE: NEGATIVE

## 2021-12-15 ENCOUNTER — LAB REQUISITION (OUTPATIENT)
Dept: LAB | Facility: CLINIC | Age: 81
End: 2021-12-15
Payer: COMMERCIAL

## 2021-12-15 DIAGNOSIS — U07.1 COVID-19: ICD-10-CM

## 2021-12-17 ENCOUNTER — LAB REQUISITION (OUTPATIENT)
Dept: LAB | Facility: CLINIC | Age: 81
End: 2021-12-17
Payer: COMMERCIAL

## 2021-12-17 DIAGNOSIS — U07.1 COVID-19: ICD-10-CM

## 2021-12-17 PROCEDURE — U0005 INFEC AGEN DETEC AMPLI PROBE: HCPCS | Mod: ORL | Performed by: FAMILY MEDICINE

## 2021-12-18 LAB — SARS-COV-2 RNA RESP QL NAA+PROBE: NEGATIVE

## 2021-12-22 ENCOUNTER — CLINICAL UPDATE (OUTPATIENT)
Dept: PHARMACY | Facility: CLINIC | Age: 81
End: 2021-12-22
Payer: COMMERCIAL

## 2021-12-22 DIAGNOSIS — I10 ESSENTIAL HYPERTENSION: Primary | ICD-10-CM

## 2021-12-22 DIAGNOSIS — N18.30 STAGE 3 CHRONIC KIDNEY DISEASE, UNSPECIFIED WHETHER STAGE 3A OR 3B CKD (H): ICD-10-CM

## 2021-12-22 PROCEDURE — 99207 PR NO CHARGE LOS: CPT | Performed by: PHARMACIST

## 2021-12-22 NOTE — PROGRESS NOTES
This patient's medication list and chart were reviewed as part of the service provided by [facility].    Assessment/Recommendations:  1. (HTN):  Noted that at 10/25/21 NP visit, hydralazine was increased to twice daily (Epic med list indicates only taking once daily).  Please verify and reconcile.  If BP still consistently >150/90mmHg, may consider increase in lisinopril to 10mg daily and follow-up BMP in one week.  Use of an ACEi in this patient with h/o CKD and diabetes may be renoprotective; if Scr increases by 30% or greater within 2 months, then reduction in dose or d'c is recommended with follow-up BMP.  Noted patient is on both CCB and beta blocker - in addition to blood pressure, monitor HR closely.  At risk of bradycardia with combo.    Noted at last NP visit, plan to recheck vitamin D level due to pt previously on vitamin D, had been dc'd, and h/o deficiency.  I do not see this resulted in Epic.  Please ensure recheck vitamin D if this has not yet been done, and if level <30, consider addition of 1000u daily.    Epic med list indicates pt is no longer taking cetirizine 10mg daily as needed - please remove from med list altogether.  Of note, max recommended cetirizine dose for this patient is 5mg daily due to age    Sara Cunha, Pharm.D.,[facility]  Board Certified Geriatric Pharmacist  Medication Therapy Management Pharmacist  [phone]

## 2021-12-30 ENCOUNTER — LAB REQUISITION (OUTPATIENT)
Dept: LAB | Facility: CLINIC | Age: 81
End: 2021-12-30
Payer: COMMERCIAL

## 2021-12-30 DIAGNOSIS — U07.1 COVID-19: ICD-10-CM

## 2021-12-31 PROCEDURE — U0003 INFECTIOUS AGENT DETECTION BY NUCLEIC ACID (DNA OR RNA); SEVERE ACUTE RESPIRATORY SYNDROME CORONAVIRUS 2 (SARS-COV-2) (CORONAVIRUS DISEASE [COVID-19]), AMPLIFIED PROBE TECHNIQUE, MAKING USE OF HIGH THROUGHPUT TECHNOLOGIES AS DESCRIBED BY CMS-2020-01-R: HCPCS | Mod: ORL | Performed by: FAMILY MEDICINE

## 2022-01-01 ENCOUNTER — NURSING HOME VISIT (OUTPATIENT)
Dept: GERIATRICS | Facility: CLINIC | Age: 82
End: 2022-01-01
Payer: COMMERCIAL

## 2022-01-01 ENCOUNTER — PATIENT OUTREACH (OUTPATIENT)
Dept: GERIATRIC MEDICINE | Facility: CLINIC | Age: 82
End: 2022-01-01

## 2022-01-01 ENCOUNTER — CLINICAL UPDATE (OUTPATIENT)
Dept: PHARMACY | Facility: CLINIC | Age: 82
End: 2022-01-01
Payer: COMMERCIAL

## 2022-01-01 VITALS
SYSTOLIC BLOOD PRESSURE: 143 MMHG | HEART RATE: 76 BPM | HEIGHT: 64 IN | WEIGHT: 202 LBS | RESPIRATION RATE: 18 BRPM | TEMPERATURE: 97.2 F | DIASTOLIC BLOOD PRESSURE: 70 MMHG | OXYGEN SATURATION: 97 % | BODY MASS INDEX: 34.49 KG/M2

## 2022-01-01 DIAGNOSIS — G30.1 SEVERE LATE ONSET ALZHEIMER'S DEMENTIA WITHOUT BEHAVIORAL DISTURBANCE, PSYCHOTIC DISTURBANCE, MOOD DISTURBANCE, OR ANXIETY (H): ICD-10-CM

## 2022-01-01 DIAGNOSIS — F02.C0 SEVERE LATE ONSET ALZHEIMER'S DEMENTIA WITHOUT BEHAVIORAL DISTURBANCE, PSYCHOTIC DISTURBANCE, MOOD DISTURBANCE, OR ANXIETY (H): ICD-10-CM

## 2022-01-01 DIAGNOSIS — I10 ESSENTIAL HYPERTENSION: Primary | ICD-10-CM

## 2022-01-01 DIAGNOSIS — E11.65 TYPE 2 DIABETES MELLITUS WITH HYPERGLYCEMIA, WITHOUT LONG-TERM CURRENT USE OF INSULIN (H): Primary | ICD-10-CM

## 2022-01-01 DIAGNOSIS — R52 PAIN: ICD-10-CM

## 2022-01-01 DIAGNOSIS — D64.9 ANEMIA, UNSPECIFIED TYPE: ICD-10-CM

## 2022-01-01 DIAGNOSIS — G30.9 ALZHEIMER'S DEMENTIA WITH BEHAVIORAL DISTURBANCE (H): ICD-10-CM

## 2022-01-01 DIAGNOSIS — F41.1 ANXIETY STATE: ICD-10-CM

## 2022-01-01 DIAGNOSIS — F02.818 ALZHEIMER'S DEMENTIA WITH BEHAVIORAL DISTURBANCE (H): ICD-10-CM

## 2022-01-01 DIAGNOSIS — E66.01 MORBID OBESITY (H): ICD-10-CM

## 2022-01-01 DIAGNOSIS — E55.9 VITAMIN D DEFICIENCY: ICD-10-CM

## 2022-01-01 DIAGNOSIS — F32.A DEPRESSION, UNSPECIFIED DEPRESSION TYPE: ICD-10-CM

## 2022-01-01 DIAGNOSIS — N18.30 STAGE 3 CHRONIC KIDNEY DISEASE, UNSPECIFIED WHETHER STAGE 3A OR 3B CKD (H): ICD-10-CM

## 2022-01-01 DIAGNOSIS — N18.30 RENAL FAILURE, CHRONIC, STAGE 3 (MODERATE) (H): ICD-10-CM

## 2022-01-01 DIAGNOSIS — K59.01 SLOW TRANSIT CONSTIPATION: ICD-10-CM

## 2022-01-01 PROCEDURE — 99207 PR NO CHARGE LOS: CPT | Performed by: PHARMACIST

## 2022-01-01 PROCEDURE — 99309 SBSQ NF CARE MODERATE MDM 30: CPT | Performed by: FAMILY MEDICINE

## 2022-01-03 LAB — SARS-COV-2 RNA RESP QL NAA+PROBE: NEGATIVE

## 2022-01-04 ENCOUNTER — LAB REQUISITION (OUTPATIENT)
Dept: LAB | Facility: CLINIC | Age: 82
End: 2022-01-04
Payer: COMMERCIAL

## 2022-01-04 DIAGNOSIS — I10 ESSENTIAL (PRIMARY) HYPERTENSION: ICD-10-CM

## 2022-01-07 ENCOUNTER — LAB REQUISITION (OUTPATIENT)
Dept: LAB | Facility: CLINIC | Age: 82
End: 2022-01-07
Payer: COMMERCIAL

## 2022-01-07 DIAGNOSIS — U07.1 COVID-19: ICD-10-CM

## 2022-01-07 LAB
ANION GAP SERPL CALCULATED.3IONS-SCNC: 9 MMOL/L (ref 5–18)
BUN SERPL-MCNC: 30 MG/DL (ref 8–28)
CALCIUM SERPL-MCNC: 9.5 MG/DL (ref 8.5–10.5)
CHLORIDE BLD-SCNC: 102 MMOL/L (ref 98–107)
CO2 SERPL-SCNC: 26 MMOL/L (ref 22–31)
CREAT SERPL-MCNC: 1.08 MG/DL (ref 0.6–1.1)
GFR SERPL CREATININE-BSD FRML MDRD: 52 ML/MIN/1.73M2
GLUCOSE BLD-MCNC: 296 MG/DL (ref 70–125)
POTASSIUM BLD-SCNC: 4.7 MMOL/L (ref 3.5–5)
SODIUM SERPL-SCNC: 137 MMOL/L (ref 136–145)

## 2022-01-07 PROCEDURE — U0005 INFEC AGEN DETEC AMPLI PROBE: HCPCS | Mod: ORL | Performed by: FAMILY MEDICINE

## 2022-01-07 PROCEDURE — 80048 BASIC METABOLIC PNL TOTAL CA: CPT | Mod: ORL | Performed by: FAMILY MEDICINE

## 2022-01-07 PROCEDURE — 36415 COLL VENOUS BLD VENIPUNCTURE: CPT | Mod: ORL | Performed by: FAMILY MEDICINE

## 2022-01-07 PROCEDURE — P9603 ONE-WAY ALLOW PRORATED MILES: HCPCS | Mod: ORL | Performed by: FAMILY MEDICINE

## 2022-01-08 LAB — SARS-COV-2 RNA RESP QL NAA+PROBE: NEGATIVE

## 2022-01-21 ENCOUNTER — LAB REQUISITION (OUTPATIENT)
Dept: LAB | Facility: CLINIC | Age: 82
End: 2022-01-21
Payer: COMMERCIAL

## 2022-01-21 DIAGNOSIS — U07.1 COVID-19: ICD-10-CM

## 2022-01-21 PROCEDURE — U0003 INFECTIOUS AGENT DETECTION BY NUCLEIC ACID (DNA OR RNA); SEVERE ACUTE RESPIRATORY SYNDROME CORONAVIRUS 2 (SARS-COV-2) (CORONAVIRUS DISEASE [COVID-19]), AMPLIFIED PROBE TECHNIQUE, MAKING USE OF HIGH THROUGHPUT TECHNOLOGIES AS DESCRIBED BY CMS-2020-01-R: HCPCS | Mod: ORL | Performed by: FAMILY MEDICINE

## 2022-01-23 LAB — SARS-COV-2 RNA RESP QL NAA+PROBE: NEGATIVE

## 2022-01-24 ENCOUNTER — LAB REQUISITION (OUTPATIENT)
Dept: LAB | Facility: CLINIC | Age: 82
End: 2022-01-24
Payer: COMMERCIAL

## 2022-01-25 PROCEDURE — U0005 INFEC AGEN DETEC AMPLI PROBE: HCPCS | Mod: ORL | Performed by: FAMILY MEDICINE

## 2022-01-27 ENCOUNTER — LAB REQUISITION (OUTPATIENT)
Dept: LAB | Facility: CLINIC | Age: 82
End: 2022-01-27
Payer: COMMERCIAL

## 2022-01-27 ENCOUNTER — LAB REQUISITION (OUTPATIENT)
Dept: LAB | Facility: CLINIC | Age: 82
End: 2022-01-27

## 2022-01-27 DIAGNOSIS — U07.1 COVID-19: ICD-10-CM

## 2022-01-27 LAB — SARS-COV-2 RNA RESP QL NAA+PROBE: NOT DETECTED

## 2022-01-27 PROCEDURE — U0003 INFECTIOUS AGENT DETECTION BY NUCLEIC ACID (DNA OR RNA); SEVERE ACUTE RESPIRATORY SYNDROME CORONAVIRUS 2 (SARS-COV-2) (CORONAVIRUS DISEASE [COVID-19]), AMPLIFIED PROBE TECHNIQUE, MAKING USE OF HIGH THROUGHPUT TECHNOLOGIES AS DESCRIBED BY CMS-2020-01-R: HCPCS | Mod: ORL | Performed by: FAMILY MEDICINE

## 2022-01-29 LAB — SARS-COV-2 RNA RESP QL NAA+PROBE: NEGATIVE

## 2022-02-01 ENCOUNTER — LAB REQUISITION (OUTPATIENT)
Dept: LAB | Facility: CLINIC | Age: 82
End: 2022-02-01
Payer: COMMERCIAL

## 2022-02-01 DIAGNOSIS — U07.1 COVID-19: ICD-10-CM

## 2022-02-03 PROCEDURE — U0005 INFEC AGEN DETEC AMPLI PROBE: HCPCS | Mod: ORL | Performed by: FAMILY MEDICINE

## 2022-02-04 LAB — SARS-COV-2 RNA RESP QL NAA+PROBE: NOT DETECTED

## 2022-02-07 ENCOUNTER — LAB REQUISITION (OUTPATIENT)
Dept: LAB | Facility: CLINIC | Age: 82
End: 2022-02-07
Payer: COMMERCIAL

## 2022-02-07 DIAGNOSIS — U07.1 COVID-19: ICD-10-CM

## 2022-02-08 PROCEDURE — U0005 INFEC AGEN DETEC AMPLI PROBE: HCPCS | Mod: ORL | Performed by: FAMILY MEDICINE

## 2022-02-09 ENCOUNTER — LAB REQUISITION (OUTPATIENT)
Dept: LAB | Facility: CLINIC | Age: 82
End: 2022-02-09
Payer: COMMERCIAL

## 2022-02-09 ENCOUNTER — NURSING HOME VISIT (OUTPATIENT)
Dept: GERIATRICS | Facility: CLINIC | Age: 82
End: 2022-02-09
Payer: COMMERCIAL

## 2022-02-09 VITALS
OXYGEN SATURATION: 96 % | DIASTOLIC BLOOD PRESSURE: 72 MMHG | TEMPERATURE: 97.4 F | BODY MASS INDEX: 37.73 KG/M2 | HEIGHT: 64 IN | SYSTOLIC BLOOD PRESSURE: 157 MMHG | WEIGHT: 221 LBS | HEART RATE: 71 BPM | RESPIRATION RATE: 19 BRPM

## 2022-02-09 DIAGNOSIS — Z51.81 ENCOUNTER FOR THERAPEUTIC DRUG LEVEL MONITORING: ICD-10-CM

## 2022-02-09 DIAGNOSIS — N18.30 RENAL FAILURE, CHRONIC, STAGE 3 (MODERATE) (H): ICD-10-CM

## 2022-02-09 DIAGNOSIS — F02.81 ALZHEIMER'S DEMENTIA WITH BEHAVIORAL DISTURBANCE, UNSPECIFIED TIMING OF DEMENTIA ONSET: Primary | ICD-10-CM

## 2022-02-09 DIAGNOSIS — I10 UNCONTROLLED HYPERTENSION: ICD-10-CM

## 2022-02-09 DIAGNOSIS — F32.A DEPRESSION, UNSPECIFIED DEPRESSION TYPE: ICD-10-CM

## 2022-02-09 DIAGNOSIS — E11.65 TYPE 2 DIABETES MELLITUS WITH HYPERGLYCEMIA, WITHOUT LONG-TERM CURRENT USE OF INSULIN (H): ICD-10-CM

## 2022-02-09 DIAGNOSIS — E55.9 VITAMIN D DEFICIENCY: ICD-10-CM

## 2022-02-09 DIAGNOSIS — E66.01 MORBID OBESITY (H): ICD-10-CM

## 2022-02-09 DIAGNOSIS — D64.9 ANEMIA, UNSPECIFIED TYPE: ICD-10-CM

## 2022-02-09 DIAGNOSIS — G30.9 ALZHEIMER'S DEMENTIA WITH BEHAVIORAL DISTURBANCE, UNSPECIFIED TIMING OF DEMENTIA ONSET: Primary | ICD-10-CM

## 2022-02-09 LAB — SARS-COV-2 RNA RESP QL NAA+PROBE: NOT DETECTED

## 2022-02-09 PROCEDURE — 99309 SBSQ NF CARE MODERATE MDM 30: CPT | Performed by: NURSE PRACTITIONER

## 2022-02-09 ASSESSMENT — MIFFLIN-ST. JEOR: SCORE: 1462.45

## 2022-02-09 NOTE — PROGRESS NOTES
[facility] GERIATRICS  Chief Complaint   Patient presents with     [facility] Care Coordination - Home Visit-Annual Assessment     [facility] Record Number:  [id number]  Place of Service where encounter took place:  [facility] () [[id number]]    HPI:    Kandi Gannon  is a 81 year old  (1940), who is being seen today for an annual comprehensive visit. HPI information obtained from: facility chart records and facility staff. She has a past medical history for dementia, hypertension, HLD, diabetes, NEDRA, vitamin D deficiency and anxiety.  She has moderate-severe dementia and is sometimes found having a conversation with another individual where she is speaking Liberian and the other individual is speaking Czech.  She is ambulatory.  She has not had any acute illness since my last visit.       Since my last visit she has had consistent elevated Bps with SBPs 150-190s.  She is on Verapamil, Metoprolol and lisinopril and hydralazine.  On my last visit, I had increased hydralazine but this order was not implemented. Lisinopril was increased to 10mg as ordered. Pulse has been WNL however the combo of verpamil and metoprolol can be concerning for bradycardia.     For her Diabetes; she is due for an A1c, not checking BS in the past year as she is no longer on any medications.  She does not exhibit symptoms of hyper/hypoglycemic symptoms.     She has a few pound weight gain in the past year however it has been stable about 220lbs.  She does snack per nursing.     Vitamin D has not been drawn in sometime.  Vitamin D was discontinued in the past 6 months.       ALLERGIES: Codeine sulfate [codeine]  PAST MEDICAL HISTORY: History reviewed. No pertinent past medical history.   PAST SURGICAL HISTORY:  has no past surgical history on file.  IMMUNIZATIONS:  Immunization History   Administered Date(s) Administered     COVID-19,PF,Moderna 12/29/2020, 01/26/2021, 11/19/2021     FLU 6-35 months  09/24/2013     Flu, Unspecified 10/16/2007, 10/27/2008, 11/17/2017, 10/18/2018     Influenza (High Dose) 3 valent vaccine 12/03/2014, 11/02/2015, 11/08/2016     Influenza (IIV3) PF 10/16/2007, 10/27/2008, 10/18/2018     Influenza Quad, Recombinant, pf(RIV4) (Flublok) 10/06/2020     Influenza Vaccine Im 4yrs+ 4 Valent CCIIV4 09/27/2021     Pneumo Conj 13-V (2010&after) 07/14/2018     Pneumococcal 23 valent 10/19/2007     Td (Adult), Adsorbed 06/23/2008     Td,adult,historic,unspecified 06/23/2008     Above immunizations pulled from [facility]. [facility] and facility records also reconciled. Outstanding information sent to  to update [facility] .  Future immunizations are not needed at this point as all recommended immunizations are up to date.       Current Outpatient Medications:      acetaminophen (TYLENOL) 325 MG tablet, Take 650 mg by mouth 4 times daily NTE 4gm/24hrs, Disp: , Rfl:      bisacodyl (DULCOLAX) 10 MG suppository, Place 10 mg rectally daily as needed for constipation, Disp: , Rfl:      fluticasone (FLONASE) 50 MCG/ACT nasal spray, Spray 1 spray into both nostrils daily as needed for rhinitis or allergies, Disp: , Rfl:      gabapentin (NEURONTIN) 100 MG capsule, Take 100 mg by mouth 3 times daily Take 100mg in the AM and 200mg at HS., Disp: , Rfl:      hydrALAZINE (APRESOLINE) 50 MG tablet, Take 50 mg by mouth daily , Disp: , Rfl:      lisinopril (ZESTRIL) 5 MG tablet, Take 10 mg by mouth daily , Disp: , Rfl:      metoprolol succinate ER (TOPROL-XL) 25 MG 24 hr tablet, Take 25 mg by mouth daily Hold for SBP less than or equal to 100 or HR less than or equal to 60, Disp: , Rfl:      mineral oil-hydrophilic petrolatum (AQUAPHOR) external ointment, Apply topically 3 times daily as needed, Disp: , Rfl:      polyethylene glycol (MIRALAX) 17 g packet, Take 17 g by mouth daily, Disp: , Rfl:      verapamil ER (VERELAN) 100 MG 24 hr capsule, Take 100 mg by mouth daily, Disp: , Rfl:       "acetaminophen (TYLENOL) 500 MG tablet, Take 500 mg by mouth every 4 hours as needed for pain every 4-6 hrs, DO NOT EXCEED 4GM/24 HOURS ACETAMINOPHEN (Patient not taking: Reported on 2/9/2022), Disp: , Rfl:      Case Management:  I have reviewed the facility/SNF care plan/MDS, including the falls risk, nutrition and pain screening. I also reviewed the current immunizations, and preventive care.. Future cancer screening is not clinically indicated secondary to age/goals of care Patient's desire to return to the community is not assessible due to cognitive impairment. Current Level of Care is appropriate.    Advance Directive Discussion:    I reviewed the current advanced directives as reflected in EPIC, the POLST and the facility chart, and verified the congruency of orders.  I did not due to cognitive impairment review the advance directives with the resident. Patient's goal is pain control and comfort.    Team Discussion:  I communicated with the appropriate disciplines involved with the Plan of Care:   Nursing    Information reviewed:  Medications, vital signs, orders, and nursing notes.    ROS:  Unobtainable secondary to cognitive impairment.     Exam:  Vital signs:BP (!) 157/72   Pulse 71   Temp 97.4  F (36.3  C)   Resp 19   Ht 1.626 m (5' 4\")   Wt 100.2 kg (221 lb)   SpO2 96%   BMI 37.93 kg/m     GENERAL APPEARANCE: Well developed, well nourished, in no acute distress.  HEENT: normocephalic, atraumatic  PERRL, sclerae anicteric, conjunctivae clear and moist, EOM intact  External inspection of ears and nose showed no scars, lesions or masses.  Lips, teeth, and gums showed normal mucosa. Throat showed no erythema or edema.  NECK: Supple and symmetric. Trachea is midline, no thyromegaly, no adenopathy, and no tenderness  LUNGS: Lung sounds CTA, no adventitious sounds, respiratory effort normal.  CARD: RRR, S1, S2, without murmurs, gallops, rubs, no JVD, peripheral pulses 2+ and symmetric  BACK: No kyphosis " of the thoracic spine. Symmetric, no curvature, ROM normal, no CVA tenderness, no spinal tenderness   ABD: Soft, nondistended and nontender with normal bowel sounds.   MSK: Muscle strength and tone were equal bilaterally. Moves all extremities easily and intentionally.   EXTREMITIES: No cyanosis, clubbing or edema.  NEURO: Alert and oriented x 3. Normal affect. Sensation to touch was normal. Face is symmetric.  SKIN: Inspection of the skin reveals no rashes, ulcerations or petechiae.  PSYCH: euthymic          Lab/Diagnostic data:   Lab Results   Component Value Date    A1C 6.7 01/21/2021    A1C 6.6 07/30/2020    A1C 6.7 03/23/2020    A1C 6.7 02/25/2020    A1C 6.6 09/09/2019         ASSESSMENT/PLAN  Alzheimer's dementia with behavioral disturbance, unspecified timing of dementia onset (H)  No behaviors, dementia moderate to severe. Sleeping well. No antidepressants. She has been weaned off of her Effexor without side effects.     Uncontrolled hypertension  Bps elevated, discontinue Metoprolol.  Increase lisinopril 40mg daily.  Continue with verapamil and hydralazine 1 x daily. Check a BMP    Type 2 diabetes mellitus with hyperglycemia, without long-term current use of insulin (H)  Check A1c, diet controlled only.     Vitamin D deficiency  Supplement dc'd last year.  Check Vit D level    Depression, unspecified depression type  Stable without re-occurrence since weaning off of venlafaxine back in Oct 2021.     Anemia, unspecified type  Check hgb.  Last hgb was 11.0.  No supplements     Morbid Obesity  Difficult to manage due to patient's dementia.  She does snack often.  Nursing to monitor snacking    CKD:  Checking BMP this week.       Electronically signed by:  Suri Chand NP

## 2022-02-09 NOTE — LETTER
2/9/2022        RE: Kandi Gannon  [facility]  [address] [address]        [facility] GERIATRICS  Chief Complaint   Patient presents with     [facility] Care Coordination - Home Visit-Annual Assessment     [facility] Record Number:  [id number]  Place of Service where encounter took place:  [facility] () [[id number]]    HPI:    Kandi Gannon  is a 81 year old  (1940), who is being seen today for an annual comprehensive visit. HPI information obtained from: facility chart records and facility staff. She has a past medical history for dementia, hypertension, HLD, diabetes, NEDRA, vitamin D deficiency and anxiety.  She has moderate-severe dementia and is sometimes found having a conversation with another individual where she is speaking Chadian and the other individual is speaking Montenegrin.  She is ambulatory.  She has not had any acute illness since my last visit.       Since my last visit she has had consistent elevated Bps with SBPs 150-190s.  She is on Verapamil, Metoprolol and lisinopril and hydralazine.  On my last visit, I had increased hydralazine but this order was not implemented. Lisinopril was increased to 10mg as ordered. Pulse has been WNL however the combo of verpamil and metoprolol can be concerning for bradycardia.     For her Diabetes; she is due for an A1c, not checking BS in the past year as she is no longer on any medications.  She does not exhibit symptoms of hyper/hypoglycemic symptoms.     She has a few pound weight gain in the past year however it has been stable about 220lbs.  She does snack per nursing.     Vitamin D has not been drawn in sometime.  Vitamin D was discontinued in the past 6 months.       ALLERGIES: Codeine sulfate [codeine]  PAST MEDICAL HISTORY: History reviewed. No pertinent past medical history.   PAST SURGICAL HISTORY:  has no past surgical history on file.  IMMUNIZATIONS:  Immunization History   Administered  Date(s) Administered     COVID-19,PF,Moderna 12/29/2020, 01/26/2021, 11/19/2021     FLU 6-35 months 09/24/2013     Flu, Unspecified 10/16/2007, 10/27/2008, 11/17/2017, 10/18/2018     Influenza (High Dose) 3 valent vaccine 12/03/2014, 11/02/2015, 11/08/2016     Influenza (IIV3) PF 10/16/2007, 10/27/2008, 10/18/2018     Influenza Quad, Recombinant, pf(RIV4) (Flublok) 10/06/2020     Influenza Vaccine Im 4yrs+ 4 Valent CCIIV4 09/27/2021     Pneumo Conj 13-V (2010&after) 07/14/2018     Pneumococcal 23 valent 10/19/2007     Td (Adult), Adsorbed 06/23/2008     Td,adult,historic,unspecified 06/23/2008     Above immunizations pulled from [facility]. [facility] and facility records also reconciled. Outstanding information sent to  to update [facility] .  Future immunizations are not needed at this point as all recommended immunizations are up to date.       Current Outpatient Medications:      acetaminophen (TYLENOL) 325 MG tablet, Take 650 mg by mouth 4 times daily NTE 4gm/24hrs, Disp: , Rfl:      bisacodyl (DULCOLAX) 10 MG suppository, Place 10 mg rectally daily as needed for constipation, Disp: , Rfl:      fluticasone (FLONASE) 50 MCG/ACT nasal spray, Spray 1 spray into both nostrils daily as needed for rhinitis or allergies, Disp: , Rfl:      gabapentin (NEURONTIN) 100 MG capsule, Take 100 mg by mouth 3 times daily Take 100mg in the AM and 200mg at HS., Disp: , Rfl:      hydrALAZINE (APRESOLINE) 50 MG tablet, Take 50 mg by mouth daily , Disp: , Rfl:      lisinopril (ZESTRIL) 5 MG tablet, Take 10 mg by mouth daily , Disp: , Rfl:      metoprolol succinate ER (TOPROL-XL) 25 MG 24 hr tablet, Take 25 mg by mouth daily Hold for SBP less than or equal to 100 or HR less than or equal to 60, Disp: , Rfl:      mineral oil-hydrophilic petrolatum (AQUAPHOR) external ointment, Apply topically 3 times daily as needed, Disp: , Rfl:      polyethylene glycol (MIRALAX) 17 g packet, Take 17 g by mouth daily, Disp: , Rfl:      " verapamil ER (VERELAN) 100 MG 24 hr capsule, Take 100 mg by mouth daily, Disp: , Rfl:      acetaminophen (TYLENOL) 500 MG tablet, Take 500 mg by mouth every 4 hours as needed for pain every 4-6 hrs, DO NOT EXCEED 4GM/24 HOURS ACETAMINOPHEN (Patient not taking: Reported on 2/9/2022), Disp: , Rfl:      Case Management:  I have reviewed the facility/SNF care plan/MDS, including the falls risk, nutrition and pain screening. I also reviewed the current immunizations, and preventive care.. Future cancer screening is not clinically indicated secondary to age/goals of care Patient's desire to return to the community is not assessible due to cognitive impairment. Current Level of Care is appropriate.    Advance Directive Discussion:    I reviewed the current advanced directives as reflected in EPIC, the POLST and the facility chart, and verified the congruency of orders.  I did not due to cognitive impairment review the advance directives with the resident. Patient's goal is pain control and comfort.    Team Discussion:  I communicated with the appropriate disciplines involved with the Plan of Care:   Nursing    Information reviewed:  Medications, vital signs, orders, and nursing notes.    ROS:  Unobtainable secondary to cognitive impairment.     Exam:  Vital signs:BP (!) 157/72   Pulse 71   Temp 97.4  F (36.3  C)   Resp 19   Ht 1.626 m (5' 4\")   Wt 100.2 kg (221 lb)   SpO2 96%   BMI 37.93 kg/m     GENERAL APPEARANCE: Well developed, well nourished, in no acute distress.  HEENT: normocephalic, atraumatic  PERRL, sclerae anicteric, conjunctivae clear and moist, EOM intact  External inspection of ears and nose showed no scars, lesions or masses.  Lips, teeth, and gums showed normal mucosa. Throat showed no erythema or edema.  NECK: Supple and symmetric. Trachea is midline, no thyromegaly, no adenopathy, and no tenderness  LUNGS: Lung sounds CTA, no adventitious sounds, respiratory effort normal.  CARD: RRR, S1, S2, " without murmurs, gallops, rubs, no JVD, peripheral pulses 2+ and symmetric  BACK: No kyphosis of the thoracic spine. Symmetric, no curvature, ROM normal, no CVA tenderness, no spinal tenderness   ABD: Soft, nondistended and nontender with normal bowel sounds.   MSK: Muscle strength and tone were equal bilaterally. Moves all extremities easily and intentionally.   EXTREMITIES: No cyanosis, clubbing or edema.  NEURO: Alert and oriented x 3. Normal affect. Sensation to touch was normal. Face is symmetric.  SKIN: Inspection of the skin reveals no rashes, ulcerations or petechiae.  PSYCH: euthymic          Lab/Diagnostic data:   Lab Results   Component Value Date    A1C 6.7 01/21/2021    A1C 6.6 07/30/2020    A1C 6.7 03/23/2020    A1C 6.7 02/25/2020    A1C 6.6 09/09/2019         ASSESSMENT/PLAN  Alzheimer's dementia with behavioral disturbance, unspecified timing of dementia onset (H)  No behaviors, dementia moderate to severe. Sleeping well. No antidepressants. She has been weaned off of her Effexor without side effects.     Uncontrolled hypertension  Bps elevated, discontinue Metoprolol.  Increase lisinopril 40mg daily.  Continue with verapamil and hydralazine 1 x daily. Check a BMP    Type 2 diabetes mellitus with hyperglycemia, without long-term current use of insulin (H)  Check A1c, diet controlled only.     Vitamin D deficiency  Supplement dc'd last year.  Check Vit D level    Depression, unspecified depression type  Stable without re-occurrence since weaning off of venlafaxine back in Oct 2021.     Anemia, unspecified type  Check hgb.  Last hgb was 11.0.  No supplements     Morbid Obesity  Difficult to manage due to patient's dementia.  She does snack often.  Nursing to monitor snacking    CKD:  Checking BMP this week.       Electronically signed by:  Suri Chand NP           Sincerely,        Suri Chand NP

## 2022-02-15 ENCOUNTER — LAB REQUISITION (OUTPATIENT)
Dept: LAB | Facility: CLINIC | Age: 82
End: 2022-02-15
Payer: COMMERCIAL

## 2022-02-15 DIAGNOSIS — F03.90 UNSPECIFIED DEMENTIA WITHOUT BEHAVIORAL DISTURBANCE: ICD-10-CM

## 2022-02-15 DIAGNOSIS — E83.30 DISORDER OF PHOSPHORUS METABOLISM, UNSPECIFIED: ICD-10-CM

## 2022-02-16 ENCOUNTER — TELEPHONE (OUTPATIENT)
Dept: GERIATRICS | Facility: CLINIC | Age: 82
End: 2022-02-16

## 2022-02-16 LAB
ANION GAP SERPL CALCULATED.3IONS-SCNC: 9 MMOL/L (ref 5–18)
BUN SERPL-MCNC: 23 MG/DL (ref 8–28)
CALCIUM SERPL-MCNC: 9.8 MG/DL (ref 8.5–10.5)
CHLORIDE BLD-SCNC: 104 MMOL/L (ref 98–107)
CO2 SERPL-SCNC: 26 MMOL/L (ref 22–31)
CREAT SERPL-MCNC: 0.83 MG/DL (ref 0.6–1.1)
DEPRECATED CALCIDIOL+CALCIFEROL SERPL-MC: 28 UG/L (ref 30–80)
ERYTHROCYTE [DISTWIDTH] IN BLOOD BY AUTOMATED COUNT: 15.1 % (ref 10–15)
FERRITIN SERPL-MCNC: 59 NG/ML (ref 10–130)
FOLATE SERPL-MCNC: 14.4 NG/ML
GFR SERPL CREATININE-BSD FRML MDRD: 71 ML/MIN/1.73M2
GLUCOSE BLD-MCNC: 201 MG/DL (ref 70–125)
HBA1C MFR BLD: 9.5 %
HCT VFR BLD AUTO: 35.4 % (ref 35–47)
HGB BLD-MCNC: 11.5 G/DL (ref 11.7–15.7)
IRON SATN MFR SERPL: 15 % (ref 20–50)
IRON SERPL-MCNC: 48 UG/DL (ref 42–175)
MCH RBC QN AUTO: 31.3 PG (ref 26.5–33)
MCHC RBC AUTO-ENTMCNC: 32.5 G/DL (ref 31.5–36.5)
MCV RBC AUTO: 97 FL (ref 78–100)
PLATELET # BLD AUTO: 441 10E3/UL (ref 150–450)
POTASSIUM BLD-SCNC: 4.6 MMOL/L (ref 3.5–5)
RBC # BLD AUTO: 3.67 10E6/UL (ref 3.8–5.2)
SODIUM SERPL-SCNC: 139 MMOL/L (ref 136–145)
TIBC SERPL-MCNC: 328 UG/DL (ref 313–563)
TRANSFERRIN SERPL-MCNC: 262 MG/DL (ref 212–360)
VIT B12 SERPL-MCNC: 493 PG/ML (ref 213–816)
WBC # BLD AUTO: 9 10E3/UL (ref 4–11)

## 2022-02-16 PROCEDURE — 82746 ASSAY OF FOLIC ACID SERUM: CPT | Mod: ORL | Performed by: FAMILY MEDICINE

## 2022-02-16 PROCEDURE — 82607 VITAMIN B-12: CPT | Mod: ORL | Performed by: FAMILY MEDICINE

## 2022-02-16 PROCEDURE — 84466 ASSAY OF TRANSFERRIN: CPT | Mod: ORL | Performed by: FAMILY MEDICINE

## 2022-02-16 PROCEDURE — 82728 ASSAY OF FERRITIN: CPT | Mod: ORL | Performed by: FAMILY MEDICINE

## 2022-02-16 PROCEDURE — 80048 BASIC METABOLIC PNL TOTAL CA: CPT | Mod: ORL | Performed by: FAMILY MEDICINE

## 2022-02-16 PROCEDURE — P9604 ONE-WAY ALLOW PRORATED TRIP: HCPCS | Mod: ORL | Performed by: FAMILY MEDICINE

## 2022-02-16 PROCEDURE — 82306 VITAMIN D 25 HYDROXY: CPT | Mod: ORL | Performed by: FAMILY MEDICINE

## 2022-02-16 PROCEDURE — 85027 COMPLETE CBC AUTOMATED: CPT | Mod: ORL | Performed by: FAMILY MEDICINE

## 2022-02-16 PROCEDURE — 83036 HEMOGLOBIN GLYCOSYLATED A1C: CPT | Mod: ORL | Performed by: FAMILY MEDICINE

## 2022-02-16 PROCEDURE — 36415 COLL VENOUS BLD VENIPUNCTURE: CPT | Mod: ORL | Performed by: FAMILY MEDICINE

## 2022-02-16 RX ORDER — MULTIVIT-MIN/IRON/FOLIC ACID/K 18-600-40
1 CAPSULE ORAL EVERY MORNING
COMMUNITY
Start: 2022-02-16

## 2022-02-16 NOTE — TELEPHONE ENCOUNTER
[facility] Geriatrics Triage Nurse Telephone Encounter    Provider: ZANDRA Krishnamurthy  Facility: [facility] Facility Type:  [facility]    Caller: Yeni  Call Back Number: [phone]    Allergies:    Allergies   Allergen Reactions     Codeine Sulfate [Codeine] Unknown        Reason for call: Nurse is calling to report Heme 2, BMP, iron studies, folate, ferritin, Vitamin D, and Vitamin B12 results.  The HgbA1c is still pending.  Notable meds:  Hydralazine 50mg daily, Lisinopril 40mg daily, Verapamil ER 100mg daily, Gabapentin 100mg TID.       Addendum:  The HgbA1c results are back.         Verbal Order/Direction given by Provider: Vitamin D3----1000iu daily.  Flag the HgbA1c for NP to address tomorrow.      Addendum:  Metformin 500mg BID.  Check BG Q AM.  Check HgbA1c on 5/11/22.        Provider giving Order:  ZANDRA Krishnamurthy    Verbal Order given to: Yeni Casillas RN

## 2022-02-18 PROCEDURE — U0003 INFECTIOUS AGENT DETECTION BY NUCLEIC ACID (DNA OR RNA); SEVERE ACUTE RESPIRATORY SYNDROME CORONAVIRUS 2 (SARS-COV-2) (CORONAVIRUS DISEASE [COVID-19]), AMPLIFIED PROBE TECHNIQUE, MAKING USE OF HIGH THROUGHPUT TECHNOLOGIES AS DESCRIBED BY CMS-2020-01-R: HCPCS | Mod: ORL | Performed by: FAMILY MEDICINE

## 2022-02-19 ENCOUNTER — LAB REQUISITION (OUTPATIENT)
Dept: LAB | Facility: CLINIC | Age: 82
End: 2022-02-19
Payer: COMMERCIAL

## 2022-02-19 DIAGNOSIS — U07.1 COVID-19: ICD-10-CM

## 2022-02-19 LAB — SARS-COV-2 RNA RESP QL NAA+PROBE: NEGATIVE

## 2022-04-27 ENCOUNTER — NURSING HOME VISIT (OUTPATIENT)
Dept: GERIATRICS | Facility: CLINIC | Age: 82
End: 2022-04-27
Payer: COMMERCIAL

## 2022-04-27 VITALS
HEART RATE: 98 BPM | OXYGEN SATURATION: 95 % | RESPIRATION RATE: 20 BRPM | HEIGHT: 64 IN | WEIGHT: 216 LBS | BODY MASS INDEX: 36.88 KG/M2 | DIASTOLIC BLOOD PRESSURE: 78 MMHG | SYSTOLIC BLOOD PRESSURE: 147 MMHG | TEMPERATURE: 96.8 F

## 2022-04-27 DIAGNOSIS — G30.9 ALZHEIMER'S DEMENTIA WITHOUT BEHAVIORAL DISTURBANCE, UNSPECIFIED TIMING OF DEMENTIA ONSET: Primary | ICD-10-CM

## 2022-04-27 DIAGNOSIS — F02.80 ALZHEIMER'S DEMENTIA WITHOUT BEHAVIORAL DISTURBANCE, UNSPECIFIED TIMING OF DEMENTIA ONSET: Primary | ICD-10-CM

## 2022-04-27 DIAGNOSIS — E66.01 MORBID OBESITY (H): ICD-10-CM

## 2022-04-27 DIAGNOSIS — I10 UNCONTROLLED HYPERTENSION: ICD-10-CM

## 2022-04-27 DIAGNOSIS — E11.65 TYPE 2 DIABETES MELLITUS WITH HYPERGLYCEMIA, WITHOUT LONG-TERM CURRENT USE OF INSULIN (H): ICD-10-CM

## 2022-04-27 PROCEDURE — 99309 SBSQ NF CARE MODERATE MDM 30: CPT | Performed by: FAMILY MEDICINE

## 2022-04-27 NOTE — LETTER
4/27/2022        RE: Kandi Gannon  [facility]  [address] [address]        [facility]      Code Status:  DNR  Visit Type: Review Of Multiple Medical Conditions     Facility:  [facility] [[id number]]           History of Present Illness: Kandi Ontiveros is a 80 y.o. female who is a resident of [facility].  She has underlying history of dementia with progressive memory impairment.   Last BIMS 3/15; remains at baseline  She has been moved to the [facility] where she resides with her .  She has continued behaviors including not letting staff touch her or take part in any of her cares.  Physically she remains debilitated but noted to be ambulating independently without any assist device.  There is a significant language barrier with her being a Nepali speaker  She is a diabetic and has been started on metformin  BG  Checks were reviewed and under 140 most of the time  Last a1c 9.5  Speaks very little and is a native Nepali speaker  Weight loss of almost 10 pounds noted    Past Medical History:   Diagnosis Date     Anxiety     Created by Conversion      Benign Adenomatous Polyp Of The Large Intestine     Created by Conversion      Chronic Diarrhea Of Unknown Origin     Created by Conversion      Dementia of the Alzheimer's type 7/13/2014     Diabetes Mellitus     Created by Conversion      Esophageal reflux     Created by Conversion      Gastritis      Herpes Zoster (Shingles)     Created by Conversion      Hiatal hernia      Hypercholesterolemia     Created by Conversion      Hypertension     Created by Conversion      Melanosis Coli     Created by Conversion [facility] Annotation: May  2 2012  1:19PM - Sheila Benavides: colonoscopy  4/30/12      Memory Lapses Or Loss     Created by Conversion      Obstructive Sleep Apnea     Created by Conversion      Osteopenia     Created by Conversion      Raynaud's Disease     Created by  Conversion      Tricuspid Regurgitation     Created by Conversion      No past surgical history on file.  Family History   Problem Relation Age of Onset     Hypertension Other      Diabetes Other      Stroke Other      Social History     Socioeconomic History     Marital status:      Spouse name: Not on file     Number of children: Not on file     Years of education: Not on file     Highest education level: Not on file   Occupational History     Not on file   Social Needs     Financial resource strain: Not on file     Food insecurity     Worry: Not on file     Inability: Not on file     Transportation needs     Medical: Not on file     Non-medical: Not on file   Tobacco Use     Smoking status: Never Smoker     Smokeless tobacco: Never Used   Substance and Sexual Activity     Alcohol use: Not on file     Drug use: Not on file     Sexual activity: Not on file   Lifestyle     Physical activity     Days per week: Not on file     Minutes per session: Not on file     Stress: Not on file   Relationships     Social connections     Talks on phone: Not on file     Gets together: Not on file     Attends Muslim service: Not on file     Active member of club or organization: Not on file     Attends meetings of clubs or organizations: Not on file     Relationship status: Not on file     Intimate partner violence     Fear of current or ex partner: Not on file     Emotionally abused: Not on file     Physically abused: Not on file     Forced sexual activity: Not on file   Other Topics Concern     Not on file   Social History Narrative     Not on file   lived in [facility]  Current Outpatient Medications   Medication Sig Dispense Refill     acetaminophen (TYLENOL) 325 MG tablet Take 650 mg by mouth 4 (four) times a day.       bisacodyl (DULCOLAX, BISACODYL,) 10 mg suppository Insert 1 suppository (10 mg total) into the rectum daily as needed (for constipation). 12 suppository 0     cetirizine (ZYRTEC) 10 MG tablet Take 10 mg by  "mouth daily as needed.        cholecalciferol, vitamin D3, 1,000 unit tablet Take 1,000 Units by mouth daily.       fluticasone propionate (FLONASE) 50 mcg/actuation nasal spray Apply 1 spray into each nostril daily as needed for rhinitis.       gabapentin (NEURONTIN) 100 MG capsule Take 100 mg by mouth 3 (three) times a day.       hydrALAZINE (APRESOLINE) 25 MG tablet Take 50 mg by mouth Daily at 8:00 am.. Hold for SBP <150             lisinopriL (PRINIVIL,ZESTRIL) 2.5 MG tablet Take 5 mg by mouth daily.        metoprolol succinate (TOPROL-XL) 25 MG Take 12.5 mg by mouth daily.              polyethylene glycol (MIRALAX) 17 gram/dose powder Take 17 g by mouth daily. 255 g 0     venlafaxine (EFFEXOR-XR) 37.5 MG 24 hr capsule Take 37.5 mg by mouth daily.       verapamil (VERELAN PM) 100 mg 24 hr capsule TAKE ONE CAPSULE BY MOUTH EVERY DAY 90 capsule 1     white petrolatum (AQUAPHOR ORIGINAL) 41 % Oint Apply 1 application topically 3 (three) times a day as needed.              No current facility-administered medications for this visit.      Allergies   Allergen Reactions     Codeine Sulfate          Review of Systems:    Comprehensive review not done because of advanced dementia and language barrier.  Noted to ambulating without assist device  Has been losing weight  Continues to have some behaviors associated dementia and does not let staff participate in her cares  Oral intake as per staff is adequate  Recently due to poorly controlled diabetes she has been started back on medications      Physical Exam:    Blood pressure (!) 147/78, pulse 98, temperature 96.8  F (36  C), resp. rate 20, height 1.626 m (5' 4\"), weight 98 kg (216 lb), SpO2 95 %.      Obese  GENERAL: no acute distress. Cooperative in conversation.  Has limited recall and mostly smiles to questions  Patient is obese  HEENT: pupils are equal, round and reactive. Oral mucosa is moist and intact.  RESP:Chest symmetric. Regular respiratory rate. No " stridor.  CVS: S1S2  ABD: Nondistended, soft.  EXTREMITIES: She has pedal and lower extremity edema.   NEURO: non focal. Alert and oriented xSELF   PSYCH: within normal limits. No depression or anxiety.  SKIN: warm dry intact   Musculoskeletal no focal abnormalities noted to be ambulating without any assist device    Labs:    Lab Results   Component Value Date    HGBA1C 6.7 (H) 01/21/2021     Results for orders placed or performed in visit on 09/17/20   Basic Metabolic Panel   Result Value Ref Range    Sodium 140 136 - 145 mmol/L    Potassium 4.5 3.5 - 5.0 mmol/L    Chloride 102 98 - 107 mmol/L    CO2 29 22 - 31 mmol/L    Anion Gap, Calculation 9 5 - 18 mmol/L    Glucose 121 70 - 125 mg/dL    Calcium 10.0 8.5 - 10.5 mg/dL    BUN 28 8 - 28 mg/dL    Creatinine 1.05 0.60 - 1.10 mg/dL    GFR MDRD Af Amer >60 >60 mL/min/1.73m2    GFR MDRD Non Af Amer 51 (L) >60 mL/min/1.73m2     Lab Results   Component Value Date    [id number] 368 03/23/2020     Vitamin D, Total (25-Hydroxy)   Date Value Ref Range Status   03/23/2020 34.4 30.0 - 80.0 ng/mL Final         Assessment/Plan:    -Dementia with significant cognitive decline  - History of diabetes with a last A1c greater than 9  - Morbid obesity  -History of depression with anxiety  - Hypertension  - Generalized weakness with decline noted    Pt is in memory care with dementia  Patient is difficult because of language barrier as well as dementia.  Denies any pain.  Blood pressures improved with recent adjustment in medications done.  She has been started on metformin 500 twice daily because of concerns about diabetes.  Blood sugar checks were all under 150  Recheck A1c ordered  Weights are down about 10 pounds  Noted to be ambulating independently.  No pain concerns      Electronically signed by: BERLIN Butts  This progress note was completed using Dragon software and there may be grammatical errors.            Sincerely,        BERLIN Butts

## 2022-04-27 NOTE — PROGRESS NOTES
[facility]      Code Status:  DNR  Visit Type: Review Of Multiple Medical Conditions     Facility:  [facility] [[id number]]           History of Present Illness: Kandi Ontiveros is a 80 y.o. female who is a resident of [facility].  She has underlying history of dementia with progressive memory impairment.   Last BIMS 3/15; remains at baseline  She has been moved to the [facility] where she resides with her .  She has continued behaviors including not letting staff touch her or take part in any of her cares.  Physically she remains debilitated but noted to be ambulating independently without any assist device.  There is a significant language barrier with her being a [place]i speaker  She is a diabetic and has been started on metformin  BG  Checks were reviewed and under 140 most of the time  Last a1c 9.5  Speaks very little and is a native [place]i speaker  Weight loss of almost 10 pounds noted    Past Medical History:   Diagnosis Date     Anxiety     Created by Conversion      Benign Adenomatous Polyp Of The Large Intestine     Created by Conversion      Chronic Diarrhea Of Unknown Origin     Created by Conversion      Dementia of the Alzheimer's type 7/13/2014     Diabetes Mellitus     Created by Conversion      Esophageal reflux     Created by Conversion      Gastritis      Herpes Zoster (Shingles)     Created by Conversion      Hiatal hernia      Hypercholesterolemia     Created by Conversion      Hypertension     Created by Conversion      Melanosis Coli     Created by Conversion [facility] Annotation: May  2 2012  1:19PM - Sheila Benavides: colonoscopy  4/30/12      Memory Lapses Or Loss     Created by Conversion      Obstructive Sleep Apnea     Created by Conversion      Osteopenia     Created by Conversion      Raynaud's Disease     Created by Conversion      Tricuspid Regurgitation     Created by Conversion      No past surgical history on file.  Family  History   Problem Relation Age of Onset     Hypertension Other      Diabetes Other      Stroke Other      Social History     Socioeconomic History     Marital status:      Spouse name: Not on file     Number of children: Not on file     Years of education: Not on file     Highest education level: Not on file   Occupational History     Not on file   Social Needs     Financial resource strain: Not on file     Food insecurity     Worry: Not on file     Inability: Not on file     Transportation needs     Medical: Not on file     Non-medical: Not on file   Tobacco Use     Smoking status: Never Smoker     Smokeless tobacco: Never Used   Substance and Sexual Activity     Alcohol use: Not on file     Drug use: Not on file     Sexual activity: Not on file   Lifestyle     Physical activity     Days per week: Not on file     Minutes per session: Not on file     Stress: Not on file   Relationships     Social connections     Talks on phone: Not on file     Gets together: Not on file     Attends Quaker service: Not on file     Active member of club or organization: Not on file     Attends meetings of clubs or organizations: Not on file     Relationship status: Not on file     Intimate partner violence     Fear of current or ex partner: Not on file     Emotionally abused: Not on file     Physically abused: Not on file     Forced sexual activity: Not on file   Other Topics Concern     Not on file   Social History Narrative     Not on file   lived in [facility]  Current Outpatient Medications   Medication Sig Dispense Refill     acetaminophen (TYLENOL) 325 MG tablet Take 650 mg by mouth 4 (four) times a day.       bisacodyl (DULCOLAX, BISACODYL,) 10 mg suppository Insert 1 suppository (10 mg total) into the rectum daily as needed (for constipation). 12 suppository 0     cetirizine (ZYRTEC) 10 MG tablet Take 10 mg by mouth daily as needed.        cholecalciferol, vitamin D3, 1,000 unit tablet Take 1,000 Units by mouth daily.        "fluticasone propionate (FLONASE) 50 mcg/actuation nasal spray Apply 1 spray into each nostril daily as needed for rhinitis.       gabapentin (NEURONTIN) 100 MG capsule Take 100 mg by mouth 3 (three) times a day.       hydrALAZINE (APRESOLINE) 25 MG tablet Take 50 mg by mouth Daily at 8:00 am.. Hold for SBP <150             lisinopriL (PRINIVIL,ZESTRIL) 2.5 MG tablet Take 5 mg by mouth daily.        metoprolol succinate (TOPROL-XL) 25 MG Take 12.5 mg by mouth daily.              polyethylene glycol (MIRALAX) 17 gram/dose powder Take 17 g by mouth daily. 255 g 0     venlafaxine (EFFEXOR-XR) 37.5 MG 24 hr capsule Take 37.5 mg by mouth daily.       verapamil (VERELAN PM) 100 mg 24 hr capsule TAKE ONE CAPSULE BY MOUTH EVERY DAY 90 capsule 1     white petrolatum (AQUAPHOR ORIGINAL) 41 % Oint Apply 1 application topically 3 (three) times a day as needed.              No current facility-administered medications for this visit.      Allergies   Allergen Reactions     Codeine Sulfate          Review of Systems:    Comprehensive review not done because of advanced dementia and language barrier.  Noted to ambulating without assist device  Has been losing weight  Continues to have some behaviors associated dementia and does not let staff participate in her cares  Oral intake as per staff is adequate  Recently due to poorly controlled diabetes she has been started back on medications      Physical Exam:    Blood pressure (!) 147/78, pulse 98, temperature 96.8  F (36  C), resp. rate 20, height 1.626 m (5' 4\"), weight 98 kg (216 lb), SpO2 95 %.      Obese  GENERAL: no acute distress. Cooperative in conversation.  Has limited recall and mostly smiles to questions  Patient is obese  HEENT: pupils are equal, round and reactive. Oral mucosa is moist and intact.  RESP:Chest symmetric. Regular respiratory rate. No stridor.  CVS: S1S2  ABD: Nondistended, soft.  EXTREMITIES: She has pedal and lower extremity edema.   NEURO: non focal. " Alert and oriented xSELF   PSYCH: within normal limits. No depression or anxiety.  SKIN: warm dry intact   Musculoskeletal no focal abnormalities noted to be ambulating without any assist device    Labs:    Lab Results   Component Value Date    HGBA1C 6.7 (H) 01/21/2021     Results for orders placed or performed in visit on 09/17/20   Basic Metabolic Panel   Result Value Ref Range    Sodium 140 136 - 145 mmol/L    Potassium 4.5 3.5 - 5.0 mmol/L    Chloride 102 98 - 107 mmol/L    CO2 29 22 - 31 mmol/L    Anion Gap, Calculation 9 5 - 18 mmol/L    Glucose 121 70 - 125 mg/dL    Calcium 10.0 8.5 - 10.5 mg/dL    BUN 28 8 - 28 mg/dL    Creatinine 1.05 0.60 - 1.10 mg/dL    GFR MDRD Af Amer >60 >60 mL/min/1.73m2    GFR MDRD Non Af Amer 51 (L) >60 mL/min/1.73m2     Lab Results   Component Value Date    [id number] 368 03/23/2020     Vitamin D, Total (25-Hydroxy)   Date Value Ref Range Status   03/23/2020 34.4 30.0 - 80.0 ng/mL Final         Assessment/Plan:    -Dementia with significant cognitive decline  - History of diabetes with a last A1c greater than 9  - Morbid obesity  -History of depression with anxiety  - Hypertension  - Generalized weakness with decline noted    Pt is in memory care with dementia  Patient is difficult because of language barrier as well as dementia.  Denies any pain.  Blood pressures improved with recent adjustment in medications done.  She has been started on metformin 500 twice daily because of concerns about diabetes.  Blood sugar checks were all under 150  Recheck A1c ordered  Weights are down about 10 pounds  Noted to be ambulating independently.  No pain concerns      Electronically signed by: BERLIN Butts  This progress note was completed using Dragon software and there may be grammatical errors.

## 2022-05-09 ENCOUNTER — LAB REQUISITION (OUTPATIENT)
Dept: LAB | Facility: CLINIC | Age: 82
End: 2022-05-09
Payer: COMMERCIAL

## 2022-05-09 DIAGNOSIS — E11.9 TYPE 2 DIABETES MELLITUS WITHOUT COMPLICATIONS (H): ICD-10-CM

## 2022-05-11 ENCOUNTER — TELEPHONE (OUTPATIENT)
Dept: GERIATRICS | Facility: CLINIC | Age: 82
End: 2022-05-11

## 2022-05-11 LAB — HBA1C MFR BLD: 7.8 %

## 2022-05-11 PROCEDURE — 83036 HEMOGLOBIN GLYCOSYLATED A1C: CPT | Mod: ORL | Performed by: FAMILY MEDICINE

## 2022-05-11 PROCEDURE — P9604 ONE-WAY ALLOW PRORATED TRIP: HCPCS | Mod: ORL | Performed by: FAMILY MEDICINE

## 2022-05-11 PROCEDURE — 36415 COLL VENOUS BLD VENIPUNCTURE: CPT | Mod: ORL | Performed by: FAMILY MEDICINE

## 2022-05-11 NOTE — TELEPHONE ENCOUNTER
[facility] En[facility]    Provider: ZANDRA Krishnamurthy  Facility: [facility] Facility Type:  [facility]    Caller: Jd  Call Back Number: [phone]    Allergies:    Allergies   Allergen Reactions     Codeine Sulfate [Codeine] Unknown        Reason for call: Nurse is calling to report HgbA1c results.  Notable meds:  Metformin 500mg BID.      Verbal Order/Direction given by Provider: No new orders.      Provider giving Order:  ZANDRA Krishnamurthy    Verbal Order given to: Jd Casillas RN

## 2022-06-01 ENCOUNTER — NURSING HOME VISIT (OUTPATIENT)
Dept: GERIATRICS | Facility: CLINIC | Age: 82
End: 2022-06-01
Payer: COMMERCIAL

## 2022-06-01 VITALS
DIASTOLIC BLOOD PRESSURE: 70 MMHG | SYSTOLIC BLOOD PRESSURE: 143 MMHG | BODY MASS INDEX: 36.37 KG/M2 | OXYGEN SATURATION: 96 % | WEIGHT: 213 LBS | HEIGHT: 64 IN | TEMPERATURE: 97.4 F | RESPIRATION RATE: 20 BRPM | HEART RATE: 98 BPM

## 2022-06-01 DIAGNOSIS — E11.65 TYPE 2 DIABETES MELLITUS WITH HYPERGLYCEMIA, WITHOUT LONG-TERM CURRENT USE OF INSULIN (H): ICD-10-CM

## 2022-06-01 DIAGNOSIS — F02.80 ALZHEIMER'S DEMENTIA WITHOUT BEHAVIORAL DISTURBANCE, UNSPECIFIED TIMING OF DEMENTIA ONSET: Primary | ICD-10-CM

## 2022-06-01 DIAGNOSIS — E55.9 VITAMIN D DEFICIENCY: ICD-10-CM

## 2022-06-01 DIAGNOSIS — N18.30 RENAL FAILURE, CHRONIC, STAGE 3 (MODERATE) (H): ICD-10-CM

## 2022-06-01 DIAGNOSIS — G30.9 ALZHEIMER'S DEMENTIA WITHOUT BEHAVIORAL DISTURBANCE, UNSPECIFIED TIMING OF DEMENTIA ONSET: Primary | ICD-10-CM

## 2022-06-01 DIAGNOSIS — F32.A DEPRESSION, UNSPECIFIED DEPRESSION TYPE: ICD-10-CM

## 2022-06-01 DIAGNOSIS — I10 UNCONTROLLED HYPERTENSION: ICD-10-CM

## 2022-06-01 PROCEDURE — 99309 SBSQ NF CARE MODERATE MDM 30: CPT | Performed by: NURSE PRACTITIONER

## 2022-06-01 RX ORDER — LISINOPRIL 40 MG/1
40 TABLET ORAL DAILY
COMMUNITY
End: 2023-01-01

## 2022-06-01 NOTE — LETTER
6/1/2022        RE: Kandi Gannon  [facility]  [address] [address]        [facility] GERIATRICS  Chief Complaint   Patient presents with     [facility] Medical Record Number:  [id number]  Place of Service where encounter took place:  [facility] () [[id number]]    HPI:    Kandi Gannon  is 81 year old (1940), who is being seen today for a federally mandated E/M visit. She has a past medical history for dementia, hypertension, HLD, diabetes, NEDRA, vitamin D deficiency and anxiety.  She has moderate-severe dementia.      Today, she is sitting quietly at the table and speaks pleasantly to me in Hebrew.  She denies any pain or discomforts.  She does become annoyed with other residents and will say paranoid comments on how they are bothering her.      A few weeks ago, nursing reported that she frequently refuses medications and so her medications were reviewed and changed.  Hydralazine was dc'd and her verapamil was increased and changed to ER.  Her gabapentin was decreased to BID.  Bps have been stable with SBPs 130-140s.      Recent A1c was 7.8 and she is on Metformin 500mg BID.   Recent Vitamin D was 28 and she continues on Cholecalciferol daily.     ALLERGIES:Codeine sulfate [codeine]  PAST MEDICAL HISTORY: History reviewed. No pertinent past medical history.  PAST SURGICAL HISTORY:   has no past surgical history on file.  FAMILY HISTORY: family history includes Cerebrovascular Disease in an other family member; Diabetes in an other family member; Hypertension in an other family member.  SOCIAL HISTORY:  reports that she has never smoked. She has never used smokeless tobacco.    MEDICATIONS:     Review of your medicines          Accurate as of June 1, 2022  1:20 PM. If you have any questions, ask your nurse or doctor.            CONTINUE these medicines which have NOT CHANGED      Dose / Directions   acetaminophen 325 MG  "tablet  Commonly known as: TYLENOL      Dose: 650 mg  Take 650 mg by mouth 4 times daily NTE 4gm/24hrs  Refills: 0     bisacodyl 10 MG suppository  Commonly known as: DULCOLAX      Dose: 10 mg  Place 10 mg rectally daily as needed for constipation  Refills: 0     fluticasone 50 MCG/ACT nasal spray  Commonly known as: FLONASE      Dose: 1 spray  Spray 1 spray into both nostrils daily as needed for rhinitis or allergies  Refills: 0     gabapentin 100 MG capsule  Commonly known as: NEURONTIN      Dose: 100 mg  Take 100 mg by mouth 3 times daily Take 100mg in the AM and 200mg at HS.  Refills: 0     metFORMIN 500 MG tablet  Commonly known as: GLUCOPHAGE      Dose: 500 mg  Take 500 mg by mouth 2 times daily (with meals)  Refills: 0     mineral oil-hydrophilic petrolatum external ointment      Apply topically 3 times daily as needed  Refills: 0     polyethylene glycol 17 g packet  Commonly known as: MIRALAX      Dose: 17 g  Take 17 g by mouth daily  Refills: 0     verapamil  MG 24 hr capsule  Commonly known as: VERELAN      Dose: 100 mg  Take 100 mg by mouth daily  Refills: 0     Vitamin D (Cholecalciferol) 25 MCG (1000 UT) Tabs      Dose: 1 tablet  Take 1 tablet by mouth daily  Refills: 0        STOP taking    hydrALAZINE 50 MG tablet  Commonly known as: APRESOLINE  Stopped by: Suri Chand NP        lisinopril 5 MG tablet  Commonly known as: ZESTRIL  Stopped by: Suri Chand NP              Case Management:  I have reviewed the care plan and MDS and do agree with the plan. Patient's desire to return to the community is present, but is not able due to care needs . Information reviewed:  Medications, vital signs, orders, and nursing notes.    ROS:  Difficult to obtain due to dementia however denies pain or bowel and bladder issues.     Exam:  Vital signs:BP (!) 143/70   Pulse 98   Temp 97.4  F (36.3  C)   Resp 20   Ht 1.626 m (5' 4\")   Wt 96.6 kg (213 lb)   SpO2 96%   BMI 36.56 kg/m     GENERAL " APPEARANCE: Well developed, well nourished, in no acute distress.  HEENT: normocephalic, atraumatic   sclerae anicteric, conjunctivae clear and moist, EOM intact  LUNGS: Lung sounds CTA, no adventitious sounds, respiratory effort normal.  CARD: RRR, S1, S2, without murmurs, gallops, rubs  BACK: No kyphosis of the thoracic spine.  ABD: Soft, nondistended and nontender with normal bowel sounds.   MSK: Muscle strength and tone were equal bilaterally. Moves all extremities easily and intentionally. Ambulatory without a device, good gait  EXTREMITIES: No cyanosis, clubbing or edema.  NEURO: Alert with cognitive impairment.  Face is symmetric.  SKIN: Inspection of the skin reveals no rashes, ulcerations or petechiae.  PSYCH: euthymic      Lab/Diagnostic data:   Last Comprehensive Metabolic Panel:  Sodium   Date Value Ref Range Status   02/16/2022 139 136 - 145 mmol/L Final     Potassium   Date Value Ref Range Status   02/16/2022 4.6 3.5 - 5.0 mmol/L Final     Chloride   Date Value Ref Range Status   02/16/2022 104 98 - 107 mmol/L Final     Carbon Dioxide (CO2)   Date Value Ref Range Status   02/16/2022 26 22 - 31 mmol/L Final     Anion Gap   Date Value Ref Range Status   02/16/2022 9 5 - 18 mmol/L Final     Glucose   Date Value Ref Range Status   02/16/2022 201 (H) 70 - 125 mg/dL Final     Urea Nitrogen   Date Value Ref Range Status   02/16/2022 23 8 - 28 mg/dL Final     Creatinine   Date Value Ref Range Status   02/16/2022 0.83 0.60 - 1.10 mg/dL Final     GFR Estimate   Date Value Ref Range Status   02/16/2022 71 >60 mL/min/1.73m2 Final     Comment:     Effective December 21, 2021 eGFRcr in adults is calculated using the 2021 CKD-EPI creatinine equation which includes age and gender (Jacqueline et al., NEJ, DOI: 10.1056/MAMUoa8430902)   06/18/2021 42 (L) >60 mL/min/1.73m2 Final     Calcium   Date Value Ref Range Status   02/16/2022 9.8 8.5 - 10.5 mg/dL Final     Lab Results   Component Value Date    WBC 9.0 02/16/2022      Lab Results   Component Value Date    RBC 3.67 02/16/2022     Lab Results   Component Value Date    HGB 11.5 02/16/2022     Lab Results   Component Value Date    HCT 35.4 02/16/2022     Lab Results   Component Value Date    MCV 97 02/16/2022     Lab Results   Component Value Date    MCH 31.3 02/16/2022     Lab Results   Component Value Date    MCHC 32.5 02/16/2022     Lab Results   Component Value Date    RDW 15.1 02/16/2022     Lab Results   Component Value Date     02/16/2022     Lab Results   Component Value Date    A1C 7.8 05/11/2022    A1C 9.5 02/16/2022    A1C 6.7 01/21/2021    A1C 6.6 07/30/2020    A1C 6.7 03/23/2020         ASSESSMENT/PLAN  Alzheimer's dementia without behavioral disturbance, unspecified timing of dementia onset (H)  Mood and behaviors are stable, needs some redirection when annoyed.  On no medication for sleep or depression.  Continue to monitor    Type 2 diabetes mellitus with hyperglycemia, without long-term current use of insulin (H)  Controlled for age, does not check BS. Continue with Metformin 500mg BID.      Uncontrolled hypertension  Controlled for age.  Continue with current verapamil and lisinopril    Vitamin D deficiency  Recheck Vitamin D in 2 months, continue on 1000iu of Vit D2    Renal failure, chronic, stage 3 (moderate) (H)  Last BMP WNL, continue to monitor    Depression, unspecified depression type  Stable, PHQ-9 0, no medications.  Continue to monitor.       Electronically signed by:  Suri Chand NP            Sincerely,        Suri Chand NP

## 2022-06-01 NOTE — PROGRESS NOTES
[facility] GERIATRICS  Chief Complaint   Patient presents with     [facility] Medical Record Number:  [id number]  Place of Service where encounter took place:  [facility] () [[id number]]    HPI:    Kandi Gannon  is 81 year old (1940), who is being seen today for a federally mandated E/M visit. She has a past medical history for dementia, hypertension, HLD, diabetes, NEDRA, vitamin D deficiency and anxiety.  She has moderate-severe dementia.      Today, she is sitting quietly at the table and speaks pleasantly to me in Faroese.  She denies any pain or discomforts.  She does become annoyed with other residents and will say paranoid comments on how they are bothering her.      A few weeks ago, nursing reported that she frequently refuses medications and so her medications were reviewed and changed.  Hydralazine was dc'd and her verapamil was increased and changed to ER.  Her gabapentin was decreased to BID.  Bps have been stable with SBPs 130-140s.      Recent A1c was 7.8 and she is on Metformin 500mg BID.   Recent Vitamin D was 28 and she continues on Cholecalciferol daily.     ALLERGIES:Codeine sulfate [codeine]  PAST MEDICAL HISTORY: History reviewed. No pertinent past medical history.  PAST SURGICAL HISTORY:   has no past surgical history on file.  FAMILY HISTORY: family history includes Cerebrovascular Disease in an other family member; Diabetes in an other family member; Hypertension in an other family member.  SOCIAL HISTORY:  reports that she has never smoked. She has never used smokeless tobacco.    MEDICATIONS:     Review of your medicines          Accurate as of June 1, 2022  1:20 PM. If you have any questions, ask your nurse or doctor.            CONTINUE these medicines which have NOT CHANGED      Dose / Directions   acetaminophen 325 MG tablet  Commonly known as: TYLENOL      Dose: 650 mg  Take 650 mg by mouth 4 times daily NTE  "4gm/24hrs  Refills: 0     bisacodyl 10 MG suppository  Commonly known as: DULCOLAX      Dose: 10 mg  Place 10 mg rectally daily as needed for constipation  Refills: 0     fluticasone 50 MCG/ACT nasal spray  Commonly known as: FLONASE      Dose: 1 spray  Spray 1 spray into both nostrils daily as needed for rhinitis or allergies  Refills: 0     gabapentin 100 MG capsule  Commonly known as: NEURONTIN      Dose: 100 mg  Take 100 mg by mouth 3 times daily Take 100mg in the AM and 200mg at HS.  Refills: 0     metFORMIN 500 MG tablet  Commonly known as: GLUCOPHAGE      Dose: 500 mg  Take 500 mg by mouth 2 times daily (with meals)  Refills: 0     mineral oil-hydrophilic petrolatum external ointment      Apply topically 3 times daily as needed  Refills: 0     polyethylene glycol 17 g packet  Commonly known as: MIRALAX      Dose: 17 g  Take 17 g by mouth daily  Refills: 0     verapamil  MG 24 hr capsule  Commonly known as: VERELAN      Dose: 100 mg  Take 100 mg by mouth daily  Refills: 0     Vitamin D (Cholecalciferol) 25 MCG (1000 UT) Tabs      Dose: 1 tablet  Take 1 tablet by mouth daily  Refills: 0        STOP taking    hydrALAZINE 50 MG tablet  Commonly known as: APRESOLINE  Stopped by: Suri Chand NP        lisinopril 5 MG tablet  Commonly known as: ZESTRIL  Stopped by: Suri Chand NP              Case Management:  I have reviewed the care plan and MDS and do agree with the plan. Patient's desire to return to the community is present, but is not able due to care needs . Information reviewed:  Medications, vital signs, orders, and nursing notes.    ROS:  Difficult to obtain due to dementia however denies pain or bowel and bladder issues.     Exam:  Vital signs:BP (!) 143/70   Pulse 98   Temp 97.4  F (36.3  C)   Resp 20   Ht 1.626 m (5' 4\")   Wt 96.6 kg (213 lb)   SpO2 96%   BMI 36.56 kg/m     GENERAL APPEARANCE: Well developed, well nourished, in no acute distress.  HEENT: normocephalic, " atraumatic   sclerae anicteric, conjunctivae clear and moist, EOM intact  LUNGS: Lung sounds CTA, no adventitious sounds, respiratory effort normal.  CARD: RRR, S1, S2, without murmurs, gallops, rubs  BACK: No kyphosis of the thoracic spine.  ABD: Soft, nondistended and nontender with normal bowel sounds.   MSK: Muscle strength and tone were equal bilaterally. Moves all extremities easily and intentionally. Ambulatory without a device, good gait  EXTREMITIES: No cyanosis, clubbing or edema.  NEURO: Alert with cognitive impairment.  Face is symmetric.  SKIN: Inspection of the skin reveals no rashes, ulcerations or petechiae.  PSYCH: euthymic      Lab/Diagnostic data:   Last Comprehensive Metabolic Panel:  Sodium   Date Value Ref Range Status   02/16/2022 139 136 - 145 mmol/L Final     Potassium   Date Value Ref Range Status   02/16/2022 4.6 3.5 - 5.0 mmol/L Final     Chloride   Date Value Ref Range Status   02/16/2022 104 98 - 107 mmol/L Final     Carbon Dioxide (CO2)   Date Value Ref Range Status   02/16/2022 26 22 - 31 mmol/L Final     Anion Gap   Date Value Ref Range Status   02/16/2022 9 5 - 18 mmol/L Final     Glucose   Date Value Ref Range Status   02/16/2022 201 (H) 70 - 125 mg/dL Final     Urea Nitrogen   Date Value Ref Range Status   02/16/2022 23 8 - 28 mg/dL Final     Creatinine   Date Value Ref Range Status   02/16/2022 0.83 0.60 - 1.10 mg/dL Final     GFR Estimate   Date Value Ref Range Status   02/16/2022 71 >60 mL/min/1.73m2 Final     Comment:     Effective December 21, 2021 eGFRcr in adults is calculated using the 2021 CKD-EPI creatinine equation which includes age and gender (Jacqueline frazier al., NEJ, DOI: 10.1056/TEHJfh4300581)   06/18/2021 42 (L) >60 mL/min/1.73m2 Final     Calcium   Date Value Ref Range Status   02/16/2022 9.8 8.5 - 10.5 mg/dL Final     Lab Results   Component Value Date    WBC 9.0 02/16/2022     Lab Results   Component Value Date    RBC 3.67 02/16/2022     Lab Results   Component Value  Date    HGB 11.5 02/16/2022     Lab Results   Component Value Date    HCT 35.4 02/16/2022     Lab Results   Component Value Date    MCV 97 02/16/2022     Lab Results   Component Value Date    MCH 31.3 02/16/2022     Lab Results   Component Value Date    MCHC 32.5 02/16/2022     Lab Results   Component Value Date    RDW 15.1 02/16/2022     Lab Results   Component Value Date     02/16/2022     Lab Results   Component Value Date    A1C 7.8 05/11/2022    A1C 9.5 02/16/2022    A1C 6.7 01/21/2021    A1C 6.6 07/30/2020    A1C 6.7 03/23/2020         ASSESSMENT/PLAN  Alzheimer's dementia without behavioral disturbance, unspecified timing of dementia onset (H)  Mood and behaviors are stable, needs some redirection when annoyed.  On no medication for sleep or depression.  Continue to monitor    Type 2 diabetes mellitus with hyperglycemia, without long-term current use of insulin (H)  Controlled for age, does not check BS. Continue with Metformin 500mg BID.      Uncontrolled hypertension  Controlled for age.  Continue with current verapamil and lisinopril    Vitamin D deficiency  Recheck Vitamin D in 2 months, continue on 1000iu of Vit D2    Renal failure, chronic, stage 3 (moderate) (H)  Last BMP WNL, continue to monitor    Depression, unspecified depression type  Stable, PHQ-9 0, no medications.  Continue to monitor.       Electronically signed by:  Suri Chand NP

## 2022-08-03 ENCOUNTER — NURSING HOME VISIT (OUTPATIENT)
Dept: GERIATRICS | Facility: CLINIC | Age: 82
End: 2022-08-03
Payer: COMMERCIAL

## 2022-08-03 VITALS
BODY MASS INDEX: 35.63 KG/M2 | RESPIRATION RATE: 18 BRPM | SYSTOLIC BLOOD PRESSURE: 143 MMHG | TEMPERATURE: 97.4 F | WEIGHT: 207.6 LBS | DIASTOLIC BLOOD PRESSURE: 70 MMHG | OXYGEN SATURATION: 96 % | HEART RATE: 76 BPM

## 2022-08-03 DIAGNOSIS — E11.65 TYPE 2 DIABETES MELLITUS WITH HYPERGLYCEMIA, WITHOUT LONG-TERM CURRENT USE OF INSULIN (H): ICD-10-CM

## 2022-08-03 DIAGNOSIS — F02.80 ALZHEIMER'S DEMENTIA WITHOUT BEHAVIORAL DISTURBANCE, UNSPECIFIED TIMING OF DEMENTIA ONSET: Primary | ICD-10-CM

## 2022-08-03 DIAGNOSIS — G30.9 ALZHEIMER'S DEMENTIA WITHOUT BEHAVIORAL DISTURBANCE, UNSPECIFIED TIMING OF DEMENTIA ONSET: Primary | ICD-10-CM

## 2022-08-03 DIAGNOSIS — E66.01 MORBID OBESITY (H): ICD-10-CM

## 2022-08-03 DIAGNOSIS — N18.30 RENAL FAILURE, CHRONIC, STAGE 3 (MODERATE) (H): ICD-10-CM

## 2022-08-03 DIAGNOSIS — I10 UNCONTROLLED HYPERTENSION: ICD-10-CM

## 2022-08-03 PROCEDURE — 99309 SBSQ NF CARE MODERATE MDM 30: CPT | Performed by: FAMILY MEDICINE

## 2022-08-03 NOTE — LETTER
8/3/2022        RE: Kandi Gannon  [facility]  [address] [address]        [facility]      Code Status:  DNR  Visit Type: Review Of Multiple Medical Conditions     Facility:  [facility] [[id number]]           History of Present Illness: Kandi Ontiveros is a 80 y.o. female who is a resident of [facility].  She has underlying history of dementia with progressive memory impairment.   Last BIMS 3/15; remains at baseline  Plan she is also Korean-speaking making communication hard.  She is noted to be ambulating independently in the facility and will not let staff touch her or take her part in her cares.  Oral intake has been okay however she has been losing weight and currently down to 207 pounds.  This is a significant weight loss for her.  Diabetes blood sugar checks are done once a day and most checks are consistently around 140  Blood pressures have been stable      Past Medical History:   Diagnosis Date     Anxiety     Created by Conversion      Benign Adenomatous Polyp Of The Large Intestine     Created by Conversion      Chronic Diarrhea Of Unknown Origin     Created by Conversion      Dementia of the Alzheimer's type 7/13/2014     Diabetes Mellitus     Created by Conversion      Esophageal reflux     Created by Conversion      Gastritis      Herpes Zoster (Shingles)     Created by Conversion      Hiatal hernia      Hypercholesterolemia     Created by Conversion      Hypertension     Created by Conversion      Melanosis Coli     Created by Conversion [facility] Annotation: May  2 2012  1:19PM - Sheila Benavides: colonoscopy  4/30/12      Memory Lapses Or Loss     Created by Conversion      Obstructive Sleep Apnea     Created by Conversion      Osteopenia     Created by Conversion      Raynaud's Disease     Created by Conversion      Tricuspid Regurgitation     Created by Conversion      No past surgical history on file.  Family History    Problem Relation Age of Onset     Hypertension Other      Diabetes Other      Stroke Other      Social History     Socioeconomic History     Marital status:      Spouse name: Not on file     Number of children: Not on file     Years of education: Not on file     Highest education level: Not on file   Occupational History     Not on file   Social Needs     Financial resource strain: Not on file     Food insecurity     Worry: Not on file     Inability: Not on file     Transportation needs     Medical: Not on file     Non-medical: Not on file   Tobacco Use     Smoking status: Never Smoker     Smokeless tobacco: Never Used   Substance and Sexual Activity     Alcohol use: Not on file     Drug use: Not on file     Sexual activity: Not on file   Lifestyle     Physical activity     Days per week: Not on file     Minutes per session: Not on file     Stress: Not on file   Relationships     Social connections     Talks on phone: Not on file     Gets together: Not on file     Attends Yarsanism service: Not on file     Active member of club or organization: Not on file     Attends meetings of clubs or organizations: Not on file     Relationship status: Not on file     Intimate partner violence     Fear of current or ex partner: Not on file     Emotionally abused: Not on file     Physically abused: Not on file     Forced sexual activity: Not on file   Other Topics Concern     Not on file   Social History Narrative     Not on file   lived in [facility]  Current Outpatient Medications   Medication Sig Dispense Refill     acetaminophen (TYLENOL) 325 MG tablet Take 650 mg by mouth 4 (four) times a day.       bisacodyl (DULCOLAX, BISACODYL,) 10 mg suppository Insert 1 suppository (10 mg total) into the rectum daily as needed (for constipation). 12 suppository 0     cetirizine (ZYRTEC) 10 MG tablet Take 10 mg by mouth daily as needed.        cholecalciferol, vitamin D3, 1,000 unit tablet Take 1,000 Units by mouth daily.        fluticasone propionate (FLONASE) 50 mcg/actuation nasal spray Apply 1 spray into each nostril daily as needed for rhinitis.       gabapentin (NEURONTIN) 100 MG capsule Take 100 mg by mouth 3 (three) times a day.       hydrALAZINE (APRESOLINE) 25 MG tablet Take 50 mg by mouth Daily at 8:00 am.. Hold for SBP <150             lisinopriL (PRINIVIL,ZESTRIL) 2.5 MG tablet Take 5 mg by mouth daily.        metoprolol succinate (TOPROL-XL) 25 MG Take 12.5 mg by mouth daily.              polyethylene glycol (MIRALAX) 17 gram/dose powder Take 17 g by mouth daily. 255 g 0     venlafaxine (EFFEXOR-XR) 37.5 MG 24 hr capsule Take 37.5 mg by mouth daily.       verapamil (VERELAN PM) 100 mg 24 hr capsule TAKE ONE CAPSULE BY MOUTH EVERY DAY 90 capsule 1     white petrolatum (AQUAPHOR ORIGINAL) 41 % Oint Apply 1 application topically 3 (three) times a day as needed.              No current facility-administered medications for this visit.      Allergies   Allergen Reactions     Codeine Sulfate          Review of Systems:    Comprehensive review not done because of advanced dementia and language barrier.  Noted to ambulating without assist device  Has been losing weight  Currently weight is down to 207 pounds  Continues to have some behaviors associated dementia and does not let staff participate in her cares  Oral intake as per staff is adequate  Recently due to poorly controlled diabetes she has been started back on medications      Physical Exam:    Blood pressure (!) 143/70, pulse 76, temperature 97.4  F (36.3  C), resp. rate 18, weight 94.2 kg (207 lb 9.6 oz), SpO2 96 %.      Obese  GENERAL: no acute distress. Cooperative in conversation.  Has limited recall and mostly smiles to questions  Patient is obese  HEENT: pupils are equal, round and reactive. Oral mucosa is moist and intact.  RESP:Chest symmetric. Regular respiratory rate. No stridor.  CVS: S1S2  ABD: Nondistended, soft.  EXTREMITIES: She has pedal and lower extremity  edema.   NEURO: non focal. Alert and oriented xSELF   PSYCH: within normal limits. No depression or anxiety.  SKIN: warm dry intact   Musculoskeletal no focal abnormalities noted to be ambulating without any assist device    Labs:    Lab Results   Component Value Date    HGBA1C 6.7 (H) 01/21/2021     Results for orders placed or performed in visit on 09/17/20   Basic Metabolic Panel   Result Value Ref Range    Sodium 140 136 - 145 mmol/L    Potassium 4.5 3.5 - 5.0 mmol/L    Chloride 102 98 - 107 mmol/L    CO2 29 22 - 31 mmol/L    Anion Gap, Calculation 9 5 - 18 mmol/L    Glucose 121 70 - 125 mg/dL    Calcium 10.0 8.5 - 10.5 mg/dL    BUN 28 8 - 28 mg/dL    Creatinine 1.05 0.60 - 1.10 mg/dL    GFR MDRD Af Amer >60 >60 mL/min/1.73m2    GFR MDRD Non Af Amer 51 (L) >60 mL/min/1.73m2     Lab Results   Component Value Date    [id number] 368 03/23/2020     Vitamin D, Total (25-Hydroxy)   Date Value Ref Range Status   03/23/2020 34.4 30.0 - 80.0 ng/mL Final         Assessment/Plan:    -Dementia with significant cognitive decline  - History of diabetes with a last A1c greater than 9  - Morbid obesity  -History of depression with anxiety  - Hypertension  - Generalized weakness with decline noted    Pt is in memory care with dementia  Patient is difficult because of language barrier as well as dementia.  Denies any pain.  Blood pressures improved with recent adjustment in medications done.  diabetic control adequate with reinitiation of medications.  We will check to less than 140.  Recheck A1c also improved to 7.8  Has progressive weight loss with current weight down to 207 pounds.  Continue with her current cares overall is declining with weight loss noted which has been progressive      Electronically signed by: BERLIN Butts  This progress note was completed using Dragon software and there may be grammatical errors.            Sincerely,        BERLIN Butts

## 2022-08-03 NOTE — PROGRESS NOTES
[facility]      Code Status:  DNR  Visit Type: Review Of Multiple Medical Conditions     Facility:  [facility] [[id number]]           History of Present Illness: Kandi Ontiveros is a 80 y.o. female who is a resident of [facility].  She has underlying history of dementia with progressive memory impairment.   Last BIMS 3/15; remains at baseline  Plan she is also Cypriot-speaking making communication hard.  She is noted to be ambulating independently in the facility and will not let staff touch her or take her part in her cares.  Oral intake has been okay however she has been losing weight and currently down to 207 pounds.  This is a significant weight loss for her.  Diabetes blood sugar checks are done once a day and most checks are consistently around 140  Blood pressures have been stable      Past Medical History:   Diagnosis Date     Anxiety     Created by Conversion      Benign Adenomatous Polyp Of The Large Intestine     Created by Conversion      Chronic Diarrhea Of Unknown Origin     Created by Conversion      Dementia of the Alzheimer's type 7/13/2014     Diabetes Mellitus     Created by Conversion      Esophageal reflux     Created by Conversion      Gastritis      Herpes Zoster (Shingles)     Created by Conversion      Hiatal hernia      Hypercholesterolemia     Created by Conversion      Hypertension     Created by Conversion      Melanosis Coli     Created by Conversion [facility] Annotation: May  2 2012  1:19PM - Sheila Benavides: colonoscopy  4/30/12      Memory Lapses Or Loss     Created by Conversion      Obstructive Sleep Apnea     Created by Conversion      Osteopenia     Created by Conversion      Raynaud's Disease     Created by Conversion      Tricuspid Regurgitation     Created by Conversion      No past surgical history on file.  Family History   Problem Relation Age of Onset     Hypertension Other      Diabetes Other      Stroke Other      Social  History     Socioeconomic History     Marital status:      Spouse name: Not on file     Number of children: Not on file     Years of education: Not on file     Highest education level: Not on file   Occupational History     Not on file   Social Needs     Financial resource strain: Not on file     Food insecurity     Worry: Not on file     Inability: Not on file     Transportation needs     Medical: Not on file     Non-medical: Not on file   Tobacco Use     Smoking status: Never Smoker     Smokeless tobacco: Never Used   Substance and Sexual Activity     Alcohol use: Not on file     Drug use: Not on file     Sexual activity: Not on file   Lifestyle     Physical activity     Days per week: Not on file     Minutes per session: Not on file     Stress: Not on file   Relationships     Social connections     Talks on phone: Not on file     Gets together: Not on file     Attends Mosque service: Not on file     Active member of club or organization: Not on file     Attends meetings of clubs or organizations: Not on file     Relationship status: Not on file     Intimate partner violence     Fear of current or ex partner: Not on file     Emotionally abused: Not on file     Physically abused: Not on file     Forced sexual activity: Not on file   Other Topics Concern     Not on file   Social History Narrative     Not on file   lived in [facility]  Current Outpatient Medications   Medication Sig Dispense Refill     acetaminophen (TYLENOL) 325 MG tablet Take 650 mg by mouth 4 (four) times a day.       bisacodyl (DULCOLAX, BISACODYL,) 10 mg suppository Insert 1 suppository (10 mg total) into the rectum daily as needed (for constipation). 12 suppository 0     cetirizine (ZYRTEC) 10 MG tablet Take 10 mg by mouth daily as needed.        cholecalciferol, vitamin D3, 1,000 unit tablet Take 1,000 Units by mouth daily.       fluticasone propionate (FLONASE) 50 mcg/actuation nasal spray Apply 1 spray into each nostril daily as needed  for rhinitis.       gabapentin (NEURONTIN) 100 MG capsule Take 100 mg by mouth 3 (three) times a day.       hydrALAZINE (APRESOLINE) 25 MG tablet Take 50 mg by mouth Daily at 8:00 am.. Hold for SBP <150             lisinopriL (PRINIVIL,ZESTRIL) 2.5 MG tablet Take 5 mg by mouth daily.        metoprolol succinate (TOPROL-XL) 25 MG Take 12.5 mg by mouth daily.              polyethylene glycol (MIRALAX) 17 gram/dose powder Take 17 g by mouth daily. 255 g 0     venlafaxine (EFFEXOR-XR) 37.5 MG 24 hr capsule Take 37.5 mg by mouth daily.       verapamil (VERELAN PM) 100 mg 24 hr capsule TAKE ONE CAPSULE BY MOUTH EVERY DAY 90 capsule 1     white petrolatum (AQUAPHOR ORIGINAL) 41 % Oint Apply 1 application topically 3 (three) times a day as needed.              No current facility-administered medications for this visit.      Allergies   Allergen Reactions     Codeine Sulfate          Review of Systems:    Comprehensive review not done because of advanced dementia and language barrier.  Noted to ambulating without assist device  Has been losing weight  Currently weight is down to 207 pounds  Continues to have some behaviors associated dementia and does not let staff participate in her cares  Oral intake as per staff is adequate  Recently due to poorly controlled diabetes she has been started back on medications      Physical Exam:    Blood pressure (!) 143/70, pulse 76, temperature 97.4  F (36.3  C), resp. rate 18, weight 94.2 kg (207 lb 9.6 oz), SpO2 96 %.      Obese  GENERAL: no acute distress. Cooperative in conversation.  Has limited recall and mostly smiles to questions  Patient is obese  HEENT: pupils are equal, round and reactive. Oral mucosa is moist and intact.  RESP:Chest symmetric. Regular respiratory rate. No stridor.  CVS: S1S2  ABD: Nondistended, soft.  EXTREMITIES: She has pedal and lower extremity edema.   NEURO: non focal. Alert and oriented xSELF   PSYCH: within normal limits. No depression or  anxiety.  SKIN: warm dry intact   Musculoskeletal no focal abnormalities noted to be ambulating without any assist device    Labs:    Lab Results   Component Value Date    HGBA1C 6.7 (H) 01/21/2021     Results for orders placed or performed in visit on 09/17/20   Basic Metabolic Panel   Result Value Ref Range    Sodium 140 136 - 145 mmol/L    Potassium 4.5 3.5 - 5.0 mmol/L    Chloride 102 98 - 107 mmol/L    CO2 29 22 - 31 mmol/L    Anion Gap, Calculation 9 5 - 18 mmol/L    Glucose 121 70 - 125 mg/dL    Calcium 10.0 8.5 - 10.5 mg/dL    BUN 28 8 - 28 mg/dL    Creatinine 1.05 0.60 - 1.10 mg/dL    GFR MDRD Af Amer >60 >60 mL/min/1.73m2    GFR MDRD Non Af Amer 51 (L) >60 mL/min/1.73m2     Lab Results   Component Value Date    [id number] 368 03/23/2020     Vitamin D, Total (25-Hydroxy)   Date Value Ref Range Status   03/23/2020 34.4 30.0 - 80.0 ng/mL Final         Assessment/Plan:    -Dementia with significant cognitive decline  - History of diabetes with a last A1c greater than 9  - Morbid obesity  -History of depression with anxiety  - Hypertension  - Generalized weakness with decline noted    Pt is in memory care with dementia  Patient is difficult because of language barrier as well as dementia.  Denies any pain.  Blood pressures improved with recent adjustment in medications done.  diabetic control adequate with reinitiation of medications.  We will check to less than 140.  Recheck A1c also improved to 7.8  Has progressive weight loss with current weight down to 207 pounds.  Continue with her current cares overall is declining with weight loss noted which has been progressive      Electronically signed by: BERLIN Butts  This progress note was completed using Dragon software and there may be grammatical errors.

## 2022-09-20 ENCOUNTER — PATIENT OUTREACH (OUTPATIENT)
Dept: GERIATRIC MEDICINE | Facility: CLINIC | Age: 82
End: 2022-09-20

## 2022-09-20 NOTE — PROGRESS NOTES
Opened program in [facility] for launch and for [place] care model change.  Angela Amaro RN, N  [facility]  [phone]

## 2022-09-27 ENCOUNTER — PATIENT OUTREACH (OUTPATIENT)
Dept: GERIATRIC MEDICINE | Facility: CLINIC | Age: 82
End: 2022-09-27

## 2022-09-27 NOTE — LETTER
September 27, 2022    ARACELI ESPINOZA [facility]  [address] [address]      Dear Araceli:    As a member of [facility] ([facility] ([facility]), you are provided a care coordinator. I will be your new care coordinator as of 10/01/22. I will be calling you soon to see how you are doing and determine your needs.    If you have any questions, please feel free to call me at [phone]. If you reach my voice mail, please leave a message and your phone number. If you are hearing impaired, please call the [place] [employer] at [phone] or [phone] ([email or web address] relay service).    I look forward to speaking with you soon.    Sincerely,    Angela Amaro RN, N  [phone]  [email or web address] is a health plan that contracts with both Medicare and the [place] Medical Assistance (Medicaid) program to provide benefits of both programs to enrollees. Enrollment in [facility] depends on contract renewal.      [facility]  [id number] DHS Approved ([id number])  [id number] (11/18)

## 2022-09-27 NOTE — PROGRESS NOTES
[facility] Care Coordination Contact    Internal CC change effective 10/01/22.  Mailed member CC Change letter.  Additional tasks to be completed by CMS include: update database & Epic and created member files on [employer].    Vickie Sandra  Care Management Specialist Manager  [facility]  [phone]

## 2022-10-05 ENCOUNTER — NURSING HOME VISIT (OUTPATIENT)
Dept: GERIATRICS | Facility: CLINIC | Age: 82
End: 2022-10-05
Payer: COMMERCIAL

## 2022-10-05 VITALS
SYSTOLIC BLOOD PRESSURE: 143 MMHG | WEIGHT: 204 LBS | DIASTOLIC BLOOD PRESSURE: 70 MMHG | BODY MASS INDEX: 34.83 KG/M2 | TEMPERATURE: 97.1 F | HEIGHT: 64 IN | HEART RATE: 76 BPM | OXYGEN SATURATION: 98 % | RESPIRATION RATE: 18 BRPM

## 2022-10-05 DIAGNOSIS — E55.9 VITAMIN D DEFICIENCY: ICD-10-CM

## 2022-10-05 DIAGNOSIS — E66.01 MORBID OBESITY (H): ICD-10-CM

## 2022-10-05 DIAGNOSIS — F32.A DEPRESSION, UNSPECIFIED DEPRESSION TYPE: ICD-10-CM

## 2022-10-05 DIAGNOSIS — E11.65 TYPE 2 DIABETES MELLITUS WITH HYPERGLYCEMIA, WITHOUT LONG-TERM CURRENT USE OF INSULIN (H): Primary | ICD-10-CM

## 2022-10-05 DIAGNOSIS — I10 UNCONTROLLED HYPERTENSION: ICD-10-CM

## 2022-10-05 PROCEDURE — 99309 SBSQ NF CARE MODERATE MDM 30: CPT | Performed by: NURSE PRACTITIONER

## 2022-10-05 NOTE — LETTER
10/5/2022        RE: Kandi Gannon  [facility]  [address] [address]        [facility] GERIATRICS  Chief Complaint   Patient presents with     senior care Regulatory     [place] Medical Record Number:  [id number]  Place of Service where encounter took place:  [facility] () [[id number]]    HPI:    Kandi Gannon  is 82 year old (1940), who is being seen today for a federally mandated E/M visit. She has a past medical history for dementia, HTN, HLD, DM, NEDRA, Vitamin D deficiency and anxiety.  She is ambulatory and can be found wandering the floor and whistling.     Nursing denies any issues.  She has a good appetite.  Weight is down about 9lbs in the past 5 months.  She is obese.     Bps are acceptable and she can often refuse medications.  Currently on lisinopril and verapamil.     Diabetes: BS have been stable and she is due for a A1c.      Today, I speak to her in [place] and she is pleasant.  She denies any pain or discomforts.  She has dry skin that improves with lotion.       ALLERGIES:Codeine sulfate [codeine]  PAST MEDICAL HISTORY: History reviewed. No pertinent past medical history.  PAST SURGICAL HISTORY:   has no past surgical history on file.  FAMILY HISTORY: family history includes Cerebrovascular Disease in an other family member; Diabetes in an other family member; Hypertension in an other family member.  SOCIAL HISTORY:  reports that she has never smoked. She has never used smokeless tobacco.         Review of your medicines          Accurate as of October 5, 2022 12:49 PM. If you have any questions, ask your nurse or doctor.            CONTINUE these medicines which have NOT CHANGED      Dose / Directions   acetaminophen 325 MG tablet  Commonly known as: TYLENOL      Dose: 650 mg  Take 650 mg by mouth 4 times daily NTE 4gm/24hrs  Refills: 0     bisacodyl 10 MG suppository  Commonly known as: DULCOLAX      Dose: 10 mg  Place 10 mg  "rectally daily as needed for constipation  Refills: 0     fluticasone 50 MCG/ACT nasal spray  Commonly known as: FLONASE      Dose: 1 spray  Spray 1 spray into both nostrils daily as needed for rhinitis or allergies  Refills: 0     gabapentin 100 MG capsule  Commonly known as: NEURONTIN      Dose: 100 mg  Take 100 mg by mouth 2 times daily Take 100mg in the AM and 200mg at HS.  Refills: 0     lisinopril 40 MG tablet  Commonly known as: ZESTRIL      Dose: 40 mg  Take 40 mg by mouth daily  Refills: 0     metFORMIN 500 MG tablet  Commonly known as: GLUCOPHAGE      Dose: 500 mg  Take 500 mg by mouth 2 times daily (with meals)  Refills: 0     mineral oil-hydrophilic petrolatum external ointment      Apply topically 3 times daily as needed  Refills: 0     polyethylene glycol 17 g packet  Commonly known as: MIRALAX      Dose: 17 g  Take 17 g by mouth daily  Refills: 0     verapamil  MG 24 hr capsule  Commonly known as: VERELAN      Dose: 120 mg  Take 120 mg by mouth daily  Refills: 0     Vitamin D (Cholecalciferol) 25 MCG (1000 UT) Tabs      Dose: 1 tablet  Take 1 tablet by mouth daily  Refills: 0             Case Management:  I have reviewed the care plan and MDS and do agree with the plan. Patient's desire to return to the community is not present. Information reviewed:  Medications, vital signs, orders, and nursing notes.    ROS:  Difficult to obtain due to dementia and language barrier.  She denies issues with bowels or bladder.  She denies headaches or chest pain.       Exam:  Vital signs:BP (!) 143/70   Pulse 76   Temp 97.1  F (36.2  C)   Resp 18   Ht 1.626 m (5' 4\")   Wt 92.5 kg (204 lb)   SpO2 98%   BMI 35.02 kg/m     GENERAL APPEARANCE: Well developed, well nourished, in no acute distress.  HEENT: normocephalic, atraumatic  sclerae anicteric, conjunctivae clear and moist, EOM intact  LUNGS: Lung sounds CTA, no adventitious sounds, respiratory effort normal.  CARD: RRR, S1, S2, without murmurs, " gallops, rubs  ABD: Soft, nondistended and nontender with normal bowel sounds.   MSK: Muscle strength and tone were equal bilaterally. Moves all extremities easily and intentionally.   EXTREMITIES: No cyanosis, clubbing or edema.  NEURO: Alert with cognitive impairment.  Face is symmetric.  SKIN: Inspection of the skin reveals no rashes, ulcerations or petechiae.  PSYCH: euthymic          Lab/Diagnostic data:   Last Comprehensive Metabolic Panel:  Sodium   Date Value Ref Range Status   02/16/2022 139 136 - 145 mmol/L Final     Potassium   Date Value Ref Range Status   02/16/2022 4.6 3.5 - 5.0 mmol/L Final     Chloride   Date Value Ref Range Status   02/16/2022 104 98 - 107 mmol/L Final     Carbon Dioxide (CO2)   Date Value Ref Range Status   02/16/2022 26 22 - 31 mmol/L Final     Anion Gap   Date Value Ref Range Status   02/16/2022 9 5 - 18 mmol/L Final     Glucose   Date Value Ref Range Status   02/16/2022 201 (H) 70 - 125 mg/dL Final     Urea Nitrogen   Date Value Ref Range Status   02/16/2022 23 8 - 28 mg/dL Final     Creatinine   Date Value Ref Range Status   02/16/2022 0.83 0.60 - 1.10 mg/dL Final     GFR Estimate   Date Value Ref Range Status   02/16/2022 71 >60 mL/min/1.73m2 Final     Comment:     Effective December 21, 2021 eGFRcr in adults is calculated using the 2021 CKD-EPI creatinine equation which includes age and gender (Jacqueline frazier al., NEJ, DOI: 10.1056/VDELds2949922)   06/18/2021 42 (L) >60 mL/min/1.73m2 Final     Calcium   Date Value Ref Range Status   02/16/2022 9.8 8.5 - 10.5 mg/dL Final     Lab Results   Component Value Date    WBC 9.0 02/16/2022     Lab Results   Component Value Date    RBC 3.67 02/16/2022     Lab Results   Component Value Date    HGB 11.5 02/16/2022     Lab Results   Component Value Date    HCT 35.4 02/16/2022     Lab Results   Component Value Date    MCV 97 02/16/2022     Lab Results   Component Value Date    MCH 31.3 02/16/2022     Lab Results   Component Value Date    MCHC  32.5 02/16/2022     Lab Results   Component Value Date    RDW 15.1 02/16/2022     Lab Results   Component Value Date     02/16/2022     Lab Results   Component Value Date    A1C 7.8 05/11/2022    A1C 9.5 02/16/2022    A1C 6.7 01/21/2021    A1C 6.6 07/30/2020    A1C 6.7 03/23/2020         ASSESSMENT/PLAN  Type 2 diabetes mellitus with hyperglycemia, without long-term current use of insulin (H)  Check A1c, continue with Metformin 500mg BID.  A1c goal is <8.0.  Continue diabetic diet.     Uncontrolled hypertension  Acceptable for age, continue with lisinopril and verapamil    Morbid obesity (H)  Losing weight due to decline in aging.  BMI is now 35    Depression, unspecified depression type  Stable, on no medications.  Monitor closely    Vitamin D deficiency  Check Vit D level and consider discontinue vit D supplement if WnL.       Electronically signed by:  Suri Chand NP              Sincerely,        Suri Chand NP

## 2022-10-05 NOTE — PROGRESS NOTES
[facility] GERIATRICS  Chief Complaint   Patient presents with     halfway Regulatory     [place] Medical Record Number:  [id number]  Place of Service where encounter took place:  [facility] () [[id number]]    HPI:    Kandi Gannon  is 82 year old (1940), who is being seen today for a federally mandated E/M visit. She has a past medical history for dementia, HTN, HLD, DM, NEDRA, Vitamin D deficiency and anxiety.  She is ambulatory and can be found wandering the floor and whistling.     Nursing denies any issues.  She has a good appetite.  Weight is down about 9lbs in the past 5 months.  She is obese.     Bps are acceptable and she can often refuse medications.  Currently on lisinopril and verapamil.     Diabetes: BS have been stable and she is due for a A1c.      Today, I speak to her in [place] and she is pleasant.  She denies any pain or discomforts.  She has dry skin that improves with lotion.       ALLERGIES:Codeine sulfate [codeine]  PAST MEDICAL HISTORY: History reviewed. No pertinent past medical history.  PAST SURGICAL HISTORY:   has no past surgical history on file.  FAMILY HISTORY: family history includes Cerebrovascular Disease in an other family member; Diabetes in an other family member; Hypertension in an other family member.  SOCIAL HISTORY:  reports that she has never smoked. She has never used smokeless tobacco.         Review of your medicines          Accurate as of October 5, 2022 12:49 PM. If you have any questions, ask your nurse or doctor.            CONTINUE these medicines which have NOT CHANGED      Dose / Directions   acetaminophen 325 MG tablet  Commonly known as: TYLENOL      Dose: 650 mg  Take 650 mg by mouth 4 times daily NTE 4gm/24hrs  Refills: 0     bisacodyl 10 MG suppository  Commonly known as: DULCOLAX      Dose: 10 mg  Place 10 mg rectally daily as needed for constipation  Refills: 0     fluticasone 50 MCG/ACT nasal spray  Commonly known as:  "FLONASE      Dose: 1 spray  Spray 1 spray into both nostrils daily as needed for rhinitis or allergies  Refills: 0     gabapentin 100 MG capsule  Commonly known as: NEURONTIN      Dose: 100 mg  Take 100 mg by mouth 2 times daily Take 100mg in the AM and 200mg at HS.  Refills: 0     lisinopril 40 MG tablet  Commonly known as: ZESTRIL      Dose: 40 mg  Take 40 mg by mouth daily  Refills: 0     metFORMIN 500 MG tablet  Commonly known as: GLUCOPHAGE      Dose: 500 mg  Take 500 mg by mouth 2 times daily (with meals)  Refills: 0     mineral oil-hydrophilic petrolatum external ointment      Apply topically 3 times daily as needed  Refills: 0     polyethylene glycol 17 g packet  Commonly known as: MIRALAX      Dose: 17 g  Take 17 g by mouth daily  Refills: 0     verapamil  MG 24 hr capsule  Commonly known as: VERELAN      Dose: 120 mg  Take 120 mg by mouth daily  Refills: 0     Vitamin D (Cholecalciferol) 25 MCG (1000 UT) Tabs      Dose: 1 tablet  Take 1 tablet by mouth daily  Refills: 0             Case Management:  I have reviewed the care plan and MDS and do agree with the plan. Patient's desire to return to the community is not present. Information reviewed:  Medications, vital signs, orders, and nursing notes.    ROS:  Difficult to obtain due to dementia and language barrier.  She denies issues with bowels or bladder.  She denies headaches or chest pain.       Exam:  Vital signs:BP (!) 143/70   Pulse 76   Temp 97.1  F (36.2  C)   Resp 18   Ht 1.626 m (5' 4\")   Wt 92.5 kg (204 lb)   SpO2 98%   BMI 35.02 kg/m     GENERAL APPEARANCE: Well developed, well nourished, in no acute distress.  HEENT: normocephalic, atraumatic  sclerae anicteric, conjunctivae clear and moist, EOM intact  LUNGS: Lung sounds CTA, no adventitious sounds, respiratory effort normal.  CARD: RRR, S1, S2, without murmurs, gallops, rubs  ABD: Soft, nondistended and nontender with normal bowel sounds.   MSK: Muscle strength and tone were " equal bilaterally. Moves all extremities easily and intentionally.   EXTREMITIES: No cyanosis, clubbing or edema.  NEURO: Alert with cognitive impairment.  Face is symmetric.  SKIN: Inspection of the skin reveals no rashes, ulcerations or petechiae.  PSYCH: euthymic          Lab/Diagnostic data:   Last Comprehensive Metabolic Panel:  Sodium   Date Value Ref Range Status   02/16/2022 139 136 - 145 mmol/L Final     Potassium   Date Value Ref Range Status   02/16/2022 4.6 3.5 - 5.0 mmol/L Final     Chloride   Date Value Ref Range Status   02/16/2022 104 98 - 107 mmol/L Final     Carbon Dioxide (CO2)   Date Value Ref Range Status   02/16/2022 26 22 - 31 mmol/L Final     Anion Gap   Date Value Ref Range Status   02/16/2022 9 5 - 18 mmol/L Final     Glucose   Date Value Ref Range Status   02/16/2022 201 (H) 70 - 125 mg/dL Final     Urea Nitrogen   Date Value Ref Range Status   02/16/2022 23 8 - 28 mg/dL Final     Creatinine   Date Value Ref Range Status   02/16/2022 0.83 0.60 - 1.10 mg/dL Final     GFR Estimate   Date Value Ref Range Status   02/16/2022 71 >60 mL/min/1.73m2 Final     Comment:     Effective December 21, 2021 eGFRcr in adults is calculated using the 2021 CKD-EPI creatinine equation which includes age and gender (Jacqueline et al., NEJ, DOI: 10.1056/BUZDgk0759700)   06/18/2021 42 (L) >60 mL/min/1.73m2 Final     Calcium   Date Value Ref Range Status   02/16/2022 9.8 8.5 - 10.5 mg/dL Final     Lab Results   Component Value Date    WBC 9.0 02/16/2022     Lab Results   Component Value Date    RBC 3.67 02/16/2022     Lab Results   Component Value Date    HGB 11.5 02/16/2022     Lab Results   Component Value Date    HCT 35.4 02/16/2022     Lab Results   Component Value Date    MCV 97 02/16/2022     Lab Results   Component Value Date    MCH 31.3 02/16/2022     Lab Results   Component Value Date    MCHC 32.5 02/16/2022     Lab Results   Component Value Date    RDW 15.1 02/16/2022     Lab Results   Component Value Date      02/16/2022     Lab Results   Component Value Date    A1C 7.8 05/11/2022    A1C 9.5 02/16/2022    A1C 6.7 01/21/2021    A1C 6.6 07/30/2020    A1C 6.7 03/23/2020         ASSESSMENT/PLAN  Type 2 diabetes mellitus with hyperglycemia, without long-term current use of insulin (H)  Check A1c, continue with Metformin 500mg BID.  A1c goal is <8.0.  Continue diabetic diet.     Uncontrolled hypertension  Acceptable for age, continue with lisinopril and verapamil    Morbid obesity (H)  Losing weight due to decline in aging.  BMI is now 35    Depression, unspecified depression type  Stable, on no medications.  Monitor closely    Vitamin D deficiency  Check Vit D level and consider discontinue vit D supplement if WnL.       Electronically signed by:  Suri Chand NP

## 2022-10-10 ENCOUNTER — LAB REQUISITION (OUTPATIENT)
Dept: LAB | Facility: CLINIC | Age: 82
End: 2022-10-10
Payer: COMMERCIAL

## 2022-10-10 DIAGNOSIS — E11.65 TYPE 2 DIABETES MELLITUS WITH HYPERGLYCEMIA (H): ICD-10-CM

## 2022-10-10 DIAGNOSIS — E55.9 VITAMIN D DEFICIENCY, UNSPECIFIED: ICD-10-CM

## 2022-10-17 PROCEDURE — P9604 ONE-WAY ALLOW PRORATED TRIP: HCPCS | Mod: ORL | Performed by: FAMILY MEDICINE

## 2022-10-18 ENCOUNTER — TELEPHONE (OUTPATIENT)
Dept: GERIATRICS | Facility: CLINIC | Age: 82
End: 2022-10-18

## 2022-10-18 LAB
DEPRECATED CALCIDIOL+CALCIFEROL SERPL-MC: 41 UG/L (ref 20–75)
HBA1C MFR BLD: 7.2 %

## 2022-10-18 PROCEDURE — 83036 HEMOGLOBIN GLYCOSYLATED A1C: CPT | Mod: ORL | Performed by: FAMILY MEDICINE

## 2022-10-18 PROCEDURE — 36415 COLL VENOUS BLD VENIPUNCTURE: CPT | Mod: ORL | Performed by: FAMILY MEDICINE

## 2022-10-18 PROCEDURE — 82306 VITAMIN D 25 HYDROXY: CPT | Mod: ORL | Performed by: FAMILY MEDICINE

## 2022-10-18 NOTE — TELEPHONE ENCOUNTER
[facility] Geriatrics Lab Note     Provider: ZANDRA Krishnamurthy  Facility: [facility]) Facility Type:  LT    Allergies   Allergen Reactions     Codeine Sulfate [Codeine] Unknown       Labs Reviewed by provider: A1c     Verbal Order/Direction given by Provider: No new orders.    Provider giving Order:  ZANDRA Krishnamurthy    Verbal Order given to: Julianna Rosenthal RN

## 2022-10-19 NOTE — TELEPHONE ENCOUNTER
Nursing called in patient's Vitamin D level which was 41 and is on Vitamin D 1000 international unit(s)     No new orders.    Electronically signed by Giovanna Cevallos RN, CNP

## 2022-11-28 NOTE — PROGRESS NOTES
This patient's medication list and chart were reviewed as part of the service provided by [facility] and Geriatric [facility].    Assessment/Recommendations:    If patient without signs/symptoms of pain or anxiety, may consider reduction in gabapentin dose and monitor.  Gabapentin may increase risk of dizziness, sedation, confusion, falls, peripheral edema, etc.  Noted recent vitamin D level = 41.  Appropriate to continue with vitamin D supplementation due to likely low level without supplement, and may be helpful for pain, weakness, fall/fracture prevention, cognition.    Sara Cunha, Pharm.D.,[facility]  Board Certified Geriatric Pharmacist  Medication Therapy Management Pharmacist  [phone]

## 2022-12-12 NOTE — PROGRESS NOTES
[facility]      Code Status:  DNR  Visit Type: Review Of Multiple Medical Conditions/dm     Facility:  [facility] [[id number]]           History of Present Illness: Kandi Ontiveros is a 80 y.o. female who is a resident of [facility].  She has underlying history of dementia with progressive memory impairment.   Last BIMS 3/15; remains at baseline  Plan she is also Mont[place]in-speaking making communication hard.  She talks very little  Does have some behaviors including refusal to participate in her cares.  Her daughter has to come in to give her bath/ showers  She is noted to be ambulating independently in the facility and will not let staff touch her or take her part in her cares.  Oral intake has been okay however she has been losing weight and currently down to 202 pounds  No new concerns voiced by staff    Past Medical History:   Diagnosis Date     Anxiety     Created by Conversion      Benign Adenomatous Polyp Of The Large Intestine     Created by Conversion      Chronic Diarrhea Of Unknown Origin     Created by Conversion      Dementia of the Alzheimer's type 7/13/2014     Diabetes Mellitus     Created by Conversion      Esophageal reflux     Created by Conversion      Gastritis      Herpes Zoster (Shingles)     Created by Conversion      Hiatal hernia      Hypercholesterolemia     Created by Conversion      Hypertension     Created by Conversion      Melanosis Coli     Created by Conversion [facility] Annotation: May  2 2012  1:19PM - Sheila Benavides: colonoscopy  4/30/12      Memory Lapses Or Loss     Created by Conversion      Obstructive Sleep Apnea     Created by Conversion      Osteopenia     Created by Conversion      Raynaud's Disease     Created by Conversion      Tricuspid Regurgitation     Created by Conversion      No past surgical history on file.  Family History   Problem Relation Age of Onset     Hypertension Other      Diabetes Other      Stroke Other       Social History     Socioeconomic History     Marital status:      Spouse name: Not on file     Number of children: Not on file     Years of education: Not on file     Highest education level: Not on file   Occupational History     Not on file   Social Needs     Financial resource strain: Not on file     Food insecurity     Worry: Not on file     Inability: Not on file     Transportation needs     Medical: Not on file     Non-medical: Not on file   Tobacco Use     Smoking status: Never Smoker     Smokeless tobacco: Never Used   Substance and Sexual Activity     Alcohol use: Not on file     Drug use: Not on file     Sexual activity: Not on file   Lifestyle     Physical activity     Days per week: Not on file     Minutes per session: Not on file     Stress: Not on file   Relationships     Social connections     Talks on phone: Not on file     Gets together: Not on file     Attends [employer] service: Not on file     Active member of club or organization: Not on file     Attends meetings of clubs or organizations: Not on file     Relationship status: Not on file     Intimate partner violence     Fear of current or ex partner: Not on file     Emotionally abused: Not on file     Physically abused: Not on file     Forced sexual activity: Not on file   Other Topics Concern     Not on file   Social History Narrative     Not on file   lived in [facility]  Current Outpatient Medications   Medication Sig Dispense Refill     acetaminophen (TYLENOL) 325 MG tablet Take 650 mg by mouth 4 (four) times a day.       bisacodyl (DULCOLAX, BISACODYL,) 10 mg suppository Insert 1 suppository (10 mg total) into the rectum daily as needed (for constipation). 12 suppository 0     cetirizine (ZYRTEC) 10 MG tablet Take 10 mg by mouth daily as needed.        cholecalciferol, vitamin D3, 1,000 unit tablet Take 1,000 Units by mouth daily.       fluticasone propionate (FLONASE) 50 mcg/actuation nasal spray Apply 1 spray into each nostril daily  "as needed for rhinitis.       gabapentin (NEURONTIN) 100 MG capsule Take 100 mg by mouth 3 (three) times a day.       hydrALAZINE (APRESOLINE) 25 MG tablet Take 50 mg by mouth Daily at 8:00 am.. Hold for SBP <150             lisinopriL (PRINIVIL,ZESTRIL) 2.5 MG tablet Take 5 mg by mouth daily.        metoprolol succinate (TOPROL-XL) 25 MG Take 12.5 mg by mouth daily.              polyethylene glycol (MIRALAX) 17 gram/dose powder Take 17 g by mouth daily. 255 g 0     venlafaxine (EFFEXOR-XR) 37.5 MG 24 hr capsule Take 37.5 mg by mouth daily.       verapamil (VERELAN PM) 100 mg 24 hr capsule TAKE ONE CAPSULE BY MOUTH EVERY DAY 90 capsule 1     white petrolatum (AQUAPHOR ORIGINAL) 41 % Oint Apply 1 application topically 3 (three) times a day as needed.              No current facility-administered medications for this visit.      Allergies   Allergen Reactions     Codeine Sulfate          Review of Systems:    Comprehensive review not done because of advanced dementia and language barrier.  Noted to ambulating without assist device  Has been losing weight  Currently weight is down to 202 pounds  Continues to have some behaviors associated dementia and does not let staff participate in her cares  Oral intake as per staff is adequate  Recently due to poorly controlled diabetes she has been started back on medications  Oral intake is variable per staff    Physical Exam:    Blood pressure (!) 143/70, pulse 76, temperature 97.2  F (36.2  C), resp. rate 18, height 1.626 m (5' 4\"), weight 91.6 kg (202 lb), SpO2 97 %.      Obese  GENERAL: no acute distress. Cooperative in conversation.  Has limited recall and mostly smiles to questions  Patient is obese  HEENT: pupils are equal, round and reactive. Oral mucosa is moist and intact.  RESP:Chest symmetric. Regular respiratory rate. No stridor.  CVS: S1S2  ABD: Nondistended, soft.  EXTREMITIES: She has pedal and lower extremity edema.   NEURO: non focal. Alert and oriented xSELF "   PSYCH: within normal limits. No depression or anxiety.  SKIN: warm dry intact   Musculoskeletal no focal abnormalities noted to be ambulating without any assist device    Labs:    Lab Results   Component Value Date    HGBA1C 6.7 (H) 01/21/2021     Results for orders placed or performed in visit on 09/17/20   Basic Metabolic Panel   Result Value Ref Range    Sodium 140 136 - 145 mmol/L    Potassium 4.5 3.5 - 5.0 mmol/L    Chloride 102 98 - 107 mmol/L    CO2 29 22 - 31 mmol/L    Anion Gap, Calculation 9 5 - 18 mmol/L    Glucose 121 70 - 125 mg/dL    Calcium 10.0 8.5 - 10.5 mg/dL    BUN 28 8 - 28 mg/dL    Creatinine 1.05 0.60 - 1.10 mg/dL    GFR MDRD Af Amer >60 >60 mL/min/1.73m2    GFR MDRD Non Af Amer 51 (L) >60 mL/min/1.73m2     Lab Results   Component Value Date    [id number] 368 03/23/2020     Vitamin D, Total (25-Hydroxy)   Date Value Ref Range Status   03/23/2020 34.4 30.0 - 80.0 ng/mL Final         Assessment/Plan:    -Dementia with significant cognitive decline  - History of diabetes with a last A1c greater than 9  - Morbid obesity  -History of depression with anxiety  - Hypertension  - Generalized weakness with decline noted    Pt is in memory care with dementia  Patient is difficult because of language barrier as well as dementia.  Denies any pain.  Mood is stable with no concern and she is noted to be ambulating without any assist device in and out of patient's room.  A1c recheck at 7.2; improvement as she is back on metfromin  BP stable  Progressive weight loss noted with a current weight of 202 pounds  No behavioral issues reported by staff    Electronically signed by: BERLIN Butts  This progress note was completed using Dragon software and there may be grammatical errors.

## 2022-12-12 NOTE — LETTER
12/12/2022        RE: Kandi Gannon  [facility]  [address] [facility] [address]        [facility]      Code Status:  DNR  Visit Type: Review Of Multiple Medical Conditions/dm     Facility:  [facility] [[id number]]           History of Present Illness: Kandi Ontiveros is a 80 y.o. female who is a resident of [facility].  She has underlying history of dementia with progressive memory impairment.   Last BIMS 3/15; remains at baseline  Plan she is also Luxembourgish-speaking making communication hard.  She talks very little  Does have some behaviors including refusal to participate in her cares.  Her daughter has to come in to give her bath/ showers  She is noted to be ambulating independently in the facility and will not let staff touch her or take her part in her cares.  Oral intake has been okay however she has been losing weight and currently down to 202 pounds  No new concerns voiced by staff    Past Medical History:   Diagnosis Date     Anxiety     Created by Conversion      Benign Adenomatous Polyp Of The Large Intestine     Created by Conversion      Chronic Diarrhea Of Unknown Origin     Created by Conversion      Dementia of the Alzheimer's type 7/13/2014     Diabetes Mellitus     Created by Conversion      Esophageal reflux     Created by Conversion      Gastritis      Herpes Zoster (Shingles)     Created by Conversion      Hiatal hernia      Hypercholesterolemia     Created by Conversion      Hypertension     Created by Conversion      Melanosis Coli     Created by Conversion [facility] Annotation: May  2 2012  1:19PM - Sheila Benavides: colonoscopy  4/30/12      Memory Lapses Or Loss     Created by Conversion      Obstructive Sleep Apnea     Created by Conversion      Osteopenia     Created by Conversion      Raynaud's Disease     Created by Conversion      Tricuspid Regurgitation     Created by Conversion      No past surgical history on  file.  Family History   Problem Relation Age of Onset     Hypertension Other      Diabetes Other      Stroke Other      Social History     Socioeconomic History     Marital status:      Spouse name: Not on file     Number of children: Not on file     Years of education: Not on file     Highest education level: Not on file   Occupational History     Not on file   Social Needs     Financial resource strain: Not on file     Food insecurity     Worry: Not on file     Inability: Not on file     Transportation needs     Medical: Not on file     Non-medical: Not on file   Tobacco Use     Smoking status: Never Smoker     Smokeless tobacco: Never Used   Substance and Sexual Activity     Alcohol use: Not on file     Drug use: Not on file     Sexual activity: Not on file   Lifestyle     Physical activity     Days per week: Not on file     Minutes per session: Not on file     Stress: Not on file   Relationships     Social connections     Talks on phone: Not on file     Gets together: Not on file     Attends Ep[employer]alian service: Not on file     Active member of club or organization: Not on file     Attends meetings of clubs or organizations: Not on file     Relationship status: Not on file     Intimate partner violence     Fear of current or ex partner: Not on file     Emotionally abused: Not on file     Physically abused: Not on file     Forced sexual activity: Not on file   Other Topics Concern     Not on file   Social History Narrative     Not on file   lived in [facility]  Current Outpatient Medications   Medication Sig Dispense Refill     acetaminophen (TYLENOL) 325 MG tablet Take 650 mg by mouth 4 (four) times a day.       bisacodyl (DULCOLAX, BISACODYL,) 10 mg suppository Insert 1 suppository (10 mg total) into the rectum daily as needed (for constipation). 12 suppository 0     cetirizine (ZYRTEC) 10 MG tablet Take 10 mg by mouth daily as needed.        cholecalciferol, vitamin D3, 1,000 unit tablet Take 1,000 Units by  "mouth daily.       fluticasone propionate (FLONASE) 50 mcg/actuation nasal spray Apply 1 spray into each nostril daily as needed for rhinitis.       gabapentin (NEURONTIN) 100 MG capsule Take 100 mg by mouth 3 (three) times a day.       hydrALAZINE (APRESOLINE) 25 MG tablet Take 50 mg by mouth Daily at 8:00 am.. Hold for SBP <150             lisinopriL (PRINIVIL,ZESTRIL) 2.5 MG tablet Take 5 mg by mouth daily.        metoprolol succinate (TOPROL-XL) 25 MG Take 12.5 mg by mouth daily.              polyethylene glycol (MIRALAX) 17 gram/dose powder Take 17 g by mouth daily. 255 g 0     venlafaxine (EFFEXOR-XR) 37.5 MG 24 hr capsule Take 37.5 mg by mouth daily.       verapamil (VERELAN PM) 100 mg 24 hr capsule TAKE ONE CAPSULE BY MOUTH EVERY DAY 90 capsule 1     white petrolatum (AQUAPHOR ORIGINAL) 41 % Oint Apply 1 application topically 3 (three) times a day as needed.              No current facility-administered medications for this visit.      Allergies   Allergen Reactions     Codeine Sulfate          Review of Systems:    Comprehensive review not done because of advanced dementia and language barrier.  Noted to ambulating without assist device  Has been losing weight  Currently weight is down to 202 pounds  Continues to have some behaviors associated dementia and does not let staff participate in her cares  Oral intake as per staff is adequate  Recently due to poorly controlled diabetes she has been started back on medications  Oral intake is variable per staff    Physical Exam:    Blood pressure (!) 143/70, pulse 76, temperature 97.2  F (36.2  C), resp. rate 18, height 1.626 m (5' 4\"), weight 91.6 kg (202 lb), SpO2 97 %.      Obese  GENERAL: no acute distress. Cooperative in conversation.  Has limited recall and mostly smiles to questions  Patient is obese  HEENT: pupils are equal, round and reactive. Oral mucosa is moist and intact.  RESP:Chest symmetric. Regular respiratory rate. No stridor.  CVS: S1S2  ABD: " Nondistended, soft.  EXTREMITIES: She has pedal and lower extremity edema.   NEURO: non focal. Alert and oriented xSELF   PSYCH: within normal limits. No depression or anxiety.  SKIN: warm dry intact   Musculoskeletal no focal abnormalities noted to be ambulating without any assist device    Labs:    Lab Results   Component Value Date    HGBA1C 6.7 (H) 01/21/2021     Results for orders placed or performed in visit on 09/17/20   Basic Metabolic Panel   Result Value Ref Range    Sodium 140 136 - 145 mmol/L    Potassium 4.5 3.5 - 5.0 mmol/L    Chloride 102 98 - 107 mmol/L    CO2 29 22 - 31 mmol/L    Anion Gap, Calculation 9 5 - 18 mmol/L    Glucose 121 70 - 125 mg/dL    Calcium 10.0 8.5 - 10.5 mg/dL    BUN 28 8 - 28 mg/dL    Creatinine 1.05 0.60 - 1.10 mg/dL    GFR MDRD Af Amer >60 >60 mL/min/1.73m2    GFR MDRD Non Af Amer 51 (L) >60 mL/min/1.73m2     Lab Results   Component Value Date    ZRZBPREF25 368 03/23/2020     Vitamin D, Total (25-Hydroxy)   Date Value Ref Range Status   03/23/2020 34.4 30.0 - 80.0 ng/mL Final         Assessment/Plan:    -Dementia with significant cognitive decline  - History of diabetes with a last A1c greater than 9  - Morbid obesity  -History of depression with anxiety  - Hypertension  - Generalized weakness with decline noted    Pt is in memory care with dementia  Patient is difficult because of language barrier as well as dementia.  Denies any pain.  Mood is stable with no concern and she is noted to be ambulating without any assist device in and out of patient's room.  A1c recheck at 7.2; improvement as she is back on metfromin  BP stable  Progressive weight loss noted with a current weight of 202 pounds  No behavioral issues reported by staff    Electronically signed by: BERLIN Butts  This progress note was completed using Dragon software and there may be grammatical errors.            Sincerely,        BERLIN Butts

## 2022-12-22 NOTE — PROGRESS NOTES
Encounter opened due to Regulatory [employer] Update to open FVP Program.    Vickie Sandra  Care Management Specialist Manager  [facility]  [phone]

## 2022-12-22 NOTE — PROGRESS NOTES
Encounter opened due to Regulatory [employer] Update to close FVP Program.    Vickie Sandra  Care Management Specialist Manager  [facility]  [phone]

## 2023-01-01 ENCOUNTER — NURSING HOME VISIT (OUTPATIENT)
Dept: GERIATRICS | Facility: CLINIC | Age: 83
End: 2023-01-01
Payer: COMMERCIAL

## 2023-01-01 ENCOUNTER — TELEPHONE (OUTPATIENT)
Dept: GERIATRICS | Facility: CLINIC | Age: 83
End: 2023-01-01
Payer: COMMERCIAL

## 2023-01-01 ENCOUNTER — LAB REQUISITION (OUTPATIENT)
Dept: LAB | Facility: CLINIC | Age: 83
End: 2023-01-01
Payer: COMMERCIAL

## 2023-01-01 ENCOUNTER — APPOINTMENT (OUTPATIENT)
Dept: NUCLEAR MEDICINE | Facility: CLINIC | Age: 83
DRG: 299 | End: 2023-01-01
Attending: STUDENT IN AN ORGANIZED HEALTH CARE EDUCATION/TRAINING PROGRAM
Payer: COMMERCIAL

## 2023-01-01 ENCOUNTER — APPOINTMENT (OUTPATIENT)
Dept: RADIOLOGY | Facility: CLINIC | Age: 83
DRG: 299 | End: 2023-01-01
Attending: STUDENT IN AN ORGANIZED HEALTH CARE EDUCATION/TRAINING PROGRAM
Payer: COMMERCIAL

## 2023-01-01 ENCOUNTER — TRANSITIONAL CARE UNIT VISIT (OUTPATIENT)
Dept: GERIATRICS | Facility: CLINIC | Age: 83
End: 2023-01-01
Payer: COMMERCIAL

## 2023-01-01 ENCOUNTER — HOSPITAL ENCOUNTER (INPATIENT)
Facility: CLINIC | Age: 83
LOS: 5 days | Discharge: NURSING, REHAB OR LONG-TERM CARE | DRG: 299 | End: 2023-10-05
Attending: EMERGENCY MEDICINE | Admitting: STUDENT IN AN ORGANIZED HEALTH CARE EDUCATION/TRAINING PROGRAM
Payer: COMMERCIAL

## 2023-01-01 ENCOUNTER — TELEPHONE (OUTPATIENT)
Dept: GERIATRICS | Facility: CLINIC | Age: 83
End: 2023-01-01

## 2023-01-01 ENCOUNTER — TRANSFERRED RECORDS (OUTPATIENT)
Dept: HEALTH INFORMATION MANAGEMENT | Facility: CLINIC | Age: 83
End: 2023-01-01

## 2023-01-01 ENCOUNTER — PATIENT OUTREACH (OUTPATIENT)
Dept: GERIATRIC MEDICINE | Facility: CLINIC | Age: 83
End: 2023-01-01
Payer: COMMERCIAL

## 2023-01-01 ENCOUNTER — MEDICAL CORRESPONDENCE (OUTPATIENT)
Dept: HEALTH INFORMATION MANAGEMENT | Facility: CLINIC | Age: 83
End: 2023-01-01
Payer: COMMERCIAL

## 2023-01-01 ENCOUNTER — DOCUMENTATION ONLY (OUTPATIENT)
Dept: GERIATRICS | Facility: CLINIC | Age: 83
End: 2023-01-01
Payer: COMMERCIAL

## 2023-01-01 ENCOUNTER — MEDICAL CORRESPONDENCE (OUTPATIENT)
Dept: HEALTH INFORMATION MANAGEMENT | Facility: CLINIC | Age: 83
End: 2023-01-01

## 2023-01-01 ENCOUNTER — PATIENT OUTREACH (OUTPATIENT)
Dept: GERIATRIC MEDICINE | Facility: CLINIC | Age: 83
End: 2023-01-01

## 2023-01-01 ENCOUNTER — APPOINTMENT (OUTPATIENT)
Dept: CT IMAGING | Facility: CLINIC | Age: 83
DRG: 299 | End: 2023-01-01
Attending: EMERGENCY MEDICINE
Payer: COMMERCIAL

## 2023-01-01 ENCOUNTER — APPOINTMENT (OUTPATIENT)
Dept: CARDIOLOGY | Facility: CLINIC | Age: 83
DRG: 299 | End: 2023-01-01
Attending: STUDENT IN AN ORGANIZED HEALTH CARE EDUCATION/TRAINING PROGRAM
Payer: COMMERCIAL

## 2023-01-01 ENCOUNTER — DOCUMENTATION ONLY (OUTPATIENT)
Dept: OTHER | Facility: CLINIC | Age: 83
End: 2023-01-01
Payer: COMMERCIAL

## 2023-01-01 VITALS
BODY MASS INDEX: 33.12 KG/M2 | HEART RATE: 104 BPM | HEIGHT: 64 IN | WEIGHT: 194 LBS | DIASTOLIC BLOOD PRESSURE: 91 MMHG | OXYGEN SATURATION: 96 % | RESPIRATION RATE: 18 BRPM | SYSTOLIC BLOOD PRESSURE: 152 MMHG | TEMPERATURE: 97.7 F

## 2023-01-01 VITALS
RESPIRATION RATE: 16 BRPM | WEIGHT: 194.2 LBS | HEART RATE: 76 BPM | BODY MASS INDEX: 33.16 KG/M2 | HEIGHT: 64 IN | TEMPERATURE: 98.4 F | SYSTOLIC BLOOD PRESSURE: 156 MMHG | DIASTOLIC BLOOD PRESSURE: 68 MMHG | OXYGEN SATURATION: 98 %

## 2023-01-01 VITALS
WEIGHT: 192 LBS | OXYGEN SATURATION: 100 % | HEART RATE: 80 BPM | HEIGHT: 64 IN | DIASTOLIC BLOOD PRESSURE: 70 MMHG | SYSTOLIC BLOOD PRESSURE: 165 MMHG | BODY MASS INDEX: 32.78 KG/M2 | TEMPERATURE: 96.4 F | RESPIRATION RATE: 18 BRPM

## 2023-01-01 VITALS
BODY MASS INDEX: 33.87 KG/M2 | OXYGEN SATURATION: 96 % | WEIGHT: 197.31 LBS | SYSTOLIC BLOOD PRESSURE: 167 MMHG | TEMPERATURE: 98.5 F | DIASTOLIC BLOOD PRESSURE: 74 MMHG | HEART RATE: 93 BPM | RESPIRATION RATE: 18 BRPM

## 2023-01-01 VITALS
BODY MASS INDEX: 34.15 KG/M2 | HEART RATE: 73 BPM | HEIGHT: 64 IN | SYSTOLIC BLOOD PRESSURE: 120 MMHG | RESPIRATION RATE: 18 BRPM | DIASTOLIC BLOOD PRESSURE: 69 MMHG | TEMPERATURE: 97.4 F | OXYGEN SATURATION: 95 % | WEIGHT: 200 LBS

## 2023-01-01 VITALS
DIASTOLIC BLOOD PRESSURE: 73 MMHG | TEMPERATURE: 97.3 F | OXYGEN SATURATION: 97 % | BODY MASS INDEX: 32.78 KG/M2 | SYSTOLIC BLOOD PRESSURE: 126 MMHG | RESPIRATION RATE: 18 BRPM | HEART RATE: 80 BPM | HEIGHT: 64 IN | WEIGHT: 192 LBS

## 2023-01-01 VITALS
DIASTOLIC BLOOD PRESSURE: 74 MMHG | HEIGHT: 64 IN | SYSTOLIC BLOOD PRESSURE: 130 MMHG | OXYGEN SATURATION: 95 % | HEART RATE: 75 BPM | BODY MASS INDEX: 33.12 KG/M2 | RESPIRATION RATE: 20 BRPM | WEIGHT: 194 LBS | TEMPERATURE: 98.3 F

## 2023-01-01 VITALS
SYSTOLIC BLOOD PRESSURE: 126 MMHG | HEART RATE: 80 BPM | TEMPERATURE: 97.3 F | WEIGHT: 192 LBS | HEIGHT: 64 IN | DIASTOLIC BLOOD PRESSURE: 73 MMHG | RESPIRATION RATE: 18 BRPM | OXYGEN SATURATION: 97 % | BODY MASS INDEX: 32.78 KG/M2

## 2023-01-01 VITALS
RESPIRATION RATE: 16 BRPM | WEIGHT: 194 LBS | DIASTOLIC BLOOD PRESSURE: 71 MMHG | TEMPERATURE: 97.5 F | BODY MASS INDEX: 33.12 KG/M2 | OXYGEN SATURATION: 97 % | SYSTOLIC BLOOD PRESSURE: 135 MMHG | HEIGHT: 64 IN | HEART RATE: 80 BPM

## 2023-01-01 DIAGNOSIS — L24.A2 IRRITANT CONTACT DERMATITIS DUE TO FECAL, URINARY OR DUAL INCONTINENCE: Primary | ICD-10-CM

## 2023-01-01 DIAGNOSIS — N18.32 STAGE 3B CHRONIC KIDNEY DISEASE (H): ICD-10-CM

## 2023-01-01 DIAGNOSIS — G30.9 ALZHEIMER'S DEMENTIA WITH BEHAVIORAL DISTURBANCE (H): ICD-10-CM

## 2023-01-01 DIAGNOSIS — I25.10 ATHEROSCLEROTIC HEART DISEASE OF NATIVE CORONARY ARTERY WITHOUT ANGINA PECTORIS: ICD-10-CM

## 2023-01-01 DIAGNOSIS — I26.99 PULMONARY EMBOLISM, OTHER, UNSPECIFIED CHRONICITY, UNSPECIFIED WHETHER ACUTE COR PULMONALE PRESENT (H): ICD-10-CM

## 2023-01-01 DIAGNOSIS — E11.65 TYPE 2 DIABETES MELLITUS WITH HYPERGLYCEMIA, WITHOUT LONG-TERM CURRENT USE OF INSULIN (H): ICD-10-CM

## 2023-01-01 DIAGNOSIS — I82.433 ACUTE BILATERAL DEEP VEIN THROMBOSIS (DVT) OF POPLITEAL VEINS (H): Primary | ICD-10-CM

## 2023-01-01 DIAGNOSIS — E11.65 TYPE 2 DIABETES MELLITUS WITH HYPERGLYCEMIA, WITHOUT LONG-TERM CURRENT USE OF INSULIN (H): Primary | ICD-10-CM

## 2023-01-01 DIAGNOSIS — I26.99 ACUTE PULMONARY EMBOLISM, UNSPECIFIED PULMONARY EMBOLISM TYPE, UNSPECIFIED WHETHER ACUTE COR PULMONALE PRESENT (H): ICD-10-CM

## 2023-01-01 DIAGNOSIS — F32.A DEPRESSION, UNSPECIFIED DEPRESSION TYPE: ICD-10-CM

## 2023-01-01 DIAGNOSIS — U07.1 COVID-19: ICD-10-CM

## 2023-01-01 DIAGNOSIS — Z51.81 ENCOUNTER FOR THERAPEUTIC DRUG LEVEL MONITORING: ICD-10-CM

## 2023-01-01 DIAGNOSIS — E11.9 TYPE 2 DIABETES MELLITUS WITHOUT COMPLICATIONS (H): ICD-10-CM

## 2023-01-01 DIAGNOSIS — F02.818 ALZHEIMER'S DEMENTIA WITH BEHAVIORAL DISTURBANCE (H): ICD-10-CM

## 2023-01-01 DIAGNOSIS — G62.9 NEUROPATHY: ICD-10-CM

## 2023-01-01 DIAGNOSIS — E78.00 PURE HYPERCHOLESTEROLEMIA, UNSPECIFIED: ICD-10-CM

## 2023-01-01 DIAGNOSIS — I21.4 NSTEMI (NON-ST ELEVATED MYOCARDIAL INFARCTION) (H): ICD-10-CM

## 2023-01-01 DIAGNOSIS — N28.9 ACUTE ON CHRONIC RENAL INSUFFICIENCY: ICD-10-CM

## 2023-01-01 DIAGNOSIS — M79.661 PAIN IN BOTH LOWER LEGS: ICD-10-CM

## 2023-01-01 DIAGNOSIS — N18.30 RENAL FAILURE, CHRONIC, STAGE 3 (MODERATE) (H): ICD-10-CM

## 2023-01-01 DIAGNOSIS — E66.01 MORBID OBESITY (H): ICD-10-CM

## 2023-01-01 DIAGNOSIS — G30.1 SEVERE LATE ONSET ALZHEIMER'S DEMENTIA WITHOUT BEHAVIORAL DISTURBANCE, PSYCHOTIC DISTURBANCE, MOOD DISTURBANCE, OR ANXIETY (H): ICD-10-CM

## 2023-01-01 DIAGNOSIS — F02.818 ALZHEIMER'S DEMENTIA WITH BEHAVIORAL DISTURBANCE (H): Primary | ICD-10-CM

## 2023-01-01 DIAGNOSIS — N18.9 ACUTE ON CHRONIC RENAL INSUFFICIENCY: ICD-10-CM

## 2023-01-01 DIAGNOSIS — I10 ESSENTIAL HYPERTENSION: ICD-10-CM

## 2023-01-01 DIAGNOSIS — M79.662 PAIN IN BOTH LOWER LEGS: ICD-10-CM

## 2023-01-01 DIAGNOSIS — E66.09 CLASS 1 OBESITY DUE TO EXCESS CALORIES WITHOUT SERIOUS COMORBIDITY WITH BODY MASS INDEX (BMI) OF 32.0 TO 32.9 IN ADULT: ICD-10-CM

## 2023-01-01 DIAGNOSIS — R65.10 SIRS (SYSTEMIC INFLAMMATORY RESPONSE SYNDROME) (H): ICD-10-CM

## 2023-01-01 DIAGNOSIS — G62.9 NEUROPATHY: Primary | ICD-10-CM

## 2023-01-01 DIAGNOSIS — D64.9 ANEMIA, UNSPECIFIED TYPE: ICD-10-CM

## 2023-01-01 DIAGNOSIS — I10 UNCONTROLLED HYPERTENSION: ICD-10-CM

## 2023-01-01 DIAGNOSIS — F02.80 ALZHEIMER'S DISEASE (H): ICD-10-CM

## 2023-01-01 DIAGNOSIS — G30.9 ALZHEIMER'S DEMENTIA WITH BEHAVIORAL DISTURBANCE (H): Primary | ICD-10-CM

## 2023-01-01 DIAGNOSIS — G30.9 ALZHEIMER'S DISEASE (H): ICD-10-CM

## 2023-01-01 DIAGNOSIS — I82.413 ACUTE DEEP VEIN THROMBOSIS (DVT) OF FEMORAL VEIN OF BOTH LOWER EXTREMITIES (H): ICD-10-CM

## 2023-01-01 DIAGNOSIS — R62.7 FAILURE TO THRIVE IN ADULT: ICD-10-CM

## 2023-01-01 DIAGNOSIS — F02.C0 SEVERE LATE ONSET ALZHEIMER'S DEMENTIA WITHOUT BEHAVIORAL DISTURBANCE, PSYCHOTIC DISTURBANCE, MOOD DISTURBANCE, OR ANXIETY (H): ICD-10-CM

## 2023-01-01 DIAGNOSIS — D64.9 ANEMIA, UNSPECIFIED: ICD-10-CM

## 2023-01-01 DIAGNOSIS — E66.811 CLASS 1 OBESITY DUE TO EXCESS CALORIES WITHOUT SERIOUS COMORBIDITY WITH BODY MASS INDEX (BMI) OF 32.0 TO 32.9 IN ADULT: ICD-10-CM

## 2023-01-01 LAB
ALBUMIN SERPL BCG-MCNC: 3.7 G/DL (ref 3.5–5.2)
ALBUMIN UR-MCNC: 10 MG/DL
ALP SERPL-CCNC: 86 U/L (ref 35–104)
ALT SERPL W P-5'-P-CCNC: 47 U/L (ref 0–50)
ANION GAP SERPL CALCULATED.3IONS-SCNC: 10 MMOL/L (ref 7–15)
ANION GAP SERPL CALCULATED.3IONS-SCNC: 10 MMOL/L (ref 7–15)
ANION GAP SERPL CALCULATED.3IONS-SCNC: 11 MMOL/L (ref 7–15)
ANION GAP SERPL CALCULATED.3IONS-SCNC: 12 MMOL/L (ref 7–15)
ANION GAP SERPL CALCULATED.3IONS-SCNC: 13 MMOL/L (ref 7–15)
ANION GAP SERPL CALCULATED.3IONS-SCNC: 15 MMOL/L (ref 7–15)
ANION GAP SERPL CALCULATED.3IONS-SCNC: 15 MMOL/L (ref 7–15)
ANION GAP SERPL CALCULATED.3IONS-SCNC: 18 MMOL/L (ref 7–15)
APPEARANCE UR: CLEAR
APTT PPP: 29 SECONDS (ref 22–38)
AST SERPL W P-5'-P-CCNC: 32 U/L (ref 0–45)
ATRIAL RATE - MUSE: 95 BPM
ATRIAL RATE - MUSE: 96 BPM
BACTERIA #/AREA URNS HPF: ABNORMAL /HPF
BACTERIA BLD CULT: ABNORMAL
BACTERIA BLD CULT: ABNORMAL
BACTERIA BLD CULT: NO GROWTH
BACTERIA UR CULT: NORMAL
BILIRUB SERPL-MCNC: 0.4 MG/DL
BILIRUB UR QL STRIP: NEGATIVE
BUN SERPL-MCNC: 104 MG/DL (ref 8–23)
BUN SERPL-MCNC: 25.5 MG/DL (ref 8–23)
BUN SERPL-MCNC: 29.5 MG/DL (ref 8–23)
BUN SERPL-MCNC: 31.5 MG/DL (ref 8–23)
BUN SERPL-MCNC: 33.8 MG/DL (ref 8–23)
BUN SERPL-MCNC: 37 MG/DL (ref 8–23)
BUN SERPL-MCNC: 51.8 MG/DL (ref 8–23)
BUN SERPL-MCNC: 70.8 MG/DL (ref 8–23)
CALCIUM SERPL-MCNC: 10 MG/DL (ref 8.8–10.2)
CALCIUM SERPL-MCNC: 10.2 MG/DL (ref 8.8–10.2)
CALCIUM SERPL-MCNC: 8.4 MG/DL (ref 8.8–10.2)
CALCIUM SERPL-MCNC: 8.7 MG/DL (ref 8.8–10.2)
CALCIUM SERPL-MCNC: 9 MG/DL (ref 8.8–10.2)
CALCIUM SERPL-MCNC: 9.3 MG/DL (ref 8.8–10.2)
CALCIUM SERPL-MCNC: 9.5 MG/DL (ref 8.8–10.2)
CALCIUM SERPL-MCNC: 9.7 MG/DL (ref 8.8–10.2)
CHLORIDE SERPL-SCNC: 101 MMOL/L (ref 98–107)
CHLORIDE SERPL-SCNC: 101 MMOL/L (ref 98–107)
CHLORIDE SERPL-SCNC: 103 MMOL/L (ref 98–107)
CHLORIDE SERPL-SCNC: 104 MMOL/L (ref 98–107)
CHLORIDE SERPL-SCNC: 107 MMOL/L (ref 98–107)
CHLORIDE SERPL-SCNC: 109 MMOL/L (ref 98–107)
CHLORIDE SERPL-SCNC: 110 MMOL/L (ref 98–107)
CHLORIDE SERPL-SCNC: 98 MMOL/L (ref 98–107)
COLOR UR AUTO: ABNORMAL
CREAT SERPL-MCNC: 1.04 MG/DL (ref 0.51–0.95)
CREAT SERPL-MCNC: 1.17 MG/DL (ref 0.51–0.95)
CREAT SERPL-MCNC: 1.21 MG/DL (ref 0.51–0.95)
CREAT SERPL-MCNC: 1.32 MG/DL (ref 0.51–0.95)
CREAT SERPL-MCNC: 1.39 MG/DL (ref 0.51–0.95)
CREAT SERPL-MCNC: 1.45 MG/DL (ref 0.51–0.95)
CREAT SERPL-MCNC: 1.51 MG/DL (ref 0.51–0.95)
CREAT SERPL-MCNC: 1.6 MG/DL (ref 0.51–0.95)
CREAT SERPL-MCNC: 1.66 MG/DL (ref 0.51–0.95)
CREAT SERPL-MCNC: 1.73 MG/DL (ref 0.51–0.95)
CREAT SERPL-MCNC: 2.27 MG/DL (ref 0.51–0.95)
DEPRECATED HCO3 PLAS-SCNC: 21 MMOL/L (ref 22–29)
DEPRECATED HCO3 PLAS-SCNC: 23 MMOL/L (ref 22–29)
DEPRECATED HCO3 PLAS-SCNC: 25 MMOL/L (ref 22–29)
DEPRECATED HCO3 PLAS-SCNC: 27 MMOL/L (ref 22–29)
DIASTOLIC BLOOD PRESSURE - MUSE: 71 MMHG
DIASTOLIC BLOOD PRESSURE - MUSE: NORMAL MMHG
EGFRCR SERPLBLD CKD-EPI 2021: 21 ML/MIN/1.73M2
EGFRCR SERPLBLD CKD-EPI 2021: 29 ML/MIN/1.73M2
EGFRCR SERPLBLD CKD-EPI 2021: 31 ML/MIN/1.73M2
EGFRCR SERPLBLD CKD-EPI 2021: 32 ML/MIN/1.73M2
EGFRCR SERPLBLD CKD-EPI 2021: 34 ML/MIN/1.73M2
EGFRCR SERPLBLD CKD-EPI 2021: 36 ML/MIN/1.73M2
EGFRCR SERPLBLD CKD-EPI 2021: 38 ML/MIN/1.73M2
EGFRCR SERPLBLD CKD-EPI 2021: 45 ML/MIN/1.73M2
ENTEROCOCCUS FAECALIS: NOT DETECTED
ENTEROCOCCUS FAECIUM: NOT DETECTED
ERYTHROCYTE [DISTWIDTH] IN BLOOD BY AUTOMATED COUNT: 14.6 % (ref 10–15)
ERYTHROCYTE [DISTWIDTH] IN BLOOD BY AUTOMATED COUNT: 14.8 % (ref 10–15)
ERYTHROCYTE [DISTWIDTH] IN BLOOD BY AUTOMATED COUNT: 14.8 % (ref 10–15)
ERYTHROCYTE [DISTWIDTH] IN BLOOD BY AUTOMATED COUNT: 14.9 % (ref 10–15)
ERYTHROCYTE [DISTWIDTH] IN BLOOD BY AUTOMATED COUNT: 15.6 % (ref 10–15)
GFR SERPL CREATININE-BSD FRML MDRD: 40 ML/MIN/1.73M2
GFR SERPL CREATININE-BSD FRML MDRD: 47 ML/MIN/1.73M2
GFR SERPL CREATININE-BSD FRML MDRD: 54 ML/MIN/1.73M2
GLUCOSE BLDC GLUCOMTR-MCNC: 174 MG/DL (ref 70–99)
GLUCOSE BLDC GLUCOMTR-MCNC: 193 MG/DL (ref 70–99)
GLUCOSE BLDC GLUCOMTR-MCNC: 200 MG/DL (ref 70–99)
GLUCOSE BLDC GLUCOMTR-MCNC: 203 MG/DL (ref 70–99)
GLUCOSE BLDC GLUCOMTR-MCNC: 209 MG/DL (ref 70–99)
GLUCOSE BLDC GLUCOMTR-MCNC: 212 MG/DL (ref 70–99)
GLUCOSE BLDC GLUCOMTR-MCNC: 228 MG/DL (ref 70–99)
GLUCOSE BLDC GLUCOMTR-MCNC: 232 MG/DL (ref 70–99)
GLUCOSE BLDC GLUCOMTR-MCNC: 255 MG/DL (ref 70–99)
GLUCOSE BLDC GLUCOMTR-MCNC: 267 MG/DL (ref 70–99)
GLUCOSE BLDC GLUCOMTR-MCNC: 269 MG/DL (ref 70–99)
GLUCOSE BLDC GLUCOMTR-MCNC: 270 MG/DL (ref 70–99)
GLUCOSE BLDC GLUCOMTR-MCNC: 274 MG/DL (ref 70–99)
GLUCOSE BLDC GLUCOMTR-MCNC: 293 MG/DL (ref 70–99)
GLUCOSE BLDC GLUCOMTR-MCNC: 306 MG/DL (ref 70–99)
GLUCOSE BLDC GLUCOMTR-MCNC: 309 MG/DL (ref 70–99)
GLUCOSE BLDC GLUCOMTR-MCNC: 320 MG/DL (ref 70–99)
GLUCOSE BLDC GLUCOMTR-MCNC: 326 MG/DL (ref 70–99)
GLUCOSE BLDC GLUCOMTR-MCNC: 329 MG/DL (ref 70–99)
GLUCOSE BLDC GLUCOMTR-MCNC: 346 MG/DL (ref 70–99)
GLUCOSE BLDC GLUCOMTR-MCNC: 360 MG/DL (ref 70–99)
GLUCOSE SERPL-MCNC: 122 MG/DL (ref 70–99)
GLUCOSE SERPL-MCNC: 134 MG/DL (ref 70–99)
GLUCOSE SERPL-MCNC: 154 MG/DL (ref 70–99)
GLUCOSE SERPL-MCNC: 157 MG/DL (ref 70–99)
GLUCOSE SERPL-MCNC: 204 MG/DL (ref 70–99)
GLUCOSE SERPL-MCNC: 259 MG/DL (ref 70–99)
GLUCOSE SERPL-MCNC: 298 MG/DL (ref 70–99)
GLUCOSE SERPL-MCNC: 308 MG/DL (ref 70–99)
GLUCOSE UR STRIP-MCNC: 300 MG/DL
HBA1C MFR BLD: 7.5 %
HBA1C MFR BLD: 8.3 %
HCT VFR BLD AUTO: 28.6 % (ref 35–47)
HCT VFR BLD AUTO: 30.8 % (ref 35–47)
HCT VFR BLD AUTO: 31.6 % (ref 35–47)
HCT VFR BLD AUTO: 31.8 % (ref 35–47)
HCT VFR BLD AUTO: 33.4 % (ref 35–47)
HCT VFR BLD AUTO: 34 % (ref 35–47)
HCT VFR BLD AUTO: 37.9 % (ref 35–47)
HGB BLD-MCNC: 10.2 G/DL (ref 11.7–15.7)
HGB BLD-MCNC: 10.2 G/DL (ref 11.7–15.7)
HGB BLD-MCNC: 10.3 G/DL (ref 11.7–15.7)
HGB BLD-MCNC: 10.4 G/DL (ref 11.7–15.7)
HGB BLD-MCNC: 10.6 G/DL (ref 11.7–15.7)
HGB BLD-MCNC: 10.7 G/DL (ref 11.7–15.7)
HGB BLD-MCNC: 11.3 G/DL (ref 11.7–15.7)
HGB BLD-MCNC: 12.3 G/DL (ref 11.7–15.7)
HGB BLD-MCNC: 9.3 G/DL (ref 11.7–15.7)
HGB BLD-MCNC: 9.7 G/DL (ref 11.7–15.7)
HGB BLD-MCNC: 9.9 G/DL (ref 11.7–15.7)
HGB UR QL STRIP: NEGATIVE
HOLD SPECIMEN: NORMAL
INR PPP: 1.13 (ref 0.85–1.15)
INTERPRETATION ECG - MUSE: NORMAL
INTERPRETATION ECG - MUSE: NORMAL
KETONES UR STRIP-MCNC: NEGATIVE MG/DL
LACTATE SERPL-SCNC: 1 MMOL/L (ref 0.7–2)
LACTATE SERPL-SCNC: 1 MMOL/L (ref 0.7–2)
LACTATE SERPL-SCNC: 2.5 MMOL/L (ref 0.7–2)
LEUKOCYTE ESTERASE UR QL STRIP: ABNORMAL
LISTERIA SPECIES (DETECTED/NOT DETECTED): NOT DETECTED
LVEF ECHO: NORMAL
MAGNESIUM SERPL-MCNC: 2.2 MG/DL (ref 1.7–2.3)
MAGNESIUM SERPL-MCNC: 3.2 MG/DL (ref 1.7–2.3)
MCH RBC QN AUTO: 29.9 PG (ref 26.5–33)
MCH RBC QN AUTO: 29.9 PG (ref 26.5–33)
MCH RBC QN AUTO: 30.1 PG (ref 26.5–33)
MCH RBC QN AUTO: 30.2 PG (ref 26.5–33)
MCH RBC QN AUTO: 30.3 PG (ref 26.5–33)
MCH RBC QN AUTO: 30.6 PG (ref 26.5–33)
MCH RBC QN AUTO: 30.7 PG (ref 26.5–33)
MCHC RBC AUTO-ENTMCNC: 30.6 G/DL (ref 31.5–36.5)
MCHC RBC AUTO-ENTMCNC: 31.3 G/DL (ref 31.5–36.5)
MCHC RBC AUTO-ENTMCNC: 31.5 G/DL (ref 31.5–36.5)
MCHC RBC AUTO-ENTMCNC: 31.7 G/DL (ref 31.5–36.5)
MCHC RBC AUTO-ENTMCNC: 32.1 G/DL (ref 31.5–36.5)
MCHC RBC AUTO-ENTMCNC: 32.5 G/DL (ref 31.5–36.5)
MCHC RBC AUTO-ENTMCNC: 32.5 G/DL (ref 31.5–36.5)
MCV RBC AUTO: 100 FL (ref 78–100)
MCV RBC AUTO: 93 FL (ref 78–100)
MCV RBC AUTO: 93 FL (ref 78–100)
MCV RBC AUTO: 94 FL (ref 78–100)
MCV RBC AUTO: 94 FL (ref 78–100)
MCV RBC AUTO: 95 FL (ref 78–100)
MCV RBC AUTO: 98 FL (ref 78–100)
MUCOUS THREADS #/AREA URNS LPF: PRESENT /LPF
NITRATE UR QL: POSITIVE
P AXIS - MUSE: 56 DEGREES
P AXIS - MUSE: 78 DEGREES
PH UR STRIP: 5 [PH] (ref 5–7)
PLATELET # BLD AUTO: 235 10E3/UL (ref 150–450)
PLATELET # BLD AUTO: 309 10E3/UL (ref 150–450)
PLATELET # BLD AUTO: 312 10E3/UL (ref 150–450)
PLATELET # BLD AUTO: 321 10E3/UL (ref 150–450)
PLATELET # BLD AUTO: 383 10E3/UL (ref 150–450)
PLATELET # BLD AUTO: 428 10E3/UL (ref 150–450)
PLATELET # BLD AUTO: 570 10E3/UL (ref 150–450)
POTASSIUM SERPL-SCNC: 4.4 MMOL/L (ref 3.4–5.3)
POTASSIUM SERPL-SCNC: 4.5 MMOL/L (ref 3.4–5.3)
POTASSIUM SERPL-SCNC: 4.7 MMOL/L (ref 3.4–5.3)
POTASSIUM SERPL-SCNC: 4.7 MMOL/L (ref 3.4–5.3)
POTASSIUM SERPL-SCNC: 5 MMOL/L (ref 3.4–5.3)
POTASSIUM SERPL-SCNC: 5.1 MMOL/L (ref 3.4–5.3)
PR INTERVAL - MUSE: 166 MS
PR INTERVAL - MUSE: 168 MS
PROT SERPL-MCNC: 7.7 G/DL (ref 6.4–8.3)
QRS DURATION - MUSE: 74 MS
QRS DURATION - MUSE: 78 MS
QT - MUSE: 344 MS
QT - MUSE: 350 MS
QTC - MUSE: 434 MS
QTC - MUSE: 439 MS
R AXIS - MUSE: 13 DEGREES
R AXIS - MUSE: 53 DEGREES
RBC # BLD AUTO: 3.07 10E6/UL (ref 3.8–5.2)
RBC # BLD AUTO: 3.24 10E6/UL (ref 3.8–5.2)
RBC # BLD AUTO: 3.24 10E6/UL (ref 3.8–5.2)
RBC # BLD AUTO: 3.39 10E6/UL (ref 3.8–5.2)
RBC # BLD AUTO: 3.39 10E6/UL (ref 3.8–5.2)
RBC # BLD AUTO: 3.55 10E6/UL (ref 3.8–5.2)
RBC # BLD AUTO: 4.07 10E6/UL (ref 3.8–5.2)
RBC URINE: 1 /HPF
SARS-COV-2 RNA RESP QL NAA+PROBE: POSITIVE
SODIUM SERPL-SCNC: 136 MMOL/L (ref 136–145)
SODIUM SERPL-SCNC: 139 MMOL/L (ref 136–145)
SODIUM SERPL-SCNC: 140 MMOL/L (ref 135–145)
SODIUM SERPL-SCNC: 140 MMOL/L (ref 136–145)
SODIUM SERPL-SCNC: 141 MMOL/L (ref 136–145)
SODIUM SERPL-SCNC: 142 MMOL/L (ref 135–145)
SODIUM SERPL-SCNC: 144 MMOL/L (ref 135–145)
SODIUM SERPL-SCNC: 147 MMOL/L (ref 135–145)
SP GR UR STRIP: 1.02 (ref 1–1.03)
SQUAMOUS EPITHELIAL: 2 /HPF
STAPHYLOCOCCUS AUREUS: NOT DETECTED
STAPHYLOCOCCUS EPIDERMIDIS: DETECTED
STAPHYLOCOCCUS LUGDUNENSIS: NOT DETECTED
STREPTOCOCCUS AGALACTIAE: NOT DETECTED
STREPTOCOCCUS ANGINOSUS GROUP: NOT DETECTED
STREPTOCOCCUS PNEUMONIAE: NOT DETECTED
STREPTOCOCCUS PYOGENES: NOT DETECTED
STREPTOCOCCUS SPECIES: NOT DETECTED
SYSTOLIC BLOOD PRESSURE - MUSE: 165 MMHG
SYSTOLIC BLOOD PRESSURE - MUSE: NORMAL MMHG
T AXIS - MUSE: 69 DEGREES
T AXIS - MUSE: 76 DEGREES
TROPONIN T SERPL HS-MCNC: 137 NG/L
TROPONIN T SERPL HS-MCNC: 143 NG/L
TROPONIN T SERPL HS-MCNC: 149 NG/L
TSH SERPL DL<=0.005 MIU/L-ACNC: 2.98 UIU/ML (ref 0.3–4.2)
UFH PPP CHRO-ACNC: 0.36 IU/ML
UFH PPP CHRO-ACNC: 0.4 IU/ML
UFH PPP CHRO-ACNC: 0.48 IU/ML
UFH PPP CHRO-ACNC: 0.54 IU/ML
UFH PPP CHRO-ACNC: 0.62 IU/ML
UFH PPP CHRO-ACNC: 0.79 IU/ML
UFH PPP CHRO-ACNC: 1.09 IU/ML
UFH PPP CHRO-ACNC: >1.1 IU/ML
UFH PPP CHRO-ACNC: >1.1 IU/ML
UROBILINOGEN UR STRIP-MCNC: <2 MG/DL
VENTRICULAR RATE- MUSE: 95 BPM
VENTRICULAR RATE- MUSE: 96 BPM
WBC # BLD AUTO: 11.9 10E3/UL (ref 4–11)
WBC # BLD AUTO: 12.4 10E3/UL (ref 4–11)
WBC # BLD AUTO: 14.9 10E3/UL (ref 4–11)
WBC # BLD AUTO: 15.1 10E3/UL (ref 4–11)
WBC # BLD AUTO: 16.5 10E3/UL (ref 4–11)
WBC # BLD AUTO: 16.7 10E3/UL (ref 4–11)
WBC # BLD AUTO: 9.2 10E3/UL (ref 4–11)
WBC CLUMPS #/AREA URNS HPF: PRESENT /HPF
WBC URINE: 46 /HPF

## 2023-01-01 PROCEDURE — 120N000004 HC R&B MS OVERFLOW

## 2023-01-01 PROCEDURE — 85520 HEPARIN ASSAY: CPT | Performed by: HOSPITALIST

## 2023-01-01 PROCEDURE — 85520 HEPARIN ASSAY: CPT | Performed by: STUDENT IN AN ORGANIZED HEALTH CARE EDUCATION/TRAINING PROGRAM

## 2023-01-01 PROCEDURE — 78580 LUNG PERFUSION IMAGING: CPT

## 2023-01-01 PROCEDURE — 85018 HEMOGLOBIN: CPT | Performed by: STUDENT IN AN ORGANIZED HEALTH CARE EDUCATION/TRAINING PROGRAM

## 2023-01-01 PROCEDURE — 258N000003 HC RX IP 258 OP 636: Performed by: STUDENT IN AN ORGANIZED HEALTH CARE EDUCATION/TRAINING PROGRAM

## 2023-01-01 PROCEDURE — 93010 ELECTROCARDIOGRAM REPORT: CPT | Performed by: INTERNAL MEDICINE

## 2023-01-01 PROCEDURE — 87077 CULTURE AEROBIC IDENTIFY: CPT | Performed by: EMERGENCY MEDICINE

## 2023-01-01 PROCEDURE — 36415 COLL VENOUS BLD VENIPUNCTURE: CPT | Mod: ORL | Performed by: FAMILY MEDICINE

## 2023-01-01 PROCEDURE — 250N000011 HC RX IP 250 OP 636: Performed by: STUDENT IN AN ORGANIZED HEALTH CARE EDUCATION/TRAINING PROGRAM

## 2023-01-01 PROCEDURE — 99310 SBSQ NF CARE HIGH MDM 45: CPT | Performed by: NURSE PRACTITIONER

## 2023-01-01 PROCEDURE — 80048 BASIC METABOLIC PNL TOTAL CA: CPT | Performed by: STUDENT IN AN ORGANIZED HEALTH CARE EDUCATION/TRAINING PROGRAM

## 2023-01-01 PROCEDURE — P9604 ONE-WAY ALLOW PRORATED TRIP: HCPCS | Mod: ORL | Performed by: FAMILY MEDICINE

## 2023-01-01 PROCEDURE — G0463 HOSPITAL OUTPT CLINIC VISIT: HCPCS

## 2023-01-01 PROCEDURE — 81001 URINALYSIS AUTO W/SCOPE: CPT | Performed by: EMERGENCY MEDICINE

## 2023-01-01 PROCEDURE — 99233 SBSQ HOSP IP/OBS HIGH 50: CPT | Performed by: STUDENT IN AN ORGANIZED HEALTH CARE EDUCATION/TRAINING PROGRAM

## 2023-01-01 PROCEDURE — 210N000002 HC R&B HEART CARE

## 2023-01-01 PROCEDURE — 250N000013 HC RX MED GY IP 250 OP 250 PS 637: Performed by: HOSPITALIST

## 2023-01-01 PROCEDURE — 83735 ASSAY OF MAGNESIUM: CPT | Mod: ORL | Performed by: FAMILY MEDICINE

## 2023-01-01 PROCEDURE — 258N000003 HC RX IP 258 OP 636: Performed by: EMERGENCY MEDICINE

## 2023-01-01 PROCEDURE — 80048 BASIC METABOLIC PNL TOTAL CA: CPT | Mod: ORL | Performed by: FAMILY MEDICINE

## 2023-01-01 PROCEDURE — 250N000011 HC RX IP 250 OP 636: Mod: JZ | Performed by: STUDENT IN AN ORGANIZED HEALTH CARE EDUCATION/TRAINING PROGRAM

## 2023-01-01 PROCEDURE — 83036 HEMOGLOBIN GLYCOSYLATED A1C: CPT | Mod: ORL | Performed by: FAMILY MEDICINE

## 2023-01-01 PROCEDURE — 36415 COLL VENOUS BLD VENIPUNCTURE: CPT | Performed by: STUDENT IN AN ORGANIZED HEALTH CARE EDUCATION/TRAINING PROGRAM

## 2023-01-01 PROCEDURE — 99309 SBSQ NF CARE MODERATE MDM 30: CPT | Performed by: FAMILY MEDICINE

## 2023-01-01 PROCEDURE — 87086 URINE CULTURE/COLONY COUNT: CPT | Performed by: EMERGENCY MEDICINE

## 2023-01-01 PROCEDURE — 343N000001 HC RX 343: Performed by: STUDENT IN AN ORGANIZED HEALTH CARE EDUCATION/TRAINING PROGRAM

## 2023-01-01 PROCEDURE — 250N000012 HC RX MED GY IP 250 OP 636 PS 637: Performed by: STUDENT IN AN ORGANIZED HEALTH CARE EDUCATION/TRAINING PROGRAM

## 2023-01-01 PROCEDURE — 85730 THROMBOPLASTIN TIME PARTIAL: CPT | Performed by: EMERGENCY MEDICINE

## 2023-01-01 PROCEDURE — 85027 COMPLETE CBC AUTOMATED: CPT | Performed by: STUDENT IN AN ORGANIZED HEALTH CARE EDUCATION/TRAINING PROGRAM

## 2023-01-01 PROCEDURE — 93005 ELECTROCARDIOGRAM TRACING: CPT

## 2023-01-01 PROCEDURE — 82565 ASSAY OF CREATININE: CPT | Performed by: HOSPITALIST

## 2023-01-01 PROCEDURE — 99233 SBSQ HOSP IP/OBS HIGH 50: CPT | Performed by: HOSPITALIST

## 2023-01-01 PROCEDURE — 99222 1ST HOSP IP/OBS MODERATE 55: CPT | Performed by: STUDENT IN AN ORGANIZED HEALTH CARE EDUCATION/TRAINING PROGRAM

## 2023-01-01 PROCEDURE — 84443 ASSAY THYROID STIM HORMONE: CPT | Mod: ORL | Performed by: FAMILY MEDICINE

## 2023-01-01 PROCEDURE — 85610 PROTHROMBIN TIME: CPT | Performed by: EMERGENCY MEDICINE

## 2023-01-01 PROCEDURE — 250N000013 HC RX MED GY IP 250 OP 250 PS 637: Performed by: STUDENT IN AN ORGANIZED HEALTH CARE EDUCATION/TRAINING PROGRAM

## 2023-01-01 PROCEDURE — 71045 X-RAY EXAM CHEST 1 VIEW: CPT

## 2023-01-01 PROCEDURE — 272N000035 NM LUNG SCAN PERFUSION PARTICULATE

## 2023-01-01 PROCEDURE — 99309 SBSQ NF CARE MODERATE MDM 30: CPT | Performed by: NURSE PRACTITIONER

## 2023-01-01 PROCEDURE — 83735 ASSAY OF MAGNESIUM: CPT | Performed by: EMERGENCY MEDICINE

## 2023-01-01 PROCEDURE — 85018 HEMOGLOBIN: CPT | Mod: ORL | Performed by: FAMILY MEDICINE

## 2023-01-01 PROCEDURE — 87149 DNA/RNA DIRECT PROBE: CPT | Performed by: EMERGENCY MEDICINE

## 2023-01-01 PROCEDURE — 93005 ELECTROCARDIOGRAM TRACING: CPT | Performed by: STUDENT IN AN ORGANIZED HEALTH CARE EDUCATION/TRAINING PROGRAM

## 2023-01-01 PROCEDURE — 85027 COMPLETE CBC AUTOMATED: CPT | Mod: ORL | Performed by: FAMILY MEDICINE

## 2023-01-01 PROCEDURE — 87635 SARS-COV-2 COVID-19 AMP PRB: CPT | Mod: ORL | Performed by: FAMILY MEDICINE

## 2023-01-01 PROCEDURE — 99223 1ST HOSP IP/OBS HIGH 75: CPT | Performed by: STUDENT IN AN ORGANIZED HEALTH CARE EDUCATION/TRAINING PROGRAM

## 2023-01-01 PROCEDURE — 36415 COLL VENOUS BLD VENIPUNCTURE: CPT | Performed by: HOSPITALIST

## 2023-01-01 PROCEDURE — 99291 CRITICAL CARE FIRST HOUR: CPT

## 2023-01-01 PROCEDURE — 83605 ASSAY OF LACTIC ACID: CPT | Performed by: EMERGENCY MEDICINE

## 2023-01-01 PROCEDURE — 36415 COLL VENOUS BLD VENIPUNCTURE: CPT | Performed by: EMERGENCY MEDICINE

## 2023-01-01 PROCEDURE — 99232 SBSQ HOSP IP/OBS MODERATE 35: CPT | Performed by: INTERNAL MEDICINE

## 2023-01-01 PROCEDURE — A9540 TC99M MAA: HCPCS | Performed by: STUDENT IN AN ORGANIZED HEALTH CARE EDUCATION/TRAINING PROGRAM

## 2023-01-01 PROCEDURE — 70450 CT HEAD/BRAIN W/O DYE: CPT

## 2023-01-01 PROCEDURE — 80053 COMPREHEN METABOLIC PANEL: CPT | Performed by: EMERGENCY MEDICINE

## 2023-01-01 PROCEDURE — 99239 HOSP IP/OBS DSCHRG MGMT >30: CPT | Performed by: INTERNAL MEDICINE

## 2023-01-01 PROCEDURE — 85027 COMPLETE CBC AUTOMATED: CPT | Performed by: INTERNAL MEDICINE

## 2023-01-01 PROCEDURE — 85027 COMPLETE CBC AUTOMATED: CPT | Performed by: EMERGENCY MEDICINE

## 2023-01-01 PROCEDURE — 87040 BLOOD CULTURE FOR BACTERIA: CPT | Performed by: STUDENT IN AN ORGANIZED HEALTH CARE EDUCATION/TRAINING PROGRAM

## 2023-01-01 PROCEDURE — 93005 ELECTROCARDIOGRAM TRACING: CPT | Performed by: EMERGENCY MEDICINE

## 2023-01-01 PROCEDURE — 84484 ASSAY OF TROPONIN QUANT: CPT | Performed by: STUDENT IN AN ORGANIZED HEALTH CARE EDUCATION/TRAINING PROGRAM

## 2023-01-01 PROCEDURE — 84484 ASSAY OF TROPONIN QUANT: CPT | Performed by: EMERGENCY MEDICINE

## 2023-01-01 PROCEDURE — 83605 ASSAY OF LACTIC ACID: CPT | Performed by: HOSPITALIST

## 2023-01-01 PROCEDURE — 93306 TTE W/DOPPLER COMPLETE: CPT | Mod: 26 | Performed by: INTERNAL MEDICINE

## 2023-01-01 PROCEDURE — 255N000002 HC RX 255 OP 636: Performed by: STUDENT IN AN ORGANIZED HEALTH CARE EDUCATION/TRAINING PROGRAM

## 2023-01-01 PROCEDURE — 99232 SBSQ HOSP IP/OBS MODERATE 35: CPT | Performed by: HOSPITALIST

## 2023-01-01 RX ORDER — PROCHLORPERAZINE 25 MG
12.5 SUPPOSITORY, RECTAL RECTAL EVERY 12 HOURS PRN
Status: DISCONTINUED | OUTPATIENT
Start: 2023-01-01 | End: 2023-01-01 | Stop reason: HOSPADM

## 2023-01-01 RX ORDER — DEXTROSE MONOHYDRATE 25 G/50ML
25-50 INJECTION, SOLUTION INTRAVENOUS
Status: DISCONTINUED | OUTPATIENT
Start: 2023-01-01 | End: 2023-01-01

## 2023-01-01 RX ORDER — NICOTINE POLACRILEX 4 MG
15-30 LOZENGE BUCCAL
Status: DISCONTINUED | OUTPATIENT
Start: 2023-01-01 | End: 2023-01-01

## 2023-01-01 RX ORDER — GLIPIZIDE 5 MG/1
5 TABLET, FILM COATED, EXTENDED RELEASE ORAL DAILY
Qty: 30 TABLET | Refills: 0 | Status: SHIPPED | OUTPATIENT
Start: 2023-01-01

## 2023-01-01 RX ORDER — POLYETHYLENE GLYCOL 3350 17 G/17G
17 POWDER, FOR SOLUTION ORAL EVERY MORNING
Status: DISCONTINUED | OUTPATIENT
Start: 2023-01-01 | End: 2023-01-01 | Stop reason: HOSPADM

## 2023-01-01 RX ORDER — NYSTATIN 100000/ML
500000 SUSPENSION, ORAL (FINAL DOSE FORM) ORAL 4 TIMES DAILY
Status: DISCONTINUED | OUTPATIENT
Start: 2023-01-01 | End: 2023-01-01 | Stop reason: HOSPADM

## 2023-01-01 RX ORDER — HYDROXYZINE HYDROCHLORIDE 10 MG/1
10 TABLET, FILM COATED ORAL 3 TIMES DAILY PRN
Status: DISCONTINUED | OUTPATIENT
Start: 2023-01-01 | End: 2023-01-01

## 2023-01-01 RX ORDER — ACETAMINOPHEN 325 MG/10.15ML
650 LIQUID ORAL EVERY 4 HOURS PRN
Status: DISCONTINUED | OUTPATIENT
Start: 2023-01-01 | End: 2023-01-01 | Stop reason: HOSPADM

## 2023-01-01 RX ORDER — AMLODIPINE BESYLATE 10 MG/1
10 TABLET ORAL EVERY MORNING
Status: DISCONTINUED | OUTPATIENT
Start: 2023-01-01 | End: 2023-01-01 | Stop reason: HOSPADM

## 2023-01-01 RX ORDER — NICOTINE POLACRILEX 4 MG
15-30 LOZENGE BUCCAL
Status: DISCONTINUED | OUTPATIENT
Start: 2023-01-01 | End: 2023-01-01 | Stop reason: HOSPADM

## 2023-01-01 RX ORDER — OLANZAPINE 10 MG/2ML
2.5 INJECTION, POWDER, FOR SOLUTION INTRAMUSCULAR EVERY 6 HOURS PRN
Status: DISCONTINUED | OUTPATIENT
Start: 2023-01-01 | End: 2023-01-01

## 2023-01-01 RX ORDER — HYDROXYZINE HYDROCHLORIDE 25 MG/1
50 TABLET, FILM COATED ORAL EVERY 6 HOURS PRN
Status: DISCONTINUED | OUTPATIENT
Start: 2023-01-01 | End: 2023-01-01 | Stop reason: HOSPADM

## 2023-01-01 RX ORDER — HEPARIN SODIUM 10000 [USP'U]/100ML
0-5000 INJECTION, SOLUTION INTRAVENOUS CONTINUOUS
Status: DISCONTINUED | OUTPATIENT
Start: 2023-01-01 | End: 2023-01-01

## 2023-01-01 RX ORDER — DEXTROSE MONOHYDRATE 25 G/50ML
25-50 INJECTION, SOLUTION INTRAVENOUS
Status: DISCONTINUED | OUTPATIENT
Start: 2023-01-01 | End: 2023-01-01 | Stop reason: HOSPADM

## 2023-01-01 RX ORDER — PROCHLORPERAZINE MALEATE 5 MG
5 TABLET ORAL EVERY 6 HOURS PRN
Status: DISCONTINUED | OUTPATIENT
Start: 2023-01-01 | End: 2023-01-01 | Stop reason: HOSPADM

## 2023-01-01 RX ORDER — INSULIN ASPART 100 [IU]/ML
INJECTION, SOLUTION INTRAVENOUS; SUBCUTANEOUS
COMMUNITY
End: 2023-01-01

## 2023-01-01 RX ORDER — NALOXONE HYDROCHLORIDE 0.4 MG/ML
0.2 INJECTION, SOLUTION INTRAMUSCULAR; INTRAVENOUS; SUBCUTANEOUS
Status: DISCONTINUED | OUTPATIENT
Start: 2023-01-01 | End: 2023-01-01 | Stop reason: HOSPADM

## 2023-01-01 RX ORDER — ACETAMINOPHEN 325 MG/1
650 TABLET ORAL EVERY 4 HOURS PRN
Status: DISCONTINUED | OUTPATIENT
Start: 2023-01-01 | End: 2023-01-01 | Stop reason: HOSPADM

## 2023-01-01 RX ORDER — ONDANSETRON 4 MG/1
4 TABLET, ORALLY DISINTEGRATING ORAL EVERY 6 HOURS PRN
Status: DISCONTINUED | OUTPATIENT
Start: 2023-01-01 | End: 2023-01-01 | Stop reason: HOSPADM

## 2023-01-01 RX ORDER — CEFTRIAXONE 1 G/1
1 INJECTION, POWDER, FOR SOLUTION INTRAMUSCULAR; INTRAVENOUS EVERY 24 HOURS
Status: DISCONTINUED | OUTPATIENT
Start: 2023-01-01 | End: 2023-01-01

## 2023-01-01 RX ORDER — OXYCODONE HYDROCHLORIDE 5 MG/1
5 TABLET ORAL EVERY 6 HOURS PRN
Status: DISCONTINUED | OUTPATIENT
Start: 2023-01-01 | End: 2023-01-01 | Stop reason: HOSPADM

## 2023-01-01 RX ORDER — SODIUM CHLORIDE 9 MG/ML
INJECTION, SOLUTION INTRAVENOUS CONTINUOUS
Status: ACTIVE | OUTPATIENT
Start: 2023-01-01 | End: 2023-01-01

## 2023-01-01 RX ORDER — ONDANSETRON 2 MG/ML
4 INJECTION INTRAMUSCULAR; INTRAVENOUS EVERY 6 HOURS PRN
Status: DISCONTINUED | OUTPATIENT
Start: 2023-01-01 | End: 2023-01-01 | Stop reason: HOSPADM

## 2023-01-01 RX ORDER — HYDROXYZINE HYDROCHLORIDE 25 MG/1
25 TABLET, FILM COATED ORAL EVERY 6 HOURS PRN
Status: DISCONTINUED | OUTPATIENT
Start: 2023-01-01 | End: 2023-01-01 | Stop reason: HOSPADM

## 2023-01-01 RX ORDER — NALOXONE HYDROCHLORIDE 0.4 MG/ML
0.4 INJECTION, SOLUTION INTRAMUSCULAR; INTRAVENOUS; SUBCUTANEOUS
Status: DISCONTINUED | OUTPATIENT
Start: 2023-01-01 | End: 2023-01-01 | Stop reason: HOSPADM

## 2023-01-01 RX ORDER — SENNOSIDES 8.6 MG
650 CAPSULE ORAL EVERY 8 HOURS PRN
Status: DISCONTINUED | OUTPATIENT
Start: 2023-01-01 | End: 2023-01-01

## 2023-01-01 RX ORDER — AMLODIPINE BESYLATE 10 MG/1
10 TABLET ORAL EVERY MORNING
COMMUNITY

## 2023-01-01 RX ORDER — LOSARTAN POTASSIUM 50 MG/1
50 TABLET ORAL EVERY MORNING
COMMUNITY

## 2023-01-01 RX ORDER — SENNOSIDES 8.6 MG
650 CAPSULE ORAL EVERY 6 HOURS PRN
COMMUNITY

## 2023-01-01 RX ORDER — LOSARTAN POTASSIUM 25 MG/1
50 TABLET ORAL EVERY MORNING
Status: DISCONTINUED | OUTPATIENT
Start: 2023-01-01 | End: 2023-01-01

## 2023-01-01 RX ADMIN — Medication: at 00:21

## 2023-01-01 RX ADMIN — ACETAMINOPHEN 650 MG: 325 TABLET ORAL at 20:08

## 2023-01-01 RX ADMIN — NYSTATIN 500000 UNITS: 100000 SUSPENSION ORAL at 13:05

## 2023-01-01 RX ADMIN — HYDROXYZINE HYDROCHLORIDE 50 MG: 25 TABLET, FILM COATED ORAL at 17:15

## 2023-01-01 RX ADMIN — POLYETHYLENE GLYCOL 3350 17 G: 17 POWDER, FOR SOLUTION ORAL at 08:42

## 2023-01-01 RX ADMIN — OXYCODONE HYDROCHLORIDE 5 MG: 5 TABLET ORAL at 03:07

## 2023-01-01 RX ADMIN — NYSTATIN 500000 UNITS: 100000 SUSPENSION ORAL at 17:12

## 2023-01-01 RX ADMIN — Medication: at 10:10

## 2023-01-01 RX ADMIN — APIXABAN 10 MG: 5 TABLET, FILM COATED ORAL at 09:41

## 2023-01-01 RX ADMIN — Medication: at 23:56

## 2023-01-01 RX ADMIN — SODIUM CHLORIDE 500 ML: 9 INJECTION, SOLUTION INTRAVENOUS at 12:08

## 2023-01-01 RX ADMIN — VANCOMYCIN HYDROCHLORIDE 750 MG: 5 INJECTION, POWDER, LYOPHILIZED, FOR SOLUTION INTRAVENOUS at 08:20

## 2023-01-01 RX ADMIN — OXYCODONE HYDROCHLORIDE 5 MG: 5 TABLET ORAL at 06:32

## 2023-01-01 RX ADMIN — INSULIN ASPART 2 UNITS: 100 INJECTION, SOLUTION INTRAVENOUS; SUBCUTANEOUS at 10:10

## 2023-01-01 RX ADMIN — AMLODIPINE BESYLATE 10 MG: 10 TABLET ORAL at 08:42

## 2023-01-01 RX ADMIN — NYSTATIN 500000 UNITS: 100000 SUSPENSION ORAL at 20:39

## 2023-01-01 RX ADMIN — INSULIN ASPART 2 UNITS: 100 INJECTION, SOLUTION INTRAVENOUS; SUBCUTANEOUS at 12:17

## 2023-01-01 RX ADMIN — INSULIN ASPART 2 UNITS: 100 INJECTION, SOLUTION INTRAVENOUS; SUBCUTANEOUS at 16:16

## 2023-01-01 RX ADMIN — HYDROXYZINE HYDROCHLORIDE 25 MG: 25 TABLET, FILM COATED ORAL at 20:08

## 2023-01-01 RX ADMIN — CEFTRIAXONE 1 G: 1 INJECTION, POWDER, FOR SOLUTION INTRAMUSCULAR; INTRAVENOUS at 10:10

## 2023-01-01 RX ADMIN — NYSTATIN 500000 UNITS: 100000 SUSPENSION ORAL at 08:42

## 2023-01-01 RX ADMIN — OXYCODONE HYDROCHLORIDE 5 MG: 5 TABLET ORAL at 12:19

## 2023-01-01 RX ADMIN — OXYCODONE HYDROCHLORIDE 5 MG: 5 TABLET ORAL at 10:09

## 2023-01-01 RX ADMIN — NYSTATIN 500000 UNITS: 100000 SUSPENSION ORAL at 08:20

## 2023-01-01 RX ADMIN — HYDROXYZINE HYDROCHLORIDE 50 MG: 25 TABLET, FILM COATED ORAL at 20:35

## 2023-01-01 RX ADMIN — HEPARIN SODIUM 1300 UNITS/HR: 10000 INJECTION, SOLUTION INTRAVENOUS at 21:25

## 2023-01-01 RX ADMIN — HEPARIN SODIUM 1600 UNITS/HR: 10000 INJECTION, SOLUTION INTRAVENOUS at 09:21

## 2023-01-01 RX ADMIN — Medication: at 21:57

## 2023-01-01 RX ADMIN — HYDROXYZINE HYDROCHLORIDE 50 MG: 25 TABLET, FILM COATED ORAL at 09:42

## 2023-01-01 RX ADMIN — HYDROXYZINE HYDROCHLORIDE 50 MG: 25 TABLET, FILM COATED ORAL at 00:21

## 2023-01-01 RX ADMIN — POLYETHYLENE GLYCOL 3350 17 G: 17 POWDER, FOR SOLUTION ORAL at 09:41

## 2023-01-01 RX ADMIN — NYSTATIN 500000 UNITS: 100000 SUSPENSION ORAL at 12:11

## 2023-01-01 RX ADMIN — NYSTATIN 500000 UNITS: 100000 SUSPENSION ORAL at 12:40

## 2023-01-01 RX ADMIN — AMLODIPINE BESYLATE 10 MG: 10 TABLET ORAL at 08:19

## 2023-01-01 RX ADMIN — HEPARIN SODIUM 1000 UNITS/HR: 10000 INJECTION, SOLUTION INTRAVENOUS at 23:29

## 2023-01-01 RX ADMIN — AMLODIPINE BESYLATE 10 MG: 10 TABLET ORAL at 09:40

## 2023-01-01 RX ADMIN — AMLODIPINE BESYLATE 10 MG: 10 TABLET ORAL at 09:42

## 2023-01-01 RX ADMIN — Medication: at 12:03

## 2023-01-01 RX ADMIN — SODIUM CHLORIDE 1000 ML: 9 INJECTION, SOLUTION INTRAVENOUS at 06:37

## 2023-01-01 RX ADMIN — Medication: at 10:27

## 2023-01-01 RX ADMIN — HYDROXYZINE HYDROCHLORIDE 25 MG: 25 TABLET, FILM COATED ORAL at 08:19

## 2023-01-01 RX ADMIN — CEFTRIAXONE 1 G: 1 INJECTION, POWDER, FOR SOLUTION INTRAMUSCULAR; INTRAVENOUS at 10:25

## 2023-01-01 RX ADMIN — NYSTATIN 500000 UNITS: 100000 SUSPENSION ORAL at 09:40

## 2023-01-01 RX ADMIN — HYDROXYZINE HYDROCHLORIDE 25 MG: 25 TABLET, FILM COATED ORAL at 12:17

## 2023-01-01 RX ADMIN — VANCOMYCIN HYDROCHLORIDE 1750 MG: 5 INJECTION, POWDER, LYOPHILIZED, FOR SOLUTION INTRAVENOUS at 10:09

## 2023-01-01 RX ADMIN — OXYCODONE HYDROCHLORIDE 5 MG: 5 TABLET ORAL at 17:44

## 2023-01-01 RX ADMIN — Medication: at 22:13

## 2023-01-01 RX ADMIN — NYSTATIN 500000 UNITS: 100000 SUSPENSION ORAL at 22:13

## 2023-01-01 RX ADMIN — APIXABAN 10 MG: 5 TABLET, FILM COATED ORAL at 13:05

## 2023-01-01 RX ADMIN — PERFLUTREN 2 ML: 6.52 INJECTION, SUSPENSION INTRAVENOUS at 15:13

## 2023-01-01 RX ADMIN — OXYCODONE HYDROCHLORIDE 5 MG: 5 TABLET ORAL at 03:42

## 2023-01-01 RX ADMIN — Medication: at 00:05

## 2023-01-01 RX ADMIN — HEPARIN SODIUM 1000 UNITS/HR: 10000 INJECTION, SOLUTION INTRAVENOUS at 02:59

## 2023-01-01 RX ADMIN — OXYCODONE HYDROCHLORIDE 5 MG: 5 TABLET ORAL at 20:43

## 2023-01-01 RX ADMIN — NYSTATIN 500000 UNITS: 100000 SUSPENSION ORAL at 20:09

## 2023-01-01 RX ADMIN — APIXABAN 10 MG: 5 TABLET, FILM COATED ORAL at 22:13

## 2023-01-01 RX ADMIN — CEFTRIAXONE 1 G: 1 INJECTION, POWDER, FOR SOLUTION INTRAMUSCULAR; INTRAVENOUS at 10:32

## 2023-01-01 RX ADMIN — NYSTATIN 500000 UNITS: 100000 SUSPENSION ORAL at 17:01

## 2023-01-01 RX ADMIN — SODIUM CHLORIDE: 9 INJECTION, SOLUTION INTRAVENOUS at 14:46

## 2023-01-01 RX ADMIN — NYSTATIN 500000 UNITS: 100000 SUSPENSION ORAL at 09:42

## 2023-01-01 RX ADMIN — OXYCODONE HYDROCHLORIDE 5 MG: 5 TABLET ORAL at 12:10

## 2023-01-01 RX ADMIN — AMLODIPINE BESYLATE 10 MG: 10 TABLET ORAL at 10:09

## 2023-01-01 RX ADMIN — AMLODIPINE BESYLATE 10 MG: 10 TABLET ORAL at 12:13

## 2023-01-01 RX ADMIN — NYSTATIN 500000 UNITS: 100000 SUSPENSION ORAL at 17:15

## 2023-01-01 RX ADMIN — KIT FOR THE PREPARATION OF TECHNETIUM TC 99M ALBUMIN AGGREGATED 8.6 MILLICURIE: 2.5 INJECTION, POWDER, FOR SOLUTION INTRAVENOUS at 13:26

## 2023-01-01 RX ADMIN — OXYCODONE HYDROCHLORIDE 5 MG: 5 TABLET ORAL at 19:31

## 2023-01-01 RX ADMIN — NYSTATIN 500000 UNITS: 100000 SUSPENSION ORAL at 21:23

## 2023-01-01 RX ADMIN — Medication: at 12:34

## 2023-01-01 RX ADMIN — NYSTATIN 500000 UNITS: 100000 SUSPENSION ORAL at 12:16

## 2023-01-01 RX ADMIN — OXYCODONE HYDROCHLORIDE 5 MG: 5 TABLET ORAL at 14:26

## 2023-01-01 RX ADMIN — OXYCODONE HYDROCHLORIDE 5 MG: 5 TABLET ORAL at 00:05

## 2023-01-01 RX ADMIN — Medication: at 09:43

## 2023-01-01 RX ADMIN — HEPARIN SODIUM 1000 UNITS/HR: 10000 INJECTION, SOLUTION INTRAVENOUS at 00:05

## 2023-01-01 RX ADMIN — CEFTRIAXONE 1 G: 1 INJECTION, POWDER, FOR SOLUTION INTRAMUSCULAR; INTRAVENOUS at 09:42

## 2023-01-01 ASSESSMENT — ACTIVITIES OF DAILY LIVING (ADL)
ADLS_ACUITY_SCORE: 53
ADLS_ACUITY_SCORE: 49
ADLS_ACUITY_SCORE: 35
ADLS_ACUITY_SCORE: 35
ADLS_ACUITY_SCORE: 49
ADLS_ACUITY_SCORE: 51
ADLS_ACUITY_SCORE: 49
ADLS_ACUITY_SCORE: 49
ADLS_ACUITY_SCORE: 53
ADLS_ACUITY_SCORE: 53
ADLS_ACUITY_SCORE: 49
ADLS_ACUITY_SCORE: 49
WALKING_OR_CLIMBING_STAIRS: AMBULATION DIFFICULTY, ASSISTANCE 1 PERSON;TRANSFERRING DIFFICULTY, ASSISTANCE 1 PERSON
ADLS_ACUITY_SCORE: 49
DRESSING/BATHING_DIFFICULTY: YES
DIFFICULTY_EATING/SWALLOWING: NO
ADLS_ACUITY_SCORE: 53
ADLS_ACUITY_SCORE: 49
ADLS_ACUITY_SCORE: 47
ADLS_ACUITY_SCORE: 53
ADLS_ACUITY_SCORE: 49
ADLS_ACUITY_SCORE: 47
TOILETING_ASSISTANCE: TOILETING DIFFICULTY, ASSISTANCE 1 PERSON
ADLS_ACUITY_SCORE: 49
ADLS_ACUITY_SCORE: 49
ADLS_ACUITY_SCORE: 53
DEPENDENT_IADLS:: CLEANING;COOKING;LAUNDRY;SHOPPING;MEAL PREPARATION;MEDICATION MANAGEMENT;MONEY MANAGEMENT;TRANSPORTATION;INCONTINENCE
ADLS_ACUITY_SCORE: 49
ADLS_ACUITY_SCORE: 49
WEAR_GLASSES_OR_BLIND: NO
ADLS_ACUITY_SCORE: 49
ADLS_ACUITY_SCORE: 47
ADLS_ACUITY_SCORE: 47
FALL_HISTORY_WITHIN_LAST_SIX_MONTHS: YES
ADLS_ACUITY_SCORE: 53
ADLS_ACUITY_SCORE: 49
ADLS_ACUITY_SCORE: 53
ADLS_ACUITY_SCORE: 45
ADLS_ACUITY_SCORE: 53
ADLS_ACUITY_SCORE: 47
ADLS_ACUITY_SCORE: 49
NUMBER_OF_TIMES_PATIENT_HAS_FALLEN_WITHIN_LAST_SIX_MONTHS: 3
ADLS_ACUITY_SCORE: 35
ADLS_ACUITY_SCORE: 49
ADLS_ACUITY_SCORE: 53
ADLS_ACUITY_SCORE: 51
ADLS_ACUITY_SCORE: 47
TOILETING_ISSUES: YES
ADLS_ACUITY_SCORE: 53
DRESSING/BATHING: BATHING DIFFICULTY, ASSISTANCE 1 PERSON
ADLS_ACUITY_SCORE: 49
ADLS_ACUITY_SCORE: 53
ADLS_ACUITY_SCORE: 49
ADLS_ACUITY_SCORE: 49
ADLS_ACUITY_SCORE: 53
WALKING_OR_CLIMBING_STAIRS_DIFFICULTY: YES
DOING_ERRANDS_INDEPENDENTLY_DIFFICULTY: NO
ADLS_ACUITY_SCORE: 49
ADLS_ACUITY_SCORE: 53
ADLS_ACUITY_SCORE: 35
ADLS_ACUITY_SCORE: 53
ADLS_ACUITY_SCORE: 47
ADLS_ACUITY_SCORE: 53
ADLS_ACUITY_SCORE: 49
CHANGE_IN_FUNCTIONAL_STATUS_SINCE_ONSET_OF_CURRENT_ILLNESS/INJURY: YES
ADLS_ACUITY_SCORE: 49
ADLS_ACUITY_SCORE: 53
CONCENTRATING,_REMEMBERING_OR_MAKING_DECISIONS_DIFFICULTY: YES
ADLS_ACUITY_SCORE: 35
ADLS_ACUITY_SCORE: 49
ADLS_ACUITY_SCORE: 47

## 2023-01-06 NOTE — PROGRESS NOTES
[facility] Care Coordination Contact    Email sent to  at [facility] to schedule Annual Health Plan assessment for 1/9/23.       Angela Amaro RN, PHN  Intuitional Care Coordinator [facility]  [address] [place] [address]  [address] [place] [id number]  [email or web address]  Cell [phone] Fax [phone]

## 2023-01-09 NOTE — Clinical Note
Suri I saw member for annual assessment. During assessment she was doing well and was pleasant and cooperative. I am checking with daughter on a few items including hearing assessment and TDAP, Covid Bivalent and Shingles vaccinnes. If famly want these I  will reach out to facility.   Angela Amaro, RN, PHN Intuitional Care Coordinator [address]  [address] [place] [address] [email or web address] Cell [phone] Fax [phone]

## 2023-01-11 NOTE — PROGRESS NOTES
[facility] Institutional Assessment     Institutional Assessment for Health Risk Assessment with Kandi Gannon completed on 2023 at [facility]    Type of residence:: Nursing home  Current living arrangement:: I live in a nursing home     Assessment completed with:: Patient, Care Team Member, Children      Mental/Behavioral Health   Depression Screening: unable to complete due to language barrier and member not understanding questions even wuth use of           Mental health DX:: Yes (depression)   Mental health DX how managed:: None (single episode depression. facility monitors for recurrence.)    Falls Assessment:   Fallen 2 or more times in the past year?: No   Any fall with injury in the past year?: No    ADL/IADL Dependencies:   Dependent ADLs:: Ambulation-no assistive device, Bathing, Dressing, Grooming, Incontinence, Positioning, Transfers, Toileting (resistive to all cares)  Dependent IADLs:: Cleaning, Cooking, Laundry, Shopping, Meal Preparation, Medication Management, Money Management, Transportation, Incontinence      Care Plan & Recommendations: Member was seen for annual assessment. She is a Mauritanian speaking. Per facility staff she understands English but does not respond well to questions unless in Mauritanian.  obtained through telephone. Member has primary diagnosis of dementia and despite uuuuse ot telephone  she still was not able to answer most of writers questions and innformation obtained from facility staff and members stephenuther [facility]. Member is able to ambulate indpendently and had no repoorted falls. she is able to dress self but needs cues and reminders to change clotheing includinnnnng incontinent products. Member is resistive to staff assistance in these areas and inn weekly bathing.Staff stated that they can get her into shower room then she will  become resistive and shower is unable to b completed. sometimes daughter is able to assist but even that has been sporatic. member denies pain thiis visit. Appetiet is good. Member is type 2 diabetic. Most recent Hgb A1c was 9.5. Bllood sugars usually range in the 150-250 range. Weight is stable around 200 lbs. Member is over due for TDAP and Covid Bivalent. Will approach family on these and on getting Shingrex vaccine. If agreeable will let facility know to obtain consent. Member is current with dental and visison but no audiology has been completed. Will discuss audiology with daughter as well. memebr has good appetite and POLST is current with DNR code status. Discussed options/opportunities for transitions.    See Institutional Care Plan for detailed assessment information.    Obtained a copy of the facility care plan and MDS from facility electronic records. Requested of intermediate social worker to put this care coordinator on care conference attendee list.    Placed the Health Plan facility face sheet in the member's facility chart.    Follow-Up Plan: Member informed of future contact, plan to f/u with member with a 6 month assessment, attend 1 care conference annually, and will follow any hospitalizations or transitions. Care Coordinator contact information shared with member/family and facility, and encouraged to call this care coordinator with any questions or concerns at any time.     [place] care continuum providers: Please see Snapshot and Care Management Flowsheets for Specific details of care plan.    This CC note routed to PCP.    Angela Amaro RN, PHN  Intuitional Care Coordinator [address]  [address] [place] [address]  [email or web address]  Cell [phone] Fax [phone]

## 2023-02-07 NOTE — PROGRESS NOTES
[facility]  Chief Complaint   Patient presents with     care home Regulatory     [facility] Medical Record Number:  [id number]  Place of Service where encounter took place:  [facility] () [[id number]]    HPI:    Kandi Gannon  is 82 year old (1940), who is being seen today for a federally mandated E/M visit.  She has a past medical history for dementia, HTN, HLD, DM, NEDRA, Vitamin D deficiency and anxiety.  She is ambulatory and can be found wandering the floor.  BIMS 3/15    She is Thai speaking and today I meet with her and speak Thai.  She denies any pain or discomforts.  She is pleasant and asks me personal questions.  Nursing denies any issues.      She has a good appetite and she has had 10lb weight loss in the past 6 months.      She can refuse her medications sometimes however nursing reports she has not done this for some time.  Bps are acceptable.     Last A1c was 7.2 which is acceptable for her age. This is after having to restart Metformin about 7 months ago due to A1c >9. She will be due for A1c recheck in May.  She can often refuse lab draws so we minimize drawing labs due to comfort.     ALLERGIES:Codeine sulfate [codeine]  PAST MEDICAL HISTORY: History reviewed. No pertinent past medical history.  PAST SURGICAL HISTORY:   has no past surgical history on file.  FAMILY HISTORY: family history includes Cerebrovascular Disease in an other family member; Diabetes in an other family member; Hypertension in an other family member.  SOCIAL HISTORY:  reports that she has never smoked. She has never used smokeless tobacco. She reports that she does not use drugs.    MEDICATIONS:           Review of your medicines          Accurate as of February 7, 2023 12:59 PM. If you have any questions, ask your nurse or doctor.            CONTINUE these medicines which have NOT CHANGED      Dose / Directions   acetaminophen 325 MG tablet  Commonly known as: TYLENOL       "Dose: 650 mg  Take 650 mg by mouth 4 times daily NTE 4gm/24hrs  Refills: 0     bisacodyl 10 MG suppository  Commonly known as: DULCOLAX      Dose: 10 mg  Place 10 mg rectally daily as needed for constipation  Refills: 0     fluticasone 50 MCG/ACT nasal spray  Commonly known as: FLONASE      Dose: 1 spray  Spray 1 spray into both nostrils daily as needed for rhinitis or allergies  Refills: 0     gabapentin 100 MG capsule  Commonly known as: NEURONTIN      Dose: 100 mg  Take 100 mg by mouth daily Take 100mg in the AM and 200mg at HS.  Refills: 0     lisinopril 40 MG tablet  Commonly known as: ZESTRIL      Dose: 40 mg  Take 40 mg by mouth daily  Refills: 0     metFORMIN 500 MG tablet  Commonly known as: GLUCOPHAGE      Dose: 500 mg  Take 500 mg by mouth 2 times daily (with meals)  Refills: 0     mineral oil-hydrophilic petrolatum external ointment      Apply topically 3 times daily as needed  Refills: 0     polyethylene glycol 17 g packet  Commonly known as: MIRALAX      Dose: 17 g  Take 17 g by mouth daily  Refills: 0     verapamil  MG 24 hr capsule  Commonly known as: VERELAN      Dose: 120 mg  Take 120 mg by mouth daily  Refills: 0     Vitamin D (Cholecalciferol) 25 MCG (1000 UT) Tabs      Dose: 1 tablet  Take 1 tablet by mouth daily  Refills: 0            ROS:  Difficult to obtain due to dementia however she denies pain or discomforts and states bowels and bladder are working well.       Exam:  Vital signs:/69   Pulse 73   Temp 97.4  F (36.3  C)   Resp 18   Ht 1.626 m (5' 4\")   Wt 90.7 kg (200 lb)   SpO2 95%   BMI 34.33 kg/m     GENERAL APPEARANCE: Well developed, well nourished, in no acute distress.  HEENT: normocephalic, atraumatic   sclerae anicteric, conjunctivae clear and moist, EOM intact  LUNGS: Lung sounds CTA, no adventitious sounds, respiratory effort normal.  CARD: RRR, S1, S2, without murmurs, gallops, rubs   ABD: Soft, nondistended and nontender with normal bowel sounds.   MSK: " Muscle strength and tone were equal bilaterally. Moves all extremities easily and intentionally.   EXTREMITIES: No cyanosis, clubbing or edema.  NEURO: Alert with cognitive impairment,Face is symmetric.  SKIN: dry skin  PSYCH: euthymic          Lab/Diagnostic data:   Last Comprehensive Metabolic Panel:  Sodium   Date Value Ref Range Status   02/16/2022 139 136 - 145 mmol/L Final     Potassium   Date Value Ref Range Status   02/16/2022 4.6 3.5 - 5.0 mmol/L Final     Chloride   Date Value Ref Range Status   02/16/2022 104 98 - 107 mmol/L Final     Carbon Dioxide (CO2)   Date Value Ref Range Status   02/16/2022 26 22 - 31 mmol/L Final     Anion Gap   Date Value Ref Range Status   02/16/2022 9 5 - 18 mmol/L Final     Glucose   Date Value Ref Range Status   02/16/2022 201 (H) 70 - 125 mg/dL Final     Urea Nitrogen   Date Value Ref Range Status   02/16/2022 23 8 - 28 mg/dL Final     Creatinine   Date Value Ref Range Status   02/16/2022 0.83 0.60 - 1.10 mg/dL Final     GFR Estimate   Date Value Ref Range Status   02/16/2022 71 >60 mL/min/1.73m2 Final     Comment:     Effective December 21, 2021 eGFRcr in adults is calculated using the 2021 CKD-EPI creatinine equation which includes age and gender (Jacqueline et al., NEJ, DOI: 10.1056/WDMRyz1819261)   06/18/2021 42 (L) >60 mL/min/1.73m2 Final     Calcium   Date Value Ref Range Status   02/16/2022 9.8 8.5 - 10.5 mg/dL Final     Lab Results   Component Value Date    WBC 9.0 02/16/2022     Lab Results   Component Value Date    RBC 3.67 02/16/2022     Lab Results   Component Value Date    HGB 11.5 02/16/2022     Lab Results   Component Value Date    HCT 35.4 02/16/2022     Lab Results   Component Value Date    MCV 97 02/16/2022     Lab Results   Component Value Date    MCH 31.3 02/16/2022     Lab Results   Component Value Date    MCHC 32.5 02/16/2022     Lab Results   Component Value Date    RDW 15.1 02/16/2022     Lab Results   Component Value Date     02/16/2022     Lab  Results   Component Value Date    A1C 7.2 10/18/2022    A1C 7.8 05/11/2022    A1C 9.5 02/16/2022    A1C 6.7 01/21/2021    A1C 6.6 07/30/2020         ASSESSMENT/PLAN  Type 2 diabetes mellitus with hyperglycemia, without long-term current use of insulin (H)  Controlled with low dose metformin.  A1c in April    Morbid obesity (H)  Slow weight loss which is good.  Monitor appetite and weights.     Renal failure, chronic, stage 3 (moderate) (H)  Last GFR checked last year good at 71.  Would recommend moving labs to April when A1c is due.     Alzheimer's dementia with behavioral disturbance (H)  Behaviors are stable on no medications.     HTN  Controlled continue with lisinopril and and verapamil.     Neuropathy  No complaints of pain.  Will attempt gradual reduction of gabapentin to 100mg in the AM only.          Electronically signed by:  Suri Chand NP

## 2023-02-07 NOTE — LETTER
2/7/2023        RE: Kandi Gannon  [facility]  [address] [address]        [facility] GERIATRICS  Chief Complaint   Patient presents with     FDC Regulatory     [place] Medical Record Number:  [id number]  Place of Service where encounter took place:  [facility] () [[id number]]    HPI:    Kandi Gannon  is 82 year old (1940), who is being seen today for a federally mandated E/M visit.  She has a past medical history for dementia, HTN, HLD, DM, NEDRA, Vitamin D deficiency and anxiety.  She is ambulatory and can be found wandering the floor.  BIMS 3/15    She is Vietnamese speaking and today I meet with her and speak Vietnamese.  She denies any pain or discomforts.  She is pleasant and asks me personal questions.  Nursing denies any issues.      She has a good appetite and she has had 10lb weight loss in the past 6 months.      She can refuse her medications sometimes however nursing reports she has not done this for some time.  Bps are acceptable.     Last A1c was 7.2 which is acceptable for her age. This is after having to restart Metformin about 7 months ago due to A1c >9. She will be due for A1c recheck in May.  She can often refuse lab draws so we minimize drawing labs due to comfort.     ALLERGIES:Codeine sulfate [codeine]  PAST MEDICAL HISTORY: History reviewed. No pertinent past medical history.  PAST SURGICAL HISTORY:   has no past surgical history on file.  FAMILY HISTORY: family history includes Cerebrovascular Disease in an other family member; Diabetes in an other family member; Hypertension in an other family member.  SOCIAL HISTORY:  reports that she has never smoked. She has never used smokeless tobacco. She reports that she does not use drugs.    MEDICATIONS:           Review of your medicines          Accurate as of February 7, 2023 12:59 PM. If you have any questions, ask your nurse or doctor.            CONTINUE these medicines  "which have NOT CHANGED      Dose / Directions   acetaminophen 325 MG tablet  Commonly known as: TYLENOL      Dose: 650 mg  Take 650 mg by mouth 4 times daily NTE 4gm/24hrs  Refills: 0     bisacodyl 10 MG suppository  Commonly known as: DULCOLAX      Dose: 10 mg  Place 10 mg rectally daily as needed for constipation  Refills: 0     fluticasone 50 MCG/ACT nasal spray  Commonly known as: FLONASE      Dose: 1 spray  Spray 1 spray into both nostrils daily as needed for rhinitis or allergies  Refills: 0     gabapentin 100 MG capsule  Commonly known as: NEURONTIN      Dose: 100 mg  Take 100 mg by mouth daily Take 100mg in the AM and 200mg at HS.  Refills: 0     lisinopril 40 MG tablet  Commonly known as: ZESTRIL      Dose: 40 mg  Take 40 mg by mouth daily  Refills: 0     metFORMIN 500 MG tablet  Commonly known as: GLUCOPHAGE      Dose: 500 mg  Take 500 mg by mouth 2 times daily (with meals)  Refills: 0     mineral oil-hydrophilic petrolatum external ointment      Apply topically 3 times daily as needed  Refills: 0     polyethylene glycol 17 g packet  Commonly known as: MIRALAX      Dose: 17 g  Take 17 g by mouth daily  Refills: 0     verapamil  MG 24 hr capsule  Commonly known as: VERELAN      Dose: 120 mg  Take 120 mg by mouth daily  Refills: 0     Vitamin D (Cholecalciferol) 25 MCG (1000 UT) Tabs      Dose: 1 tablet  Take 1 tablet by mouth daily  Refills: 0            ROS:  Difficult to obtain due to dementia however she denies pain or discomforts and states bowels and bladder are working well.       Exam:  Vital signs:/69   Pulse 73   Temp 97.4  F (36.3  C)   Resp 18   Ht 1.626 m (5' 4\")   Wt 90.7 kg (200 lb)   SpO2 95%   BMI 34.33 kg/m     GENERAL APPEARANCE: Well developed, well nourished, in no acute distress.  HEENT: normocephalic, atraumatic   sclerae anicteric, conjunctivae clear and moist, EOM intact  LUNGS: Lung sounds CTA, no adventitious sounds, respiratory effort normal.  CARD: RRR, S1, S2, " without murmurs, gallops, rubs   ABD: Soft, nondistended and nontender with normal bowel sounds.   MSK: Muscle strength and tone were equal bilaterally. Moves all extremities easily and intentionally.   EXTREMITIES: No cyanosis, clubbing or edema.  NEURO: Alert with cognitive impairment,Face is symmetric.  SKIN: dry skin  PSYCH: euthymic          Lab/Diagnostic data:   Last Comprehensive Metabolic Panel:  Sodium   Date Value Ref Range Status   02/16/2022 139 136 - 145 mmol/L Final     Potassium   Date Value Ref Range Status   02/16/2022 4.6 3.5 - 5.0 mmol/L Final     Chloride   Date Value Ref Range Status   02/16/2022 104 98 - 107 mmol/L Final     Carbon Dioxide (CO2)   Date Value Ref Range Status   02/16/2022 26 22 - 31 mmol/L Final     Anion Gap   Date Value Ref Range Status   02/16/2022 9 5 - 18 mmol/L Final     Glucose   Date Value Ref Range Status   02/16/2022 201 (H) 70 - 125 mg/dL Final     Urea Nitrogen   Date Value Ref Range Status   02/16/2022 23 8 - 28 mg/dL Final     Creatinine   Date Value Ref Range Status   02/16/2022 0.83 0.60 - 1.10 mg/dL Final     GFR Estimate   Date Value Ref Range Status   02/16/2022 71 >60 mL/min/1.73m2 Final     Comment:     Effective December 21, 2021 eGFRcr in adults is calculated using the 2021 CKD-EPI creatinine equation which includes age and gender (Jacqueline frazier al., NEJ, DOI: 10.1056/NZGJml8079694)   06/18/2021 42 (L) >60 mL/min/1.73m2 Final     Calcium   Date Value Ref Range Status   02/16/2022 9.8 8.5 - 10.5 mg/dL Final     Lab Results   Component Value Date    WBC 9.0 02/16/2022     Lab Results   Component Value Date    RBC 3.67 02/16/2022     Lab Results   Component Value Date    HGB 11.5 02/16/2022     Lab Results   Component Value Date    HCT 35.4 02/16/2022     Lab Results   Component Value Date    MCV 97 02/16/2022     Lab Results   Component Value Date    MCH 31.3 02/16/2022     Lab Results   Component Value Date    MCHC 32.5 02/16/2022     Lab Results   Component  Value Date    RDW 15.1 02/16/2022     Lab Results   Component Value Date     02/16/2022     Lab Results   Component Value Date    A1C 7.2 10/18/2022    A1C 7.8 05/11/2022    A1C 9.5 02/16/2022    A1C 6.7 01/21/2021    A1C 6.6 07/30/2020         ASSESSMENT/PLAN  Type 2 diabetes mellitus with hyperglycemia, without long-term current use of insulin (H)  Controlled with low dose metformin.  A1c in April    Morbid obesity (H)  Slow weight loss which is good.  Monitor appetite and weights.     Renal failure, chronic, stage 3 (moderate) (H)  Last GFR checked last year good at 71.  Would recommend moving labs to April when A1c is due.     Alzheimer's dementia with behavioral disturbance (H)  Behaviors are stable on no medications.     HTN  Controlled continue with lisinopril and and verapamil.     Neuropathy  No complaints of pain.  Will attempt gradual reduction of gabapentin to 100mg in the AM only.          Electronically signed by:  Suri Chand NP              Sincerely,        Suri Chand NP

## 2023-04-04 NOTE — PROGRESS NOTES
[facility]      Code Status:  DNR  Visit Type: Review Of Multiple Medical Conditions/dm     Facility:  [facility] [[id number]]           History of Present Illness: Kandi Ontiveros is a 80 y.o. female who is a resident of [facility].  She has underlying history of dementia with progressive memory impairment.   Last BIMS 3/15; remains at baseline  Plan she is also Citizen of [place]-speaking making communication hard.  She talks very little  Does have some behaviors including refusal to participate in her cares.  Her daughter has to come in to give her bath/ showers  Ambulates independently and wandering in van and in other pts rooms  Oral intake has been okay however she has been losing weight and currently down to 192 pounds  No new concerns voiced by staff    Past Medical History:   Diagnosis Date     Anxiety     Created by Conversion      Benign Adenomatous Polyp Of The Large Intestine     Created by Conversion      Chronic Diarrhea Of Unknown Origin     Created by Conversion      Dementia of the Alzheimer's type 7/13/2014     Diabetes Mellitus     Created by Conversion      Esophageal reflux     Created by Conversion      Gastritis      Herpes Zoster (Shingles)     Created by Conversion      Hiatal hernia      Hypercholesterolemia     Created by Conversion      Hypertension     Created by Conversion      Melanosis Coli     Created by Conversion [facility] Annotation: May  2 2012  1:19PM - Sheila Benavides: colonoscopy  4/30/12      Memory Lapses Or Loss     Created by Conversion      Obstructive Sleep Apnea     Created by Conversion      Osteopenia     Created by Conversion      Raynaud's Disease     Created by Conversion      Tricuspid Regurgitation     Created by Conversion      No past surgical history on file.  Family History   Problem Relation Age of Onset     Hypertension Other      Diabetes Other      Stroke Other      Social History     Socioeconomic History     Marital  status:      Spouse name: Not on file     Number of children: Not on file     Years of education: Not on file     Highest education level: Not on file   Occupational History     Not on file   Social Needs     Financial resource strain: Not on file     Food insecurity     Worry: Not on file     Inability: Not on file     Transportation needs     Medical: Not on file     Non-medical: Not on file   Tobacco Use     Smoking status: Never Smoker     Smokeless tobacco: Never Used   Substance and Sexual Activity     Alcohol use: Not on file     Drug use: Not on file     Sexual activity: Not on file   Lifestyle     Physical activity     Days per week: Not on file     Minutes per session: Not on file     Stress: Not on file   Relationships     Social connections     Talks on phone: Not on file     Gets together: Not on file     Attends Shinto service: Not on file     Active member of club or organization: Not on file     Attends meetings of clubs or organizations: Not on file     Relationship status: Not on file     Intimate partner violence     Fear of current or ex partner: Not on file     Emotionally abused: Not on file     Physically abused: Not on file     Forced sexual activity: Not on file   Other Topics Concern     Not on file   Social History Narrative     Not on file   lived in [facility]  Current Outpatient Medications   Medication Sig Dispense Refill     acetaminophen (TYLENOL) 325 MG tablet Take 650 mg by mouth 4 (four) times a day.       bisacodyl (DULCOLAX, BISACODYL,) 10 mg suppository Insert 1 suppository (10 mg total) into the rectum daily as needed (for constipation). 12 suppository 0     cetirizine (ZYRTEC) 10 MG tablet Take 10 mg by mouth daily as needed.        cholecalciferol, vitamin D3, 1,000 unit tablet Take 1,000 Units by mouth daily.       fluticasone propionate (FLONASE) 50 mcg/actuation nasal spray Apply 1 spray into each nostril daily as needed for rhinitis.       gabapentin (NEURONTIN) 100  "MG capsule Take 100 mg by mouth 3 (three) times a day.       hydrALAZINE (APRESOLINE) 25 MG tablet Take 50 mg by mouth Daily at 8:00 am.. Hold for SBP <150             lisinopriL (PRINIVIL,ZESTRIL) 2.5 MG tablet Take 5 mg by mouth daily.        metoprolol succinate (TOPROL-XL) 25 MG Take 12.5 mg by mouth daily.              polyethylene glycol (MIRALAX) 17 gram/dose powder Take 17 g by mouth daily. 255 g 0     venlafaxine (EFFEXOR-XR) 37.5 MG 24 hr capsule Take 37.5 mg by mouth daily.       verapamil (VERELAN PM) 100 mg 24 hr capsule TAKE ONE CAPSULE BY MOUTH EVERY DAY 90 capsule 1     white petrolatum (AQUAPHOR ORIGINAL) 41 % Oint Apply 1 application topically 3 (three) times a day as needed.              No current facility-administered medications for this visit.      Allergies   Allergen Reactions     Codeine Sulfate          Review of Systems:    Comprehensive review not done because of advanced dementia and language barrier.  Noted to ambulating without assist device  Has been losing weight  Currently weight is down to 192 pounds  Continues to have some behaviors associated dementia and does not let staff participate in her cares  Oral intake as per staff is adequate  Recently due to poorly controlled diabetes she has been started back on medications  Oral intake is variable per staff  Wandering in the hallways    Physical Exam:    Blood pressure 126/73, pulse 80, temperature 97.3  F (36.3  C), resp. rate 18, height 1.626 m (5' 4\"), weight 87.1 kg (192 lb), SpO2 97 %.      GENERAL: no acute distress. Cooperative in conversation.  Has limited recall and mostly smiles to questions  Patient is obese  HEENT: pupils are equal, round and reactive. Oral mucosa is moist and intact.  RESP:Chest symmetric. Regular respiratory rate. No stridor.  CVS: S1S2  ABD: Nondistended, soft.  EXTREMITIES: She has pedal and lower extremity edema.   NEURO: non focal. Alert and oriented xSELF   PSYCH: within normal limits. No " depression or anxiety.  SKIN: warm dry intact   Musculoskeletal no focal abnormalities noted to be ambulating without any assist device    Labs:    Lab Results   Component Value Date    HGBA1C 6.7 (H) 01/21/2021     Results for orders placed or performed in visit on 09/17/20   Basic Metabolic Panel   Result Value Ref Range    Sodium 140 136 - 145 mmol/L    Potassium 4.5 3.5 - 5.0 mmol/L    Chloride 102 98 - 107 mmol/L    CO2 29 22 - 31 mmol/L    Anion Gap, Calculation 9 5 - 18 mmol/L    Glucose 121 70 - 125 mg/dL    Calcium 10.0 8.5 - 10.5 mg/dL    BUN 28 8 - 28 mg/dL    Creatinine 1.05 0.60 - 1.10 mg/dL    GFR MDRD Af Amer >60 >60 mL/min/1.73m2    GFR MDRD Non Af Amer 51 (L) >60 mL/min/1.73m2     Lab Results   Component Value Date    [id number] 368 03/23/2020     Vitamin D, Total (25-Hydroxy)   Date Value Ref Range Status   03/23/2020 34.4 30.0 - 80.0 ng/mL Final         Assessment/Plan:    -Dementia with significant cognitive decline  - History of diabetes with a last A1c 7.2  - ongoing wt loss  - Morbid obesity  -History of depression with anxiety  - Hypertension  - Generalized weakness with decline noted    Pt is in memory care with dementia  Patient is difficult because of language barrier as well as dementia.  Denies any pain.  Mood is stable with no concern and she is noted to be ambulating without any assist device in and out of patient's room.  A1c recheck at 7.2; improvement as she is back on metfromin  BP stable  Progressive weight loss noted  On lower dose of gabapentin with no change noted    Electronically signed by: BERLIN Butts  This progress note was completed using Dragon software and there may be grammatical errors.

## 2023-04-05 PROBLEM — K63.89 MELANOSIS COLI: Status: ACTIVE | Noted: 2023-01-01

## 2023-04-05 NOTE — LETTER
4/5/2023        RE: Kandi Gannon  [facility]  [address] [address]        [facility]      Code Status:  DNR  Visit Type: Review Of Multiple Medical Conditions/dm     Facility:  [facility] [[id number]]           History of Present Illness: Kandi Ontiveros is a 80 y.o. female who is a resident of [facility].  She has underlying history of dementia with progressive memory impairment.   Last BIMS 3/15; remains at baseline  Plan she is also Maltese-speaking making communication hard.  She talks very little  Does have some behaviors including refusal to participate in her cares.  Her daughter has to come in to give her bath/ showers  Ambulates independently and wandering in van and in other pts rooms  Oral intake has been okay however she has been losing weight and currently down to 192 pounds  No new concerns voiced by staff    Past Medical History:   Diagnosis Date     Anxiety     Created by Conversion      Benign Adenomatous Polyp Of The Large Intestine     Created by Conversion      Chronic Diarrhea Of Unknown Origin     Created by Conversion      Dementia of the Alzheimer's type 7/13/2014     Diabetes Mellitus     Created by Conversion      Esophageal reflux     Created by Conversion      Gastritis      Herpes Zoster (Shingles)     Created by Conversion      Hiatal hernia      Hypercholesterolemia     Created by Conversion      Hypertension     Created by Conversion      Melanosis Coli     Created by Conversion [facility] Annotation: May  2 2012  1:19PM - Sheila Benavides: colonoscopy  4/30/12      Memory Lapses Or Loss     Created by Conversion      Obstructive Sleep Apnea     Created by Conversion      Osteopenia     Created by Conversion      Raynaud's Disease     Created by Conversion      Tricuspid Regurgitation     Created by Conversion      No past surgical history on file.  Family History   Problem Relation Age of Onset      Hypertension Other      Diabetes Other      Stroke Other      Social History     Socioeconomic History     Marital status:      Spouse name: Not on file     Number of children: Not on file     Years of education: Not on file     Highest education level: Not on file   Occupational History     Not on file   Social Needs     Financial resource strain: Not on file     Food insecurity     Worry: Not on file     Inability: Not on file     Transportation needs     Medical: Not on file     Non-medical: Not on file   Tobacco Use     Smoking status: Never Smoker     Smokeless tobacco: Never Used   Substance and Sexual Activity     Alcohol use: Not on file     Drug use: Not on file     Sexual activity: Not on file   Lifestyle     Physical activity     Days per week: Not on file     Minutes per session: Not on file     Stress: Not on file   Relationships     Social connections     Talks on phone: Not on file     Gets together: Not on file     Attends Hoahaoism service: Not on file     Active member of club or organization: Not on file     Attends meetings of clubs or organizations: Not on file     Relationship status: Not on file     Intimate partner violence     Fear of current or ex partner: Not on file     Emotionally abused: Not on file     Physically abused: Not on file     Forced sexual activity: Not on file   Other Topics Concern     Not on file   Social History Narrative     Not on file   lived in [facility]  Current Outpatient Medications   Medication Sig Dispense Refill     acetaminophen (TYLENOL) 325 MG tablet Take 650 mg by mouth 4 (four) times a day.       bisacodyl (DULCOLAX, BISACODYL,) 10 mg suppository Insert 1 suppository (10 mg total) into the rectum daily as needed (for constipation). 12 suppository 0     cetirizine (ZYRTEC) 10 MG tablet Take 10 mg by mouth daily as needed.        cholecalciferol, vitamin D3, 1,000 unit tablet Take 1,000 Units by mouth daily.       fluticasone propionate (FLONASE) 50  "mcg/actuation nasal spray Apply 1 spray into each nostril daily as needed for rhinitis.       gabapentin (NEURONTIN) 100 MG capsule Take 100 mg by mouth 3 (three) times a day.       hydrALAZINE (APRESOLINE) 25 MG tablet Take 50 mg by mouth Daily at 8:00 am.. Hold for SBP <150             lisinopriL (PRINIVIL,ZESTRIL) 2.5 MG tablet Take 5 mg by mouth daily.        metoprolol succinate (TOPROL-XL) 25 MG Take 12.5 mg by mouth daily.              polyethylene glycol (MIRALAX) 17 gram/dose powder Take 17 g by mouth daily. 255 g 0     venlafaxine (EFFEXOR-XR) 37.5 MG 24 hr capsule Take 37.5 mg by mouth daily.       verapamil (VERELAN PM) 100 mg 24 hr capsule TAKE ONE CAPSULE BY MOUTH EVERY DAY 90 capsule 1     white petrolatum (AQUAPHOR ORIGINAL) 41 % Oint Apply 1 application topically 3 (three) times a day as needed.              No current facility-administered medications for this visit.      Allergies   Allergen Reactions     Codeine Sulfate          Review of Systems:    Comprehensive review not done because of advanced dementia and language barrier.  Noted to ambulating without assist device  Has been losing weight  Currently weight is down to 192 pounds  Continues to have some behaviors associated dementia and does not let staff participate in her cares  Oral intake as per staff is adequate  Recently due to poorly controlled diabetes she has been started back on medications  Oral intake is variable per staff  Wandering in the hallways    Physical Exam:    Blood pressure 126/73, pulse 80, temperature 97.3  F (36.3  C), resp. rate 18, height 1.626 m (5' 4\"), weight 87.1 kg (192 lb), SpO2 97 %.      GENERAL: no acute distress. Cooperative in conversation.  Has limited recall and mostly smiles to questions  Patient is obese  HEENT: pupils are equal, round and reactive. Oral mucosa is moist and intact.  RESP:Chest symmetric. Regular respiratory rate. No stridor.  CVS: S1S2  ABD: Nondistended, soft.  EXTREMITIES: She has " pedal and lower extremity edema.   NEURO: non focal. Alert and oriented xSELF   PSYCH: within normal limits. No depression or anxiety.  SKIN: warm dry intact   Musculoskeletal no focal abnormalities noted to be ambulating without any assist device    Labs:    Lab Results   Component Value Date    HGBA1C 6.7 (H) 01/21/2021     Results for orders placed or performed in visit on 09/17/20   Basic Metabolic Panel   Result Value Ref Range    Sodium 140 136 - 145 mmol/L    Potassium 4.5 3.5 - 5.0 mmol/L    Chloride 102 98 - 107 mmol/L    CO2 29 22 - 31 mmol/L    Anion Gap, Calculation 9 5 - 18 mmol/L    Glucose 121 70 - 125 mg/dL    Calcium 10.0 8.5 - 10.5 mg/dL    BUN 28 8 - 28 mg/dL    Creatinine 1.05 0.60 - 1.10 mg/dL    GFR MDRD Af Amer >60 >60 mL/min/1.73m2    GFR MDRD Non Af Amer 51 (L) >60 mL/min/1.73m2     Lab Results   Component Value Date    [id number] 368 03/23/2020     Vitamin D, Total (25-Hydroxy)   Date Value Ref Range Status   03/23/2020 34.4 30.0 - 80.0 ng/mL Final         Assessment/Plan:    -Dementia with significant cognitive decline  - History of diabetes with a last A1c 7.2  - ongoing wt loss  - Morbid obesity  -History of depression with anxiety  - Hypertension  - Generalized weakness with decline noted    Pt is in memory care with dementia  Patient is difficult because of language barrier as well as dementia.  Denies any pain.  Mood is stable with no concern and she is noted to be ambulating without any assist device in and out of patient's room.  A1c recheck at 7.2; improvement as she is back on metfromin  BP stable  Progressive weight loss noted  On lower dose of gabapentin with no change noted    Electronically signed by: BERLIN Butts  This progress note was completed using Dragon software and there may be grammatical errors.            Sincerely,        BERLIN Butts

## 2023-06-01 NOTE — LETTER
6/1/2023        RE: Kandi Gannon  [facility]  [address]  [facility] [address]        [facility] GERIATRICS  Chief Complaint   Patient presents with     [facility]ual [facility]     [facility] Medical Record Number:  [id number]  Place of Service where encounter took place:  [facility] () [[id number]]    HPI:    Kandi Gannon  is 82 year old (1940), who is being seen today for a federally mandated E/M visit.  She has a past medical history for dementia, HTN, HLD, DM, NEDRA, Vitamin D deficiency and anxiety. She is a wandering risk and will often be found in other residents' rooms.  She can often times refuse treatments or medications.      She has uncontrolled hypertension when not consistently taking her medications.  Nursing reports she has been taking her medications recently and so her Bps are stable with SBPs 120-140s.  She denies any pain and speaks to me in Somali.      She has had slow weight loss of about 10lbs in the past 6 months.  She has been eating % of all meals.      We don't check BS due to refusal and trying to minimize antagonizing events.      Gabapentin tapered down on my last visit, seems to have tolerated.      ALLERGIES:Codeine sulfate [codeine]  PAST MEDICAL HISTORY: History reviewed. No pertinent past medical history.  PAST SURGICAL HISTORY:   has no past surgical history on file.  FAMILY HISTORY: family history includes Cerebrovascular Disease in an other family member; Diabetes in an other family member; Hypertension in an other family member.  SOCIAL HISTORY:  reports that she has never smoked. She has never used smokeless tobacco. She reports that she does not use drugs.    MEDICATIONS:         Review of your medicines          Accurate as of June 1, 2023  2:46 PM. If you have any questions, ask your nurse or doctor.            CONTINUE these medicines which have NOT CHANGED      Dose / Directions  "  acetaminophen 325 MG tablet  Commonly known as: TYLENOL      Dose: 650 mg  Take 650 mg by mouth 4 times daily NTE 4gm/24hrs  Refills: 0     bisacodyl 10 MG suppository  Commonly known as: DULCOLAX      Dose: 10 mg  Place 10 mg rectally daily as needed for constipation  Refills: 0     fluticasone 50 MCG/ACT nasal spray  Commonly known as: FLONASE      Dose: 1 spray  Spray 1 spray into both nostrils daily as needed for rhinitis or allergies  Refills: 0     gabapentin 100 MG capsule  Commonly known as: NEURONTIN      Dose: 100 mg  Take 100 mg by mouth daily Take 100mg in the AM and 200mg at HS.  Refills: 0     lisinopril 40 MG tablet  Commonly known as: ZESTRIL      Dose: 40 mg  Take 40 mg by mouth daily  Refills: 0     metFORMIN 500 MG tablet  Commonly known as: GLUCOPHAGE      Dose: 500 mg  Take 500 mg by mouth 2 times daily (with meals)  Refills: 0     mineral oil-hydrophilic petrolatum external ointment      Apply topically 3 times daily as needed  Refills: 0     polyethylene glycol 17 g packet  Commonly known as: MIRALAX      Dose: 17 g  Take 17 g by mouth daily  Refills: 0     verapamil  MG 24 hr capsule  Commonly known as: VERELAN      Dose: 120 mg  Take 120 mg by mouth daily  Refills: 0     Vitamin D3 25 mcg (1000 units) tablet  Commonly known as: CHOLECALCIFEROL      Dose: 1 tablet  Take 1 tablet by mouth daily  Refills: 0            ROS:  Unable to obtain due to dementia.       Exam:  Vital signs:/73   Pulse 80   Temp 97.3  F (36.3  C)   Resp 18   Ht 1.626 m (5' 4\")   Wt 87.1 kg (192 lb)   SpO2 97%   BMI 32.96 kg/m     GENERAL APPEARANCE: Well developed, well nourished, in no acute distress.  HEENT: normocephalic, atraumatic  sclerae anicteric, conjunctivae clear and moist, EOM intact  LUNGS: Lung sounds CTA, no adventitious sounds, respiratory effort normal.  CARD: RRR, S1, S2, without murmurs, gallops, rubs  ABD: Soft, nondistended and nontender with normal bowel sounds.   MSK: Muscle " strength and tone were equal bilaterally. Moves all extremities easily and intentionally.   EXTREMITIES: No cyanosis, clubbing or edema.  NEURO: Alert with cognitive impairment, Face is symmetric.  SKIN: Inspection of the skin reveals no rashes, ulcerations or petechiae.  PSYCH: euthymic          Lab/Diagnostic data:   Last Comprehensive Metabolic Panel:  Lab Results   Component Value Date     02/16/2022    POTASSIUM 4.6 02/16/2022    CHLORIDE 104 02/16/2022    CO2 26 02/16/2022    ANIONGAP 9 02/16/2022     (H) 02/16/2022    BUN 23 02/16/2022    CR 0.83 02/16/2022    GFRESTIMATED 71 02/16/2022    TIM 9.8 02/16/2022       Lab Results   Component Value Date    WBC 9.0 02/16/2022     Lab Results   Component Value Date    RBC 3.67 02/16/2022     Lab Results   Component Value Date    HGB 11.5 02/16/2022     Lab Results   Component Value Date    HCT 35.4 02/16/2022     Lab Results   Component Value Date    MCV 97 02/16/2022     Lab Results   Component Value Date    MCH 31.3 02/16/2022     Lab Results   Component Value Date    MCHC 32.5 02/16/2022     Lab Results   Component Value Date    RDW 15.1 02/16/2022     Lab Results   Component Value Date     02/16/2022     Lab Results   Component Value Date    A1C 7.2 10/18/2022    A1C 7.8 05/11/2022    A1C 9.5 02/16/2022    A1C 6.7 01/21/2021    A1C 6.6 07/30/2020         ASSESSMENT/PLAN  Alzheimer's dementia with behavioral disturbance (H)  Refusals of meds and treatments, daughter comes in to give her showers.  On no medications.     Type 2 diabetes mellitus with hyperglycemia, without long-term current use of insulin (H)  Check A1c, BMP next week.  Continue with low dose metformin.     Class 1 obesity due to excess calories without serious comorbidity with body mass index (BMI) of 32.0 to 32.9 in adult  Having slow weight loss, ? If this is due to metformin.  Monitor.     Depression, unspecified depression type  Controlled, no medications.  Continue with  Vit D.     Uncontrolled hypertension  Stable when taking medications.  Continue with lisinopril and verapamil.     Anemia, unspecified type  Check CBC next week.           Electronically signed by:  Suri Chand NP              Sincerely,        Suri Chand NP       Pt alert and can phonate well  h at/nc  perrl, conj clear, sclera anicteric,  neck supple  throat no exudate  cor rrr pos s1s2  lungs clear to asno wheeze  abd soft no r/g/t  GYN exam os open material passing significant bleeding.  ext no edema no deformities  neueo awake, lucid normal gait moves all extremities with strength  psych normal affect  vs reasonable

## 2023-06-01 NOTE — PROGRESS NOTES
[facility] GERIATRICS  Chief Complaint   Patient presents with     Annual Comprehensive [facility] Medical Record Number:  [id number]  Place of Service where encounter took place:  [facility] () [[id number]]    HPI:    Kandi Gannon  is 82 year old (1940), who is being seen today for a federally mandated E/M visit.  She has a past medical history for dementia, HTN, HLD, DM, NEDRA, Vitamin D deficiency and anxiety. She is a wandering risk and will often be found in other residents' rooms.  She can often times refuse treatments or medications.      She has uncontrolled hypertension when not consistently taking her medications.  Nursing reports she has been taking her medications recently and so her Bps are stable with SBPs 120-140s.  She denies any pain and speaks to me in Malaysian.      She has had slow weight loss of about 10lbs in the past 6 months.  She has been eating % of all meals.      We don't check BS due to refusal and trying to minimize antagonizing events.      Gabapentin tapered down on my last visit, seems to have tolerated.      ALLERGIES:Codeine sulfate [codeine]  PAST MEDICAL HISTORY: History reviewed. No pertinent past medical history.  PAST SURGICAL HISTORY:   has no past surgical history on file.  FAMILY HISTORY: family history includes Cerebrovascular Disease in an other family member; Diabetes in an other family member; Hypertension in an other family member.  SOCIAL HISTORY:  reports that she has never smoked. She has never used smokeless tobacco. She reports that she does not use drugs.    MEDICATIONS:         Review of your medicines          Accurate as of June 1, 2023  2:46 PM. If you have any questions, ask your nurse or doctor.            CONTINUE these medicines which have NOT CHANGED      Dose / Directions   acetaminophen 325 MG tablet  Commonly known as: TYLENOL      Dose: 650 mg  Take 650 mg by mouth 4 times daily NTE  "4gm/24hrs  Refills: 0     bisacodyl 10 MG suppository  Commonly known as: DULCOLAX      Dose: 10 mg  Place 10 mg rectally daily as needed for constipation  Refills: 0     fluticasone 50 MCG/ACT nasal spray  Commonly known as: FLONASE      Dose: 1 spray  Spray 1 spray into both nostrils daily as needed for rhinitis or allergies  Refills: 0     gabapentin 100 MG capsule  Commonly known as: NEURONTIN      Dose: 100 mg  Take 100 mg by mouth daily Take 100mg in the AM and 200mg at HS.  Refills: 0     lisinopril 40 MG tablet  Commonly known as: ZESTRIL      Dose: 40 mg  Take 40 mg by mouth daily  Refills: 0     metFORMIN 500 MG tablet  Commonly known as: GLUCOPHAGE      Dose: 500 mg  Take 500 mg by mouth 2 times daily (with meals)  Refills: 0     mineral oil-hydrophilic petrolatum external ointment      Apply topically 3 times daily as needed  Refills: 0     polyethylene glycol 17 g packet  Commonly known as: MIRALAX      Dose: 17 g  Take 17 g by mouth daily  Refills: 0     verapamil  MG 24 hr capsule  Commonly known as: VERELAN      Dose: 120 mg  Take 120 mg by mouth daily  Refills: 0     Vitamin D3 25 mcg (1000 units) tablet  Commonly known as: CHOLECALCIFEROL      Dose: 1 tablet  Take 1 tablet by mouth daily  Refills: 0            ROS:  Unable to obtain due to dementia.       Exam:  Vital signs:/73   Pulse 80   Temp 97.3  F (36.3  C)   Resp 18   Ht 1.626 m (5' 4\")   Wt 87.1 kg (192 lb)   SpO2 97%   BMI 32.96 kg/m     GENERAL APPEARANCE: Well developed, well nourished, in no acute distress.  HEENT: normocephalic, atraumatic  sclerae anicteric, conjunctivae clear and moist, EOM intact  LUNGS: Lung sounds CTA, no adventitious sounds, respiratory effort normal.  CARD: RRR, S1, S2, without murmurs, gallops, rubs  ABD: Soft, nondistended and nontender with normal bowel sounds.   MSK: Muscle strength and tone were equal bilaterally. Moves all extremities easily and intentionally.   EXTREMITIES: No cyanosis, " clubbing or edema.  NEURO: Alert with cognitive impairment, Face is symmetric.  SKIN: Inspection of the skin reveals no rashes, ulcerations or petechiae.  PSYCH: euthymic          Lab/Diagnostic data:   Last Comprehensive Metabolic Panel:  Lab Results   Component Value Date     02/16/2022    POTASSIUM 4.6 02/16/2022    CHLORIDE 104 02/16/2022    CO2 26 02/16/2022    ANIONGAP 9 02/16/2022     (H) 02/16/2022    BUN 23 02/16/2022    CR 0.83 02/16/2022    GFRESTIMATED 71 02/16/2022    TIM 9.8 02/16/2022       Lab Results   Component Value Date    WBC 9.0 02/16/2022     Lab Results   Component Value Date    RBC 3.67 02/16/2022     Lab Results   Component Value Date    HGB 11.5 02/16/2022     Lab Results   Component Value Date    HCT 35.4 02/16/2022     Lab Results   Component Value Date    MCV 97 02/16/2022     Lab Results   Component Value Date    MCH 31.3 02/16/2022     Lab Results   Component Value Date    MCHC 32.5 02/16/2022     Lab Results   Component Value Date    RDW 15.1 02/16/2022     Lab Results   Component Value Date     02/16/2022     Lab Results   Component Value Date    A1C 7.2 10/18/2022    A1C 7.8 05/11/2022    A1C 9.5 02/16/2022    A1C 6.7 01/21/2021    A1C 6.6 07/30/2020         ASSESSMENT/PLAN  Alzheimer's dementia with behavioral disturbance (H)  Refusals of meds and treatments, daughter comes in to give her showers.  On no medications.     Type 2 diabetes mellitus with hyperglycemia, without long-term current use of insulin (H)  Check A1c, BMP next week.  Continue with low dose metformin.     Class 1 obesity due to excess calories without serious comorbidity with body mass index (BMI) of 32.0 to 32.9 in adult  Having slow weight loss, ? If this is due to metformin.  Monitor.     Depression, unspecified depression type  Controlled, no medications.  Continue with Vit D.     Uncontrolled hypertension  Stable when taking medications.  Continue with lisinopril and verapamil.      Anemia, unspecified type  Check CBC next week.           Electronically signed by:  Suri Chand NP

## 2023-06-07 NOTE — TELEPHONE ENCOUNTER
[facility] Geriatrics Lab Note     Provider: ZANDRA Krishnamurthy  Facility: [facility]) Facility Type:  [facility]    Allergies   Allergen Reactions     Codeine Sulfate [Codeine] Unknown       Labs Reviewed by provider: Heme 2, BMP, Mg, TSH, HgbA1c     Verbal Order/Direction given by Provider: Check BMP and Hgb on 8/2/23.      Provider giving Order:  ZANDRA Krishnamurthy    Verbal Order given to: Elana([phone])    Davin Casillas RN

## 2023-07-11 NOTE — PROGRESS NOTES
CC attended care conference for member on 07/11/23 at [facility].   Present at care conference member, this care coordinator, adult daughter (Polina Perez), and NH  (Aleksandra Hubbard).  OT Report: NA  Cognitive testing results: BIMS 2/15 but has significant language barrier  PT Report:  NA  Nursing Report: Continues to refuse Bathing in afternoon states I took this morning. Family aware and try to assist as able with minimal success. Member continues to roam halls and go into other people rooms. Roommate requested to be moved and facility currently going to try and keep room as private for now. Code status is DNR?DNI  Dietician Report: a CSC diet with reg texture and consistency. CBW: 192.2#. Intakes are %  Social Work Report:  17 activities per week. Resident likes to visit with Jaswant, walking down the halls, visiting with family/staff. Only concern  is resident frequently eats food off the tables after meals. Intervention- offering resident snacks   CC Report:  6 month update completed this date along with care conference. [employer] Doll ordered for member has not arrived. Daughter excited about this coming,      Angela Amaro RN, PHN  Intuitional Care Coordinator [facility]  Cell [phone] Fax [phone]

## 2023-07-12 NOTE — PROGRESS NOTES
[facility] Six-Month Assessment    6 month assessment completed on 07/11/23 with GERARDO Hubbard, Daughter Deborah, Member during Care Conference .    ER visits: No  Hospitalizations: No  TCU stays: No  Significant health status changes: health remains stable   Falls/Injuries: No  ADL/IADL changes: No    Reviewed Institutional Assessment and updated as needed.     Will see member in 6 months for an annual health risk assessment.   Encouraged member to call CC with any questions or concerns in the meantime.     Angela Amaro RN, PHN  Intuitional Care Coordinator [facility]  Cell [phone] Fax [phone]

## 2023-08-01 NOTE — PROGRESS NOTES
[facility]      Code Status:  DNR  Visit Type: Review Of Multiple Medical Conditions/dm     Facility:  [facility] [[id number]]           History of Present Illness: Kandi Ontiveros is a 80 y.o. female who is a resident of [facility].  She has underlying history of dementia with progressive memory impairment.   Last BIMS 3/15; remains at baseline  Plan she is also [place]aguayan-speaking making communication hard.  She talks very little  Frequently refuses care from caregiver  Ambulates independently and wandering in van and in other pts rooms  Oral intake has been okay however she has been losing weight and currently down to 192 pounds  No new concerns voiced by staff    Past Medical History:   Diagnosis Date     Anxiety     Created by Conversion      Benign Adenomatous Polyp Of The Large Intestine     Created by Conversion      Chronic Diarrhea Of Unknown Origin     Created by Conversion      Dementia of the Alzheimer's type 7/13/2014     Diabetes Mellitus     Created by Conversion      Esophageal reflux     Created by Conversion      Gastritis      Herpes Zoster (Shingles)     Created by Conversion      Hiatal hernia      Hypercholesterolemia     Created by Conversion      Hypertension     Created by Conversion      Melanosis Coli     Created by Conversion [facility] Annotation: May  2 2012  1:19PM - Sheila Benavides: colonoscopy  4/30/12      Memory Lapses Or Loss     Created by Conversion      Obstructive Sleep Apnea     Created by Conversion      Osteopenia     Created by Conversion      Raynaud's Disease     Created by Conversion      Tricuspid Regurgitation     Created by Conversion      No past surgical history on file.  Family History   Problem Relation Age of Onset     Hypertension Other      Diabetes Other      Stroke Other      Social History     Socioeconomic History     Marital status:      Spouse name: Not on file     Number of children: Not on file      Years of education: Not on file     Highest education level: Not on file   Occupational History     Not on file   Social Needs     Financial resource strain: Not on file     Food insecurity     Worry: Not on file     Inability: Not on file     Transportation needs     Medical: Not on file     Non-medical: Not on file   Tobacco Use     Smoking status: Never Smoker     Smokeless tobacco: Never Used   Substance and Sexual Activity     Alcohol use: Not on file     Drug use: Not on file     Sexual activity: Not on file   Lifestyle     Physical activity     Days per week: Not on file     Minutes per session: Not on file     Stress: Not on file   Relationships     Social connections     Talks on phone: Not on file     Gets together: Not on file     Attends Adventist service: Not on file     Active member of club or organization: Not on file     Attends meetings of clubs or organizations: Not on file     Relationship status: Not on file     Intimate partner violence     Fear of current or ex partner: Not on file     Emotionally abused: Not on file     Physically abused: Not on file     Forced sexual activity: Not on file   Other Topics Concern     Not on file   Social History Narrative     Not on file   lived in [facility]  Current Outpatient Medications   Medication Sig Dispense Refill     acetaminophen (TYLENOL) 325 MG tablet Take 650 mg by mouth 4 (four) times a day.       bisacodyl (DULCOLAX, BISACODYL,) 10 mg suppository Insert 1 suppository (10 mg total) into the rectum daily as needed (for constipation). 12 suppository 0     cetirizine (ZYRTEC) 10 MG tablet Take 10 mg by mouth daily as needed.        cholecalciferol, vitamin D3, 1,000 unit tablet Take 1,000 Units by mouth daily.       fluticasone propionate (FLONASE) 50 mcg/actuation nasal spray Apply 1 spray into each nostril daily as needed for rhinitis.       gabapentin (NEURONTIN) 100 MG capsule Take 100 mg by mouth 3 (three) times a day.       hydrALAZINE (APRESOLINE)  "25 MG tablet Take 50 mg by mouth Daily at 8:00 am.. Hold for SBP <150             lisinopriL (PRINIVIL,ZESTRIL) 2.5 MG tablet Take 5 mg by mouth daily.        metoprolol succinate (TOPROL-XL) 25 MG Take 12.5 mg by mouth daily.              polyethylene glycol (MIRALAX) 17 gram/dose powder Take 17 g by mouth daily. 255 g 0     venlafaxine (EFFEXOR-XR) 37.5 MG 24 hr capsule Take 37.5 mg by mouth daily.       verapamil (VERELAN PM) 100 mg 24 hr capsule TAKE ONE CAPSULE BY MOUTH EVERY DAY 90 capsule 1     white petrolatum (AQUAPHOR ORIGINAL) 41 % Oint Apply 1 application topically 3 (three) times a day as needed.              No current facility-administered medications for this visit.      Allergies   Allergen Reactions     Codeine Sulfate          Review of Systems:    Comprehensive review not done because of advanced dementia and language barrier.  Noted to ambulating without assist device  Has been losing weight  Currently weight is down to 192 pounds  Continues to have some behaviors associated dementia and does not let staff participate in her cares  Oral intake is variable per staff  Wandering in the hallways    Physical Exam:    Blood pressure (!) 165/70, pulse 80, temperature (!) 96.4  F (35.8  C), resp. rate 18, height 1.626 m (5' 4\"), weight 87.1 kg (192 lb), SpO2 100 %.      GENERAL: no acute distress. Cooperative in conversation.  Has limited recall and mostly smiles to questions  Patient is obese  HEENT: pupils are equal, round and reactive. Oral mucosa is moist and intact.  RESP:Chest symmetric. Regular respiratory rate. No stridor.  CVS: S1S2  ABD: Nondistended, soft.  EXTREMITIES: She has pedal and lower extremity edema.   NEURO: non focal. Alert and oriented xSELF   PSYCH: within normal limits. No depression or anxiety.  SKIN: warm dry intact   Musculoskeletal no focal abnormalities noted to be ambulating without any assist device    Labs:    Lab Results   Component Value Date    HGBA1C 6.7 (H) " 01/21/2021     Results for orders placed or performed in visit on 09/17/20   Basic Metabolic Panel   Result Value Ref Range    Sodium 140 136 - 145 mmol/L    Potassium 4.5 3.5 - 5.0 mmol/L    Chloride 102 98 - 107 mmol/L    CO2 29 22 - 31 mmol/L    Anion Gap, Calculation 9 5 - 18 mmol/L    Glucose 121 70 - 125 mg/dL    Calcium 10.0 8.5 - 10.5 mg/dL    BUN 28 8 - 28 mg/dL    Creatinine 1.05 0.60 - 1.10 mg/dL    GFR MDRD Af Amer >60 >60 mL/min/1.73m2    GFR MDRD Non Af Amer 51 (L) >60 mL/min/1.73m2     Lab Results   Component Value Date    NSQCVHHP42 368 03/23/2020     Vitamin D, Total (25-Hydroxy)   Date Value Ref Range Status   03/23/2020 34.4 30.0 - 80.0 ng/mL Final         Assessment/Plan:    -Dementia with significant cognitive decline  - History of diabetes with a last A1c 7.5  - ongoing wt loss  - Morbid obesity  -History of depression with anxiety  - Hypertension  - Generalized weakness with decline noted    Pt is in memory care with dementia  Patient is difficult because of language barrier as well as dementia.  Weight loss noted with a current weight around 192 pounds.  Lymphedema is stable.  Recheck labs reviewed and due to a bump in her creatinine to 1.17 taken off lisinopril and switch to amlodipine.  Continue to monitor weights/blood pressures and creatinine  She is on metformin for diabetes.  Slight jump in A1c noted at 7.5  Recheck creatinine in a few weeks and increase metformin if needed  Care to be palliative in intent    Electronically signed by: BERLIN Butts  This progress note was completed using Dragon software and there may be grammatical errors.

## 2023-08-02 NOTE — TELEPHONE ENCOUNTER
[facility] Geriatrics Lab Note     Provider: ZANDRA Krishnamurthy  Facility: [facility]  Facility Type:  LTC    Allergies   Allergen Reactions    Codeine Sulfate [Codeine] Unknown       Labs Reviewed by provider: Hgb and BMP     Verbal Order/Direction given by Provider:     Discontinue lisinopril and start on 10mg of amlodipine.  Check Bps daily x 14 days and update provider.  Recheck Renal function in 3 weeks.   Provider giving Order:  ZANDRA Krishnamurthy    Verbal Order given to: Jerri Israel RN

## 2023-08-02 NOTE — LETTER
8/2/2023        RE: Kandi Gannon  [facility]  [address] [facility] [address]        [facility]      Code Status:  DNR  Visit Type: Review Of Multiple Medical Conditions/dm     Facility:  [facility] [[id number]]           History of Present Illness: Kandi Ontiveros is a 80 y.o. female who is a resident of [facility].  She has underlying history of dementia with progressive memory impairment.   Last BIMS 3/15; remains at baseline  Plan she is also German-speaking making communication hard.  She talks very little  Frequently refuses care from caregiver  Ambulates independently and wandering in van and in other pts rooms  Oral intake has been okay however she has been losing weight and currently down to 192 pounds  No new concerns voiced by staff    Past Medical History:   Diagnosis Date     Anxiety     Created by Conversion      Benign Adenomatous Polyp Of The Large Intestine     Created by Conversion      Chronic Diarrhea Of Unknown Origin     Created by Conversion      Dementia of the Alzheimer's type 7/13/2014     Diabetes Mellitus     Created by Conversion      Esophageal reflux     Created by Conversion      Gastritis      Herpes Zoster (Shingles)     Created by Conversion      Hiatal hernia      Hypercholesterolemia     Created by Conversion      Hypertension     Created by Conversion      Melanosis Coli     Created by Conversion [facility] Annotation: May  2 2012  1:19PM - Sheila Benavides: colonoscopy  4/30/12      Memory Lapses Or Loss     Created by Conversion      Obstructive Sleep Apnea     Created by Conversion      Osteopenia     Created by Conversion      Raynaud's Disease     Created by Conversion      Tricuspid Regurgitation     Created by Conversion      No past surgical history on file.  Family History   Problem Relation Age of Onset     Hypertension Other      Diabetes Other      Stroke Other      Social History      Socioeconomic History     Marital status:      Spouse name: Not on file     Number of children: Not on file     Years of education: Not on file     Highest education level: Not on file   Occupational History     Not on file   Social Needs     Financial resource strain: Not on file     Food insecurity     Worry: Not on file     Inability: Not on file     Transportation needs     Medical: Not on file     Non-medical: Not on file   Tobacco Use     Smoking status: Never Smoker     Smokeless tobacco: Never Used   Substance and Sexual Activity     Alcohol use: Not on file     Drug use: Not on file     Sexual activity: Not on file   Lifestyle     Physical activity     Days per week: Not on file     Minutes per session: Not on file     Stress: Not on file   Relationships     Social connections     Talks on phone: Not on file     Gets together: Not on file     Attends Jew service: Not on file     Active member of club or organization: Not on file     Attends meetings of clubs or organizations: Not on file     Relationship status: Not on file     Intimate partner violence     Fear of current or ex partner: Not on file     Emotionally abused: Not on file     Physically abused: Not on file     Forced sexual activity: Not on file   Other Topics Concern     Not on file   Social History Narrative     Not on file   lived in [facility]  Current Outpatient Medications   Medication Sig Dispense Refill     acetaminophen (TYLENOL) 325 MG tablet Take 650 mg by mouth 4 (four) times a day.       bisacodyl (DULCOLAX, BISACODYL,) 10 mg suppository Insert 1 suppository (10 mg total) into the rectum daily as needed (for constipation). 12 suppository 0     cetirizine (ZYRTEC) 10 MG tablet Take 10 mg by mouth daily as needed.        cholecalciferol, vitamin D3, 1,000 unit tablet Take 1,000 Units by mouth daily.       fluticasone propionate (FLONASE) 50 mcg/actuation nasal spray Apply 1 spray into each nostril daily as needed for  "rhinitis.       gabapentin (NEURONTIN) 100 MG capsule Take 100 mg by mouth 3 (three) times a day.       hydrALAZINE (APRESOLINE) 25 MG tablet Take 50 mg by mouth Daily at 8:00 am.. Hold for SBP <150             lisinopriL (PRINIVIL,ZESTRIL) 2.5 MG tablet Take 5 mg by mouth daily.        metoprolol succinate (TOPROL-XL) 25 MG Take 12.5 mg by mouth daily.              polyethylene glycol (MIRALAX) 17 gram/dose powder Take 17 g by mouth daily. 255 g 0     venlafaxine (EFFEXOR-XR) 37.5 MG 24 hr capsule Take 37.5 mg by mouth daily.       verapamil (VERELAN PM) 100 mg 24 hr capsule TAKE ONE CAPSULE BY MOUTH EVERY DAY 90 capsule 1     white petrolatum (AQUAPHOR ORIGINAL) 41 % Oint Apply 1 application topically 3 (three) times a day as needed.              No current facility-administered medications for this visit.      Allergies   Allergen Reactions     Codeine Sulfate          Review of Systems:    Comprehensive review not done because of advanced dementia and language barrier.  Noted to ambulating without assist device  Has been losing weight  Currently weight is down to 192 pounds  Continues to have some behaviors associated dementia and does not let staff participate in her cares  Oral intake is variable per staff  Wandering in the hallways    Physical Exam:    Blood pressure (!) 165/70, pulse 80, temperature (!) 96.4  F (35.8  C), resp. rate 18, height 1.626 m (5' 4\"), weight 87.1 kg (192 lb), SpO2 100 %.      GENERAL: no acute distress. Cooperative in conversation.  Has limited recall and mostly smiles to questions  Patient is obese  HEENT: pupils are equal, round and reactive. Oral mucosa is moist and intact.  RESP:Chest symmetric. Regular respiratory rate. No stridor.  CVS: S1S2  ABD: Nondistended, soft.  EXTREMITIES: She has pedal and lower extremity edema.   NEURO: non focal. Alert and oriented xSELF   PSYCH: within normal limits. No depression or anxiety.  SKIN: warm dry intact   Musculoskeletal no focal " abnormalities noted to be ambulating without any assist device    Labs:    Lab Results   Component Value Date    HGBA1C 6.7 (H) 01/21/2021     Results for orders placed or performed in visit on 09/17/20   Basic Metabolic Panel   Result Value Ref Range    Sodium 140 136 - 145 mmol/L    Potassium 4.5 3.5 - 5.0 mmol/L    Chloride 102 98 - 107 mmol/L    CO2 29 22 - 31 mmol/L    Anion Gap, Calculation 9 5 - 18 mmol/L    Glucose 121 70 - 125 mg/dL    Calcium 10.0 8.5 - 10.5 mg/dL    BUN 28 8 - 28 mg/dL    Creatinine 1.05 0.60 - 1.10 mg/dL    GFR MDRD Af Amer >60 >60 mL/min/1.73m2    GFR MDRD Non Af Amer 51 (L) >60 mL/min/1.73m2     Lab Results   Component Value Date    [id number] 368 03/23/2020     Vitamin D, Total (25-Hydroxy)   Date Value Ref Range Status   03/23/2020 34.4 30.0 - 80.0 ng/mL Final         Assessment/Plan:    -Dementia with significant cognitive decline  - History of diabetes with a last A1c 7.5  - ongoing wt loss  - Morbid obesity  -History of depression with anxiety  - Hypertension  - Generalized weakness with decline noted    Pt is in memory care with dementia  Patient is difficult because of language barrier as well as dementia.  Weight loss noted with a current weight around 192 pounds.  Lymphedema is stable.  Recheck labs reviewed and due to a bump in her creatinine to 1.17 taken off lisinopril and switch to amlodipine.  Continue to monitor weights/blood pressures and creatinine  She is on metformin for diabetes.  Slight jump in A1c noted at 7.5  Recheck creatinine in a few weeks and increase metformin if needed  Care to be palliative in intent    Electronically signed by: BERLIN Butts  This progress note was completed using Dragon software and there may be grammatical errors.            Sincerely,        BERLIN Butts

## 2023-08-02 NOTE — RESULT ENCOUNTER NOTE
Discontinue lisinopril and start on 10mg of amlodipine.  Check Bps daily x 14 days and update provider.  Recheck Renal function in 3 weeks.

## 2023-08-25 NOTE — RESULT ENCOUNTER NOTE
Discontinue verapamil.  Start Losartan 50mg daily.  Discontinue Gabapentin Nursing to check Bps daily and update provider with Bps in 1 week.  Also assess if neuropathy pain worsens or has increased falls.  Recheck BMP on 9/6.

## 2023-08-25 NOTE — TELEPHONE ENCOUNTER
[facility] Lab Note     Provider: ZANDRA Krishnamurthy  Facility: [facility]  Facility Type:  LT    Allergies   Allergen Reactions    Codeine Sulfate [Codeine] Unknown       Labs Reviewed by provider: ÁNGEL     Verbal Order/Direction given by Provider:   1) Discontinue verapamil.    2) Start Losartan 50mg PO daily.    3) Discontinue Gabapentin   4) Nursing to check Bps daily and update provider with Bps in 1 week.  Also assess if neuropathy pain worsens or has increased falls.    5) Recheck BMP and A1c on 9/6.     6) Also hold Metformin x 7 days.    Provider giving Order:  ZANDRA Krishnamurthy    Verbal Order given to: Alona Rosenthal RN

## 2023-09-07 NOTE — TELEPHONE ENCOUNTER
[facility] Lab Note     Provider: ZANDRA Krishnamurthy  Facility: [facility]  Facility Type:  LT    Allergies   Allergen Reactions    Codeine Sulfate [Codeine] Unknown       Labs Reviewed by provider: BMP, HgbA1c---from 9/5/23     Verbal Order/Direction given by Provider: Check BG Q AM x 7 days and update provider on 9/14/23.  Recheck HgbA1c on 12/5/23.      Provider giving Order:  ZANDRA Krishnamurthy    Verbal Order given to: Julianna([phone])    Davin Casillas RN

## 2023-09-08 NOTE — PROGRESS NOTES
[facility]      Code Status:  DNR  Visit Type: Nursing Home Regulatory     Facility:   [facility] () [[id number]]         History of Present Illness: Kandi Gannon is a 83 year old female   She has a past medical history for dementia, HTN, HLD, DM, NEDRA, Vitamin D deficiency and anxiety. She is a wandering risk and will often be found in other residents' rooms.  She can often times refuse treatments or medications.       For the past 3 months her GFR has dropped even with concentration on hydration. She did have a fall last month and so her Medications were changed as follows.   On 8/2 her lisinopril was dc'd and started on amlodipine for BP. Cr continued to rise and so her Verapamil was changed to losartan and her Gabapentin was dc'd.  And recheck labs showed mild improvement with Cr 1.21 and GFR 45.  Her Metformin was held for 7 days last week to decrease chance of metabolic acidosis.  Recheck A1c she went from 7.5 3 months ago to 8.3.  (this could be due to holding Metformin).      SBPs in the last week 140-150s. No significant change overall.     Today, she denies any pain and is found to be ambulating the halls frequently.  Nursing reports she is doing therapy since her recent fall.  She speaks Pakistani and states she would like to go to a party and dance.     Current Outpatient Medications   Medication Sig Dispense Refill    acetaminophen (TYLENOL) 325 MG tablet Take 650 mg by mouth 4 times daily NTE 4gm/24hrs      amLODIPine (NORVASC) 10 MG tablet Take 10 mg by mouth daily      gabapentin (NEURONTIN) 100 MG capsule Take 100 mg by mouth daily Take 100mg in the AM and 200mg at HS.      metFORMIN (GLUCOPHAGE) 500 MG tablet Take 500 mg by mouth 2 times daily (with meals)      polyethylene glycol (MIRALAX) 17 g packet Take 17 g by mouth daily      verapamil ER (VERELAN) 100 MG 24 hr capsule Take  "120 mg by mouth daily      Vitamin D, Cholecalciferol, 25 MCG (1000 UT) TABS Take 1 tablet by mouth daily         Review of Systems   Unable to obtain due to dementia.      Physical Exam  Vital signs:BP (!) 156/68   Pulse 76   Temp 98.4  F (36.9  C)   Resp 16   Ht 1.626 m (5' 4\")   Wt 88.1 kg (194 lb 3.2 oz)   SpO2 98%   BMI 33.33 kg/m     GENERAL APPEARANCE: Well developed, well nourished, in no acute distress.   sclerae anicteric, conjunctivae clear and moist, EOM intact  LUNGS: Lung sounds CTA, no adventitious sounds, respiratory effort normal.  CARD: RRR, S1, S2, without murmurs, gallops, rubs  ABD: Soft, nondistended and nontender with normal bowel sounds.   MSK: Muscle strength and tone were equal bilaterally. Moves all extremities easily and intentionally.   EXTREMITIES: No cyanosis, clubbing or edema.  NEURO: Alert with cognitive impairment. Face is symmetric.  SKIN: Inspection of the skin reveals no rashes, ulcerations or petechiae.  PSYCH: euthymic        Labs:    Last Comprehensive Metabolic Panel:  Lab Results   Component Value Date     09/06/2023    POTASSIUM 5.1 09/06/2023    CHLORIDE 98 09/06/2023    CO2 23 09/06/2023    ANIONGAP 15 09/06/2023     (H) 09/06/2023    BUN 31.5 (H) 09/06/2023    CR 1.21 (H) 09/06/2023    GFRESTIMATED 45 (L) 09/06/2023    TIM 10.2 09/06/2023       Lab Results   Component Value Date    WBC 9.2 06/07/2023     Lab Results   Component Value Date    RBC 3.39 06/07/2023     Lab Results   Component Value Date    HGB 11.3 08/02/2023     Lab Results   Component Value Date    HCT 34.0 06/07/2023     Lab Results   Component Value Date     06/07/2023     Lab Results   Component Value Date    MCH 30.7 06/07/2023     Lab Results   Component Value Date    MCHC 30.6 06/07/2023     Lab Results   Component Value Date    RDW 14.6 06/07/2023     Lab Results   Component Value Date     06/07/2023     Lab Results   Component Value Date    A1C 8.3 09/06/2023    " A1C 7.5 06/07/2023    A1C 7.2 10/18/2022    A1C 7.8 05/11/2022    A1C 9.5 02/16/2022           Assessment/Plan:  Type 2 diabetes mellitus with hyperglycemia, without long-term current use of insulin (H)  Last A1c 8.3 up from last time even with increased Metformin and so though is that it is related to holding Metformin for the past week.  Will check BS daily x 7 days to assure she is not too high.  Continue with Metformin 500mg BID.     Neuropathy  No pain, Gabapentin was dc'd after a fall.     Essential hypertension  Acceptable, monitor.  Continue with amlodipine and Losartan.      Renal failure, chronic, stage 3 (moderate) (H)  Likely worsening with age and medications.  Will plan to monitor ever 6 months x 1 year.  Avoid nephrotoxins.       Electronically signed by:Suri Chand NP

## 2023-09-08 NOTE — LETTER
9/8/2023        RE: Kandi Gannon  [facility]  [address]  [facility] [address]                                                                                           [facility]      Code Status:  DNR  Visit Type: Nursing Home Regulatory     Facility:   [facility] () [[id number]]         History of Present Illness: Kandi Gannon is a 83 year old female   She has a past medical history for dementia, HTN, HLD, DM, NEDRA, Vitamin D deficiency and anxiety. She is a wandering risk and will often be found in other residents' rooms.  She can often times refuse treatments or medications.       For the past 3 months her GFR has dropped even with concentration on hydration. She did have a fall last month and so her Medications were changed as follows.   On 8/2 her lisinopril was dc'd and started on amlodipine for BP. Cr continued to rise and so her Verapamil was changed to losartan and her Gabapentin was dc'd.  And recheck labs showed mild improvement with Cr 1.21 and GFR 45.  Her Metformin was held for 7 days last week to decrease chance of metabolic acidosis.  Recheck A1c she went from 7.5 3 months ago to 8.3.  (this could be due to holding Metformin).      SBPs in the last week 140-150s. No significant change overall.     Today, she denies any pain and is found to be ambulating the halls frequently.  Nursing reports she is doing therapy since her recent fall.  She speaks Micronesian and states she would like to go to a party and dance.     Current Outpatient Medications   Medication Sig Dispense Refill     acetaminophen (TYLENOL) 325 MG tablet Take 650 mg by mouth 4 times daily NTE 4gm/24hrs       amLODIPine (NORVASC) 10 MG tablet Take 10 mg by mouth daily       gabapentin (NEURONTIN) 100 MG capsule Take 100 mg by mouth daily Take 100mg in the AM and 200mg at HS.       metFORMIN (GLUCOPHAGE) 500 MG tablet Take 500 mg by mouth 2 times daily (with meals)        "polyethylene glycol (MIRALAX) 17 g packet Take 17 g by mouth daily       verapamil ER (VERELAN) 100 MG 24 hr capsule Take 120 mg by mouth daily       Vitamin D, Cholecalciferol, 25 MCG (1000 UT) TABS Take 1 tablet by mouth daily         Review of Systems   Unable to obtain due to dementia.      Physical Exam  Vital signs:BP (!) 156/68   Pulse 76   Temp 98.4  F (36.9  C)   Resp 16   Ht 1.626 m (5' 4\")   Wt 88.1 kg (194 lb 3.2 oz)   SpO2 98%   BMI 33.33 kg/m     GENERAL APPEARANCE: Well developed, well nourished, in no acute distress.   sclerae anicteric, conjunctivae clear and moist, EOM intact  LUNGS: Lung sounds CTA, no adventitious sounds, respiratory effort normal.  CARD: RRR, S1, S2, without murmurs, gallops, rubs  ABD: Soft, nondistended and nontender with normal bowel sounds.   MSK: Muscle strength and tone were equal bilaterally. Moves all extremities easily and intentionally.   EXTREMITIES: No cyanosis, clubbing or edema.  NEURO: Alert with cognitive impairment. Face is symmetric.  SKIN: Inspection of the skin reveals no rashes, ulcerations or petechiae.  PSYCH: euthymic        Labs:    Last Comprehensive Metabolic Panel:  Lab Results   Component Value Date     09/06/2023    POTASSIUM 5.1 09/06/2023    CHLORIDE 98 09/06/2023    CO2 23 09/06/2023    ANIONGAP 15 09/06/2023     (H) 09/06/2023    BUN 31.5 (H) 09/06/2023    CR 1.21 (H) 09/06/2023    GFRESTIMATED 45 (L) 09/06/2023    TIM 10.2 09/06/2023       Lab Results   Component Value Date    WBC 9.2 06/07/2023     Lab Results   Component Value Date    RBC 3.39 06/07/2023     Lab Results   Component Value Date    HGB 11.3 08/02/2023     Lab Results   Component Value Date    HCT 34.0 06/07/2023     Lab Results   Component Value Date     06/07/2023     Lab Results   Component Value Date    MCH 30.7 06/07/2023     Lab Results   Component Value Date    MCHC 30.6 06/07/2023     Lab Results   Component Value Date    RDW 14.6 06/07/2023 "     Lab Results   Component Value Date     06/07/2023     Lab Results   Component Value Date    A1C 8.3 09/06/2023    A1C 7.5 06/07/2023    A1C 7.2 10/18/2022    A1C 7.8 05/11/2022    A1C 9.5 02/16/2022           Assessment/Plan:  Type 2 diabetes mellitus with hyperglycemia, without long-term current use of insulin (H)  Last A1c 8.3 up from last time even with increased Metformin and so though is that it is related to holding Metformin for the past week.  Will check BS daily x 7 days to assure she is not too high.  Continue with Metformin 500mg BID.     Neuropathy  No pain, Gabapentin was dc'd after a fall.     Essential hypertension  Acceptable, monitor.  Continue with amlodipine and Losartan.      Renal failure, chronic, stage 3 (moderate) (H)  Likely worsening with age and medications.  Will plan to monitor ever 6 months x 1 year.  Avoid nephrotoxins.       Electronically signed by:Suri Chand NP                Sincerely,        Suri Chand NP

## 2023-09-28 NOTE — LETTER
9/28/2023        RE: Kandi Gannon  [facility]  [address] [address]         HEALTH GERIATRIC SERVICES  Chief Complaint   Patient presents with     intermediate Acute     [facility] Medical Record Number:  [id number]  Place of Service where encounter took place:  [facility] () [[id number]]  Code Status: Unknown    HISTORY:      HPI:  Kandi Gannon  is 83 year old (1940) currently residing in the long-term care unit at [facility].  She is with past medical history of hypertension, anxiety, Alzheimer's, GERD, recent positive COVID, renal failure stage III    Today she was seen to review vital signs, labs, a routine visit and for elevated blood sugar.  Patient is a Slovenian-speaking Alzheimer's patient.  She is currently recovering from COVID and will come out of isolation tomorrow.  Per staff she is not eating well and has not been out of bed in a week.  Vital signs blood pressure 152/91 pulse 104 oxygen 96% on room air respiratory rate 18 temp 97.7 she was also noted to have a blood sugar of 346.  Currently on metformin however this was stopped due to elevated creatinine and she was started on a sliding scale.  She was also noted to have pain when writer would touch her legs.  Bilateral ultrasounds were ordered due to her extended bedrest.  Also per the orders she did complete a course of Molonupivir.  She was also noted to have a large blister right heel that has popped and new orders were given for dressing changes.        ALLERGIES:Codeine sulfate [codeine]    PAST MEDICAL HISTORY: No past medical history on file.    PAST SURGICAL HISTORY:   has no past surgical history on file.    FAMILY HISTORY: family history includes Cerebrovascular Disease in an other family member; Diabetes in an other family member; Hypertension in an other family member.    SOCIAL HISTORY:  reports that she has never smoked. She has never used smokeless tobacco.  Patient : Kwaku Tong Age: 60 year old Sex: male   MRN: [id number] Encounter Date: 4/1/2019      History     Chief Complaint   Patient presents with   • Chest Pain Adult     HPI  60-year-old male presents the emergency department with complaints of epigastric pain at this time and substernal chest pain. He denies any shortness of breath. States pain is worse when sitting up first laying down. No nausea, vomiting or diarrhea. He is most concerned about his pain increased at this time. Patient does state that he has a known hiatal hernia and has had gastric reflux for years. He does take omeprazole 20 mg daily and has been on that dose for a long period of time. States all the symptoms started Friday. No pain up into the neck or down as the arm. Or constipation.     Allergies   Allergen Reactions   • Penicillins Other (See Comments)     Throat swells       Discharge Medication List as of 4/1/2019  5:17 PM      Prior to Admission Medications    Details   Multiple Vitamins-Minerals (MULTIVITAL) tablet Take 1 tablet by mouth daily.Historical Med      atorvastatin (LIPITOR) 20 MG tablet Take 1 tablet by mouth daily.Eprescribe, Disp-90 tablet, R-3      famciclovir (FAMVIR) 500 MG tablet Take 1 tablet by mouth 3 times daily.Eprescribe, Disp-20 tablet, R-4      sildenafil (REVATIO) 20 MG tablet Take 2-5 tablets daily PRN for Erectile DysfunctionEprescribe, Disp-100 tablet, R-36/12/17- Replaces previous prescription.      omeprazole 20 MG tablet Take 1 tablet by mouth daily. Take 1/2 hour before first mealHistorical Med, Disp-1 tablet, R-0             Discharge Medication List as of 4/1/2019  5:17 PM      New Prescriptions    Details   famotidine (PEPCID) 20 MG tablet Take 1 tablet by mouth 2 times daily.Normal, Disp-28 tablet, R-0             Past Medical History:   Diagnosis Date   • GERD (gastroesophageal reflux disease)    • Hiatal hernia     dilation x2   • Hiatal hernia    • Hyperlipemia        Past Surgical History:    Procedure Laterality Date   • COLONOSCOPY DIAGNOSTIC  02/01/2012    next 2022   • VASECTOMY  02/01/2003       Family History   Problem Relation Age of Onset   • Cancer Brother         prostate   • Cancer Father         prostate   • Hypertension Mother    • Hyperlipidemia Mother    • Asthma Father    • Heart Mother         aotic stenosis       Social History     Tobacco Use   • Smoking status: Never Smoker   • Smokeless tobacco: Never Used   Substance Use Topics   • Alcohol use: Yes     Alcohol/week: 1.2 oz     Types: 2 Standard drinks or equivalent per week   • Drug use: No       Review of Systems   Constitutional: Negative for chills and fever.   HENT: Negative for sore throat.    Respiratory: Negative for cough and shortness of breath.    Cardiovascular: Positive for chest pain.   Gastrointestinal: Positive for abdominal pain. Negative for constipation, diarrhea, nausea and vomiting.   Genitourinary: Negative for dysuria, frequency and urgency.   Neurological: Negative for dizziness and light-headedness.   ALL 13 SYSTEMS REVIEWED AND NEGATIVE OR NONCONTRIBUTORY UNLESS OTHERWISE NOTED IN HPI    Physical Exam     ED Triage Vitals [04/01/19 1524]   ED Triage Vitals Group      Temp 98.2 °F (36.8 °C)      Pulse 72      Resp 20      BP (!) 156/97      SpO2 93 %      EtCO2 mmHg       Height 6' 2\" (1.88 m)      Weight (S) 260 lb (117.9 kg)      Weight Scale Used ED Stated       Physical Exam   Constitutional: He is oriented to person, place, and time. He appears well-developed and well-nourished. No distress.   HENT:   Head: Normocephalic and atraumatic.   Mouth/Throat: Oropharynx is clear and moist. No oropharyngeal exudate.   Eyes: Pupils are equal, round, and reactive to light.   Neck: Normal range of motion.   Cardiovascular: Normal rate, regular rhythm and normal heart sounds.   Pulses:       Radial pulses are 2+ on the right side, and 2+ on the left side.   Pulmonary/Chest: Effort normal and breath sounds normal.  "She reports that she does not use drugs.    ROS:  Constitutional: Negative for activity change, appetite change, fatigue and fever.  She has not been out of bed x1 week per staff  HENT: Negative for congestion.    Respiratory: Negative for cough, shortness of breath and wheezing.  Recent positive COVID will come out of isolation tomorrow  Cardiovascular: Negative for chest pain and leg swelling.   Gastrointestinal: Negative for abdominal distention, abdominal pain, constipation, diarrhea and nausea.   Genitourinary: Negative for dysuria.   Musculoskeletal: Negative for arthralgia. Negative for back pain.  Pain with light touch to lower extremities  Skin: Negative for color change and wound.   Neurological: Negative for dizziness.   Psychiatric/Behavioral: Negative for agitation, behavioral problems and confusion.  History of Alzheimer's and Malay-speaking only    Physical Exam:  Constitutional:       Appearance: Patient is well-developed.   HENT:      Head: Normocephalic.   Eyes:      Conjunctiva/sclera: Conjunctivae normal.   Neck:      Musculoskeletal: Normal range of motion.   Cardiovascular:      Rate and Rhythm: Normal rate and regular rhythm.      Heart sounds: Normal heart sounds. No murmur.   Pulmonary:      Effort: No respiratory distress.      Breath sounds: Normal breath sounds. No wheezing or rales.   Abdominal:      General: Bowel sounds are normal. There is no distension.      Palpations: Abdomen is soft.      Tenderness: There is no abdominal tenderness.   Musculoskeletal:       Normal range of motion.     Skin:General:        Skin is warm.  Large popped blister right  heel  Neurological:         Mental Status: Patient is alert Malay-speaking only and Alzheimer dementia   psychiatric:         Behavior: Behavior normal.     Vitals:BP (!) 152/91   Pulse 104   Temp 97.7  F (36.5  C)   Resp 18   Ht 1.626 m (5' 4\")   Wt 88 kg (194 lb)   SpO2 96%   BMI 33.30 kg/m   and Body mass index is 33.3 " No respiratory distress. He has no wheezes. He has no rales.   Abdominal: Soft. Bowel sounds are normal. He exhibits no distension. There is tenderness. There is no rigidity, no guarding, no CVA tenderness and no tenderness at McBurney's point.   Epigastric pain upon palpation.   Musculoskeletal: Normal range of motion.   Neurological: He is alert and oriented to person, place, and time.   Skin: Skin is warm. No erythema.   Psychiatric: He has a normal mood and affect. His behavior is normal.   Nursing note and vitals reviewed.      ED Course     Procedures    Lab Results     Results for orders placed or performed during the hospital encounter of 04/01/19   CBC & Auto Differential   Result Value Ref Range    WBC 9.8 4.2 - 11.0 K/mcL    RBC 5.34 4.50 - 5.90 mil/mcL    HGB 15.8 13.0 - 17.0 g/dL    HCT 45.9 39.0 - 51.0 %    MCV 86.0 78.0 - 100.0 fl    MCH 29.6 26.0 - 34.0 pg    MCHC 34.4 32.0 - 36.5 g/dL    RDW-CV 12.3 11.0 - 15.0 %     140 - 450 K/mcL    NRBC 0 0 /100 WBC    DIFF TYPE AUTOMATED DIFFERENTIAL     Neutrophil 54 %    LYMPH 33 %    MONO 8 %    EOSIN 3 %    BASO 1 %    Percent Immature Granuloctyes 1 %    Absolute Neutrophil 5.3 1.8 - 7.7 K/mcL    Absolute Lymph 3.3 1.0 - 4.0 K/mcL    Absolute Mono 0.8 0.3 - 0.9 K/mcL    Absolute Eos 0.3 0.1 - 0.5 K/mcL    Absolute Baso 0.1 0.0 - 0.3 K/mcL    Absolute Immature Granulocytes 0.1 0 - 0.2 K/mcl   Comprehensive Metabolic Panel   Result Value Ref Range    Sodium 141 135 - 145 mmol/L    Potassium 4.1 3.4 - 5.1 mmol/L    Chloride 102 98 - 107 mmol/L    Carbon Dioxide 28 21 - 32 mmol/L    Anion Gap 15 10 - 20 mmol/L    Glucose 96 65 - 99 mg/dL    BUN 14 6 - 20 mg/dL    Creatinine 1.04 0.67 - 1.17 mg/dL    GFR Estimate,  >90     GFR Estimate, Non African American 79     BUN/Creatinine Ratio 13 7 - 25    CALCIUM 9.2 8.4 - 10.2 mg/dL    TOTAL BILIRUBIN 0.9 0.2 - 1.0 mg/dL    AST/SGOT 20 <38 Units/L    ALT/SGPT 68 <79 Units/L    ALK PHOSPHATASE 69  kg/m .    Lab/Diagnostic data:   Recent Results (from the past 240 hour(s))   ECG 12-LEAD WITH MUSE (L[facility])    Collection Time: 09/30/23  5:01 AM   Result Value Ref Range    Systolic Blood Pressure 165 mmHg    Diastolic Blood Pressure 71 mmHg    Ventricular Rate 96 BPM    Atrial Rate 96 BPM    IA Interval 168 ms    QRS Duration 74 ms     ms    QTc 434 ms    P Axis 78 degrees    R AXIS 53 degrees    T Axis 76 degrees    Interpretation ECG       Sinus rhythm  Normal ECG  When compared with ECG of 01-APR-2016 11:26,  No significant change was found  Confirmed by SEE ED PROVIDER NOTE FOR, ECG INTERPRETATION ([id number]),  ZEINAB DUDLEY ([id number]) on 9/30/2023 5:41:04 AM     Comprehensive metabolic panel    Collection Time: 09/30/23  5:32 AM   Result Value Ref Range    Sodium 147 (H) 135 - 145 mmol/L    Potassium 5.0 3.4 - 5.3 mmol/L    Carbon Dioxide (CO2) 25 22 - 29 mmol/L    Anion Gap 15 7 - 15 mmol/L    Urea Nitrogen 104.0 (H) 8.0 - 23.0 mg/dL    Creatinine 2.27 (H) 0.51 - 0.95 mg/dL    GFR Estimate 21 (L) >60 mL/min/1.73m2    Calcium 9.3 8.8 - 10.2 mg/dL    Chloride 107 98 - 107 mmol/L    Glucose 298 (H) 70 - 99 mg/dL    Alkaline Phosphatase 86 35 - 104 U/L    AST 32 0 - 45 U/L    ALT 47 0 - 50 U/L    Protein Total 7.7 6.4 - 8.3 g/dL    Albumin 3.7 3.5 - 5.2 g/dL    Bilirubin Total 0.4 <=1.2 mg/dL   Troponin T, High Sensitivity    Collection Time: 09/30/23  5:32 AM   Result Value Ref Range    Troponin T, High Sensitivity 149 (HH) <=14 ng/L   Lactic acid whole blood    Collection Time: 09/30/23  5:32 AM   Result Value Ref Range    Lactic Acid 2.5 (H) 0.7 - 2.0 mmol/L   Magnesium    Collection Time: 09/30/23  5:32 AM   Result Value Ref Range    Magnesium 3.2 (H) 1.7 - 2.3 mg/dL   CBC (+ platelets, no diff)    Collection Time: 09/30/23  5:32 AM   Result Value Ref Range    WBC Count 16.7 (H) 4.0 - 11.0 10e3/uL    RBC Count 4.07 3.80 - 5.20 10e6/uL    Hemoglobin 12.3 11.7 - 15.7 g/dL    Hematocrit 37.9 35.0 - 47.0 %     MCV 93 78 - 100 fL    MCH 30.2 26.5 - 33.0 pg    MCHC 32.5 31.5 - 36.5 g/dL    RDW 14.6 10.0 - 15.0 %    Platelet Count 321 150 - 450 10e3/uL   INR    Collection Time: 09/30/23  5:32 AM   Result Value Ref Range    INR 1.13 0.85 - 1.15   Partial thromboplastin time    Collection Time: 09/30/23  5:32 AM   Result Value Ref Range    aPTT 29 22 - 38 Seconds        MEDICATIONS:     Review of your medicines            Accurate as of September 28, 2023 11:59 PM. If you have any questions, ask your nurse or doctor.                CONTINUE these medicines which have NOT CHANGED        Dose / Directions   amLODIPine 10 MG tablet  Commonly known as: NORVASC      Dose: 10 mg  Take 10 mg by mouth every morning  Refills: 0     insulin aspart 100 UNIT/ML pen  Commonly known as: NovoLOG PEN      Dose: 1-8 Units  Inject 1-8 Units Subcutaneous 3 times daily (with meals) Use half the sliding scale if not eating  Refills: 0     losartan 50 MG tablet  Commonly known as: COZAAR      Dose: 50 mg  Take 50 mg by mouth every morning  Refills: 0     polyethylene glycol 17 g packet  Commonly known as: MIRALAX      Dose: 17 g  Take 17 g by mouth every morning  Refills: 0     Vitamin D3 25 mcg (1000 units) tablet  Commonly known as: CHOLECALCIFEROL      Dose: 1 tablet  Take 1 tablet by mouth every morning  Refills: 0            STOP taking      metFORMIN 500 MG tablet  Commonly known as: GLUCOPHAGE  Stopped by: Nessa Linder CNP                 ASSESSMENT/PLAN  Encounter Diagnoses   Name Primary?     Type 2 diabetes mellitus with hyperglycemia, without long-term current use of insulin (H) Yes     Renal failure, chronic, stage 3 (moderate) (H)      Pain in both lower legs      Type 2 diabetes she was currently on metformin which was stopped due to elevated creatinine and started on a sliding scale    Pain noted lower extremities venous ultrasounds ordered bilateral    Hypertension on amlodipine and losartan    Pain management as needed  45 - 117 Units/L    TOTAL PROTEIN 7.5 6.4 - 8.2 g/dL    Albumin 4.2 3.6 - 5.1 g/dL    GLOBULIN 3.3 2.0 - 4.0 g/dL    A/G Ratio, Serum 1.3 1.0 - 2.4   Lipase Level   Result Value Ref Range    Lipase 138 73 - 393 Units/L   Troponin I Ultra Sensitive   Result Value Ref Range    TROPONIN I <0.02 <0.05 ng/mL       EKG Results     EKG Interpretation  Rate: 68  Rhythm: normal sinus rhythm   Abnormality: no    EKG interpreted by ED physician - Dr. Lopez.     Radiology Results     Imaging Results          XR Chest PA and Lateral (Final result)  Result time 04/01/19 16:40:47    Final result                 Impression:    IMPRESSION:    No acute pulmonary findings.                       Narrative:          EXAM: XR CHEST PA AND LATERAL    CLINICAL INDICATION: chest pain    FINDINGS:    There is no acute infiltrate or suspicious mass or edema. The heart and  mediastinum are unremarkable. There is no acute bony abnormality. There is  mild spondylosis.                                    ED Medication Orders (From admission, onward)    Start Ordered     Status Ordering Provider    04/01/19 1643 04/01/19 1642  famotidine (PEPCID) injection 20 mg  ONCE      Last MAR action:  Given BETTYURGUIGNONORTEGA    04/01/19 1636 04/01/19 1636    PRN      Discontinued ORTEGA KOLB    04/01/19 1636 04/01/19 1636    PRN      Discontinued MARTINONORTEGA    04/01/19 1635 04/01/19 1634    ONCE      Discontinued ORTEGA KOLB               MDM  60-year-old male presents the emergency department with complaints of epigastric pain at this time and substernal chest pain. He denies any shortness of breath. States pain is worse when sitting up first laying down. No nausea, vomiting or diarrhea. He is most concerned about his pain increased at this time. Patient does state that he has a known hiatal hernia and has had gastric reflux for years. Epigastric pain upon palpation.    No acute findings on lab work. Patient's pain improved with GI  Tylenol    Right heel blister new dressing change orders written     Electronically signed by: Nessa Linder CNP       Sincerely,        Nessa Linder CNP       cocktail and famotidine. Discussed adding famotidine to his medications. Twice a day. Will give 2 weeks worth of supply. Patient is to follow-up with his primary care provider. Increase fluids and rest. Avoid acidic foods such as coffee and chocolate. Return to the emergency department with any new or worsening symptoms. Patient agreeable with this plan and has no further questions at this time.    Closure:  The patient understands that this is a provisional diagnosis. Provisional diagnosis can and do change. The diagnosis that you are discharged with today is based on the symptoms with which you presented today. If any new symptoms occur or worsen, you should seek immediate attention for re-evaluation.  Any symptoms that persist or fail to completely resolve require further evaluation by your other healthcare provider(s).    Clinical Impression     ED Diagnosis   1. Epigastric pain         Disposition        ED Course/MDM:    Diagnosis  The encounter diagnosis was Epigastric pain.    Follow Up:  Leonard Devi DO  [address] [place] [address]  [phone]    Schedule an appointment as soon as possible for a visit in 2 days      [facility]  [address] [place] [address]  [phone]    If symptoms worsen       Discharge Medication List as of 4/1/2019  5:17 PM      New Prescriptions    Details   famotidine (PEPCID) 20 MG tablet Take 1 tablet by mouth 2 times daily.Normal, Disp-28 tablet, R-0             Disposition:  Discharge 4/1/2019  5:14 PM  Kwaku Tong discharge to home/self care.         Angy Gee PA-C  04/01/19 [id number]

## 2023-09-30 PROBLEM — N28.9 ACUTE ON CHRONIC RENAL INSUFFICIENCY: Status: ACTIVE | Noted: 2023-01-01

## 2023-09-30 PROBLEM — F02.80 ALZHEIMER'S DISEASE (H): Status: ACTIVE | Noted: 2023-01-01

## 2023-09-30 PROBLEM — G30.9 ALZHEIMER'S DISEASE (H): Status: ACTIVE | Noted: 2023-01-01

## 2023-09-30 PROBLEM — I82.413 ACUTE DEEP VEIN THROMBOSIS (DVT) OF FEMORAL VEIN OF BOTH LOWER EXTREMITIES (H): Status: ACTIVE | Noted: 2023-01-01

## 2023-09-30 PROBLEM — R65.10 SIRS (SYSTEMIC INFLAMMATORY RESPONSE SYNDROME) (H): Status: ACTIVE | Noted: 2023-01-01

## 2023-09-30 PROBLEM — I21.4 NSTEMI (NON-ST ELEVATED MYOCARDIAL INFARCTION) (H): Status: ACTIVE | Noted: 2023-01-01

## 2023-09-30 PROBLEM — N18.9 ACUTE ON CHRONIC RENAL INSUFFICIENCY: Status: ACTIVE | Noted: 2023-01-01

## 2023-09-30 NOTE — ED NOTES
Patient's daughter Deborah updated. Patient's daughter is unaware of patient's multiple open areas to coccyx. Open area to right heel. Bilateral legs with discoloration. Patient's daughter reports that patient's ambulatory at baseline.

## 2023-09-30 NOTE — H&P
[facility]    History and Physical - Hospitalist Service       Date of Admission:  9/30/2023    Assessment & Plan      Kandi Gannon is a 83 year old female admitted on 9/30/2023. She has a pmhx of dementia, hypertension, diabetes mellitus type 2, chronic constipation, NEDRA, who presents with DVT diagnosed on US in right and left posterior tibial vein with nonocclusive dvt in right proximal femoral right mid femoral right and distal femoral veins.     Utilizing .Also writer called both daughters Polina and Deborah. DNR DNI confirmed. On admit, vitally stable, labs notable for hypernatremia 147, KATRIN 2.27, elevated lactate 2.5, troponin T 149, leukocytosis 16.7, hgb 12.3, INR 1.13, blood cultures in process, Covid19 positive. Given 1L bolus.     Admitted. On initial exam pt is oriented to only self. As above, discussed w/ pt's 2 daughters. Urinalysis appears infected so starting ceftriaxone. Starting on heparin drip. V/Q scan ordered.     -----   Acute encephalopathy (suspect metabolic and also infectious etiologies)  Multiple dvts on US  Concern for PE - obtaining V/Q scan  Recent covid19 infection on 9/18   Also found w/ UTI  Elevated troponin  Elevated lactate improving  A- as above. Encephalopathy may be multifactorial including metabolic and also infectious etiologies.  Notable KATRIN is unable to obtain contrast study.  Discussed with radiology as well as the patient and her daughter Gladys.  Plan to do VQ scan.  Discussed with bedside nursing team.  Fortunately patient is not hypoxic at this time.  The concurrent UTI may be contributing to her encephalopathy. Etiology unclear for clots though recent covid19 infection is suspect. Troponin may be demand ischemia (no chest pain) and will trend.  P:  -Admit to inpatient  -Monitor oxygen needs carefully, may require steroids if hypoxic  -Start ceftriaxone for UTI next-follow-up urine culture  -On heparin for clots  -VQ  "scan  -Pain and nausea control  -trend troponin   -infection control team to see     KATRIN  Hypernatremia  A- possibly decreased po and prerenal azotemia and/or possibly atn developing w/ ischemic injury  P:  - avoid nephrotoxic agents  - v/q instead of ct contrast for investigating PE  - hold losartan  - fluid bolus but avoid overhydrating    Hx of dementia  A/p- noted, will need collateral information including from two daughters Maulik  - redirect when able    Hypertension  A/p- stable, appropriate bp for age range. On losartan and amlodipine  - HOLD losartan  -continue amlodipine    Dm2  A/p- start hypoglycemia protocl and ldssi     Chronic constipation  A/p- continue prns, monitor for abd pain. Stable on admit and on exam    Anoop  A/p- cont cpap if able    coccygeal wound  A/p- woc consult       Diet: Combination Diet Regular Diet Adult   DVT Prophylaxis: Heparin drip  Bone Catheter: Not present  Lines: None     Cardiac Monitoring: ACTIVE order. Indication: Tachyarrhythmias, acute (48 hours)  Code Status: No CPR- Do NOT Intubate     Clinically Significant Risk Factors Present on Admission         # Hypernatremia: Highest Na = 147 mmol/L in last 2 days, will monitor as appropriate           # Acute Kidney Injury, unspecified: based on a >150% or 0.3 mg/dL increase in last creatinine compared to past 90 day average, will monitor renal function    # Hypertension: Noted on problem list   # Dementia: noted on problem list   # DMII: A1C = 8.3 % (Ref range: <5.7 %) within past 6 months     # Obesity: Estimated body mass index is 33.3 kg/m  as calculated from the following:    Height as of 9/28/23: 1.626 m (5' 4\").    Weight as of 9/28/23: 88 kg (194 lb).              Disposition Plan      Expected Discharge Date: 10/02/2023                  Pepe Vasquez MD  Hospitalist Service  [facility]  Securely message with [employer] (more info)  Text page via [facility] Paging/Directory "     ______________________________________________________________________    Chief Complaint   Abnormal imaging (on US) per report  Pt is confused     History is obtained from the patient and ER team and daughter Deborah     History of Present Illness   Kandi Gannon is a 83 year old female who has a pmhx of dementia, hypertension, diabetes mellitus type 2, chronic constipation, NEDRA, who presents with DVT diagnosed on US in right and left posterior tibial vein with nonocclusive dvt in right proximal femoral right mid femoral right and distal femoral veins.     Utilizing . Also writer called both daughters Polina and Deborah. DNR DNI confirmed.   On admission interview, the patient is minimally responsive to questions but is alert and oriented to self only and she initially nodded yes for full CODE STATUS but as above, we clarified patient's wishes and code status with family members including daughter.  On admission interview, the patient did not have any questions.  She was responsive to stimuli but not consistently answering any questions.  Discussed with bedside nursing and has been unchanged throughout the morning.  Care plans were updated for the patient including plan for VQ scan as well as heparin drip. Later urinalysis returned and plan to add ceftriaxone too. Discussed w/ bedside nursing and radiology imaging team.     Past Medical History    No past medical history on file.    Past Surgical History   No past surgical history on file.    Prior to Admission Medications   Prior to Admission Medications   Prescriptions Last Dose Informant Patient Reported? Taking?   Vitamin D, Cholecalciferol, 25 MCG (1000 UT) TABS 9/29/2023 at am  Yes Yes   Sig: Take 1 tablet by mouth every morning   acetaminophen (ACETAMINOPHEN 8 HOUR) 650 MG CR tablet 9/29/2023 at PRN  Yes Yes   Sig: Take 650 mg by mouth every 6 hours as needed for pain   amLODIPine (NORVASC) 10 MG tablet 9/29/2023 at am  Yes Yes    Sig: Take 10 mg by mouth every morning   insulin aspart (NOVOLOG PEN) 100 UNIT/ML pen   Yes No   Sig: Inject 1-8 Units Subcutaneous 3 times daily (with meals) Use half the sliding scale if not eating   losartan (COZAAR) 50 MG tablet 9/29/2023 at am  Yes Yes   Sig: Take 50 mg by mouth every morning   polyethylene glycol (MIRALAX) 17 g packet 9/29/2023 at am  Yes Yes   Sig: Take 17 g by mouth every morning   zinc oxide (DESITIN) 40 % paste 9/29/2023  Yes Yes   Sig: Apply topically every 12 hours Apply to coccyx      Facility-Administered Medications: None        Review of Systems    The 10 point Review of Systems is negative other than noted in the HPI or here.      Social History   I have reviewed this patient's social history and updated it with pertinent information if needed.  Social History     Tobacco Use    Smoking status: Never    Smokeless tobacco: Never   Vaping Use    Vaping Use: Never used   Substance Use Topics    Drug use: Never         Family History   I have reviewed this patient's family history and updated it with pertinent information if needed.  Family History   Problem Relation Age of Onset    Hypertension Other     Diabetes Other     Cerebrovascular Disease Other          Allergies   Allergies   Allergen Reactions    Codeine Sulfate [Codeine] Unknown        Physical Exam   Vital Signs: Temp: 97.9  F (36.6  C)   BP: (!) 144/67 Pulse: 87   Resp: 17 SpO2: 97 %      Weight: 0 lbs 0 oz    GENERAL:  Alert, appears comfortable, in no acute distress, appears stated age, not oriented   HEAD:  Normocephalic, without obvious abnormality, atraumatic   EYES:  PERRL, conjunctiva/corneas clear, no scleral icterus, EOM's intact   NOSE: Nares normal, septum midline, mucosa normal, no drainage   THROAT: Lips, mucosa, and tongue normal; teeth and gums normal, mouth moist   NECK: Supple, symmetrical, trachea midline   BACK:   Symmetric, no curvature, ROM normal   LUNGS:   Remains on room air, relatively clear to  auscultation   CHEST WALL:  No tenderness or conspicuous deformity   HEART:  Rrr, no jvd, no limb edema   ABDOMEN:   Soft, non-tender, bowel sounds auscultated in all four quadrants, no masses, no organomegaly, no rebound or guarding   EXTREMITIES: Extremities normal, atraumatic, no cyanosis or edema    SKIN: Dry to touch, no exanthems in the visualized areas   NEURO: Alert, oriented to self only, moves all four extremities of their own volition, minimally responds appropriately   PSYCH: Unable to assess       Medical Decision Making       82 MINUTES SPENT BY ME on the date of service doing chart review, history, exam, documentation & further activities per the note.      Data   ------------------------- PAST 24 HR DATA REVIEWED -----------------------------------------------    I have personally reviewed the following data over the past 24 hrs:    16.7 (H)  \   12.3   / 321     147 (H) 107 104.0 (H) /  298 (H)   5.0 25 2.27 (H) \     ALT: 47 AST: 32 AP: 86 TBILI: 0.4   ALB: 3.7 TOT PROTEIN: 7.7 LIPASE: N/A     Trop: 143 (HH) BNP: N/A     Procal: N/A CRP: N/A Lactic Acid: 1.0       INR:  1.13 PTT:  29   D-dimer:  N/A Fibrinogen:  N/A       Imaging results reviewed over the past 24 hrs:   Recent Results (from the past 24 hour(s))   CT Head w/o Contrast    Narrative    EXAM: CT HEAD WITHOUT CONTRAST  LOCATION: [facility]  DATE: 09/30/2023    INDICATION: Altered mental status.  COMPARISON: None.  TECHNIQUE: Routine CT Head without IV contrast. Multiplanar reformats. Dose reduction techniques were used.    FINDINGS:  INTRACRANIAL CONTENTS: No intracranial hemorrhage, extra-axial collection, or mass effect.  Small chronic infarction left inferior cerebellum. Mild presumed chronic small vessel ischemic changes. Moderate generalized volume loss with preferential volume   loss within the anterior and medial temporal lobes. No hydrocephalus.     VISUALIZED ORBITS/SINUSES/MASTOIDS: No intraorbital  abnormality. Moderate mucosal thickening right maxillary sinus. The paranasal sinuses are otherwise clear. No middle ear or mastoid effusion.    BONES/SOFT TISSUES: No acute abnormality.      Impression    IMPRESSION:  1.  No CT evidence for acute intracranial process.  2.  Brain atrophy and presumed chronic microvascular ischemic changes as above.

## 2023-09-30 NOTE — TELEPHONE ENCOUNTER
Kandi Gannon is a 83 year old  (1940), Nurse called today to report: Nurse called to report bilateral lower extremity ultrasounds.  Writer did see this patient in the facility on Thursday.  She was currently recovering from COVID and was scheduled to come out of isolation on Friday.  She is Belgian-speaking only.  Per staff she has been in bed for a week.  She did appear to have pain when her legs were touched therefore the ultrasounds were ordered.  Results showed multiple clots in both lower extremities.  She was sent to the ER for further evaluation           Electronically signed by:   Nessa Linder CNP

## 2023-09-30 NOTE — PROGRESS NOTES
[facility] GERIATRIC SERVICES  Chief Complaint   Patient presents with    long-term Acute     [facility] Medical Record Number:  [id number]  Place of Service where encounter took place:  [facility] () [[id number]]  Code Status: Unknown    HISTORY:      HPI:  Kandi Gannon  is 83 year old (1940) currently residing in the long-term care unit at [facility].  She is with past medical history of hypertension, anxiety, Alzheimer's, GERD, recent positive COVID, renal failure stage III    Today she was seen to review vital signs, labs, a routine visit and for elevated blood sugar.  Patient is a Lithuanian-speaking Alzheimer's patient.  She is currently recovering from COVID and will come out of isolation tomorrow.  Per staff she is not eating well and has not been out of bed in a week.  Vital signs blood pressure 152/91 pulse 104 oxygen 96% on room air respiratory rate 18 temp 97.7 she was also noted to have a blood sugar of 346.  Currently on metformin however this was stopped due to elevated creatinine and she was started on a sliding scale.  She was also noted to have pain when writer would touch her legs.  Bilateral ultrasounds were ordered due to her extended bedrest.  Also per the orders she did complete a course of Molonupivir.  She was also noted to have a large blister right heel that has popped and new orders were given for dressing changes.        ALLERGIES:Codeine sulfate [codeine]    PAST MEDICAL HISTORY: No past medical history on file.    PAST SURGICAL HISTORY:   has no past surgical history on file.    FAMILY HISTORY: family history includes Cerebrovascular Disease in an other family member; Diabetes in an other family member; Hypertension in an other family member.    SOCIAL HISTORY:  reports that she has never smoked. She has never used smokeless tobacco. She reports that she does not use drugs.    ROS:  Constitutional: Negative for activity change, appetite change,  "fatigue and fever.  She has not been out of bed x1 week per staff  HENT: Negative for congestion.    Respiratory: Negative for cough, shortness of breath and wheezing.  Recent positive COVID will come out of isolation tomorrow  Cardiovascular: Negative for chest pain and leg swelling.   Gastrointestinal: Negative for abdominal distention, abdominal pain, constipation, diarrhea and nausea.   Genitourinary: Negative for dysuria.   Musculoskeletal: Negative for arthralgia. Negative for back pain.  Pain with light touch to lower extremities  Skin: Negative for color change and wound.   Neurological: Negative for dizziness.   Psychiatric/Behavioral: Negative for agitation, behavioral problems and confusion.  History of Alzheimer's and Ugandan-speaking only    Physical Exam:  Constitutional:       Appearance: Patient is well-developed.   HENT:      Head: Normocephalic.   Eyes:      Conjunctiva/sclera: Conjunctivae normal.   Neck:      Musculoskeletal: Normal range of motion.   Cardiovascular:      Rate and Rhythm: Normal rate and regular rhythm.      Heart sounds: Normal heart sounds. No murmur.   Pulmonary:      Effort: No respiratory distress.      Breath sounds: Normal breath sounds. No wheezing or rales.   Abdominal:      General: Bowel sounds are normal. There is no distension.      Palpations: Abdomen is soft.      Tenderness: There is no abdominal tenderness.   Musculoskeletal:       Normal range of motion.     Skin:General:        Skin is warm.  Large popped blister right  heel  Neurological:         Mental Status: Patient is alert Ugandan-speaking only and Alzheimer dementia   psychiatric:         Behavior: Behavior normal.     Vitals:BP (!) 152/91   Pulse 104   Temp 97.7  F (36.5  C)   Resp 18   Ht 1.626 m (5' 4\")   Wt 88 kg (194 lb)   SpO2 96%   BMI 33.30 kg/m   and Body mass index is 33.3 kg/m .    Lab/Diagnostic data:   Recent Results (from the past 240 hour(s))   ECG 12-LEAD WITH MUSE (E)    " Collection Time: 09/30/23  5:01 AM   Result Value Ref Range    Systolic Blood Pressure 165 mmHg    Diastolic Blood Pressure 71 mmHg    Ventricular Rate 96 BPM    Atrial Rate 96 BPM    MN Interval 168 ms    QRS Duration 74 ms     ms    QTc 434 ms    P Axis 78 degrees    R AXIS 53 degrees    T Axis 76 degrees    Interpretation ECG       Sinus rhythm  Normal ECG  When compared with ECG of 01-APR-2016 11:26,  No significant change was found  Confirmed by SEE ED PROVIDER NOTE FOR, ECG INTERPRETATION ([id number]),  ZEINAB DUDLEY ([id number]) on 9/30/2023 5:41:04 AM     Comprehensive metabolic panel    Collection Time: 09/30/23  5:32 AM   Result Value Ref Range    Sodium 147 (H) 135 - 145 mmol/L    Potassium 5.0 3.4 - 5.3 mmol/L    Carbon Dioxide (CO2) 25 22 - 29 mmol/L    Anion Gap 15 7 - 15 mmol/L    Urea Nitrogen 104.0 (H) 8.0 - 23.0 mg/dL    Creatinine 2.27 (H) 0.51 - 0.95 mg/dL    GFR Estimate 21 (L) >60 mL/min/1.73m2    Calcium 9.3 8.8 - 10.2 mg/dL    Chloride 107 98 - 107 mmol/L    Glucose 298 (H) 70 - 99 mg/dL    Alkaline Phosphatase 86 35 - 104 U/L    AST 32 0 - 45 U/L    ALT 47 0 - 50 U/L    Protein Total 7.7 6.4 - 8.3 g/dL    Albumin 3.7 3.5 - 5.2 g/dL    Bilirubin Total 0.4 <=1.2 mg/dL   Troponin T, High Sensitivity    Collection Time: 09/30/23  5:32 AM   Result Value Ref Range    Troponin T, High Sensitivity 149 (HH) <=14 ng/L   Lactic acid whole blood    Collection Time: 09/30/23  5:32 AM   Result Value Ref Range    Lactic Acid 2.5 (H) 0.7 - 2.0 mmol/L   Magnesium    Collection Time: 09/30/23  5:32 AM   Result Value Ref Range    Magnesium 3.2 (H) 1.7 - 2.3 mg/dL   CBC (+ platelets, no diff)    Collection Time: 09/30/23  5:32 AM   Result Value Ref Range    WBC Count 16.7 (H) 4.0 - 11.0 10e3/uL    RBC Count 4.07 3.80 - 5.20 10e6/uL    Hemoglobin 12.3 11.7 - 15.7 g/dL    Hematocrit 37.9 35.0 - 47.0 %    MCV 93 78 - 100 fL    MCH 30.2 26.5 - 33.0 pg    MCHC 32.5 31.5 - 36.5 g/dL    RDW 14.6 10.0 - 15.0 %     Platelet Count 321 150 - 450 10e3/uL   INR    Collection Time: 09/30/23  5:32 AM   Result Value Ref Range    INR 1.13 0.85 - 1.15   Partial thromboplastin time    Collection Time: 09/30/23  5:32 AM   Result Value Ref Range    aPTT 29 22 - 38 Seconds        MEDICATIONS:     Review of your medicines            Accurate as of September 28, 2023 11:59 PM. If you have any questions, ask your nurse or doctor.                CONTINUE these medicines which have NOT CHANGED        Dose / Directions   amLODIPine 10 MG tablet  Commonly known as: NORVASC      Dose: 10 mg  Take 10 mg by mouth every morning  Refills: 0     insulin aspart 100 UNIT/ML pen  Commonly known as: NovoLOG PEN      Dose: 1-8 Units  Inject 1-8 Units Subcutaneous 3 times daily (with meals) Use half the sliding scale if not eating  Refills: 0     losartan 50 MG tablet  Commonly known as: COZAAR      Dose: 50 mg  Take 50 mg by mouth every morning  Refills: 0     polyethylene glycol 17 g packet  Commonly known as: MIRALAX      Dose: 17 g  Take 17 g by mouth every morning  Refills: 0     Vitamin D3 25 mcg (1000 units) tablet  Commonly known as: CHOLECALCIFEROL      Dose: 1 tablet  Take 1 tablet by mouth every morning  Refills: 0            STOP taking      metFORMIN 500 MG tablet  Commonly known as: GLUCOPHAGE  Stopped by: Nessa Linder CNP                 ASSESSMENT/PLAN  Encounter Diagnoses   Name Primary?    Type 2 diabetes mellitus with hyperglycemia, without long-term current use of insulin (H) Yes    Renal failure, chronic, stage 3 (moderate) (H)     Pain in both lower legs      Type 2 diabetes she was currently on metformin which was stopped due to elevated creatinine and started on a sliding scale    Pain noted lower extremities venous ultrasounds ordered bilateral    Hypertension on amlodipine and losartan    Pain management as needed Tylenol    Right heel blister new dressing change orders written     Electronically signed by: Nessa Lindre CNP

## 2023-09-30 NOTE — ED PROVIDER NOTES
EMERGENCY DEPARTMENT ENCOUNTER      NAME: Kandi Gannon  YOB: 1940  MRN: [id number]    FINAL IMPRESSION  1. SIRS (systemic inflammatory response syndrome) (H)    2. Acute deep vein thrombosis (DVT) of femoral vein of both lower extremities (H)    3. NSTEMI (non-ST elevated myocardial infarction) (H)    4. Alzheimer's disease (H)    5. Acute on chronic renal insufficiency        MEDICAL DECISION MAKING   Pertinent Labs & Imaging studies reviewed. (See chart for details)    Kandi Gannon is a 83 year old female who presents for evaluation after ultrasound at [facility] showed occlusive DVT in bilateral lower extremities.  I reviewed paperwork sent with patient and results from ultrasound obtained at outside facility.  Unfortunately, there were no notes regarding why this ultrasound was obtained.  She did test positive for COVID on 9/19/2023.  She has a history of type 2 diabetes, hypertension, CKD.  Patient has a history of Alzheimer's dementia and is an unreliable historian so was also not able to offer anything in the way of history or current symptoms.  Paperwork indicates that patient is DNR/DNI.      On arrival here, vitals were stable.  Patient was moved from the stretcher onto bed and at that time, RN noted that she had multiple pressure wounds that were covered with a white ointment and had mild surrounding erythema.    I considered a very broad differential including but not limited to DVT, PE, ACS/ischemia, intracranial hemorrhage, coagulopathy, occult infection, sepsis/bacteremia, electrolyte derangement, acute on chronic kidney injury, anemia.  Given inability to obtain a reliable history, will proceed with a very broad work-up including labs, blood cultures, UA, CT head, CT PE study, EKG.  I anticipate patient will require admission for heparin but will hold on starting this until CT head returned to rule out intracranial hemorrhage and to ensure appropriate  dosing.    EKG with normal sinus rhythm and no obvious ischemic changes.  CMP was notable for elevated creatinine at 2.27, increased from baseline and consistent with acute on chronic kidney injury.  We will continue IV fluids.  No other significant electrolyte derangement or acidosis.  Glucose elevated at 298, consistent with type 2 diabetes.  Lactate elevated at 2.5 without obvious source of infection.  Will hold on starting antibiotics for now.  CBC with leukocytosis and WBC of 16.7, again of unclear etiology.  May be related to stress/demargination, DVT, or occult infection.  Troponin elevated at 149.  EKG did not show obvious ischemic changes and patient was not able to articulate as to whether or not she is experiencing any chest pain, may be related to known DVT/PE.  Unfortunately, given elevated creatinine, patient may need to undergo VQ scan.    I discussed the case with Dr. Jacobson of hospital medicine team who agreed with work-up and management thus far.  We have agreed on plan to start heparin at DVT/PE dosing assuming CT of head is negative.  We will also repeat troponin at 2 hours, hold on starting antibiotics for now.    Patient was signed out to day ED provider pending results of CT, UA, repeat lactate and troponin.  If CT of head is negative, will start heparin.  Hospital medicine team will follow up on remainder of results and order additional imaging/labs as indicated.  I have not yet discussed the case with cardiology.          Medical Decision Making    History:  Supplemental history from: Documented in chart, if applicable and Other: Nurse  External Record(s) reviewed: Documented in chart, if applicable.    Work Up:  Chart documentation includes differential considered and any EKGs or imaging independently interpreted by provider, where specified.  In additional to work up documented, I considered the following work up: Documented in chart, if applicable.    External consultation:  Discussion of  management with another provider: Documented in chart, if applicable    Complicating factors:  Care impacted by chronic illness: Dementia, Diabetes, Hyperlipidemia, and Hypertension  Care affected by social determinants of health: Access to Medical Care    Disposition considerations: Admit.      Critical care: 60 minutes excluding separately billable procedures.  Includes bedside management, time reviewing test results, review of records, discussing the case with staff, documenting the medical record and time spent with family members (or surrogate decision makers) discussing specific treatment issues.       ED COURSE  5:15 AM I met with the patient, obtained history, performed an initial exam, and discussed options and plan for diagnostics and treatment here in the ED.   6:55 AM I spoke to Dr. Jacobson, hospitalist, who agrees to take the patient.  7:31 AM Rechecked patient. Signed out to Dr. Sawyer.       MEDICATIONS GIVEN IN THE ED  Medications   sodium chloride 0.9% BOLUS 1,000 mL (1,000 mLs Intravenous $New Bag 9/30/23 0637)       NEW PRESCRIPTIONS STARTED AT TODAY'S VISIT  New Prescriptions    No medications on file          =================================================================    Chief Complaint   Patient presents with    Deep Vein Thrombosis     Multiple bilateral         HPI:    Patient information was obtained from: Nurse    Use of : N/A     Limited HPI due to Alzheimer's    Kandi Gannon is a 83 year old female with a medical history for dementia, HTN, HLD, DM, NEDRA, and anxiety who presents for DVT    The patient presents from [facility] via EMS. She was sent to the ER due to multiple areas of DVT in lower extremities. For the past 3 months her GFR has dropped even with concentration on hydration. She has had changes with her medications within the last month. She is a wandering risk.    Per chart review, She had a duplex scan of extremity veins around 04:50 AM  on 09/30/2023. She was found to have an acute occlusive DVT within the right and left posterior tibial vein, along with with an acute nonocclusive DVT within the right proximal femoral, right mid femoral, right and distal femoral veins.    RELEVANT HISTORY, MEDICATIONS, & ALLERGIES   Past medical history, surgical history, family history, medications, and allergies reviewed and pertinent noted in HPI.    REVIEW OF SYSTEMS:  A complete review of systems was performed with pertinent positives and negatives noted in the HPI. All other systems negative.     PHYSICAL EXAM:    Vitals: BP (!) 167/71   Pulse 84   Temp 97.9  F (36.6  C)   Resp 19   SpO2 94%    General: Alert and interactive, comfortable appearing.  HENT: Atraumatic. Full AROM of neck. Conjunctiva clear.   Cardiovascular: Mild tachycardia.  Chest/Pulmonary: Normal work of breathing.  Lungs CTAB. No chest wall tenderness or deformities.  Abdomen: Soft, nondistended. Nontender without guarding or rebound.  Extremities: Normal AROM of all major joints. Diffuse nonpitting edema bilateral LE with venous stasis changes.  Pressure wound to right heel.  Skin: Warm and dry. Normal skin color.  Per RN, multiple pressure sores to buttocks without significant surrounding erythema or drainage.  Neuro: CNs grossly intact. Moves all extremities spontaneously.  Memory deficits, baseline.  Psych: Unable to assess.    LAB  Labs Ordered and Resulted from Time of ED Arrival to Time of ED Departure   COMPREHENSIVE METABOLIC PANEL - Abnormal       Result Value    Sodium 147 (*)     Potassium 5.0      Carbon Dioxide (CO2) 25      Anion Gap 15      Urea Nitrogen 104.0 (*)     Creatinine 2.27 (*)     GFR Estimate 21 (*)     Calcium 9.3      Chloride 107      Glucose 298 (*)     Alkaline Phosphatase 86      AST 32      ALT 47      Protein Total 7.7      Albumin 3.7      Bilirubin Total 0.4     TROPONIN T, HIGH SENSITIVITY - Abnormal    Troponin T, High Sensitivity 149 (*)     LACTIC ACID WHOLE BLOOD - Abnormal    Lactic Acid 2.5 (*)    MAGNESIUM - Abnormal    Magnesium 3.2 (*)    CBC WITH PLATELETS - Abnormal    WBC Count 16.7 (*)     RBC Count 4.07      Hemoglobin 12.3      Hematocrit 37.9      MCV 93      MCH 30.2      MCHC 32.5      RDW 14.6      Platelet Count 321     INR - Normal    INR 1.13     PARTIAL THROMBOPLASTIN TIME - Normal    aPTT 29     ROUTINE UA WITH MICROSCOPIC REFLEX TO CULTURE   LACTIC ACID WHOLE BLOOD   TROPONIN T, HIGH SENSITIVITY   BLOOD CULTURE   BLOOD CULTURE       RADIOLOGY  CT Head w/o Contrast    (Results Pending)   CT Chest Pulmonary Embolism w Contrast    (Results Pending)       EKG  Performed at: 09/30/2023 at 05:01  Impression: Sinus rhythm. Normal ECG.  No acute ischemic changes.  Rate: 96 BPM  Rhythm: Sinus  QRS Interval: 74  QTc Interval: 434  Comparison: 04/01/2016 at 11:26    All laboratory and imaging results and EKG's were personally reviewed and interpreted by myself prior to disposition decision.       I, Gabriel Hawkins, am serving as a scribe to document services personally performed by Dr. Caitlyn Garza based on my observation and the provider's statements to me. I, Caitlyn Garza MD attest that Gabriel Hawkins is acting in a scribe capacity, has observed my performance of the services and has documented them in accordance with my direction.    Caitlyn Garza M.D.  Emergency Medicine  [facility] EMERGENCY ROOM  [address] [place] [place] [address]  [phone]  Dept: [phone]     Caitlyn Garza MD  09/30/23 0733

## 2023-09-30 NOTE — PHARMACY-ADMISSION MEDICATION HISTORY
Pharmacy Intern Admission Medication History    Admission medication history is complete. The information provided in this note is only as accurate as the sources available at the time of the update.    Medication reconciliation/reorder completed by provider prior to medication history? No    Information Source(s): MAR from facility    Pertinent Information: PTA med list updated using MAR from [facility]. Patient completed a 5 day course of Molnupiravir for COVID on 9/18-9/23. Patient was prescribed insulin aspart and was to discontinue metformin per primary care on 9/28. Patient has not started insulin regimen yet PTA.    Changes made to PTA medication list:  Added: Zinc oxide paste   Deleted: None  Changed: None     Medication Affordability:   Not including over the counter (OTC) medications, was there a time in the past 3 months when you did not take your medications as prescribed because of cost?: Unable to Assess    Allergies reviewed with patient and updates made in EHR: yes    Medication available for use during hospital stay: None    Medication History Completed By: Leandro Gtz 9/30/2023 7:44 AM    PTA Med List   Medication Sig Last Dose    acetaminophen (ACETAMINOPHEN 8 HOUR) 650 MG CR tablet Take 650 mg by mouth every 6 hours as needed for pain 9/29/2023 at PRN    amLODIPine (NORVASC) 10 MG tablet Take 10 mg by mouth every morning 9/29/2023 at am    losartan (COZAAR) 50 MG tablet Take 50 mg by mouth every morning 9/29/2023 at am    polyethylene glycol (MIRALAX) 17 g packet Take 17 g by mouth every morning 9/29/2023 at am    Vitamin D, Cholecalciferol, 25 MCG (1000 UT) TABS Take 1 tablet by mouth every morning 9/29/2023 at am    zinc oxide (DESITIN) 40 % paste Apply topically every 12 hours Apply to coccyx 9/29/2023    [DISCONTINUED] metFORMIN (GLUCOPHAGE) 500 MG tablet Take 500 mg by mouth 2 times daily (with meals) 9/29/2023

## 2023-09-30 NOTE — ED TRIAGE NOTES
Per medics patient from [facility]. Ultra sound report received by facility stating patient has multiple bilateral DVT's to lower extremities.     Triage Assessment       Row Name 09/30/23 7363       Triage Assessment (Adult)    Airway WDL WDL       Respiratory WDL    Respiratory WDL WDL       Skin Circulation/Temperature WDL    Skin Circulation/Temperature WDL --  Bilateral leg discoloration.  Open areas to coccyx.       Cardiac WDL    Cardiac WDL --  tachycardia       Cognitive/Neuro/Behavioral WDL    Cognitive/Neuro/Behavioral WDL --  alzheimers

## 2023-10-01 NOTE — PROGRESS NOTES
"[facility]    Medicine Progress Note - Hospitalist Service    Date of Admission:  9/30/2023    Assessment & Plan      Kandi Gannon is a 83 year old female admitted on 9/30/2023. She has a pmhx of dementia, hypertension, diabetes mellitus type 2, chronic constipation, NEDRA, who presents with DVT diagnosed on US in right and left posterior tibial vein with nonocclusive dvt in right proximal femoral right mid femoral right and distal femoral veins. Utilize  and also both daughters Maulik are involved. DNR DNI confirmed.     On admit was vitally stable, labs notable for hypernatremia 147, KATRIN 2.27, elevated lactate 2.5, troponin T 149, leukocytosis 16.7, hgb 12.3, INR 1.13, blood cultures in process, Covid19 positive. Given 1L bolus. Admitted. On initial exam pt was oriented to only self. Urinalysis appears infected so started ceftriaxone. Started on heparin drip. V/Q scan ordered which returned demonstrating \"Abnormal perfusion scan with multiple peripheral perfusion defects in the right lung and a perfusion defect in the left lung as detailed above. These findings are highly suspicious for pulmonary emboli given history of DVT .\"     On 10/1/2023 blood cultures with GPCs; started empirically on vancomycin additionally and ID consulted.    -----   New 10/1/2023 - bacteremia with GPC  Acute encephalopathy (multifactorial including infectious 2/2 to UTI and possibly bacteremia)  Multiple dvts on US  Bilateral PE suspected on V/Q scan 9/30  Recent covid19 infection on 9/18   UTI  NSTMI with chest pain and Elevated troponin  Elevated lactate   A- as above. On ceftriaxone since 9/30 and starting vancomycin 10/1/2023. Troponin trending down. On heparin drip. Eventually would transition to DOAC when ready. Now w/ bacteremia, asking ID to see. Updated pt's daughter Deborah on 10/1. On RA.  P:  -echocardiogram to monitor for right heart strain  -Monitor oxygen needs " "carefully, may require steroids if hypoxic  -continue ceftriaxone for UTI   -follow-up urine culture  -On heparin drip, continue and monitor dosing as per protocol  -Pain and nausea control  -trend troponin; if worsening chest pain could recheck and consider cardiology  - Infectious Disease Consult 10/1/2023   - START vancomycin  -infection control team to see as well regarding covid status and isolation, discussed w/ RN and Charge     KATRIN  Hypernatremia  A- possibly decreased po and prerenal azotemia and/or possibly atn developing w/ ischemic injury- now downtrending on HOD at 1.73.   P:  - avoid nephrotoxic agents  - hold losartan  - fluid bolus but avoid overhydrating  - now start MIVFs gently at 50ccs/hr ordered for x24 hours only, re-assess on 10/2    Anemia  A- on admit at 12.3, now at 10.6, on heparin drip, so need to monitor closely  P:  - recheck q12  - transfuse if < 7    Hx of dementia  A/p- noted, pertinent for history-gathering. Have discussed daily with the patient's daughter(s) and the reported baseline PTA has been \" conversational, but not enough to know who\" daughters are.  However, the patient is minimally responding to questions, will recommend utilizing both  and/or daughter as needed  - noted, and minimize escalation   - redirect when able    Hypertension  A/p- stable, appropriate bp for age range. On losartan and amlodipine  - HOLD losartan  - continue amlodipine    Dm2  A/p-  hypoglycemia protocol.  On admit started on low-dose Lantus. Still elevated sugars. Will increase.  -Increase to MD for ssi    Chronic constipation  A/p- continue prns, monitor for abd pain. Stable     Anoop  A/p- cont cpap if able    Multiple wounds   A/p- woc consulted for Coccyx, R inner thigh, and R heel     Oral candidiasis  A/P-start nystatin       Diet: Combination Diet Regular Diet Adult    DVT Prophylaxis: heparin drip  Bone Catheter: Not present  Lines: None     Cardiac Monitoring: ACTIVE order. " "Indication: Tachyarrhythmias, acute (48 hours)  Code Status: No CPR- Do NOT Intubate      Clinically Significant Risk Factors         # Hypernatremia: Highest Na = 147 mmol/L in last 2 days, will monitor as appropriate          # Hypertension: Noted on problem list     # Dementia: noted on problem list   # DMII: A1C = 8.3 % (Ref range: <5.7 %) within past 6 months   # Obesity: Estimated body mass index is 31.26 kg/m  as calculated from the following:    Height as of 9/28/23: 1.626 m (5' 4\").    Weight as of this encounter: 82.6 kg (182 lb 1.6 oz)., PRESENT ON ADMISSION            Disposition Plan      Expected Discharge Date: 10/03/2023    Discharge Delays: Other (Add Comment)  IV Medication - consider oral or Home Infusion    Discharge Comments: hep gtt for DVT's/ likely PE.  WOC consult  Back to her facility at discharge          Pepe Vasquez MD  Hospitalist Service  [facility]  Securely message with [employer] (more info)  Text page via [employer] Paging/Directory   ______________________________________________________________________    Interval History   -Notable interval events: Patient did have transient chest pain and sitter was started   -This morning the patient remains on room air  -A one-to-one sitter was present, she reported the patient was calm thus far  -Patient minimally responds, utilized translation, called the daughter Deborah and she also helped to transalte and clarified the patient's baseline: PTA, patient was conversational but with the advanced Alzheimer's, would not recognize either of the daughters.  Patient's daughter feels that the patient is still more confused than usual  -Updated them on the blood culture results as well as the VQ scan.  -Also updated them on infectious disease consult, discussed with bedside nursing, as well as charge nurse     Physical Exam   Vital Signs: Temp: 98.5  F (36.9  C) Temp src: Axillary BP: (!) 159/70 Pulse: 95   Resp: 20 SpO2: 96 % O2 " Device: None (Room air)    Weight: 182 lbs 1.6 oz    General: alert, not oriented, and in no acute distress but minimally responding including to her daughter on phone  Pulmonary: on room air, no increased respiratory effort with minimal speaking, no evidence of accessory muscle use  CVS: warm extremities, no obvious jvd and regular rate  GI: soft, nontender, BS+, no rebound or guarding, no conspicuous organomegaly   Neuro: not responding consistently to stimuli or questions but would   Psych: not able to be assessed, minimally responsive including to family members  Has noted wounds- at the Coccyx, R inner thigh, and R heel     Medical Decision Making       55 MINUTES SPENT BY ME on the date of service doing chart review, history, exam, documentation & further activities per the note.      Data   ------------------------- PAST 24 HR DATA REVIEWED -----------------------------------------------    I have personally reviewed the following data over the past 24 hrs:    14.9 (H)  \   10.6 (L)   / 309     144 110 (H) 70.8 (H) /  309 (H)   4.5 23 1.73 (H) \     Trop: 137 (HH) BNP: N/A       Imaging results reviewed over the past 24 hrs:   Recent Results (from the past 24 hour(s))   NM Lung Scan Perfusion Particulate    Narrative    EXAM: NM LUNG SCAN PERFUSION PARTICULATE  LOCATION: [facility]  DATE: 9/30/2023    INDICATION: Multiple DVTs, please assess for PE and has renal injury.  COMPARISON: Chest radiograph 09/30/2023 at 3:13 PM  TECHNIQUE: 8.6 mCi technetium-99m MAA, IV. Standard lung perfusion imaging.    FINDINGS: Abnormal perfusion throughout the right lung with multiple peripheral perfusion defects in the right lung highly suspicious for right-sided pulmonary embolic disease. Focal area of abnormal perfusion defect in the posterior medial aspect of the   left lower lobe also suspicious for pulmonary embolic disease.      Impression    IMPRESSION:     Abnormal perfusion scan with  multiple peripheral perfusion defects in the right lung and a perfusion defect in the left lung as detailed above. These findings are highly suspicious for pulmonary emboli given history of DVT. The chest x-ray in these areas   demonstrate no pulmonary parenchymal abnormality.    Results discussed with the patient's nurse at 4:25 PM 09/30/2023.   XR Chest Port 1 View    Narrative    EXAM: XR CHEST PORTABLE 1 VIEW  LOCATION: [facility]  DATE: 09/30/2023    INDICATION: Chest x-ray to follow nuclear medicine lung scan per protocol.  COMPARISON: None.      Impression    IMPRESSION: Negative chest.

## 2023-10-01 NOTE — CONSULTS
Consultation - INFECTIOUS DISEASE CONSULTATION  Kandi Gannon,  1940, MRN [id number]      Alzheimer's disease (H) [G30.9, F02.80]  NSTEMI (non-ST elevated myocardial infarction) (H) [I21.4]  SIRS (systemic inflammatory response syndrome) (H) [R65.10]  Acute on chronic renal insufficiency [N28.9, N18.9]  Acute deep vein thrombosis (DVT) of femoral vein of both lower extremities (H) [I82.413]    PCP: Suri Chand, [phone]   Code status:  No CPR- Do NOT Intubate               Chief Complaint: Bacteremia with staph epi     Assessment:  Kandi Gannon is a 83 year old old female with   History of Alzheimer disease.  Diabetes mellitus type 2 with last A1c of 8.3 on 2023  Coccygeal wound  COVID-19 infection.  Diagnosis 2023.  Currently asymptomatic.  KATRIN on CKD.  Baseline creatinine of 1.2.  Came in with a creatinine of 2.27.  Improving down to 1.73.  Bilateral posterior tibial vein DVT with likely pulmonary embolism on VQ scan.  Bacteremia with gram-positive cocci in clusters.  Staph epi by Verigene.  On IV vancomycin and ceftriaxone.  Unclear source since patient is not known to have any foreign body.  Does have multiple skin ulcers that could be port of entry.    Recommendations:   Continue on IV vancomycin and ceftriaxone for now  Follow pending susceptibility of the staph epi.  More blood cultures today.  Wound care.  Can discontinue enhanced respiratory isolation precaution    Discussed with nursing staff.    Thank you for letting us be part of the patient care team. We will follow.    Jerry Nielsen MD,MD  [facility].   [facility]  Office Telephone [phone].  Fax [phone]  [facility] paging    HPI:    Kandi Gannon is a 83 year old old female. History is provided by chart review.  ID is asked to see this patient for bacteremia.  The patient has a history of Alzheimer's disease, diabetes mellitus.   She was recently diagnosed with COVID-19 infection.  She is now admitted with bilateral lower extremity DVT as above.  Admission blood cultures positive with gram-positive cocci in clusters, staph epi by Verigene.  On IV vancomycin and ceftriaxone.  Seen today, the patient is unable to provide any history.        ===========================================    Medical History  Alzheimer disease.  CKD.  Diabetes mellitus     Surgical History  No previous surgery         Social History  Reviewed, and she  reports that she has never smoked. She has never used smokeless tobacco. She reports that she does not use drugs.        Family History  Family History   Problem Relation Age of Onset    Hypertension Other     Diabetes Other     Cerebrovascular Disease Other       Psychosocial Needs  Social History     Social History Narrative    Not on file     Additional psychosocial needs reviewed per nursing assessment.       Allergies   Allergen Reactions    Codeine Sulfate [Codeine] Unknown        Medications Prior to Admission   Medication Sig Dispense Refill Last Dose    acetaminophen (ACETAMINOPHEN 8 HOUR) 650 MG CR tablet Take 650 mg by mouth every 6 hours as needed for pain   9/29/2023 at PRN    amLODIPine (NORVASC) 10 MG tablet Take 10 mg by mouth every morning   9/29/2023 at am    losartan (COZAAR) 50 MG tablet Take 50 mg by mouth every morning   9/29/2023 at am    polyethylene glycol (MIRALAX) 17 g packet Take 17 g by mouth every morning   9/29/2023 at am    Vitamin D, Cholecalciferol, 25 MCG (1000 UT) TABS Take 1 tablet by mouth every morning   9/29/2023 at am    zinc oxide (DESITIN) 40 % paste Apply topically every 12 hours Apply to coccyx   9/29/2023    insulin aspart (NOVOLOG PEN) 100 UNIT/ML pen Inject 1-8 Units Subcutaneous 3 times daily (with meals) Use half the sliding scale if not eating           Review of Systems:  Unable Physical Exam:  Temp:  [97.3  F (36.3  C)-98.5  F (36.9  C)] 98.5  F (36.9  C)  Pulse:   [79-95] 95  Resp:  [18-20] 20  BP: (141-164)/(70-75) 159/70  SpO2:  [94 %-96 %] 96 %    Gen: Primarily moaning  HEENT: NCAT.   Neck: No bruit, JVD or thyromegaly.  Lungs: Clear to ascultation bilat with no crackles or wheezes.  Card: RRR. NSR. No RMG. Peripheral pulses present and symmetric. No edema.  Abd: Soft NT ND. No mass. Normal bowel sounds.  Skin: No rash.  Extr: No edema.  Neuro: Not interactive         ============================    Pertinent Labs  personally reviewed.   Recent Labs   Lab 10/01/23  0653 09/30/23  0532   WBC 14.9* 16.7*   HGB 10.6* 12.3   HCT 33.4* 37.9    321       Recent Labs   Lab 10/01/23  0653 09/30/23  0532    147*   CO2 23 25   BUN 70.8* 104.0*   ALBUMIN  --  3.7   ALKPHOS  --  86   ALT  --  47   AST  --  32       MICROBIOLOGY DATA:  Personally reviewed  Blood Culture Arm, Right  Order: [id number]  Collected 9/30/2023  6:36 AM       Status: Preliminary result       Visible to patient: No (not released)    Specimen Information: Arm, Right; Blood   0 Result Notes  Culture Positive on the 1st day of incubation Abnormal       Gram positive cocci in clusters Panic    2 of 2 bottles        Resulting Agency: [facility]           Specimen Collected: 09/30/23  6:36 AM Last Resulted: 10/01/23 11:35 AM             Pertinent Radiology  personally reviewed.   Study Result    Narrative & Impression   EXAM: NM LUNG SCAN PERFUSION PARTICULATE  LOCATION: [facility]  DATE: 9/30/2023     INDICATION: Multiple DVTs, please assess for PE and has renal injury.  COMPARISON: Chest radiograph 09/30/2023 at 3:13 PM  TECHNIQUE: 8.6 mCi technetium-99m MAA, IV. Standard lung perfusion imaging.     FINDINGS: Abnormal perfusion throughout the right lung with multiple peripheral perfusion defects in the right lung highly suspicious for right-sided pulmonary embolic disease. Focal area of abnormal perfusion defect in the posterior medial aspect of the   left lower lobe also suspicious  for pulmonary embolic disease.                                                                      IMPRESSION:      Abnormal perfusion scan with multiple peripheral perfusion defects in the right lung and a perfusion defect in the left lung as detailed above. These findings are highly suspicious for pulmonary emboli given history of DVT. The chest x-ray in these areas   demonstrate no pulmonary parenchymal abnormality.     Results discussed with the patient's nurse at 4:25 PM 09/30/2023.

## 2023-10-01 NOTE — PHARMACY-VANCOMYCIN DOSING SERVICE
Pharmacy Vancomycin Initial Note  Date of Service 2023  Patient's  1940  83 year old, female    Indication: Bacteremia    Current estimated CrCl = Estimated Creatinine Clearance: 26.5 mL/min (A) (based on SCr of 1.73 mg/dL (H)).    Creatinine for last 3 days  2023:  5:32 AM Creatinine 2.27 mg/dL  10/1/2023:  6:53 AM Creatinine 1.73 mg/dL    Recent Vancomycin Level(s) for last 3 days  No results found for requested labs within last 3 days.      Vancomycin IV Administrations (past 72 hours)        No vancomycin orders with administrations in past 72 hours.                    Nephrotoxins and other renal medications (From now, onward)      None            Contrast Orders - past 72 hours (72h ago, onward)      Start     Dose/Rate Route Frequency Stop    23 1330  technetium pertechnetate with albumin (Tc99m MAA) radioisotope injection 5-10 millicurie         5-10 millicurie Intravenous ONCE 23 1326            [employer] Prediction of Planned Initial Vancomycin Regimen  Loading dose: 1750 mg at 08:30 10/01/2023.  Regimen: 750 mg IV every 24 hours.  Start time: 07:51 on 10/01/2023  Exposure target: AUC24 (range)400-600 mg/L.hr   AUC24,ss: 455 mg/L.hr  Probability of AUC24 > 400: 64 %  Ctrough,ss: 15.4 mg/L  Probability of Ctrough,ss > 20: 25 %  Probability of nephrotoxicity (Lodise LESLEE ): 11 %          Plan:  Start vancomycin  1750 mg IV once followed by 750 mg IV q24h.   Vancomycin monitoring method: AUC  Vancomycin therapeutic monitoring goal: 400-600 mg*h/L  Pharmacy will check vancomycin levels as appropriate in 1-3 Days.    Serum creatinine levels will be ordered daily for the first week of therapy and at least twice weekly for subsequent weeks.      Bob Escalera, Pharmacy Intern

## 2023-10-01 NOTE — PROVIDER NOTIFICATION
Paged Dr. Jacobson- critical lab, blood cultures positive for gram positive cocci clusters. Please advise. Thanks CARLOS ALBERTO Fink [phone]

## 2023-10-01 NOTE — PLAN OF CARE
Problem: Plan of Care - These are the overarching goals to be used throughout the patient stay.    Goal: Plan of Care Review  Outcome: Progressing     Problem: Plan of Care - These are the overarching goals to be used throughout the patient stay.    Goal: Absence of Hospital-Acquired Illness or Injury  Intervention: Prevent Skin Injury  Recent Flowsheet Documentation  Taken 10/1/2023 0345 by Danae Costa RN  Body Position:   turned   right   heels elevated  Taken 10/1/2023 0255 by Danae Costa RN  Body Position:   heels elevated   position changed independently   turned   right  Taken 9/30/2023 2350 by Danae Costa RN  Body Position:   heels elevated   position changed independently   turned   left  Taken 9/30/2023 2100 by Danae Costa RN  Body Position:   heels elevated   position changed independently   Goal Outcome Evaluation:  Patient A&O x1, disoriented to place,time, and situation. Sitter at bedside. VSS on RA. NSR on telemetry. CPOT for pain assessment, PRN Oxycodone given for pain. Left PIV infusing heparin at 1100Units/hr. Pressure injuries on sacrum/coccyx, right posterior thigh, and heel, mepilexs in place. Barrier cream applied to sacrum. Q2 turns. Voiding via purewick. Incontinent of bowel. ACHS sugar checks, insulin coverage per sliding scale. Blood cultures positive for gram positive cocci clusters. Regular diet, tolerating well. On bedrest. Discharge pending.

## 2023-10-02 NOTE — PROGRESS NOTES
"[facility]    Medicine Progress Note - Hospitalist Service    Date of Admission:  9/30/2023    Assessment & Plan      Kandi Gannon is a 83 year old female admitted on 9/30/2023. She has a pmhx of dementia, hypertension, diabetes mellitus type 2, chronic constipation, NEDRA, who presents with DVT diagnosed on US in right and left posterior tibial vein with nonocclusive dvt in right proximal femoral right mid femoral right and distal femoral veins. Utilize  and also both daughters Maulik are involved. DNR DNI confirmed.     On admit was vitally stable, labs notable for hypernatremia 147, KATRIN 2.27, elevated lactate 2.5, troponin T 149, leukocytosis 16.7, hgb 12.3, INR 1.13, blood cultures in process, Covid19 positive. Given 1L bolus. Admitted. On initial exam pt was oriented to only self. Urinalysis appears infected so started ceftriaxone. Started on heparin drip. V/Q scan ordered which returned demonstrating \"Abnormal perfusion scan with multiple peripheral perfusion defects in the right lung and a perfusion defect in the left lung as detailed above. These findings are highly suspicious for pulmonary emboli given history of DVT .\"     On 10/1/2023 blood cultures with GPCs; started empirically on vancomycin additionally and ID consulted.    Patient new to me today. Currently on IV ceftriaxone and IV vancomycin. Awaiting staph epi susceptibilities. Hypernatremia mildly improved to 142. Will eventually transition to DOAC/warfarin, potentially 10/3 and discuss risks/benefits with HCA/mPOA.     -----   New 10/1/2023 - bacteremia with GPC  Acute encephalopathy (multifactorial including infectious 2/2 to UTI and possibly bacteremia)  Multiple dvts on US  Bilateral PE suspected on V/Q scan 9/30  Recent covid19 infection on 9/18   UTI  Elevated troponin --> Type II demand ischemia?  Elevated lactate   A- as above. On ceftriaxone since 9/30 and starting vancomycin " "10/1/2023. Troponin trending down. On heparin drip. Eventually would transition to DOAC when ready. Now w/ bacteremia, asking ID to see. Updated pt's daughter Deborah on 10/1. On RA.  P:  -echocardiogram to monitor for right heart strain  -Monitor oxygen needs carefully, may require steroids if hypoxic  -continue ceftriaxone for UTI   -follow-up urine culture  -On heparin drip, continue and monitor dosing as per protocol  -Pain and nausea control  -trend troponin; if worsening chest pain could recheck and consider cardiology  - Infectious Disease Consult 10/1/2023   - Continue vancomycin pending culture s/s  -infection control team to see as well regarding covid status and isolation, discussed w/ RN and Charge   - No current reports of chest pain; troponin elevated at admission but without delta change on 3 checks and dropped/downtrended: 149 --> 143 --> 137. No EKG changes. TTE unremarkable, no acute changes, preserved LVEF, no WMAs noted. Thus, more likely to be demand ischemia.   -Heparin is for PE treatment, not Type I MI / ACS    KATRIN  Hypernatremia  A- possibly decreased po and prerenal azotemia and/or possibly atn developing w/ ischemic injury- now downtrending on HOD at 1.73.   P:  - avoid nephrotoxic agents  - hold losartan  - fluid bolus but avoid overhydrating  - now start MIVFs gently at 50ccs/hr ordered for x24 hours only, re-assess on 10/2    Anemia  A- on admit at 12.3, now at 10.6, on heparin drip, so need to monitor closely  P:  - recheck q12  - transfuse if < 7    Hx of dementia  A/p- noted, pertinent for history-gathering. Have discussed daily with the patient's daughter(s) and the reported baseline PTA has been \" conversational, but not enough to know who\" daughters are.  However, the patient is minimally responding to questions, will recommend utilizing both  and/or daughter as needed  - noted, and minimize escalation   - redirect when able    Hypertension  A/p- stable, appropriate bp " "for age range. On losartan and amlodipine  - HOLD losartan  - continue amlodipine    Dm2  A/p-  hypoglycemia protocol.  On admit started on low-dose Lantus. Still elevated sugars. Will increase.  -Increase to MD for ssi    Chronic constipation  A/p- continue prns, monitor for abd pain. Stable     Anoop  A/p- cont cpap if able    Multiple wounds   A/p- woc consulted for Coccyx, R inner thigh, and R heel     Oral candidiasis  A/P-start nystatin       Diet: Combination Diet Regular Diet Adult    DVT Prophylaxis: heparin drip  Bone Catheter: Not present  Lines: None     Cardiac Monitoring: ACTIVE order. Indication: Tachyarrhythmias, acute (48 hours)  Code Status: No CPR- Do NOT Intubate      Clinically Significant Risk Factors                    # Hypertension: Noted on problem list     # Dementia: noted on problem list     # DMII: A1C = 8.3 % (Ref range: <5.7 %) within past 6 months     # Obesity: Estimated body mass index is 31.14 kg/m  as calculated from the following:    Height as of 9/28/23: 1.626 m (5' 4\").    Weight as of this encounter: 82.3 kg (181 lb 7 oz).  , PRESENT ON ADMISSION            Disposition Plan      Expected Discharge Date: 10/05/2023    Discharge Delays: Other (Add Comment)  IV Medication - consider oral or Home Infusion    Discharge Comments: hep gtt for DVT's/ likely PE.  WOC consult.  Confusion and on 1:1 for safety while on hep gtt (pt attempting to pull IV's out)  Back to her facility at discharge          Rodrick Vasquez MD  Hospitalist Service  [facility]  Securely message with [employer] (more info)  Text page via [employer] Paging/Directory   ______________________________________________________________________    Interval History   No acute events overnight.    No report of chest pain, shortness of breath, or other new complaints reported. Now on ambient room air. Sitter 1:1 present. No reports of agitation. Discussed with aide/ sitter.   Physical Exam   Vital Signs: " Temp: 98.3  F (36.8  C) Temp src: Oral BP: 127/60 Pulse: 87   Resp: 20 SpO2: 95 % O2 Device: None (Room air)    Weight: 181 lbs 7.02 oz    GENERAL:  Alert, appears comfortable, in no acute distress, appears stated age, on room air   HEAD:  Normocephalic, without obvious abnormality, atraumatic   EYES:  Conjunctiva/corneas clear, no scleral icterus, EOM's appears intact grossly   NOSE: Nares normal, septum midline, mucosa normal, no drainage   THROAT: Lips, mucosa, and tongue appear normal   NECK: Symmetrical, trachea appears midline   BACK:   Symmetric, no curvature noted   LUNGS:   Symmetric chest rise on inhalation, respirations unlabored   CHEST WALL:  No apparent deformity   HEART:  No thrills or heaves visualized   ABDOMEN:   No masses or organomegaly apparent; non-scaphoid   EXTREMITIES: Extremities appear normal, atraumatic, no cyanosis   SKIN: No exanthems in the visualized areas; noted has wounds- at the Coccyx, R inner thigh, and R heel    NEURO: Alert, unclear baseline or not at this time, moves all four extremities freely and spontaneously   PSYCH: Cooperative, behavior is appropriate given context - acute illness and advanced severe dementia     Medical Decision Making       44 MINUTES SPENT BY ME on the date of service doing chart review, history, exam, documentation & further activities per the note.       Data   ------------------------- PAST 24 HR DATA REVIEWED -----------------------------------------------    I have personally reviewed the following data over the past 24 hrs:    16.5 (H)  \   10.2 (L); 10.2 (L)   / 312     142 109 (H) 51.8 (H) /  200 (H)   4.5 23 1.66 (H) \       Imaging results reviewed over the past 24 hrs:   Recent Results (from the past 24 hour(s))   Echocardiogram Complete   Result Value    LVEF  > 65%    Narrative    [id number]  [id number]  [id number]  [id number]^OCHOA^YESICA^[place] [address]     Name: ARACELI MCKEON  MRN:  [id number]  : 1940  Study Date: 10/01/2023 02:45 PM  Age: 83 yrs  Gender: Female  Patient [facility]  Reason For Study: Pulmonary Emboli  Ordering Physician: YESICA OCHOA  Performed By: NEDRA     BSA: 1.9 m2  Height: 64 in  Weight: 182 lb  HR: 86  BP: 159/70 mmHg  ______________________________________________________________________________  Procedure  Complete Portable Echo Adult. Definity (NDC #10112-599) given intravenously.  Technically difficult study. Poor acoustic windows. No hemodynamically  significant valvular abnormalities on 2D or color flow imaging. There is no  comparison study available.  ______________________________________________________________________________  Interpretation Summary     The right ventricle is not well visualized but grossly appears to have normal  size and systolic function.  Left ventricular size, wall motion and function are normal. The ejection  fraction is > 65%.  No hemodynamically significant valvular abnormalities on 2D or color flow  imaging.  Right ventricle systolic pressure estimate normal  There is no comparison study available.  ______________________________________________________________________________  Left Ventricle  Left ventricular size, wall motion and function are normal. The ejection  fraction is > 65%. There is normal left ventricular wall thickness. Left  ventricular diastolic function is normal.     Right Ventricle  The right ventricle is not well visualized but grossly appears to have normal  size and systolic function.     Atria  Normal left atrial size. Right atrial size is normal. There is no color  Doppler evidence of an atrial shunt.     Mitral Valve  Mitral valve leaflets appear normal. There is no evidence of mitral stenosis  or clinically significant mitral regurgitation.     Tricuspid Valve  The tricuspid valve is not well visualized. Right ventricle systolic pressure  estimate normal. The right ventricular systolic pressure is  approximated at  24.1 mmHg plus the right atrial pressure.     Aortic Valve  The aortic valve is trileaflet. Aortic valve leaflets appear normal. There is  no evidence of aortic stenosis or clinically significant aortic regurgitation.     Pulmonic Valve  The pulmonic valve is not well seen, but is grossly normal. This degree of  valvular regurgitation is within normal limits. There is trace pulmonic  valvular regurgitation.     Vessels  The aorta root is normal. Normal size ascending aorta. IVC diameter <2.1 cm  collapsing >50% with sniff suggests a normal RA pressure of 3 mmHg.     Pericardium  There is no pericardial effusion.     ______________________________________________________________________________  MMode/2D Measurements & Calculations  RVDd: 3.5 cm  IVSd: 0.97 cm  LVIDd: 3.7 cm  LVIDs: 2.2 cm  LVPWd: 0.89 cm  FS: 40.7 %     LV mass(C)d: 102.7 grams  LV mass(C)dI: 54.7 grams/m2  Ao root diam: 3.1 cm  LA dimension: 2.8 cm  LA/Ao: 0.91  LVOT diam: 1.8 cm  LVOT area: 2.5 cm2  Ao root diam index Ht(cm/m): 1.9  Ao root diam index BSA (cm/m2): 1.6  LA Volume Indexed (AL/bp): 19.7 ml/m2  RWT: 0.48  TAPSE: 1.9 cm     Time Measurements  MM HR: 80.0 BPM     Doppler Measurements & Calculations  MV E max thang: 78.4 cm/sec  MV A max thang: 70.7 cm/sec  MV E/A: 1.1  MV dec time: 0.19 sec  LV V1 max P.3 mmHg  LV V1 max: 125.8 cm/sec  LV V1 VTI: 23.9 cm  SV(LVOT): 60.7 ml  SI(LVOT): 32.3 ml/m2  PA acc time: 0.13 sec  TR max thang: 245.7 cm/sec  TR max P.1 mmHg  E/E' av.7  Lateral E/e': 9.7  Medial E/e': 9.6  RV S Thang: 11.6 cm/sec     ______________________________________________________________________________  Report approved by: Brock August 10/01/2023 04:23 PM

## 2023-10-02 NOTE — PLAN OF CARE
Problem: Plan of Care - These are the overarching goals to be used throughout the patient stay.    Goal: Plan of Care Review  Outcome: Progressing     Problem: Risk for Delirium  Goal: Improved Behavioral Control  Outcome: Progressing  Intervention: Minimize Safety Risk  Recent Flowsheet Documentation  Taken 10/2/2023 0320 by Danae Costa RN  Enhanced Safety Measures:   room near unit station    at bedside  Taken 10/2/2023 0005 by Danae Costa RN  Enhanced Safety Measures:   room near unit station    at bedside  Taken 10/1/2023 2008 by Danae Costa RN  Enhanced Safety Measures:   room near unit station    at bedside   Goal Outcome Evaluation:  Patient A&O x1, disoriented to place, time, and situation. Sitter at bedside. VSS besides elevated BPs. On RA. NSR on telemetry. Pain assessed with CPOT, PRN Tylenol, Atarax, and Oxycodone administer with relief. Right PIV running NS at 50mL/hr. Left PIV infusing heparin at 1000Units/hr. Intermittent IV antibiotics. Pressure wounds on sacrum/coccyx, right posterior thigh, and right heel. Mepilex covering areas. ACHS sugar checks, insulin coverage per sliding scale. Regular diet, tolerating well. Not out of bed during shift. Discharge pending.

## 2023-10-02 NOTE — PROGRESS NOTES
[facility] Care Coordination Contact    [facility]  Ambulatory Care Coordination to Inpatient Care Management   Hand-In Communication    Date:  October 2, 2023  Name: Kandi Gannon is enrolled in [facility] Care Coordination program and I am the Lead Care Coordinator.  CC Contact Information:. Angela Amaro RN, PHN  Intuitional Care Coordinator [facility]  Cell [phone] Fax [phone]    Payor Source: Payor: [facility] / Plan: [facility] DUAL / Product Type: HMO /   Current services in place:     Please see the CC Snaphot and Care Management Flowsheets for specific  details of this Kandi Gannon care plan.   Additional details/specific concerns r/t this admission:    Goals of Care Dementia and wanders frequently.  and Readmission Risk High risk for covid infection due to frequent wandering on unit that has had major covid outbreak in recent weeks.     I will follow this admission in Epic. Please feel free to contact me with questions or for further collaboration in discharge planning.    Angela Amaro RN, PHN  Intuitional Care Coordinator [facility]  Cell [phone] Fax [phone]

## 2023-10-02 NOTE — PROGRESS NOTES
TRANSITIONS OF CARE (DRISS) LOG   DRISS tasks should be completed by the CC within one (1) business day of notification of each transition. Follow up contact with member is required after return to their usual care setting.  Note:  If CC finds out about the transitions fifteen (15) days or more after the member has returned to their usual care setting, no DRISS log is needed. However, the CC should check in with the member to discuss the transition process, any changes needed to the care plan and document it in a case note.    Member Name:  Kandi Gannon MCO Name:  [place] [employer] Member ID#: [id number]   Product: [facility] Care Coordinator Contact:  Angela Amaro RN, PHN Agency/County/Care System: [facility]   Transition Communication Actions from Care Management Contact   Transition #1   Notification Date: 10/2/23 Transition Date:   09/30/23 Transition From: Nursing Home, [facility]     Is this the member s usual care setting?               yes Transition To: [facility]   Transition Type:  Unplanned  Reason for Admission/Comments:  1. SIRS (systemic inflammatory response syndrome) (H)    2. Acute deep vein thrombosis (DVT) of femoral vein of both lower extremities (H)    3. NSTEMI (non-ST elevated myocardial infarction) (H)    4. Alzheimer's disease (H)    5. Acute on chronic renal insufficiency      Contact member/responsible party to offer assistance with transition Date completed: 10/02/23    Notes from conversation with the member/responsible party, provider, discharging and receiving facility (as applicable):   Date 10/02/23:CC contacted Hospital /discharge planner via the [facility] Hand-In Process, with community care plan included.  CC reached out to member, this care coordinator, and adult daughter Deborah Higginbotham  regarding transition and offered support as needed.  Reviewed and update care plan as needed.  Notified  community service providers and placed services None on hold as needed.  Transition log initiated.   PCP, Suri Chand, notified of hospitalization via EMR.    10/03/23 Reviewed note from Venus BERNARDO who is discharge planner for Kandi. Plan is tentative for her to return to [facility] on 10/5/23.      Shared CC contact info, care plan/services with receiving setting--Date completed: 09/30/23   Name & Title of receiving setting contact: [facility]   Notified PCP of transition--Date completed:  09/30/23     via  EMR  Name of PCP: Suri Chand     *RETURN TO USUAL CARE SETTING: *Complete tasks below when the member is discharging TO their usual care setting within one (1) business day of notification..      For situations where the Care Coordinator is notified of the discharge prior to the date of discharge, the Care Coordinator must follow up with the member or designated representative to confirm that discharge actually occurred and discuss required DRISS tasks as outlined in the DRISS Instructions.  (This includes situations where it may be a  new  usual care setting for the member. (i.e., a community member who decides upon permanent nursing home placement following hospitalization and rehab).    Discuss with Member/Responsible Party:    Check  Yes  - if the member, family member and/or SNF/facility staff manages the following:    If  No  provide explanation in the comments section.          Date completed: 10/05/23 Communicated with member or their designated representative about the following:  care transition process; about changes to the member s health status; plan of care updates; education about transitions and how to prevent unplanned transitions/readmissions    Four Pillars for Optimal Transition:    Check  Yes  - if the member, family member and/or SNF/facility staff manages the following:    If  No  provide explanation in the comments section.           [x]  Yes     []  No Does the member have a follow-up appointment scheduled with primary care or specialist? (Mental health hospitalizations--the appt. should be w/in 7 days)              For mental health hospitalizations:  []  Yes     []  No     Does the member have a follow-up appointment scheduled with a mental health practitioner within 7 days of discharge?  [x]  Yes     []  No     Has a medication review been completed with member? If no, refer to PCP, home care nurse, MTM, pharmacist  [x]  Yes     []  No     Can the member manage their medications or is there a system in place to manage medications (e.g. home care set-up)?         [x]  Yes     []  No     Can the member verbalize warning signs and symptoms to watch for and how to respond?  [x]  Yes     []  No     Does the member have a copy of and understand their discharge instructions?  If no, assist to obtain copy of discharge instructions, review discharge instructions, and assist to contact PCP to discuss questions about their recent hospitalization.  [x]  Yes     []  No     Does the member have adequate food, housing and transportation?  If no, add goal and discuss additional supports available to the member                                                                                                                                                                                 [x]  Yes     []  No     Is the member safe in their home?  If no, document needs and support provided                                                                                                                                                                          []  Yes     [x]  No     Are there any concerns of vulnerability, abuse, or neglect?  If yes, document concerns and actions taken by Care Coordinator as a mandated                                                                                                                                                                               [x]  Yes     []  No     Does the member use a Personal Health Care Record?  Check  Yes  if visit summary, discharge summary, and/or healthcare summary are being used as a PHR.                                                                                                                                                                                  [x]  Yes     []  No     Have you reviewed the discharge summary with the member? If  No  provide explanation in comments.  [x]  Yes     []  No     Have you updated the member s care plan/support plan? Add new diagnosis, medications, treatments, goals & interventions, as applicable. If No, provide explanation in comments.    Comments:    [facility]. Has new wound to buttocks and is goinng to need treatment ongoing for this. Member has been resistive to personal care in past so may need family to assist in getting her to allow wound management.        Angela Amaro RN, PHN  Intuitional Care Coordinator [facility]  Cell [phone] Fax [phone]

## 2023-10-02 NOTE — PROGRESS NOTES
INFECTIOUS DISEASE FOLLOW UP NOTE      ASSESSMENT:  Kandi Gannon is a 83 year old old female with   History of Alzheimer disease.  Diabetes mellitus type 2 with last A1c of 8.3 on 2023  Coccygeal wound  COVID-19 infection.  Diagnosis 2023.  Currently asymptomatic.  KATRIN on CKD.  Baseline creatinine of 1.2.  Came in with a creatinine of 2.27.  Improving.  Bilateral posterior tibial vein DVT with likely pulmonary embolism on VQ scan.  Positive blood culture -- staph epi one set is a contaminant  Possible cellulitis R heel -- bullae noted.   Leukocytosis -- could be from infection or thrombosis.    PLAN:  Ceftriaxone for now, likely PO cephalexin soon  Stop vancomycin    Ashwin Vanegas MD  [facility]  [phone] [facility]    ______________________________________________________________________    SUBJECTIVE / INTERVAL HISTORY: tired, not very interactive. Family here. Reviewed course, results.     ROS: not obtained        OBJECTIVE:  /63 (BP Location: Right arm)   Pulse 83   Temp 99.2  F (37.3  C) (Oral)   Resp 18   Wt 82.3 kg (181 lb 7 oz)   SpO2 95%   BMI 31.14 kg/m         Vital Signs  Temp: 99.2  F (37.3  C)  Temp src: Oral  Resp: 18  Pulse: 83  Pulse Rate Source: Monitor  BP: 139/63  BP Location: Right arm    Temp (24hrs), Av.6  F (37  C), Min:97.9  F (36.6  C), Max:99.2  F (37.3  C)      GEN: lying in bed, few words  RESPIRATORY:  Normal breathing pattern. Clear to auscultation  CARDIOVASCULAR:  Regular rate and rhythm. Normal S1 and S2. No murmur, click, gallop or rub.   ABDOMEN:  Soft, normal bowel sounds, non-tender  EXTREMITIES: No edema.  SKIN/HAIR/NAILS:  large blisters/bullae at R heel without significant cellulitis that I see, but there is tenderness. Without signs of necrotizing infection.   IV: peripheral        Antibiotics:  Vancomycin  ceftriaxone    Pertinent labs:    Recent Labs   Lab 10/02/23  0619 10/01/23  3546  10/01/23  0653 23  0532   WBC 16.5*  --  14.9* 16.7*   HGB 10.2*  10.2* 10.7* 10.6* 12.3   HCT 31.8*  --  33.4* 37.9     --  309 321        Recent Labs   Lab 10/02/23  0619 10/01/23  0653 23  0532    144 147*   CO2    BUN 51.8* 70.8* 104.0*      No results found for: CRP   [unfilled]    Lab Results   Component Value Date    ALT 47 2023    AST 32 2023    ALKPHOS 86 2023         MICROBIOLOGY DATA:  [unfilled]    RADIOLOGY:  Echocardiogram Complete    Result Date: 10/1/2023  [id number]^DON^YESICA^[place] [address]  Name: ARACELI MCKEON MRN: [id number] : 1940 Study Date: 10/01/2023 02:45 PM Age: 83 yrs Gender: Female Patient Location: [facility] Reason For Study: Pulmonary Emboli Ordering Physician: YESICA OCHOA Performed By: NEDRA  BSA: 1.9 m2 Height: 64 in Weight: 182 lb HR: 86 BP: 159/70 mmHg ______________________________________________________________________________ Procedure Complete Portable Echo Adult. Definity (NDC #57991-021) given intravenously. Technically difficult study. Poor acoustic windows. No hemodynamically significant valvular abnormalities on 2D or color flow imaging. There is no comparison study available. ______________________________________________________________________________ Interpretation Summary  The right ventricle is not well visualized but grossly appears to have normal size and systolic function. Left ventricular size, wall motion and function are normal. The ejection fraction is > 65%. No hemodynamically significant valvular abnormalities on 2D or color flow imaging. Right ventricle systolic pressure estimate normal There is no comparison study available. ______________________________________________________________________________ Left Ventricle Left ventricular size, wall motion and function are normal. The ejection fraction is > 65%. There is normal  left ventricular wall thickness. Left ventricular diastolic function is normal.  Right Ventricle The right ventricle is not well visualized but grossly appears to have normal size and systolic function.  Atria Normal left atrial size. Right atrial size is normal. There is no color Doppler evidence of an atrial shunt.  Mitral Valve Mitral valve leaflets appear normal. There is no evidence of mitral stenosis or clinically significant mitral regurgitation.  Tricuspid Valve The tricuspid valve is not well visualized. Right ventricle systolic pressure estimate normal. The right ventricular systolic pressure is approximated at 24.1 mmHg plus the right atrial pressure.  Aortic Valve The aortic valve is trileaflet. Aortic valve leaflets appear normal. There is no evidence of aortic stenosis or clinically significant aortic regurgitation.  Pulmonic Valve The pulmonic valve is not well seen, but is grossly normal. This degree of valvular regurgitation is within normal limits. There is trace pulmonic valvular regurgitation.  Vessels The aorta root is normal. Normal size ascending aorta. IVC diameter <2.1 cm collapsing >50% with sniff suggests a normal RA pressure of 3 mmHg.  Pericardium There is no pericardial effusion.  ______________________________________________________________________________ MMode/2D Measurements & Calculations RVDd: 3.5 cm IVSd: 0.97 cm LVIDd: 3.7 cm LVIDs: 2.2 cm LVPWd: 0.89 cm FS: 40.7 %  LV mass(C)d: 102.7 grams LV mass(C)dI: 54.7 grams/m2 Ao root diam: 3.1 cm LA dimension: 2.8 cm LA/Ao: 0.91 LVOT diam: 1.8 cm LVOT area: 2.5 cm2 Ao root diam index Ht(cm/m): 1.9 Ao root diam index BSA (cm/m2): 1.6 LA Volume Indexed (AL/bp): 19.7 ml/m2 RWT: 0.48 TAPSE: 1.9 cm  Time Measurements MM HR: 80.0 BPM  Doppler Measurements & Calculations MV E max dennis: 78.4 cm/sec MV A max dennis: 70.7 cm/sec MV E/A: 1.1 MV dec time: 0.19 sec LV V1 max P.3 mmHg LV V1 max: 125.8 cm/sec LV V1 VTI: 23.9 cm SV(LVOT): 60.7 ml  SI(LVOT): 32.3 ml/m2 PA acc time: 0.13 sec TR max thang: 245.7 cm/sec TR max P.1 mmHg E/E' av.7 Lateral E/e': 9.7 Medial E/e': 9.6 RV S Thang: 11.6 cm/sec  ______________________________________________________________________________ Report approved by: Brock August 10/01/2023 04:23 PM       NM Lung Scan Perfusion Particulate    Result Date: 2023  EXAM: NM LUNG SCAN PERFUSION PARTICULATE LOCATION: [facility] DATE: 2023 INDICATION: Multiple DVTs, please assess for PE and has renal injury. COMPARISON: Chest radiograph 2023 at 3:13 PM TECHNIQUE: 8.6 mCi technetium-99m MAA, IV. Standard lung perfusion imaging. FINDINGS: Abnormal perfusion throughout the right lung with multiple peripheral perfusion defects in the right lung highly suspicious for right-sided pulmonary embolic disease. Focal area of abnormal perfusion defect in the posterior medial aspect of the  left lower lobe also suspicious for pulmonary embolic disease.     IMPRESSION: Abnormal perfusion scan with multiple peripheral perfusion defects in the right lung and a perfusion defect in the left lung as detailed above. These findings are highly suspicious for pulmonary emboli given history of DVT. The chest x-ray in these areas  demonstrate no pulmonary parenchymal abnormality. Results discussed with the patient's nurse at 4:25 PM 2023.    XR Chest Port 1 View    Result Date: 2023  EXAM: XR CHEST PORTABLE 1 VIEW LOCATION: [facility] DATE: 2023 INDICATION: Chest x-ray to follow nuclear medicine lung scan per protocol. COMPARISON: None.     IMPRESSION: Negative chest.     CT Head w/o Contrast    Result Date: 2023  EXAM: CT HEAD WITHOUT CONTRAST LOCATION: [facility] DATE: 2023 INDICATION: Altered mental status. COMPARISON: None. TECHNIQUE: Routine CT Head without IV contrast. Multiplanar reformats. Dose reduction techniques were  used. FINDINGS: INTRACRANIAL CONTENTS: No intracranial hemorrhage, extra-axial collection, or mass effect.  Small chronic infarction left inferior cerebellum. Mild presumed chronic small vessel ischemic changes. Moderate generalized volume loss with preferential volume loss within the anterior and medial temporal lobes. No hydrocephalus. VISUALIZED ORBITS/SINUSES/MASTOIDS: No intraorbital abnormality. Moderate mucosal thickening right maxillary sinus. The paranasal sinuses are otherwise clear. No middle ear or mastoid effusion. BONES/SOFT TISSUES: No acute abnormality.     IMPRESSION: 1.  No CT evidence for acute intracranial process. 2.  Brain atrophy and presumed chronic microvascular ischemic changes as above.

## 2023-10-02 NOTE — CONSULTS
[facility] Nurse Inpatient Assessment     Consulted for: Coccyx    Summary: Upon assessment, majority of wound extended over L buttock    Patient History (according to provider note(s):      Kandi Gannon is a 83 year old female admitted on 9/30/2023. She has a pmhx of dementia, hypertension, diabetes mellitus type 2, chronic constipation, NEDRA, who presents with DVT diagnosed on US in right and left posterior tibial vein with nonocclusive dvt in right proximal femoral right mid femoral right and distal femoral veins.     Assessment:      Skin Injury Location: Coccyx & L buttock    10/2    Pressure Injury Location: Coccyx & L buttock    Last photo: 10/2  Wound type: Pressure Injury     Pressure Injury Stage: Unstageable, present on admission   Wound history/plan of care:   Multiple areas of skin breakdown, multiple factors contributing: pressure, ICD, friction    Wound base: 50 % non-granular tissue, 50 % slough     Palpation of the wound bed: boggy      Drainage: small     Description of drainage: serosanguinous     Measurements (length x width x depth, in cm) 10  x 5  x  0.2 to slough cm      Tunneling N/A     Undermining N/A  Periwound skin: Dry/scaly and Scar tissue      Color:  hyperpigmentation      Temperature: normal   Odor: none  Pain: moderate, Irritable or crying out at intervals, and during dressing change, throbbing  Pain intervention prior to dressing change: slow and gentle cares   Treatment goal: Heal , Increase granulation, Protection, Promote epidermal migration, Remove necrotic tissue, and Soften necrotic tissue  STATUS: initial assessment  Supplies ordered: ordered Triad    My PI Risk Assessment     Sensory Perception: 2 - Very Limited     Moisture: 1 - Constantly moist     Activity: 1 - Bedfast      Mobility: 2 - Very limited     Nutrition: 2 - Probably inadequate      Friction/Shear: 1 - Problem     TOTAL: 9       Requested an isoflex pump for the  patient's bed.     Treatment Plan:     Buttocks  Gently wipe away soiled TRIAD, do not scrub  Can perform tre cares with bath wipes and reapply TRIAD over wound BID + PRN to keep areas covered  No briefs in bed, one can be worn if patient getting into chair  Continue to turn patient every 2 hours, use wedges to position patient    Orders: Written    RECOMMEND PRIMARY TEAM ORDER: None, at this time  Education provided: importance of repositioning  Discussed plan of care with: Nurse  [facility] nurse follow-up plan: weekly  Notify [facility] if wound(s) deteriorate.  Nursing to notify the Provider(s) and re-consult the [facility] Nurse if new skin concern.    DATA:     Current support surface: Standard  Standard gel/foam mattress (IsoFlex, Atmos air, etc)  Containment of urine/stool: Incontinence Protocol  BMI: Body mass index is 31.14 kg/m .   Active diet order: Orders Placed This Encounter      Combination Diet Regular Diet Adult     Output: I/O last 3 completed shifts:  In: 1432 [P.O.:1200; I.V.:232]  Out: 1600 [Urine:1600]     Labs:   Recent Labs   Lab 10/02/23  0619 10/01/23  0653 09/30/23  0532   ALBUMIN  --   --  3.7   HGB 10.2*  10.2*   < > 12.3   INR  --   --  1.13   WBC 16.5*   < > 16.7*    < > = values in this interval not displayed.     Pressure injury risk assessment:   Sensory Perception: 3-->slightly limited  Moisture: 3-->occasionally moist  Activity: 1-->bedfast  Mobility: 2-->very limited  Nutrition: 2-->probably inadequate  Friction and Shear: 2-->potential problem  Santy Score: 13    DOUGIE Sanchez RN CWOCN  Pager no longer in use, please contact through [employer] group: [facility] [employer] [facility]

## 2023-10-02 NOTE — DISCHARGE INSTRUCTIONS
WOC DISCHARGE INSTRUCTIONS:  Buttocks  Gently wipe away soiled TRIAD, do not scrub  Can perform tre cares with bath wipes and reapply TRIAD over wound BID + PRN to keep areas covered  No briefs in bed, one can be worn if patient getting into chair  Continue to turn patient every 2 hours, use wedges to position patient

## 2023-10-02 NOTE — PROGRESS NOTES
Care Management Follow Up    Length of Stay (days): 2    Expected Discharge Date: 10/05/2023     Concerns to be Addressed: discharge planning       Patient plan of care discussed at interdisciplinary rounds: Yes    Anticipated Discharge Disposition: Long Term Care     Anticipated Discharge Services:  TBD    Anticipated Discharge DME: None    Education Provided on the Discharge Plan: Yes (AVS will be per bedside RN)    Patient/Family in Agreement with the Plan: unable to assess    Referrals Placed by CM/SW:  n/a        Additional Information:  CM reviewed chart. Patient lives at [facility] long-term care. Transportation TBD. CM will follow.     Madhavi Villa RN

## 2023-10-03 NOTE — PLAN OF CARE
Problem: Hypertension Comorbidity  Goal: Blood Pressure in Desired Range  Outcome: Progressing     Problem: Obstructive Sleep Apnea Risk or Actual Comorbidity Management  Goal: Unobstructed Breathing During Sleep  Outcome: Progressing   Goal Outcome Evaluation:       VSS, see flowsheet. Remains on RA. Heparin gtt infusing per protocol. 1:1 sitter at bedside. Turn q 2hrs.

## 2023-10-03 NOTE — PROGRESS NOTES
Care Management Follow Up    Length of Stay (days): 3    Expected Discharge Date: 10/05/2023     Concerns to be Addressed: discharge planning     Patient plan of care discussed at interdisciplinary rounds: Yes    Anticipated Discharge Disposition: Long Term Care     Anticipated Discharge Services:  (TBD)  Anticipated Discharge DME: None    Patient/family educated on Medicare website which has current facility and service quality ratings:    Education Provided on the Discharge Plan: Yes (AVS will be per bedside RN)  Patient/Family in Agreement with the Plan: unable to assess    Referrals Placed by CM/SW:  None  Private pay costs discussed: Not applicable at this time     Additional Information:  DAMION called and spoke with Kimberly at [facility] and Pt does have bed hold. No PAS needed.     TOVA Escalona

## 2023-10-03 NOTE — PROGRESS NOTES
"[facility]    Medicine Progress Note - Hospitalist Service    Date of Admission:  9/30/2023    Assessment & Plan      Kandi Gannon is a 83 year old female admitted on 9/30/2023. She has a pmhx of dementia, hypertension, diabetes mellitus type 2, chronic constipation, NEDRA, who presents with DVT diagnosed on US in right and left posterior tibial vein with nonocclusive dvt in right proximal femoral right mid femoral right and distal femoral veins. Utilize  and also both daughters Maulik are involved. DNR DNI confirmed.     On admit was vitally stable, labs notable for hypernatremia 147, KATRNI 2.27, elevated lactate 2.5, troponin T 149, leukocytosis 16.7, hgb 12.3, INR 1.13, blood cultures in process, Covid19 positive. Given 1L bolus. Admitted. On initial exam pt was oriented to only self. Urinalysis appears infected so started ceftriaxone. Started on heparin drip. V/Q scan ordered which returned demonstrating \"Abnormal perfusion scan with multiple peripheral perfusion defects in the right lung and a perfusion defect in the left lung as detailed above. These findings are highly suspicious for pulmonary emboli given history of DVT .\"     On 10/1/2023 blood cultures with GPCs; started empirically on vancomycin additionally and ID consulted.    No evidence of MRSA; vancomycin discontinued 10/2/32. Currently on IV ceftriaxone as per ID. Awaiting final staph epi susceptibilities. Hypernatremia mildly improved to 142. Will eventually transition to DOAC/warfarin, potentially 10/3 and discuss risks/benefits with HCA/mPOA.     Update 1: Called her daughter Deborah - no answer and did not leave ; will try again later.    -----   Staph  Acute encephalopathy (multifactorial including infectious 2/2 to UTI and possibly bacteremia)  Multiple dvts on US  Bilateral PE suspected on V/Q scan 9/30  Recent covid19 infection on 9/18   UTI  Elevated troponin --> Type II demand " "ischemia?  Elevated lactate   A- as above. On ceftriaxone since 9/30 and starting vancomycin 10/1/2023. Troponin trending down. On heparin drip. Eventually would transition to DOAC when ready. Now w/ bacteremia, asking ID to see. Updated pt's daughter Deborah on 10/1. On RA.  P:  -echocardiogram to monitor for right heart strain  -Monitor oxygen needs carefully, may require steroids if hypoxic  -continue ceftriaxone for UTI   -follow-up urine culture  -On heparin drip, continue and monitor dosing as per protocol  -Pain and nausea control  -trend troponin; if worsening chest pain could recheck and consider cardiology  - Infectious Disease Consult 10/1/2023   - Continue vancomycin pending culture s/s  -infection control team to see as well regarding covid status and isolation, discussed w/ RN and Charge   - No current reports of chest pain; troponin elevated at admission but without delta change on 3 checks and dropped/downtrended: 149 --> 143 --> 137. No EKG changes. TTE unremarkable, no acute changes, preserved LVEF, no WMAs noted. Thus, more likely to be demand ischemia.   -Heparin is for PE treatment, not Type I MI / ACS    KATRIN  Hypernatremia  A- possibly decreased po and prerenal azotemia and/or possibly atn developing w/ ischemic injury- now downtrending from 2.3 --> 1.7 --> 1.5  -Close to baseline/normalized (1.2)    Anemia  A- on admit at 12.3, now at 10.6, on heparin drip, so need to monitor closely  P:  - recheck q12  - transfuse if < 7    Hx of dementia  A/p- noted, pertinent for history-gathering. Have discussed daily with the patient's daughter(s) and the reported baseline PTA has been \" conversational, but not enough to know who\" daughters are.  However, the patient is minimally responding to questions, will recommend utilizing both  and/or daughter as needed  - noted, and minimize escalation   - redirect when able    Hypertension  A/p- stable, appropriate bp for age range. On losartan and " "amlodipine  - HOLD losartan given resolving KATRIN  - continue amlodipine    Dm2  A/p-  hypoglycemia protocol.  On admit started on low-dose Lantus. Still elevated sugars. Will increase.  -Increase to MD for ssi    Chronic constipation  A/p- continue prns, monitor for abd pain. Stable     Anoop  A/p- cont cpap if able    Multiple wounds   A/p- woc consulted for Coccyx, R inner thigh, and R heel     Oral candidiasis  A/P-start nystatin       Diet: Combination Diet Regular Diet Adult    DVT Prophylaxis: heparin drip  Bone Catheter: Not present  Lines: None     Cardiac Monitoring: ACTIVE order. Indication: Tachyarrhythmias, acute (48 hours)  Code Status: No CPR- Do NOT Intubate      Clinically Significant Risk Factors                    # Hypertension: Noted on problem list     # Dementia: noted on problem list     # DMII: A1C = 8.3 % (Ref range: <5.7 %) within past 6 months     # Obesity: Estimated body mass index is 33.87 kg/m  as calculated from the following:    Height as of 9/28/23: 1.626 m (5' 4\").    Weight as of this encounter: 89.5 kg (197 lb 5 oz).  , PRESENT ON ADMISSION            Disposition Plan     Expected Discharge Date: 10/05/2023    Discharge Delays: Other (Add Comment)  IV Medication - consider oral or Home Infusion    Discharge Comments: hep gtt for DVT's/ likely PE.  WOC consult.  Confusion and on 1:1 for safety while on hep gtt (pt attempting to pull IV's out)  Back to her facility at discharge          Rodrick Vasquez MD  Hospitalist Service  [facility]  Securely message with [employer] (more info)  Text page via [employer] Paging/Directory   ______________________________________________________________________    Interval History   No acute events overnight.    No report of chest pain, shortness of breath, or other new complaints.     Called her daughter Deborah - no answer and did not leave .    Physical Exam   Vital Signs: Temp: 98.9  F (37.2  C) Temp src: Axillary BP: 116/58 " Pulse: 78   Resp: 18 SpO2: 90 % O2 Device: None (Room air)    Weight: 197 lbs 4.99 oz    GENERAL:  Alert, appears comfortable, in no acute distress, appears stated age, on room air   HEAD:  Normocephalic, without obvious abnormality, atraumatic   EYES:  Conjunctiva/corneas clear, no scleral icterus, EOM's appears intact grossly   NOSE: Nares normal, septum midline, mucosa normal, no drainage   THROAT: Lips, mucosa, and tongue appear normal   NECK: Symmetrical, trachea appears midline   BACK:   Symmetric, no curvature noted   LUNGS:   Symmetric chest rise on inhalation, respirations unlabored   CHEST WALL:  No apparent deformity   HEART:  No thrills or heaves visualized   ABDOMEN:   No masses or organomegaly apparent; non-scaphoid   EXTREMITIES: Extremities appear normal, atraumatic, no cyanosis   SKIN: No exanthems in the visualized areas; noted has wounds- at the Coccyx, R inner thigh, and R heel    NEURO: Alert, at reported baseline with known dementia, moves all four extremities freely and spontaneously   PSYCH: Cooperative, behavior is appropriate given context - acute illness and advanced severe dementia     Medical Decision Making       36 MINUTES SPENT BY ME on the date of service doing chart review, history, exam, documentation & further activities per the note.       Data   ------------------------- PAST 24 HR DATA REVIEWED -----------------------------------------------    I have personally reviewed the following data over the past 24 hrs:    N/A  \   10.3 (L)   / N/A     N/A N/A N/A /  255 (H)   N/A N/A N/A \     Procal: N/A CRP: N/A Lactic Acid: 1.0         Imaging results reviewed over the past 24 hrs:   No results found for this or any previous visit (from the past 24 hour(s)).

## 2023-10-03 NOTE — PROGRESS NOTES
CLINICAL NUTRITION SERVICES - ASSESSMENT NOTE     Nutrition Prescription    RECOMMENDATIONS FOR MDs/PROVIDERS TO ORDER:  Recommend Moderate consistent carbohydrate diet ( 60 g cho per meal) with hyperglycemia/DM2  Recommend MVI/M with pressure injuries     Malnutrition Status:    Not Met    Recommendations already ordered by Registered Dietitian (RD):  Glucerna daily at breakfast for additional protein     Future/Additional Recommendations:  Monitor po, wt, BG, wound healing.      REASON FOR ASSESSMENT  Kandi Gannon is a/an 83 year old female assessed by the dietitian for Admission Nutrition Risk Screen for unsure weight loss, decreased appetite.     HPI: Per MD, Pt w/ hx of dementia, hypertension, diabetes mellitus type 2, chronic constipation, NEDRA, who presents with DVT diagnosed on US in right and left posterior tibial vein with nonocclusive dvt in right proximal femoral right mid femoral right and distal femoral veins. On admission interview, the patient is minimally responsive to questions but is alert and oriented to self only.  On admission interview, the patient did not have any questions.  She was responsive to stimuli but not consistently answering any questions.      NUTRITION HISTORY  Met with pt at bedside this AM.  called, pt not responding to . Pt with dementia, oriented only to self.   Pt lives at [facility]. Pt eating 2-3 meals/day and meeting >75% estimated nutrition needs.   NKFA    CURRENT NUTRITION ORDERS  Diet: Regular  Intake/Tolerance: Fair   Per nursing, pt eating % meals   2-3 meals ordered/day since admit, average 1600 kcals, 64 gm protein ordered   Pt meeting >75% estimated calorie and protein needs     LABS  Reviewed  Creat 1.51(H), Glucose 193-255 las 24 hrs     MEDICATIONS  Reviewed   Norvasc, rocephin, novolog, mycostatin, miralax    ANTHROPOMETRICS  Height: 0 cm (Data Unavailable)  Most Recent Weight: 89.5 kg (197 lb  5 oz)    IBW: 54.5 kg  BMI: Obesity Grade I BMI 30-34.9  Weight History: 3.2% wt loss x1 year, not clinically significant   Wt Readings from Last 10 Encounters:   10/03/23 89.5 kg (197 lb 5 oz)   09/28/23 88 kg (194 lb)   09/08/23 88.1 kg (194 lb 3.2 oz)   08/02/23 87.1 kg (192 lb)   06/01/23 87.1 kg (192 lb)   04/05/23 87.1 kg (192 lb)   02/07/23 90.7 kg (200 lb)   12/12/22 91.6 kg (202 lb)   12/07/22 91.6 kg (202 lb)   10/05/22 92.5 kg (204 lb)        Dosing Weight: 63.3 kg adjusted    ASSESSED NUTRITION NEEDS  Estimated Energy Needs: 4725-8962 kcals/day (25 - 30 kcals/kg)  Justification: Maintenance  Estimated Protein Needs: 76-95 grams protein/day (1.2 - 1.5 grams of pro/kg)  Justification: Wound healing  Estimated Fluid Needs: 1583+ mL/day (25 mL/kg)   Justification: Maintenance    PHYSICAL FINDINGS  See malnutrition section below.  Per Flowsheets:   X3 BM 9/30   +UOP   Trace/Mild edema   Sacrum stage 2 pressure injury, thigh stage 2 pressure injury, heel stage 1 pressure injury  WOC following     MALNUTRITION:  % Weight Loss:  Weight loss does not meet criteria for malnutrition   % Intake:  No decreased intake noted  Subcutaneous Fat Loss:  None observed  Muscle Loss:  Acromion bone region mild depletion and Dorsal hand region mild depletion  Fluid Retention:  Mild/Trace     Malnutrition Diagnosis: Patient does not meet two of the above criteria necessary for diagnosing malnutrition    NUTRITION DIAGNOSIS  Increased nutrient needs protein related to wound healing as evidenced by sacrum and thigh stage 2 pressure injuries and heel stage 1 pressure injury.     INTERVENTIONS  Implementation  Recommend consistent carbohydrate diet with hyperglycemia, DM2    Goals  Pt to meet nutritional needs   BG < 180   Wound healing      Monitoring/Evaluation  Progress toward goals will be monitored and evaluated per protocol.

## 2023-10-03 NOTE — PROGRESS NOTES
INFECTIOUS DISEASE FOLLOW UP NOTE      ASSESSMENT:  Kandi Gannon is a 83 year old old female with   History of Alzheimer disease.  Diabetes mellitus type 2 with last A1c of 8.3 on 9/6/2023  Coccygeal wound  COVID-19 infection.  Diagnosis 9/18/2023.  Currently asymptomatic.  KATRIN on CKD.  Baseline creatinine of 1.2.  Came in with a creatinine of 2.27.  Improving.  Bilateral posterior tibial vein DVT with likely pulmonary embolism on VQ scan.  Positive blood culture -- staph epi one set is a contaminant  R heel bullae with sign of cellulitis.   Leukocytosis -- could be from infection or thrombosis.  UA with pyuria. UC with mixed nithya.     PLAN:  Could monitor off antibiotics. Reviewed with Dr. Vasquez.    Ashwin Vanegas MD  [facility]  [phone] [facility]  [employer] paging    ______________________________________________________________________    SUBJECTIVE / INTERVAL HISTORY: more awake. Family feeding her lunch. Pain, unable to localize.     ROS: not obtained        OBJECTIVE:  /59 (BP Location: Left arm)   Pulse 85   Temp 98.8  F (37.1  C) (Axillary)   Resp 18   Wt 89.5 kg (197 lb 5 oz)   SpO2 94%   BMI 33.87 kg/m             GEN: sitting up in bed, being fed.   RESPIRATORY:  Normal breathing pattern. Clear to auscultation  CARDIOVASCULAR:  Regular rate and rhythm. Normal S1 and S2. No murmur, click, gallop or rub.   ABDOMEN:  Soft, normal bowel sounds, non-tender  EXTREMITIES: No edema.  SKIN/HAIR/NAILS:  large blisters/bullae at R heel without cellulitis that I see, but there is tenderness. Without signs of necrotizing infection.   IV: peripheral        Antibiotics:  Vancomycin  ceftriaxone    Pertinent labs:    Recent Labs   Lab 10/03/23  0841 10/02/23  1823 10/02/23  0619 10/01/23  1835 10/01/23  0653   WBC 15.1*  --  16.5*  --  14.9*   HGB 9.7* 10.3* 10.2*  10.2*   < > 10.6*   HCT 30.8*  --  31.8*  --  33.4*     --  312  --  309    < > = values in this  interval not displayed.        Recent Labs   Lab 10/02/23  0619 10/01/23  0653 23  0532    144 147*   CO2    BUN 51.8* 70.8* 104.0*         Lab Results   Component Value Date    ALT 47 2023    AST 32 2023    ALKPHOS 86 2023         MICROBIOLOGY DATA:  Staph epi in one culture    RADIOLOGY:  Echocardiogram Complete    Result Date: 10/1/2023  [id number]^DON^[email or web address]^[place] [address]  Name: ARACELI MCKEON MRN: [id number] : 1940 Study Date: 10/01/2023 02:45 PM Age: 83 yrs Gender: Female Patient Location: [facility] Reason For Study: Pulmonary Emboli Ordering Physician: YESICA OCHOA Performed By: NEDRA  BSA: 1.9 m2 Height: 64 in Weight: 182 lb HR: 86 BP: 159/70 mmHg ______________________________________________________________________________ Procedure Complete Portable Echo Adult. Definity (NDC #13757-482) given intravenously. Technically difficult study. Poor acoustic windows. No hemodynamically significant valvular abnormalities on 2D or color flow imaging. There is no comparison study available. ______________________________________________________________________________ Interpretation Summary  The right ventricle is not well visualized but grossly appears to have normal size and systolic function. Left ventricular size, wall motion and function are normal. The ejection fraction is > 65%. No hemodynamically significant valvular abnormalities on 2D or color flow imaging. Right ventricle systolic pressure estimate normal There is no comparison study available. ______________________________________________________________________________ Left Ventricle Left ventricular size, wall motion and function are normal. The ejection fraction is > 65%. There is normal left ventricular wall thickness. Left ventricular diastolic function is normal.  Right Ventricle The right ventricle is not well visualized but  grossly appears to have normal size and systolic function.  Atria Normal left atrial size. Right atrial size is normal. There is no color Doppler evidence of an atrial shunt.  Mitral Valve Mitral valve leaflets appear normal. There is no evidence of mitral stenosis or clinically significant mitral regurgitation.  Tricuspid Valve The tricuspid valve is not well visualized. Right ventricle systolic pressure estimate normal. The right ventricular systolic pressure is approximated at 24.1 mmHg plus the right atrial pressure.  Aortic Valve The aortic valve is trileaflet. Aortic valve leaflets appear normal. There is no evidence of aortic stenosis or clinically significant aortic regurgitation.  Pulmonic Valve The pulmonic valve is not well seen, but is grossly normal. This degree of valvular regurgitation is within normal limits. There is trace pulmonic valvular regurgitation.  Vessels The aorta root is normal. Normal size ascending aorta. IVC diameter <2.1 cm collapsing >50% with sniff suggests a normal RA pressure of 3 mmHg.  Pericardium There is no pericardial effusion.  ______________________________________________________________________________ MMode/2D Measurements & Calculations RVDd: 3.5 cm IVSd: 0.97 cm LVIDd: 3.7 cm LVIDs: 2.2 cm LVPWd: 0.89 cm FS: 40.7 %  LV mass(C)d: 102.7 grams LV mass(C)dI: 54.7 grams/m2 Ao root diam: 3.1 cm LA dimension: 2.8 cm LA/Ao: 0.91 LVOT diam: 1.8 cm LVOT area: 2.5 cm2 Ao root diam index Ht(cm/m): 1.9 Ao root diam index BSA (cm/m2): 1.6 LA Volume Indexed (AL/bp): 19.7 ml/m2 RWT: 0.48 TAPSE: 1.9 cm  Time Measurements MM HR: 80.0 BPM  Doppler Measurements & Calculations MV E max thang: 78.4 cm/sec MV A max thang: 70.7 cm/sec MV E/A: 1.1 MV dec time: 0.19 sec LV V1 max P.3 mmHg LV V1 max: 125.8 cm/sec LV V1 VTI: 23.9 cm SV(LVOT): 60.7 ml SI(LVOT): 32.3 ml/m2 PA acc time: 0.13 sec TR max thang: 245.7 cm/sec TR max P.1 mmHg E/E' av.7 Lateral E/e': 9.7 Medial E/e': 9.6 RV S Thang:  11.6 cm/sec  ______________________________________________________________________________ Report approved by: Brock August 10/01/2023 04:23 PM       NM Lung Scan Perfusion Particulate    Result Date: 9/30/2023  EXAM: NM LUNG SCAN PERFUSION PARTICULATE LOCATION: [facility] DATE: 9/30/2023 INDICATION: Multiple DVTs, please assess for PE and has renal injury. COMPARISON: Chest radiograph 09/30/2023 at 3:13 PM TECHNIQUE: 8.6 mCi technetium-99m MAA, IV. Standard lung perfusion imaging. FINDINGS: Abnormal perfusion throughout the right lung with multiple peripheral perfusion defects in the right lung highly suspicious for right-sided pulmonary embolic disease. Focal area of abnormal perfusion defect in the posterior medial aspect of the  left lower lobe also suspicious for pulmonary embolic disease.     IMPRESSION: Abnormal perfusion scan with multiple peripheral perfusion defects in the right lung and a perfusion defect in the left lung as detailed above. These findings are highly suspicious for pulmonary emboli given history of DVT. The chest x-ray in these areas  demonstrate no pulmonary parenchymal abnormality. Results discussed with the patient's nurse at 4:25 PM 09/30/2023.    XR Chest Port 1 View    Result Date: 9/30/2023  EXAM: XR CHEST PORTABLE 1 VIEW LOCATION: [facility] DATE: 09/30/2023 INDICATION: Chest x-ray to follow nuclear medicine lung scan per protocol. COMPARISON: None.     IMPRESSION: Negative chest.     CT Head w/o Contrast    Result Date: 9/30/2023  EXAM: CT HEAD WITHOUT CONTRAST LOCATION: [facility] DATE: 09/30/2023 INDICATION: Altered mental status. COMPARISON: None. TECHNIQUE: Routine CT Head without IV contrast. Multiplanar reformats. Dose reduction techniques were used. FINDINGS: INTRACRANIAL CONTENTS: No intracranial hemorrhage, extra-axial collection, or mass effect.  Small chronic infarction left inferior  cerebellum. Mild presumed chronic small vessel ischemic changes. Moderate generalized volume loss with preferential volume loss within the anterior and medial temporal lobes. No hydrocephalus. VISUALIZED ORBITS/SINUSES/MASTOIDS: No intraorbital abnormality. Moderate mucosal thickening right maxillary sinus. The paranasal sinuses are otherwise clear. No middle ear or mastoid effusion. BONES/SOFT TISSUES: No acute abnormality.     IMPRESSION: 1.  No CT evidence for acute intracranial process. 2.  Brain atrophy and presumed chronic microvascular ischemic changes as above.

## 2023-10-04 NOTE — PLAN OF CARE
Attempted to use  and pt was not cooperative and would closed her eyes and would not answer questions. No signs of pain observed at rest but pt appears painful to RLE with movement. Repositioned pt every 2 hours and pillows used to float heels and relieve pressure from buttocks. Pt ate well at meals. Incontinent of bowel and bladder. Purewick in place to keep skin dry. 1:1 for safety. Heparin stopped this shift and pt given her first dose of eliquis.   Problem: Suicide Risk  Goal: Absence of Self-Harm  Outcome: Progressing     Problem: Diabetes Comorbidity  Goal: Blood Glucose Level Within Targeted Range  Outcome: Progressing     Problem: Hypertension Comorbidity  Goal: Blood Pressure in Desired Range  Outcome: Progressing  Intervention: Maintain Blood Pressure Management  Recent Flowsheet Documentation  Taken 10/4/2023 [id number] by Veronica Ayon, RN  Medication Review/Management: medications reviewed

## 2023-10-04 NOTE — PROGRESS NOTES
"[facility]    Medicine Progress Note - Hospitalist Service    Date of Admission:  9/30/2023    Assessment & Plan      Kandi Gannon is a 83 year old female admitted on 9/30/2023. She has a PHMx of dementia, hypertension, diabetes mellitus type 2, chronic constipation, NEDRA, who presents with DVT diagnosed on US in right and left posterior tibial vein with nonocclusive dvt in right proximal femoral right mid femoral right and distal femoral veins. Utilize  and also both daughters Polina and Deborah are involved. DNR DNI confirmed.     On admit was vitally stable, labs notable for hypernatremia 147, KATRIN 2.27, elevated lactate 2.5, troponin T 149, leukocytosis 16.7, hgb 12.3, INR 1.13, blood cultures in process, Covid19 positive. Given 1L bolus. Admitted. On initial exam pt was oriented to only self. Urinalysis appears infected so started ceftriaxone. Started on heparin drip. V/Q scan ordered which returned demonstrating \"Abnormal perfusion scan with multiple peripheral perfusion defects in the right lung and a perfusion defect in the left lung as detailed above. These findings are highly suspicious for pulmonary emboli given history of DVT .\"     On 10/1/2023 blood cultures with GPCs; started empirically on vancomycin additionally and ID consulted. No evidence of MRSA; vancomycin discontinued 10/2/32. Antibiotics held starting 10/4 as per ID recommendations and to monitor off antibiotics (held order placed on 10/3, first dose affected is 10/4). Hypernatremia mildly improved to 142. Called and discussed risks/benefits with HCA/mPOA, daughter Polina (both Deborah and Polina were called; Polina was available), and as per wishes will transition to DOAC, Apixban/Eliquis, ordered 10/4. ID following.    -----   Staph  Acute encephalopathy (multifactorial including infectious 2/2 to UTI and possibly bacteremia)  Multiple dvts on US  Bilateral PE suspected on V/Q scan " "9/30  Recent covid19 infection on 9/18   UTI  Elevated troponin --> Type II demand ischemia?  Elevated lactate   A- as above. On ceftriaxone since 9/30 and starting vancomycin 10/1/2023. Troponin trending down. On heparin drip. Eventually would transition to DOAC when ready. Now w/ bacteremia, asking ID to see. Updated pt's daughter Deborah on 10/1. On RA. Updated patient's daughter Polina on 10/4.  -Held ceftriaxone, first dose affected is 10/4, originally started by admitting provider for UTI   - Infectious Disease Consult 10/1/2023   - No current reports of chest pain; troponin elevated at admission but without delta change on 3 checks and dropped/downtrended: 149 --> 143 --> 137. No EKG changes. TTE unremarkable, no acute changes, preserved LVEF, no WMAs noted. Thus, more likely to be demand ischemia.   -Heparin is for PE treatment, not Type I MI / ACS  -Transition to DOAC Apixaban on 10/4 as per HCA/mPOA's wishes    KATRIN  Hypernatremia  A- possibly decreased po and prerenal azotemia and/or possibly atn developing w/ ischemic injury- now downtrending from 2.3 --> 1.7 --> 1.5  -Close to baseline/normalized (1.2)    Anemia  A- on admit at 12.3, now at 10.6, on heparin drip, so need to monitor closely  Stable, 10.3, 10.2, 9.7; no further checks unless status changes  - transfuse if < 7    Hx of dementia  A/p- noted, pertinent for history-gathering. Have discussed daily with the patient's daughter(s) and the reported baseline PTA has been \" conversational, but not enough to know who\" daughters are.  However, the patient is minimally responding to questions, will recommend utilizing both  and/or daughter as needed  - noted, and minimize escalation   - redirect when able    Hypertension  A/p- stable, appropriate bp for age range. On losartan and amlodipine  - HOLD losartan given resolving KATRIN, may resume 10/5 or at discharge  - continue amlodipine    Dm2  A/p-  hypoglycemia protocol.  On admit started on " "low-dose Lantus. Still elevated sugars. Will increase.  -Increase to MD for ssi    Chronic constipation  A/p- continue prns, monitor for abd pain. Stable     Anoop  A/p- cont cpap if able    Multiple wounds   A/p- woc consulted for Coccyx, R inner thigh, and R heel     Oral candidiasis  A/P-start nystatin       Diet: Combination Diet Regular Diet Adult  Snacks/Supplements Adult: Glucerna; With Meals    DVT Prophylaxis: heparin drip  Bone Catheter: Not present  Lines: None     Cardiac Monitoring: None  Code Status: No CPR- Do NOT Intubate      Clinically Significant Risk Factors                    # Hypertension: Noted on problem list     # Dementia: noted on problem list     # DMII: A1C = 8.3 % (Ref range: <5.7 %) within past 6 months     # Obesity: Estimated body mass index is 33.87 kg/m  as calculated from the following:    Height as of 9/28/23: 1.626 m (5' 4\").    Weight as of this encounter: 89.5 kg (197 lb 5 oz).               Disposition Plan      Expected Discharge Date: 10/05/2023    Discharge Delays: Other (Add Comment)  IV Medication - consider oral or Home Infusion    Discharge Comments: hep gtt for DVT's/ likely PE.  WOC consult.  Confusion and on 1:1 for safety while on hep gtt (pt attempting to pull IV's out)  Back to her facility at discharge          Rodrick Vasquez MD  Hospitalist Service  [facility]  Securely message with [employer] (more info)  Text page via [facility] Paging/Directory   ______________________________________________________________________    Interval History   .No acute events overnight.    No report of chest pain, shortness of breath, or other new complaints. Called and discussed risks/benefits with HCA/mPOA, daughter Polina (both Deborah and Polina were called; Polina was available), and as per wishes will transition to DOAC, Apixban/Eliquis. Answered all questions she had to verbalized understanding and stated satisfaction.     Discussed with CM/SW as well. "     Physical Exam   Vital Signs: Temp: 98.8  F (37.1  C) Temp src: Oral BP: (!) 149/65 (RN notified) Pulse: 94   Resp: 16 SpO2: 94 % O2 Device: None (Room air)    Weight: 197 lbs 4.99 oz    GENERAL:  Alert, appears comfortable, in no acute distress, appears stated age, on room air   HEAD:  Normocephalic, without obvious abnormality, atraumatic   EYES:  Conjunctiva/corneas clear, no scleral icterus, EOM's appears intact grossly   NOSE: Nares normal, septum midline, mucosa normal, no drainage   THROAT: Lips, mucosa, and tongue appear normal   NECK: Symmetrical, trachea appears midline   BACK:   Symmetric, no curvature noted   LUNGS:   Symmetric chest rise on inhalation, respirations unlabored   CHEST WALL:  No apparent deformity   HEART:  No thrills or heaves visualized   ABDOMEN:   No masses or organomegaly apparent; non-scaphoid   EXTREMITIES: Extremities appear normal, atraumatic, no cyanosis   SKIN: No exanthems in the visualized areas; noted has wounds- at the Coccyx, R inner thigh, and R heel    NEURO: Alert, at reported baseline with known dementia, moves all four extremities freely and spontaneously   PSYCH: Cooperative, behavior is appropriate given context - acute illness and advanced severe dementia     Medical Decision Making       51 MINUTES SPENT BY ME on the date of service doing chart review, history, exam, documentation & further activities per the note.         Data   ------------------------- PAST 24 HR DATA REVIEWED -----------------------------------------------    I have personally reviewed the following data over the past 24 hrs:    15.1 (H)  \   9.7 (L)   / 235     N/A N/A N/A /  360 (H)   N/A N/A 1.60 (H) \       Imaging results reviewed over the past 24 hrs:   No results found for this or any previous visit (from the past 24 hour(s)).

## 2023-10-04 NOTE — PROGRESS NOTES
Care Management Follow Up    Length of Stay (days): 4    Expected Discharge Date: 10/05/2023     Concerns to be Addressed: discharge planning     Patient plan of care discussed at interdisciplinary rounds: Yes    Anticipated Discharge Disposition: Long Term Care     Anticipated Discharge Services:  (TBD)  Anticipated Discharge DME: None    Patient/family educated on Medicare website which has current facility and service quality ratings:  n/a    Education Provided on the Discharge Plan: Yes (AVS will be per bedside RN)  Patient/Family in Agreement with the Plan: unable to assess    Referrals Placed by CM/SW:    Private pay costs discussed: transportation costs    Additional Information:  DAMION called and spoke with Skip Jackson that Pt may be ready for discharge back to [facility] tomorrow and wants [facility] stretcher ride.  DAMION scheduled ride for 1:00 PM. DAMION called and updated Latasha at [facility]. PCS needed.     TOVA Escalona

## 2023-10-04 NOTE — CARE PLAN
Shift Events:     PRN Medications given for pain (See MAR).  PRN Mediatation attempted to give for pain, Pt. Refused.     Assessment:     Neuro: Disoriented x4, follows commands occasionally    Respiratory: RA    Cardiovascular: Apical pulse regular    GI/: Voiding via purewick. Diet: Regular. No BM this shift.    Lines/Drains: X2R hand PIVs, L) AC PIV    Gtts: Heparin gtt @1000units/hr    Pain: PAINAD scores of 0-5, PRN Medications given for pain (See MAR).       Problem: Plan of Care - These are the overarching goals to be used throughout the patient stay.    Goal: Optimal Comfort and Wellbeing  Outcome: Progressing     Problem: Risk for Delirium  Goal: Improved Behavioral Control  Outcome: Progressing  Intervention: Minimize Safety Risk  Recent Flowsheet Documentation  Taken 10/4/2023 0000 by Caitlyn Reyes, RN  Enhanced Safety Measures:  at bedside  Taken 10/3/2023 2000 by Caitlyn Reyes RN  Enhanced Safety Measures:  at bedside  Goal: Improved Sleep  Outcome: Progressing   Goal Outcome Evaluation:

## 2023-10-05 NOTE — DISCHARGE SUMMARY
[facility] MEDICINE  DISCHARGE SUMMARY     Primary Care Physician: Suri Chand  Admission Date: 9/30/2023   Discharge Provider: Phyllis Hoyt MD Discharge Date: 10/5/2023   Diet: Orders Placed This Encounter      Combination Diet Regular Diet Adult      Diet      Code Status: No CPR- Do NOT Intubate   Activity: activity as tolerated   [facility]      Condition at Discharge: Stable      REASON FOR PRESENTATION(See Admission Note for Details)   Abnormal imaging with DVTs    PRINCIPAL & ACTIVE DISCHARGE DIAGNOSES     Principal Problem:  Acute pulmonary emboli, acute bilateral lower extremity DVT  Acute on chronic renal insufficiency  Acute metabolic encephalopathy  Positive blood culture with Staph epidermidis likely contaminant  Chronic coccygeal pressure wound, right heel bullae with no sign of cellulitis.  Likely pressure injury.  Elevated troponin likely type II demand ischemia in the setting of pulmonary emboli     SIGNIFICANT FINDINGS (Imaging, labs):     Most Recent 3 CBC's:  Recent Labs   Lab Test 10/05/23  1159 10/03/23  0841 10/02/23  1823 10/02/23  0619   WBC 12.4* 15.1*  --  16.5*   HGB 9.3* 9.7* 10.3* 10.2*  10.2*   MCV 93 95  --  94    235  --  312      Most Recent 3 BMP's:  Recent Labs   Lab Test 10/05/23  1227 10/05/23  0939 10/05/23  0439 10/04/23  2157 10/04/23  0842 10/04/23  0432 10/03/23  0842 10/03/23  0840 10/02/23  0700 10/02/23  0619 10/01/23  0839 10/01/23  0653 09/30/23  1612 09/30/23  0532   NA  --   --   --   --   --   --   --   --   --  142  --  144  --  147*   POTASSIUM  --   --   --   --   --   --   --   --   --  4.5  --  4.5  --  5.0   CHLORIDE  --   --   --   --   --   --   --   --   --  109*  --  110*  --  107   CO2  --   --   --   --   --   --   --   --   --  23  --  23  --  25   BUN  --   --   --   --   --   --   --   --   --  51.8*  --  70.8*  --  104.0*   CR  --   --  1.45*  --   --  1.60*  --  1.51*  --  1.66*  --  1.73*  --   2.27*   ANIONGAP  --   --   --   --   --   --   --   --   --  10  --  11  --  15   TIM  --   --   --   --   --   --   --   --   --  8.4*  --  8.7*  --  9.3   * 228*  --  320*   < >  --    < >  --    < > 204*   < > 308*   < > 298*    < > = values in this interval not displayed.     Most Recent 2 LFT's:  Recent Labs   Lab Test 09/30/23  0532   AST 32   ALT 47   ALKPHOS 86   BILITOTAL 0.4     Most Recent INR's and Anticoagulation Dosing History:  Anticoagulation Dose History          Latest Ref Rng & Units 9/30/2023   Recent Dosing and Labs   INR 0.85 - 1.15 1.13        Recent Labs   Lab Test 09/06/23  0831 06/07/23  0834   TSH  --  2.98   A1C 8.3* 7.5*     Results for orders placed or performed during the hospital encounter of 09/30/23   CT Head w/o Contrast    Narrative    EXAM: CT HEAD WITHOUT CONTRAST  LOCATION: [facility]  DATE: 09/30/2023    INDICATION: Altered mental status.  COMPARISON: None.  TECHNIQUE: Routine CT Head without IV contrast. Multiplanar reformats. Dose reduction techniques were used.    FINDINGS:  INTRACRANIAL CONTENTS: No intracranial hemorrhage, extra-axial collection, or mass effect.  Small chronic infarction left inferior cerebellum. Mild presumed chronic small vessel ischemic changes. Moderate generalized volume loss with preferential volume   loss within the anterior and medial temporal lobes. No hydrocephalus.     VISUALIZED ORBITS/SINUSES/MASTOIDS: No intraorbital abnormality. Moderate mucosal thickening right maxillary sinus. The paranasal sinuses are otherwise clear. No middle ear or mastoid effusion.    BONES/SOFT TISSUES: No acute abnormality.      Impression    IMPRESSION:  1.  No CT evidence for acute intracranial process.  2.  Brain atrophy and presumed chronic microvascular ischemic changes as above.       NM Lung Scan Perfusion Particulate    Narrative    EXAM: NM LUNG SCAN PERFUSION PARTICULATE  LOCATION: [facility]  HOSPITAL  DATE: 2023    INDICATION: Multiple DVTs, please assess for PE and has renal injury.  COMPARISON: Chest radiograph 2023 at 3:13 PM  TECHNIQUE: 8.6 mCi technetium-99m MAA, IV. Standard lung perfusion imaging.    FINDINGS: Abnormal perfusion throughout the right lung with multiple peripheral perfusion defects in the right lung highly suspicious for right-sided pulmonary embolic disease. Focal area of abnormal perfusion defect in the posterior medial aspect of the   left lower lobe also suspicious for pulmonary embolic disease.      Impression    IMPRESSION:     Abnormal perfusion scan with multiple peripheral perfusion defects in the right lung and a perfusion defect in the left lung as detailed above. These findings are highly suspicious for pulmonary emboli given history of DVT. The chest x-ray in these areas   demonstrate no pulmonary parenchymal abnormality.    Results discussed with the patient's nurse at 4:25 PM 2023.   XR Chest Port 1 View    Narrative    EXAM: XR CHEST PORTABLE 1 VIEW  LOCATION: [facility]  DATE: 2023    INDICATION: Chest x-ray to follow nuclear medicine lung scan per protocol.  COMPARISON: None.      Impression    IMPRESSION: Negative chest.           Echocardiogram Complete     Value    LVEF  > 65%    Narrative    [id number]  [id number]  [id number]  [id number]^DON^YESICA^[place] [address]     Name: ARACELI MCKEON  MRN: [id number]  : 1940  Study Date: 10/01/2023 02:45 PM  Age: 83 yrs  Gender: Female  Patient [facility]  Reason For Study: Pulmonary Emboli  Ordering Physician: YESICA OCHOA  Performed By: NEDRA     BSA: 1.9 m2  Height: 64 in  Weight: 182 lb  HR: 86  BP: 159/70 mmHg  ______________________________________________________________________________  Procedure  Complete Portable Echo Adult. Definity (NDC #15890-653) given intravenously.  Technically difficult study. Poor  acoustic windows. No hemodynamically  significant valvular abnormalities on 2D or color flow imaging. There is no  comparison study available.  ______________________________________________________________________________  Interpretation Summary     The right ventricle is not well visualized but grossly appears to have normal  size and systolic function.  Left ventricular size, wall motion and function are normal. The ejection  fraction is > 65%.  No hemodynamically significant valvular abnormalities on 2D or color flow  imaging.  Right ventricle systolic pressure estimate normal  There is no comparison study available.  ______________________________________________________________________________    ______________________________________________________________________________  Report approved by: Brock August 10/01/2023 04:23 PM              PENDING LABS         PROCEDURES ( this hospitalization only)          RECOMMENDATION FOR F/U VISIT       DISPOSITION     Long-term care    SUMMARY OF HOSPITAL COURSE:    83-year-old lady past medical history significant for Alzheimer's dementia, lives in a care facility, hypertension diabetes mellitus type 2, sleep apnea, brought into the ED with abnormal imaging, she was found to have bilateral lower extremity DVT on imaging.  Her presentation was concerning for sepsis, leukocytosis of 16, elevated high-sensitivity troponin, lactic acidosis, also diagnosed with acute renal failure on CKD. She had a VQ scan highly suspicious for pulmonary emboli.  She was initially treated with heparin drip, transition to Eliquis.  She remained hemodynamically stable.  She had elevated troponin, likely felt secondary to demand ischemia.  Echocardiogram did not show any regional wall motion changes, EF was greater than 65%.  She was seen by infectious disease, blood culture positive for staph epi felt likely contaminant.  Initially was started on antibiotics for possible UTI, urine  culture returned with mixed nithya.  ID discontinued antibiotics.  Her leukocytosis resolving at the time of the discharge.  She is discharging back to care facility in stable condition.  For diabetes mellitus type 2, her blood sugars were running high on the hospital, started on glipizide, continue with insulin sliding scale as needed.  Discharge Medications with Med changes:        Review of your medicines        START taking        Dose / Directions   apixaban ANTICOAGULANT 5 MG tablet  Commonly known as: ELIQUIS  Indication: DVT-PE Treatment  Used for: Pulmonary embolism, other, unspecified chronicity, unspecified whether acute cor pulmonale present (H)      Dose: 10 mg  Take 2 tablets (10 mg) by mouth 2 times daily 10mg BID for 6 days then 5 mg BID  Quantity: 120 tablet  Refills: 0     glipiZIDE 5 MG 24 hr tablet  Commonly known as: GLUCOTROL XL  Used for: Type 2 diabetes mellitus with hyperglycemia, without long-term current use of insulin (H)      Dose: 5 mg  Take 1 tablet (5 mg) by mouth daily  Quantity: 30 tablet  Refills: 0            CONTINUE these medicines which have NOT CHANGED        Dose / Directions   Acetaminophen 8 Hour 650 MG CR tablet  Generic drug: acetaminophen      Dose: 650 mg  Take 650 mg by mouth every 6 hours as needed for pain  Refills: 0     amLODIPine 10 MG tablet  Commonly known as: NORVASC      Dose: 10 mg  Take 10 mg by mouth every morning  Refills: 0     insulin aspart 100 UNIT/ML pen  Commonly known as: NovoLOG PEN      Dose: 1-8 Units  Inject 1-8 Units Subcutaneous 3 times daily (with meals) Use half the sliding scale if not eating  Refills: 0     losartan 50 MG tablet  Commonly known as: COZAAR      Dose: 50 mg  Take 50 mg by mouth every morning  Refills: 0     polyethylene glycol 17 g packet  Commonly known as: MIRALAX      Dose: 17 g  Take 17 g by mouth every morning  Refills: 0     Vitamin D3 25 mcg (1000 units) tablet  Commonly known as: CHOLECALCIFEROL      Dose: 1  tablet  Take 1 tablet by mouth every morning  Refills: 0     zinc oxide 40 % paste  Commonly known as: DESITIN      Apply topically every 12 hours Apply to coccyx  Refills: 0               Where to get your medicines        These medications were sent to [id number] - [place], [place] - [address]  [address], [place] [address]      Phone: [phone]   apixaban ANTICOAGULANT 5 MG tablet  glipiZIDE 5 MG 24 hr tablet              Rationale for medication changes:    Glipizide for diabetes  Apixaban for DVT, PE        Consults   Infectious disease      Immunizations given this encounter     [unfilled]      Discharge Procedure Orders   General info for SNF   Order Comments: Length of Stay Estimate: Long Term Care  Condition at Discharge: Improving  Level of care:skilled   Rehabilitation Potential: Fair  Admission H&P remains valid and up-to-date: Yes  Recent Chemotherapy: N/A  Use Nursing Home Standing Orders: Yes     Mantoux instructions   Order Comments: Give two-step Mantoux (PPD) Per Facility Policy Yes     Follow Up and recommended labs and tests   Order Comments: Follow up with Nursing home physician.  Check bmp IN 3 DAYS  Follow up with primary care provider in 2 weeks     Reason for your hospital stay   Order Comments: Blood clots in lungs, legs     Glucose monitor nursing POCT   Order Comments: Before meals and at bedtime     Activity - Up with assistive device     Order Specific Question Answer Comments   Is discharge order? Yes      Wound care   Order Comments: Site:    Buttocks  Instructions:   1. Gently wipe away soiled TRIAD, do not scrub  2. Can perform tre cares with bath wipes and reapply TRIAD over wound BID + PRN to keep areas covered  3. No briefs in bed, one can be worn if patient getting into chair  4. Continue to turn patient every 2 hours, use wedges to position patient    Right heel     No CPR- Do NOT Intubate     Order Specific Question  "Answer Comments   Code status determined by: Discussion with patient/ legal decision maker      Physical Therapy Adult Consult   Order Comments: Evaluate and treat as clinically indicated.    Reason:  weakness     Occupational Therapy Adult Consult   Order Comments: Evaluate and treat as clinically indicated.    Reason:  weakness     Fall precautions     Diet   Order Comments: Follow this diet upon discharge: Orders Placed This Encounter      Snacks/Supplements Adult: Glucerna; With Meals      Combination Diet Regular Diet Adult     Order Specific Question Answer Comments   Is discharge order? Yes      Examination     Vital Signs in last 24 hours:   Vital signs:  Temp: 98.5  F (36.9  C) Temp src: Axillary BP: (!) 167/74 Pulse: 93   Resp: 18 SpO2: 96 % O2 Device: None (Room air)     Weight: 89.5 kg (197 lb 5 oz)  Estimated body mass index is 33.87 kg/m  as calculated from the following:    Height as of 9/28/23: 1.626 m (5' 4\").    Weight as of this encounter: 89.5 kg (197 lb 5 oz).       Pertinent positives on exam:  Heart S1-S2 heard regular rate and rhythm  Lungs: Clear to auscultation bilaterally  Extremities: No edema    Please see EMR for more detailed significant labs, imaging, consultant notes etc.  Total time spent on discharge: > 35 minutes    Phyllis Hoyt MD   [facility] Hospitalist Service: Ph:[phone]    CC:Suri Chand      "

## 2023-10-05 NOTE — PROGRESS NOTES
Care Management Discharge Note    Discharge Date: 10/05/2023       Discharge Disposition: Long Term Care    Discharge Services: None    Discharge DME: None    Discharge Transportation:  (TBD)    Private pay costs discussed: transportation costs    Does the patient's insurance plan have a 3 day qualifying hospital stay waiver?  No    PAS Confirmation Code: N/A  Patient/family educated on Medicare website which has current facility and service quality ratings: no    Education Provided on the Discharge Plan: Yes  Persons Notified of Discharge Plans: Pt, Dtr, and LTC   Patient/Family in Agreement with the Plan: unable to assess    Handoff Referral Completed:   Yes  Additional Information:     Pt was scheduled to discharge back to [facility] at 1:00 PM. Waiting on lab work.  SW changed ride time to 2:30 PM or between 2:15 PM and 3:00 PM.  No PAS needed as Pt is returning.     1:10 PM  [facility] sent discharge orders to Latasha at [facility]. PCS completed.     TOVA Escalona

## 2023-10-05 NOTE — PLAN OF CARE
Problem: Plan of Care - These are the overarching goals to be used throughout the patient stay.    Goal: Optimal Comfort and Wellbeing  Outcome: Progressing    Goal Outcome Evaluation:    Discharge at 1445 back to memory care. Belongings returned to patient.

## 2023-10-05 NOTE — PLAN OF CARE
Patient not cooperative with  use, patient closed eyes and would not respond. No signs of pain until turning patient. 1:1 continuing. VSS. Blood sugars high during shift (see results).     Problem: Diabetes Comorbidity  Goal: Blood Glucose Level Within Targeted Range  Outcome: Not Progressing     Problem: Risk for Delirium  Goal: Improved Behavioral Control  Outcome: Progressing  Intervention: Minimize Safety Risk  Recent Flowsheet Documentation  Taken 10/4/2023 1600 by Jeyson Del Cid RN  Enhanced Safety Measures:  at bedside   Goal Outcome Evaluation:

## 2023-10-05 NOTE — PROGRESS NOTES
Patient is [place]orese speaking, not cooperative with . Nonverbal communication techniques utilized. BP is slightly elevated, other vital signs are stable on room air. She has bruises under her left arm, wounds on back, heel, buttocks and inner thigh. Mepilex covering wounds is clean, dry and intact.  Critical anti Xa >1.1 received from lab, patient not on heparin protocol. Q2H turns continued. Creatinine elevated.

## 2023-10-05 NOTE — PROGRESS NOTES
Physical Therapy: Orders received. Chart reviewed and discussed with care team.? Physical Therapy not indicated due to patient discharging back to LTC today, will have PT/OT evaluations back at LTC.? Will complete orders. Thanks

## 2023-10-05 NOTE — PROGRESS NOTES
OT: Orders received. Chart reviewed and discussed with care team.  OT not indicated due to pt being discharged back to LTC today and will be getting therapy upon return. Will complete orders.    -Mark Bhat OTR/L

## 2023-10-06 NOTE — TELEPHONE ENCOUNTER
"CC and HPI:  Nurse reports that pt new admit--needs SSI clarified      ASSESSMENT and PLAN:    Inf accuchecks 201-250-- 3 units Novolog  If accuchecks 251-300,-- 4 units  \"  If 351-400--5 units Novolog  If >400 call  Check with provider in am to review    Call if further concerns as well as update patient's  provider/NP.  Pt new  "

## 2023-10-06 NOTE — LETTER
10/6/2023        RE: Kandi Gannon  [facility]  [address]  [facility] [address]                                                                                      [facility]     Code Status:  DNR  Visit Type: Nursing Home Regulatory and Hospital F/U     Facility:   [facility] () [[id number]]        History of Present Illness:   Hospital Admission Date: 9/30/2023    Hospital Discharge Date: 10/5/2023      Kandi Gannon is a 83 year old female with advance Alzheimer's, HTN, DM2, NEDRA.  She was recently hospitalized for SIRS 2/2 COVID 19 and acute BLE DVT.  UA was positive for infection and so she was started on IV antibiotics.  She had a VQ scan showing abnormal perfusion consistent with pulmonary emboli.  She was seen by ID and was treated for staph bacteremia with IV vancomycin.  She did have Type II demand ischemia in which her troponin trended down with treatments for SIRs.  Ultimately, family chose to start DOAC Eliquis for clots after discussing risks to benefits.      KATRIN improved with Cr going from 2.3 to 1.5.  baseline 1.2. improved with holding losartan and resuming at discharge. She also had anemia and hgb trended down from 12.3 to 9.7.  no active bleeding and hgb stabilized.      She has a new coccyx wound and wound of right inner thigh and right heel due to pressure and poor mobility.      Treated for oral candidiasis with oral nystatin.      Today, she is lying in bed and states in Vietnamese she doesn't feel well and is very tired.  She has no apparent pain on exam.     -140s on Glipizide XR and mild dosed sliding scale insulin    History reviewed. No pertinent past medical history.  History reviewed. No pertinent surgical history.  Family History   Problem Relation Age of Onset     Hypertension Other      Diabetes Other      Cerebrovascular Disease Other      Social History     Socioeconomic History     Marital status:       Spouse name: Not on file     Number of children: 3     Years of education: Not on file     Highest education level: Not on file   Occupational History     Not on file   Tobacco Use     Smoking status: Never     Smokeless tobacco: Never   Vaping Use     Vaping Use: Never used   Substance and Sexual Activity     Alcohol use: Not on file     Drug use: Never     Sexual activity: Not Currently     Partners: Male   Other Topics Concern     Not on file   Social History Narrative     Not on file     Social Determinants of Health     Financial Resource Strain: Not on file   Food Insecurity: Not on file   Transportation Needs: Not on file   Physical Activity: Not on file   Stress: Not on file   Social Connections: Not on file   Interpersonal Safety: Not on file   Housing Stability: Not on file       Current Outpatient Medications   Medication Sig Dispense Refill     acetaminophen (ACETAMINOPHEN 8 HOUR) 650 MG CR tablet Take 650 mg by mouth every 6 hours as needed for pain       amLODIPine (NORVASC) 10 MG tablet Take 10 mg by mouth every morning       apixaban ANTICOAGULANT (ELIQUIS) 5 MG tablet Take 2 tablets (10 mg) by mouth 2 times daily 10mg BID for 6 days then 5 mg  tablet 0     glipiZIDE (GLUCOTROL XL) 5 MG 24 hr tablet Take 1 tablet (5 mg) by mouth daily 30 tablet 0     losartan (COZAAR) 50 MG tablet Take 50 mg by mouth every morning       metFORMIN (GLUCOPHAGE) 500 MG tablet Take 500 mg by mouth 2 times daily (with meals)       polyethylene glycol (MIRALAX) 17 g packet Take 17 g by mouth every morning       Vitamin D, Cholecalciferol, 25 MCG (1000 UT) TABS Take 1 tablet by mouth every morning       zinc oxide (DESITIN) 40 % paste Apply topically every 12 hours Apply to coccyx       insulin aspart (NOVOLOG PEN) 100 UNIT/ML pen Inject 1-8 Units Subcutaneous 3 times daily (with meals) Use half the sliding scale if not eating (Patient not taking: Reported on 10/5/2023)       Allergies   Allergen Reactions      "Codeine Sulfate [Codeine] Unknown     Immunization History   Administered Date(s) Administered     COVID-19 Monovalent 18+ (Moderna) 12/29/2020, 01/26/2021, 11/19/2021, 05/12/2022     FLU 6-35 months 09/24/2013     Flu, Unspecified 10/16/2007, 10/27/2008, 11/17/2017, 10/18/2018     Influenza (High Dose) 3 valent vaccine 12/03/2014, 11/02/2015, 11/08/2016     Influenza (IIV3) PF 10/16/2007, 10/27/2008, 10/18/2018     Influenza Vaccine 18-64 (Flublok) 10/06/2020     Influenza Vaccine Im 4yrs+ 4 Valent CCIIV4 09/27/2021, 10/20/2022     Pneumo Conj 13-V (2010&after) 07/14/2018     Pneumococcal 23 valent 10/19/2007     Td (Adult), Adsorbed 06/23/2008     Td,adult,historic,unspecified 06/23/2008         Post Discharge Medication Reconciliation Status: discharge medications reconciled, continue medications without change.    Medications list and allergies in the facility chart have been reviewed.  Please see facility EMR for most up to date list.         Review of Systems   Unable to obtain due to Dementia    Physical Exam  Vital signs:/71   Pulse 80   Temp 97.5  F (36.4  C)   Resp 16   Ht 1.626 m (5' 4\")   Wt 88 kg (194 lb)   SpO2 97%   BMI 33.30 kg/m     GENERAL APPEARANCE: Well developed elderly female,  in no acute distress.  HEENT: normocephalic, atraumatic  sclerae anicteric, conjunctivae clear and moist, EOM intact  LUNGS: Lung sounds CTA, no adventitious sounds, respiratory effort normal.  CARD: RRR, S1, S2, without murmurs, gallops, rubs  ABD: Soft, nondistended and nontender with normal bowel sounds.   MSK: Muscle strength and tone were equal bilaterally. Moves all extremities easily and intentionally.   EXTREMITIES: No cyanosis, clubbing or edema.  NEURO: Alert with cognitive impairment,  Face is symmetric.  SKIN: wounds not viewed by me  PSYCH: euthymic        Labs:    Last Comprehensive Metabolic Panel:  Lab Results   Component Value Date     10/02/2023    POTASSIUM 4.5 10/02/2023    " CHLORIDE 109 (H) 10/02/2023    CO2 23 10/02/2023    ANIONGAP 10 10/02/2023     (H) 10/05/2023    BUN 51.8 (H) 10/02/2023    CR 1.45 (H) 10/05/2023    GFRESTIMATED 36 (L) 10/05/2023    TIM 8.4 (L) 10/02/2023       Lab Results   Component Value Date    WBC 12.4 10/05/2023     Lab Results   Component Value Date    RBC 3.07 10/05/2023     Lab Results   Component Value Date    HGB 9.3 10/05/2023     Lab Results   Component Value Date    HCT 28.6 10/05/2023     Lab Results   Component Value Date    MCV 93 10/05/2023     Lab Results   Component Value Date    MCH 30.3 10/05/2023     Lab Results   Component Value Date    MCHC 32.5 10/05/2023     Lab Results   Component Value Date    RDW 14.8 10/05/2023     Lab Results   Component Value Date     10/05/2023             Assessment/plan:   Acute bilateral deep vein thrombosis (DVT) of popliteal veins (H)  Acute pulmonary embolism, unspecified pulmonary embolism type, unspecified whether acute cor pulmonale present (H)  Stable, continue with Eliquis and monitor. PT/OT eval and treat.     Type 2 diabetes mellitus with hyperglycemia, without long-term current use of insulin (H)  Hold orders for glipizide and sliding scale insulin if patient not eating.  Likely can expect lower BS with glipizide so will monitor and consider replacing with Lantus if she accepts insulin.  She often refuses medications.  Will hold off on restarting Metformin at this time due to renal function.  Monitor BMP.     Severe late onset Alzheimer's dementia without behavioral disturbance, psychotic disturbance, mood disturbance, or anxiety (H)  Severe and anticipate ongoing decline and weight loss if appetite does not improve.      Anemia, unspecified type  Last hgb 9.3, recheck hgb and monitor.      CKD:   New diagnosis, advised nursing to make sure well hydrated.  Monitor BMP last GFR 36    Open wounds:  Air mattress, PT/OT eval and treat.  Wound care orders in facility.       45 total minutes  spent in reviewing medical records from [facility], assessing patient and coordinating care with nursing.     Electronically signed by: Suri Chand NP      Sincerely,        Suri Chand, NP

## 2023-10-06 NOTE — PROGRESS NOTES
[facility]     Code Status:  DNR  Visit Type: Nursing Home Regulatory and Hospital F/U     Facility:   [facility] () [[id number]]        History of Present Illness:   Hospital Admission Date: 9/30/2023    Hospital Discharge Date: 10/5/2023      Kandi Gannon is a 83 year old female with advance Alzheimer's, HTN, DM2, NEDRA.  She was recently hospitalized for SIRS 2/2 COVID 19 and acute BLE DVT.  UA was positive for infection and so she was started on IV antibiotics.  She had a VQ scan showing abnormal perfusion consistent with pulmonary emboli.  She was seen by ID and was treated for staph bacteremia with IV vancomycin.  She did have Type II demand ischemia in which her troponin trended down with treatments for SIRs.  Ultimately, family chose to start DOAC Eliquis for clots after discussing risks to benefits.      KATRIN improved with Cr going from 2.3 to 1.5.  baseline 1.2. improved with holding losartan and resuming at discharge. She also had anemia and hgb trended down from 12.3 to 9.7.  no active bleeding and hgb stabilized.      She has a new coccyx wound and wound of right inner thigh and right heel due to pressure and poor mobility.      Treated for oral candidiasis with oral nystatin.      Today, she is lying in bed and states in Chilean she doesn't feel well and is very tired.  She has no apparent pain on exam.     -140s on Glipizide XR and mild dosed sliding scale insulin    History reviewed. No pertinent past medical history.  History reviewed. No pertinent surgical history.  Family History   Problem Relation Age of Onset    Hypertension Other     Diabetes Other     Cerebrovascular Disease Other      Social History     Socioeconomic History    Marital status:      Spouse name: Not on file    Number of children: 3    Years of education: Not on file    Highest education level: Not on  file   Occupational History    Not on file   Tobacco Use    Smoking status: Never    Smokeless tobacco: Never   Vaping Use    Vaping Use: Never used   Substance and Sexual Activity    Alcohol use: Not on file    Drug use: Never    Sexual activity: Not Currently     Partners: Male   Other Topics Concern    Not on file   Social History Narrative    Not on file     Social Determinants of Health     Financial Resource Strain: Not on file   Food Insecurity: Not on file   Transportation Needs: Not on file   Physical Activity: Not on file   Stress: Not on file   Social Connections: Not on file   Interpersonal Safety: Not on file   Housing Stability: Not on file       Current Outpatient Medications   Medication Sig Dispense Refill    acetaminophen (ACETAMINOPHEN 8 HOUR) 650 MG CR tablet Take 650 mg by mouth every 6 hours as needed for pain      amLODIPine (NORVASC) 10 MG tablet Take 10 mg by mouth every morning      apixaban ANTICOAGULANT (ELIQUIS) 5 MG tablet Take 2 tablets (10 mg) by mouth 2 times daily 10mg BID for 6 days then 5 mg  tablet 0    glipiZIDE (GLUCOTROL XL) 5 MG 24 hr tablet Take 1 tablet (5 mg) by mouth daily 30 tablet 0    losartan (COZAAR) 50 MG tablet Take 50 mg by mouth every morning      metFORMIN (GLUCOPHAGE) 500 MG tablet Take 500 mg by mouth 2 times daily (with meals)      polyethylene glycol (MIRALAX) 17 g packet Take 17 g by mouth every morning      Vitamin D, Cholecalciferol, 25 MCG (1000 UT) TABS Take 1 tablet by mouth every morning      zinc oxide (DESITIN) 40 % paste Apply topically every 12 hours Apply to coccyx      insulin aspart (NOVOLOG PEN) 100 UNIT/ML pen Inject 1-8 Units Subcutaneous 3 times daily (with meals) Use half the sliding scale if not eating (Patient not taking: Reported on 10/5/2023)       Allergies   Allergen Reactions    Codeine Sulfate [Codeine] Unknown     Immunization History   Administered Date(s) Administered    COVID-19 Monovalent 18+ (Moderna) 12/29/2020,  "01/26/2021, 11/19/2021, 05/12/2022    FLU 6-35 months 09/24/2013    Flu, Unspecified 10/16/2007, 10/27/2008, 11/17/2017, 10/18/2018    Influenza (High Dose) 3 valent vaccine 12/03/2014, 11/02/2015, 11/08/2016    Influenza (IIV3) PF 10/16/2007, 10/27/2008, 10/18/2018    Influenza Vaccine 18-64 (Flublok) 10/06/2020    Influenza Vaccine Im 4yrs+ 4 Valent CCIIV4 09/27/2021, 10/20/2022    Pneumo Conj 13-V (2010&after) 07/14/2018    Pneumococcal 23 valent 10/19/2007    Td (Adult), Adsorbed 06/23/2008    Td,adult,historic,unspecified 06/23/2008         Post Discharge Medication Reconciliation Status: discharge medications reconciled, continue medications without change.    Medications list and allergies in the facility chart have been reviewed.  Please see facility EMR for most up to date list.         Review of Systems   Unable to obtain due to Dementia    Physical Exam  Vital signs:/71   Pulse 80   Temp 97.5  F (36.4  C)   Resp 16   Ht 1.626 m (5' 4\")   Wt 88 kg (194 lb)   SpO2 97%   BMI 33.30 kg/m     GENERAL APPEARANCE: Well developed elderly female,  in no acute distress.  HEENT: normocephalic, atraumatic  sclerae anicteric, conjunctivae clear and moist, EOM intact  LUNGS: Lung sounds CTA, no adventitious sounds, respiratory effort normal.  CARD: RRR, S1, S2, without murmurs, gallops, rubs  ABD: Soft, nondistended and nontender with normal bowel sounds.   MSK: Muscle strength and tone were equal bilaterally. Moves all extremities easily and intentionally.   EXTREMITIES: No cyanosis, clubbing or edema.  NEURO: Alert with cognitive impairment,  Face is symmetric.  SKIN: wounds not viewed by me  PSYCH: euthymic        Labs:    Last Comprehensive Metabolic Panel:  Lab Results   Component Value Date     10/02/2023    POTASSIUM 4.5 10/02/2023    CHLORIDE 109 (H) 10/02/2023    CO2 23 10/02/2023    ANIONGAP 10 10/02/2023     (H) 10/05/2023    BUN 51.8 (H) 10/02/2023    CR 1.45 (H) 10/05/2023    " GFRESTIMATED 36 (L) 10/05/2023    TIM 8.4 (L) 10/02/2023       Lab Results   Component Value Date    WBC 12.4 10/05/2023     Lab Results   Component Value Date    RBC 3.07 10/05/2023     Lab Results   Component Value Date    HGB 9.3 10/05/2023     Lab Results   Component Value Date    HCT 28.6 10/05/2023     Lab Results   Component Value Date    MCV 93 10/05/2023     Lab Results   Component Value Date    MCH 30.3 10/05/2023     Lab Results   Component Value Date    MCHC 32.5 10/05/2023     Lab Results   Component Value Date    RDW 14.8 10/05/2023     Lab Results   Component Value Date     10/05/2023             Assessment/plan:   Acute bilateral deep vein thrombosis (DVT) of popliteal veins (H)  Acute pulmonary embolism, unspecified pulmonary embolism type, unspecified whether acute cor pulmonale present (H)  Stable, continue with Eliquis and monitor. PT/OT eval and treat.     Type 2 diabetes mellitus with hyperglycemia, without long-term current use of insulin (H)  Hold orders for glipizide and sliding scale insulin if patient not eating.  Likely can expect lower BS with glipizide so will monitor and consider replacing with Lantus if she accepts insulin.  She often refuses medications.  Will hold off on restarting Metformin at this time due to renal function.  Monitor BMP.     Severe late onset Alzheimer's dementia without behavioral disturbance, psychotic disturbance, mood disturbance, or anxiety (H)  Severe and anticipate ongoing decline and weight loss if appetite does not improve.      Anemia, unspecified type  Last hgb 9.3, recheck hgb and monitor.      CKD:   New diagnosis, advised nursing to make sure well hydrated.  Monitor BMP last GFR 36    Open wounds:  Air mattress, PT/OT eval and treat.  Wound care orders in facility.       45 total minutes spent in reviewing medical records from [facility], assessing patient and coordinating care with nursing.     Electronically signed by: Suri YANCEY  Jagruti, NP

## 2023-10-10 NOTE — PROGRESS NOTES
[facility]      Code Status:  DNR/DNI  Visit Type: RECHECK     Facility:   [facility] () [[id number]]         History of Present Illness: Kandi Gannon is a 83 year old female  with advance Alzheimer's, HTN, DM2, NEDRA.  She was recently hospitalized for SIRS 2/2 COVID 19 and acute BLE DVT.     Today, I follow up with patient on her condition.  She reports pain in her feet but when assessed it appears to be discomfort with sheets.  She has been refusing cares, showers, medications and therapy.  Therapy reports that they were going to try a session with daughter present to see if they could get her moving.  She lies in bed most days.  I speak to her in Ukrainian language. When asked why she doesn't want to get up her response does not align with questioning.      BS running 170-300s.  It appears that the high BS are due to refusing medications.      Current Outpatient Medications   Medication Sig Dispense Refill    acetaminophen (ACETAMINOPHEN 8 HOUR) 650 MG CR tablet Take 650 mg by mouth every 6 hours as needed for pain      amLODIPine (NORVASC) 10 MG tablet Take 10 mg by mouth every morning      apixaban ANTICOAGULANT (ELIQUIS) 5 MG tablet Take 2 tablets (10 mg) by mouth 2 times daily 10mg BID for 6 days then 5 mg  tablet 0    glipiZIDE (GLUCOTROL XL) 5 MG 24 hr tablet Take 1 tablet (5 mg) by mouth daily 30 tablet 0    insulin aspart (NOVOLOG FLEXPEN) 100 UNIT/ML pen Inject Subcutaneous 3 times daily (with meals) 200-250 2u  251-300 3u  301-350 4u  351-500 5u  401+ call provider      losartan (COZAAR) 50 MG tablet Take 50 mg by mouth every morning      polyethylene glycol (MIRALAX) 17 g packet Take 17 g by mouth every morning      Vitamin D, Cholecalciferol, 25 MCG (1000 UT) TABS Take 1 tablet by mouth every morning      zinc oxide (DESITIN) 40 % paste Apply topically every 12 hours Apply to  "coccyx      metFORMIN (GLUCOPHAGE) 500 MG tablet Take 500 mg by mouth 2 times daily (with meals) (Patient not taking: Reported on 10/10/2023)         Review of Systems   Unable to obtain due to dementia    Physical Exam  Vital signs:/74   Pulse 75   Temp 98.3  F (36.8  C)   Resp 20   Ht 1.626 m (5' 4\")   Wt 88 kg (194 lb)   SpO2 95%   BMI 33.30 kg/m     GENERAL APPEARANCE: Well developed, well nourished, in no acute distress.  HEENT: normocephalic, atraumatic   sclerae anicteric, conjunctivae clear and moist, EOM intact  LUNGS: Lung sounds CTA, no adventitious sounds, respiratory effort normal.  CARD: RRR, S1, S2, without murmurs, gallops, rubs  ABD: Soft, nondistended and nontender with normal bowel sounds.   MSK: Muscle strength and tone were equal bilaterally. Moves all extremities easily and intentionally.   EXTREMITIES: No cyanosis, clubbing or edema.  NEURO: Alert with cognitive impairment, Face is symmetric.  SKIN: wounds on buttocks and right foot not seen by me today  PSYCH: euthymic        Labs:    Last Comprehensive Metabolic Panel:  Lab Results   Component Value Date     10/02/2023    POTASSIUM 4.5 10/02/2023    CHLORIDE 109 (H) 10/02/2023    CO2 23 10/02/2023    ANIONGAP 10 10/02/2023     (H) 10/05/2023    BUN 51.8 (H) 10/02/2023    CR 1.45 (H) 10/05/2023    GFRESTIMATED 36 (L) 10/05/2023    TIM 8.4 (L) 10/02/2023       Lab Results   Component Value Date    WBC 12.4 10/05/2023     Lab Results   Component Value Date    RBC 3.07 10/05/2023     Lab Results   Component Value Date    HGB 9.3 10/05/2023     Lab Results   Component Value Date    HCT 28.6 10/05/2023     Lab Results   Component Value Date    MCV 93 10/05/2023     Lab Results   Component Value Date    MCH 30.3 10/05/2023     Lab Results   Component Value Date    MCHC 32.5 10/05/2023     Lab Results   Component Value Date    RDW 14.8 10/05/2023     Lab Results   Component Value Date     10/05/2023 "           Assessment/Plan:  Acute bilateral deep vein thrombosis (DVT) of popliteal veins (H)  Acute pulmonary embolism, unspecified pulmonary embolism type, unspecified whether acute cor pulmonale present (H)  Stable, continue with Eliquis and monitor.  Therapy to continue to attempt therapy.     Failure to thrive in adult  Nutritional supplements, nursing to encourage patient to get in wheelchair daily.      Type 2 diabetes mellitus with hyperglycemia, without long-term current use of insulin (H)  Starting to trend down,  monitor.  Often refuses medications.  Continue with Glipizide and sliding scale insulin.  May need to consider Lantus.  Do not restart Metformin due to renal failure.     Stage 3b chronic kidney disease (H)  Last GFR stable at 36.  Cr 1.5 still not at baseline 1.2.  awaiting labs today.          Electronically signed by:Suri Chand NP

## 2023-10-10 NOTE — TELEPHONE ENCOUNTER
[facility] Lab Note     Provider: ZANDRA Krishnamurthy  Facility: [facility]  Facility Type:  LT    Allergies   Allergen Reactions    Codeine Sulfate [Codeine] Unknown       Labs Reviewed by provider: Fatuma 2, BMP     Verbal Order/Direction given by Provider: No new orders.      Provider giving Order:  ZANDRA Krishnamurthy    Verbal Order given to: Elana([phone])    Davin Casillas RN

## 2023-10-10 NOTE — LETTER
10/10/2023        RE: Kandi Gannon  [facility]  [address]  [facility] [address]                                                                                           [facility]      Code Status:  DNR/DNI  Visit Type: RECHECK     Facility:   [facility] () [[id number]]         History of Present Illness: Kandi Gannon is a 83 year old female  with advance Alzheimer's, HTN, DM2, NEDRA.  She was recently hospitalized for SIRS 2/2 COVID 19 and acute BLE DVT.     Today, I follow up with patient on her condition.  She reports pain in her feet but when assessed it appears to be discomfort with sheets.  She has been refusing cares, showers, medications and therapy.  Therapy reports that they were going to try a session with daughter present to see if they could get her moving.  She lies in bed most days.  I speak to her in Filipino language. When asked why she doesn't want to get up her response does not align with questioning.      BS running 170-300s.  It appears that the high BS are due to refusing medications.      Current Outpatient Medications   Medication Sig Dispense Refill     acetaminophen (ACETAMINOPHEN 8 HOUR) 650 MG CR tablet Take 650 mg by mouth every 6 hours as needed for pain       amLODIPine (NORVASC) 10 MG tablet Take 10 mg by mouth every morning       apixaban ANTICOAGULANT (ELIQUIS) 5 MG tablet Take 2 tablets (10 mg) by mouth 2 times daily 10mg BID for 6 days then 5 mg  tablet 0     glipiZIDE (GLUCOTROL XL) 5 MG 24 hr tablet Take 1 tablet (5 mg) by mouth daily 30 tablet 0     insulin aspart (NOVOLOG FLEXPEN) 100 UNIT/ML pen Inject Subcutaneous 3 times daily (with meals) 200-250 2u  251-300 3u  301-350 4u  351-500 5u  401+ call provider       losartan (COZAAR) 50 MG tablet Take 50 mg by mouth every morning       polyethylene glycol (MIRALAX) 17 g packet Take 17 g by mouth every morning       Vitamin D, Cholecalciferol, 25 MCG  "(1000 UT) TABS Take 1 tablet by mouth every morning       zinc oxide (DESITIN) 40 % paste Apply topically every 12 hours Apply to coccyx       metFORMIN (GLUCOPHAGE) 500 MG tablet Take 500 mg by mouth 2 times daily (with meals) (Patient not taking: Reported on 10/10/2023)         Review of Systems   Unable to obtain due to dementia    Physical Exam  Vital signs:/74   Pulse 75   Temp 98.3  F (36.8  C)   Resp 20   Ht 1.626 m (5' 4\")   Wt 88 kg (194 lb)   SpO2 95%   BMI 33.30 kg/m     GENERAL APPEARANCE: Well developed, well nourished, in no acute distress.  HEENT: normocephalic, atraumatic   sclerae anicteric, conjunctivae clear and moist, EOM intact  LUNGS: Lung sounds CTA, no adventitious sounds, respiratory effort normal.  CARD: RRR, S1, S2, without murmurs, gallops, rubs  ABD: Soft, nondistended and nontender with normal bowel sounds.   MSK: Muscle strength and tone were equal bilaterally. Moves all extremities easily and intentionally.   EXTREMITIES: No cyanosis, clubbing or edema.  NEURO: Alert with cognitive impairment, Face is symmetric.  SKIN: wounds on buttocks and right foot not seen by me today  PSYCH: euthymic        Labs:    Last Comprehensive Metabolic Panel:  Lab Results   Component Value Date     10/02/2023    POTASSIUM 4.5 10/02/2023    CHLORIDE 109 (H) 10/02/2023    CO2 23 10/02/2023    ANIONGAP 10 10/02/2023     (H) 10/05/2023    BUN 51.8 (H) 10/02/2023    CR 1.45 (H) 10/05/2023    GFRESTIMATED 36 (L) 10/05/2023    TIM 8.4 (L) 10/02/2023       Lab Results   Component Value Date    WBC 12.4 10/05/2023     Lab Results   Component Value Date    RBC 3.07 10/05/2023     Lab Results   Component Value Date    HGB 9.3 10/05/2023     Lab Results   Component Value Date    HCT 28.6 10/05/2023     Lab Results   Component Value Date    MCV 93 10/05/2023     Lab Results   Component Value Date    MCH 30.3 10/05/2023     Lab Results   Component Value Date    MCHC 32.5 10/05/2023     Lab " Results   Component Value Date    RDW 14.8 10/05/2023     Lab Results   Component Value Date     10/05/2023           Assessment/Plan:  Acute bilateral deep vein thrombosis (DVT) of popliteal veins (H)  Acute pulmonary embolism, unspecified pulmonary embolism type, unspecified whether acute cor pulmonale present (H)  Stable, continue with Eliquis and monitor.  Therapy to continue to attempt therapy.     Failure to thrive in adult  Nutritional supplements, nursing to encourage patient to get in wheelchair daily.      Type 2 diabetes mellitus with hyperglycemia, without long-term current use of insulin (H)  Starting to trend down,  monitor.  Often refuses medications.  Continue with Glipizide and sliding scale insulin.  May need to consider Lantus.  Do not restart Metformin due to renal failure.     Stage 3b chronic kidney disease (H)  Last GFR stable at 36.  Cr 1.5 still not at baseline 1.2.  awaiting labs today.          Electronically signed by:Suri Chand NP              Sincerely,        Suri Chand NP

## 2023-10-25 NOTE — TELEPHONE ENCOUNTER
[facility] En[facility]    Provider: ZANDRA Krishnamurthy  Facility: [facility]  Facility Type:  LT    Caller: Alona  Call Back Number: [phone]    Allergies:    Allergies   Allergen Reactions    Codeine Sulfate [Codeine] Unknown        Reason for call: Patient on hospice. Family requests to stop all BG monitoring and insulin. They would like to continue with Glipizide.    Verbal Order/Direction given by Provider:   - discontinue BG monitoring and sliding scale insulin    Provider giving Order:  ZANDRA Krishnamurthy    Verbal Order given to: Hawa Belle RN

## 2023-11-02 NOTE — PROGRESS NOTES
Geriatrics Notification of Patient Death    Provider: ZANDRA Krishnamurthy  Place of death: [facility]   Facility Type:  LTC    Caller: Jerri  Date of Death: 11/02/2023 at 0935    Patient was on Hospice care: Yes; please explain: [facility]  Patient was seen by [facility] Geriatrics provider: Yes; please explain: by Suri Chand NP on 10/10/2023        Raquel Belle RN

## 2023-11-03 ENCOUNTER — PATIENT OUTREACH (OUTPATIENT)
Dept: GERIATRIC MEDICINE | Facility: CLINIC | Age: 83
End: 2023-11-03
Payer: COMMERCIAL

## 2023-11-03 NOTE — PROGRESS NOTES
[facility] Care Coordination Contact    Notified by E&E Jenelle Marrero that member passed away on 11/02/23 at Nursing Facility.    PCP notified. Called all providers to cancel services.    For [facility]:  Death Notification form completed and faxed to [facility].    Chart reviewed and this CC's encounter closed. Chart handed off to CMS to process disenrollment tasks.    Angela Amaro RN, PHN  Intuitional Care Coordinator [facility]  Cell [phone] Fax [phone]

## 2023-12-04 ENCOUNTER — LAB REQUISITION (OUTPATIENT)
Dept: LAB | Facility: CLINIC | Age: 83
End: 2023-12-04
Payer: COMMERCIAL

## 2023-12-04 DIAGNOSIS — E11.9 TYPE 2 DIABETES MELLITUS WITHOUT COMPLICATIONS (H): ICD-10-CM
